# Patient Record
Sex: FEMALE | Race: WHITE | NOT HISPANIC OR LATINO | Employment: OTHER | URBAN - METROPOLITAN AREA
[De-identification: names, ages, dates, MRNs, and addresses within clinical notes are randomized per-mention and may not be internally consistent; named-entity substitution may affect disease eponyms.]

---

## 2017-01-16 ENCOUNTER — HOSPITAL ENCOUNTER (EMERGENCY)
Facility: OTHER | Age: 57
Discharge: HOME | End: 2017-01-16
Attending: EMERGENCY MEDICINE
Payer: MEDICARE

## 2017-01-16 VITALS
DIASTOLIC BLOOD PRESSURE: 75 MMHG | BODY MASS INDEX: 43.43 KG/M2 | OXYGEN SATURATION: 99 % | WEIGHT: 230 LBS | HEART RATE: 74 BPM | HEIGHT: 61 IN | TEMPERATURE: 99 F | RESPIRATION RATE: 18 BRPM | SYSTOLIC BLOOD PRESSURE: 122 MMHG

## 2017-01-16 DIAGNOSIS — R52 PAIN: ICD-10-CM

## 2017-01-16 PROCEDURE — 99283 EMERGENCY DEPT VISIT LOW MDM: CPT

## 2017-01-16 NOTE — ED AVS SNAPSHOT
[facility] EMERGENCY DEPARTMENT  [address] [place] [address]               Robert Smith   2017 10:45 PM   ED    Description:  Female : 1960   Department:  [facility] Emergency Department           Your Care was Coordinated By:     Provider Role From To    Davis Barrett MD Attending Provider 17 [phone] --      Reason for Visit     Fall           Diagnoses this Visit        Comments    Pain           ED Disposition     ED Disposition Condition Comment    Discharge             To Do List           Follow-up Information     Follow up with Tani Chan MD In 3 days.    Specialty:  Family Medicine    Contact information:    [place] [address]  [phone]        [facility] On Call     [facility] On Call Nurse Care Line -  Assistance  Registered nurses in the [facility] provide clinical advisement, health education, appointment booking, and other advisory services.  Call for this free service at [phone].             Medications           Message regarding Medications     Verify the changes and/or additions to your medication regime listed below are the same as discussed with your clinician today.  If any of these changes or additions are incorrect, please notify your healthcare provider.        STOP taking these medications     butalbital-acetaminophen-caffeine -40 mg (FIORICET, ESGIC) -40 mg per tablet     busPIRone (BUSPAR) 7.5 MG tablet     amitriptyline (ELAVIL) 25 MG tablet 25 mg every evening.     aspirin 325 MG tablet Take 1 tablet (325 mg total) by mouth 2 (two) times daily.           Verify that the below list of medications is an accurate representation of the medications you are currently taking.  If none reported, the list may be blank. If incorrect, please contact your healthcare provider. Carry this list with you in case of emergency.           Current Medications     rivaroxaban (XARELTO) 20 mg Tab Take 20 mg by  "mouth daily with dinner or evening meal.    ADVAIR DISKUS 250-50 mcg/dose diskus inhaler     albuterol sulfate 2.5 mg/0.5 mL Nebu     alprazolam (XANAX) 0.5 MG tablet Take 0.5 mg by mouth 3 (three) times daily as needed.      cyclobenzaprine (FLEXERIL) 10 MG tablet 10 mg every evening.     DOC-Q-LACE 100 mg capsule TAKE one capsule by mouth once a day    esomeprazole (NEXIUM) 40 MG capsule Take 40 mg by mouth before breakfast.      fluticasone (FLONASE) 50 mcg/actuation nasal spray as needed.     gabapentin (NEURONTIN) 300 MG capsule 300 mg 3 (three) times daily.     hydrocodone-acetaminophen 10-325mg (NORCO)  mg Tab     hydroxychloroquine (PLAQUENIL) 200 mg tablet Take 1 tablet (200 mg total) by mouth 2 (two) times daily.    levocetirizine (XYZAL) 5 MG tablet 5 mg every evening.     meclizine (ANTIVERT) 25 mg tablet     PAZEO 0.7 % Drop     predniSONE (DELTASONE) 5 MG tablet 5 mg.     promethazine (PHENERGAN) 12.5 MG Tab Take 1 tablet (12.5 mg total) by mouth every 6 (six) hours as needed (for nausea).    SODIUM CHLORIDE FOR INHALATION (SODIUM CHLORIDE 0.9%) 0.9 % nebulizer solution     tizanidine (ZANAFLEX) 4 MG tablet 4 mg as needed.     tofacitinib (XELJANZ XR) 11 mg Tb24 Take 11 mg by mouth once daily.           Clinical Reference Information           Your Vitals Were     BP Pulse Temp Resp Height Weight    122/75 74 99.3 °F (37.4 °C) (Oral) 18 5' 1" (1.549 m) 104.3 kg (230 lb)    SpO2 BMI             99% 43.46 kg/m2         Allergies as of 1/16/2017        Reactions    Sulfa (Sulfonamide Antibiotics) Nausea And Vomiting    Clindamycin Diarrhea    Daypro [Oxaprozin] Nausea Only    Enbrel [Etanercept] Other (See Comments)    Other reaction(s): odd thoughts  Odd thoughts    Orencia  [Abatacept]     Other reaction(s): Muscle pain    Percocet  [Oxycodone-acetaminophen]     Other reaction(s): Vomiting    Simponi  [Golimumab]     Other reaction(s): Unknown    Ciprofloxacin Rash    Sulfamethoxazole-trimethoprim " Rash      Immunizations Administered on Date of Encounter - 1/16/2017     None      ED Micro, Lab, POCT     None      ED Imaging Orders     Start Ordered       Status Ordering Provider    01/16/17 2254 01/16/17 2253  X-Ray Lumbar Spine Ap And Lateral  1 time imaging      In process       Discharge References/Attachments     BACK PAIN (ACUTE OR CHRONIC) (ENGLISH)      Your Scheduled Appointments     Jan 23, 2017 10:45 AM CST   Established Patient Visit with MD Deepak Farias - Orthopedics ([facility] )    [address]  [facility] [address]   [phone]               [facility] Emergency Department complies with applicable Federal civil rights laws and does not discriminate on the basis of race, color, national origin, age, disability, or sex.        Language Assistance Services     ATTENTION: Language assistance services are available, free of charge. Please call [phone].      ATENCIÓN: Si habla español, tiene a holland disposición servicios gratuitos de asistencia lingüística. Llame al [phone].     CHÚ Ý: N?u b?n nói Ti?ng Vi?t, có các d?ch v? h? tr? ngôn ng? mi?n phí dành cho b?n. G?i s? [phone].

## 2017-01-17 NOTE — ED PROVIDER NOTES
Encounter Date: 1/16/2017       History   No chief complaint on file.    Review of patient's allergies indicates:   Allergen Reactions    Sulfa (sulfonamide antibiotics) Nausea And Vomiting    Clindamycin Diarrhea    Daypro [oxaprozin] Nausea Only    Enbrel [etanercept] Other (See Comments)     Other reaction(s): odd thoughts  Odd thoughts    Orencia  [abatacept]      Other reaction(s): Muscle pain    Percocet  [oxycodone-acetaminophen]      Other reaction(s): Vomiting    Simponi  [golimumab]      Other reaction(s): Unknown    Ciprofloxacin Rash    Sulfamethoxazole-trimethoprim Rash     Patient is a 56 y.o. female presenting with the following complaint: back pain. The history is provided by the patient.   Back Pain    This is a new problem. The current episode started just prior to arrival. The problem occurs constantly. The problem has been unchanged. The pain is associated with falling (Tripped up by dog and fell on buttocks). The pain is present in the gluteal region. The quality of the pain is described as stabbing. The pain does not radiate. The pain is at a severity of 4/10. The symptoms are aggravated by bending, twisting and certain positions. The pain is the same all the time. Pertinent negatives include no bowel incontinence, no perianal numbness, no bladder incontinence, no paresthesias, no paresis, no tingling and no weakness. She has tried nothing for the symptoms. The treatment provided no relief. Risk factors include obesity, lack of exercise, poor posture and a sedentary lifestyle.     Past Medical History   Diagnosis Date    Acid reflux     Allergy     Anxiety     Arthritis     Degenerative disc disease     Depression     DVT of leg (deep venous thrombosis) 2009     rt leg    Fibromyalgia     Long-term current use of steroids 11/12/2012    Osteoporosis, postmenopausal      chronic steroid use    RA (rheumatoid arthritis)     Rheumatoid arteritis 2003     Past Medical History  Pertinent Negatives   Diagnosis Date Noted    *Atrial fibrillation 9/24/2012    Alopecia 9/24/2012    Asthma 9/24/2012    Blood transfusion 9/24/2012    Cataract 9/24/2012    CHF (congestive heart failure) 9/24/2012    Chronic kidney disease 9/24/2012    Clotting disorder 9/24/2012    COPD (chronic obstructive pulmonary disease) 9/24/2012    DEMENTIA 9/24/2012    Diabetes mellitus 9/24/2012    Dry eyes 9/24/2012    Dry mouth 9/24/2012    Emphysema of lung 9/24/2012    Glaucoma 9/24/2012    Heart murmur 9/24/2012    HIV infection 9/24/2012    Meningitis 9/24/2012    Myocardial infarction 9/24/2012    Neuromuscular disorder 9/24/2012    Osteoporosis 9/24/2012    Pulmonary embolism 9/24/2012    Scalp tenderness 9/24/2012    Seizures 9/24/2012    Sickle cell anemia 9/24/2012    Stroke 9/24/2012    Substance abuse 9/24/2012    Thyroid disease 9/24/2012    Tuberculosis 9/24/2012    Ulcer 9/24/2012     Past Surgical History   Procedure Laterality Date    Back surgery      Tonsillectomy      Spine surgery      Tubal ligation      Cholecystectomy      Ankle fracture surgery      Hardware removal      Carpal tunnel release      Joint replacement Right      right knee    Knee surgery       Bilateral    Knee surgery Left      revision x 2     Family History   Problem Relation Age of Onset    COPD Mother     Heart attack Father     Emphysema Maternal Grandfather     Breast cancer Neg Hx     Colon cancer Neg Hx     Ovarian cancer Neg Hx      Social History   Substance Use Topics    Smoking status: Never Smoker    Smokeless tobacco: Never Used    Alcohol use No     Review of Systems   Constitutional: Negative.    HENT: Negative.    Eyes: Negative.    Respiratory: Negative.    Cardiovascular: Negative.    Gastrointestinal: Negative.  Negative for bowel incontinence.   Endocrine: Negative.    Genitourinary: Negative.  Negative for bladder incontinence.   Musculoskeletal: Positive for  back pain.   Skin: Negative.    Allergic/Immunologic: Negative.    Neurological: Negative.  Negative for tingling, weakness and paresthesias.   Hematological: Negative.    Psychiatric/Behavioral: Negative.    All other systems reviewed and are negative.      Physical Exam   Initial Vitals   BP Pulse Resp Temp SpO2   -- -- -- -- --            Physical Exam    Nursing note and vitals reviewed.  Constitutional: Vital signs are normal. She appears well-developed. She is active and cooperative.   HENT:   Head: Normocephalic and atraumatic.   Eyes: Conjunctivae, EOM and lids are normal. Pupils are equal, round, and reactive to light.   Neck: Trachea normal and full passive range of motion without pain. Neck supple. No thyroid mass present.   Cardiovascular: Normal rate, regular rhythm, S1 normal, S2 normal, normal heart sounds, intact distal pulses and normal pulses.   Abdominal: Soft. Normal appearance, normal aorta and bowel sounds are normal.   Musculoskeletal:        Lumbar back: She exhibits decreased range of motion, tenderness and spasm. She exhibits no bony tenderness, no swelling and no deformity.   Lymphadenopathy:     She has no axillary adenopathy.   Neurological: She is alert and oriented to person, place, and time.   Skin: Skin is warm, dry and intact.   Psychiatric: She has a normal mood and affect. Her speech is normal and behavior is normal. Judgment and thought content normal. Cognition and memory are normal.         ED Course   Procedures  Labs Reviewed - No data to display          Medical Decision Making:   Clinical Tests:   Radiological Study: Ordered and Reviewed  ED Management:  X-ray of the LS-spine is unremarkable  The patient already has narcotic pain medication muscle relaxers at home.                   ED Course     Clinical Impression:   The encounter diagnosis was Pain.          Davis Barrett MD  01/16/17 0509

## 2017-01-17 NOTE — ED TRIAGE NOTES
Pt presents to ER with c/o lower back pain and rt wrist pain after tripping over a puppy and falling earlier.  She has mild swelling to rt wrist and stiffness to back.  Was ambulatory to exam room.

## 2017-01-23 ENCOUNTER — OFFICE VISIT (OUTPATIENT)
Dept: ORTHOPEDICS | Facility: CLINIC | Age: 57
End: 2017-01-23
Payer: MEDICARE

## 2017-01-23 VITALS
HEIGHT: 61 IN | BODY MASS INDEX: 43.93 KG/M2 | HEART RATE: 76 BPM | RESPIRATION RATE: 18 BRPM | WEIGHT: 232.69 LBS | DIASTOLIC BLOOD PRESSURE: 83 MMHG | SYSTOLIC BLOOD PRESSURE: 129 MMHG

## 2017-01-23 DIAGNOSIS — Z96.652 STATUS POST REVISION OF TOTAL REPLACEMENT OF LEFT KNEE: Primary | ICD-10-CM

## 2017-01-23 DIAGNOSIS — E66.01 OBESITY, MORBID, BMI 40.0-49.9: ICD-10-CM

## 2017-01-23 PROCEDURE — 1159F MED LIST DOCD IN RCRD: CPT | Mod: S$GLB,,, | Performed by: ORTHOPAEDIC SURGERY

## 2017-01-23 PROCEDURE — 99999 PR PBB SHADOW E&M-EST. PATIENT-LVL III: CPT | Mod: PBBFAC,,, | Performed by: ORTHOPAEDIC SURGERY

## 2017-01-23 PROCEDURE — 99213 OFFICE O/P EST LOW 20 MIN: CPT | Mod: S$GLB,,, | Performed by: ORTHOPAEDIC SURGERY

## 2017-01-23 RX ORDER — ZOLPIDEM TARTRATE 10 MG/1
TABLET ORAL
Refills: 0 | COMMUNITY
Start: 2017-01-09 | End: 2017-12-20

## 2017-01-23 NOTE — PROGRESS NOTES
"Subjective:      Patient ID: Robert Smith is a 56 y.o. female.    Chief Complaint: left knee (follow up revision 9-8-16)  SHe is on Xarelto now.  HPI  Robert Smith has left knee pain.  The pain has improved. The pain is located in the medial aspect of the knee.  There is is radiation.  The pain radaites to the thigh.  There is not associated stiffness.   There is not catching and locking. The pain is described as burning. The pain is aggravated by standing and walking .  It is alleviated by rest.  There is associated back pain.  She fell recently and aggravated her back.  Her history, medications and problem list were reviewed.    Review of Systems   Constitution: Negative for chills, fever and night sweats.   HENT: Negative for headaches and hearing loss.    Eyes: Negative for blurred vision and double vision.   Cardiovascular: Negative for chest pain, claudication and leg swelling.   Respiratory: Negative for shortness of breath.    Endocrine: Negative for polydipsia, polyphagia and polyuria.   Hematologic/Lymphatic: Negative for adenopathy and bleeding problem. Does not bruise/bleed easily.   Skin: Negative for poor wound healing.   Musculoskeletal: Positive for joint pain.   Gastrointestinal: Negative for diarrhea and heartburn.   Genitourinary: Negative for bladder incontinence.   Neurological: Negative for focal weakness, numbness, paresthesias and sensory change.   Psychiatric/Behavioral: The patient is not nervous/anxious.    Allergic/Immunologic: Negative for persistent infections.         Objective:      Body mass index is 43.97 kg/(m^2).  Vitals:    01/23/17 1000   BP: 129/83   BP Location: Right arm   Patient Position: Sitting   BP Method: Automatic   Pulse: 76   Resp: 18   Weight: 105.6 kg (232 lb 11.1 oz)   Height: 5' 1" (1.549 m)           General    Constitutional: She is oriented to person, place, and time.   obese   HENT:   Head: Normocephalic and atraumatic.   Eyes: EOM are normal.   Cardiovascular: " Normal rate and regular rhythm.    Pulmonary/Chest: Effort normal.   Neurological: She is alert and oriented to person, place, and time.   Psychiatric: She has a normal mood and affect.     General Musculoskeletal Exam   Gait: normal       Right Knee Exam     Inspection   Erythema: absent  Scars: present  Swelling: absent  Effusion: effusion  Deformity: deformity  Bruising: absent    Tenderness   The patient is experiencing no tenderness.         Range of Motion   Extension: 0   Flexion: 110     Tests   Ligament Examination Lachman: normal (-1 to 2mm)   MCL - Valgus: normal (0 to 2mm)  LCL - Varus: normal  Patella   Passive Patellar Tilt: neutral    Other   Sensation: normal    Left Knee Exam     Inspection   Erythema: absent  Scars: present  Swelling: absent  Effusion: absent  Deformity: deformity  Bruising: absent    Tenderness   The patient tender to palpation of the medial retinaculum.    Range of Motion   Extension: 0   Flexion: 120     Tests   Stability Lachman: normal (-1 to 2mm)   MCL - Valgus: normal (0 to 2mm)  LCL - Varus: normal (0 to 2mm)  Patella   Passive Patellar Tilt: neutral    Other   Sensation: normal    Muscle Strength   Right Lower Extremity   Hip Abduction: 5/5   Quadriceps:  5/5   Hamstrin/5   Left Lower Extremity   Hip Abduction: 5/5   Quadriceps:  4/5   Hamstrin/5     Reflexes     Left Side  Quadriceps:  2+    Right Side   Quadriceps:  2+    Vascular Exam     Right Pulses  Dorsalis Pedis:      2+          Left Pulses  Dorsalis Pedis:      2+          Edema  Right Lower Leg: absent  Left Lower Leg: absent              Assessment:       Encounter Diagnosis   Name Primary?    Status post revision of total replacement of left knee Yes          Plan:       Robert was seen today for left knee.    Diagnoses and all orders for this visit:    Status post revision of total replacement of left knee      She is going to wait until her back pain improves and if her knee pain persists she will  call and  I will send her back to PT for strengthening.    Otherwise, f/u in three months with knee xrays

## 2017-01-24 DIAGNOSIS — M54.50 LUMBAR SPINE PAIN: Primary | ICD-10-CM

## 2017-02-01 ENCOUNTER — TELEPHONE (OUTPATIENT)
Dept: ORTHOPEDICS | Facility: CLINIC | Age: 57
End: 2017-02-01

## 2017-02-01 NOTE — TELEPHONE ENCOUNTER
----- Message from Germán Zamora sent at 2/1/2017  9:30 AM CST -----  Contact: patient  Pt called regarding experiencing pain in her rt knee and leg

## 2017-02-09 ENCOUNTER — HOSPITAL ENCOUNTER (OUTPATIENT)
Dept: RADIOLOGY | Facility: HOSPITAL | Age: 57
Discharge: HOME | End: 2017-02-09
Attending: ORTHOPAEDIC SURGERY
Payer: MEDICARE

## 2017-02-09 ENCOUNTER — INITIAL CONSULT (OUTPATIENT)
Dept: ORTHOPEDICS | Facility: CLINIC | Age: 57
End: 2017-02-09
Payer: MEDICARE

## 2017-02-09 VITALS
HEIGHT: 61 IN | HEART RATE: 74 BPM | SYSTOLIC BLOOD PRESSURE: 148 MMHG | DIASTOLIC BLOOD PRESSURE: 85 MMHG | BODY MASS INDEX: 42.76 KG/M2 | WEIGHT: 226.5 LBS

## 2017-02-09 DIAGNOSIS — M54.50 LUMBAR SPINE PAIN: ICD-10-CM

## 2017-02-09 DIAGNOSIS — Z98.1 HISTORY OF LUMBAR FUSION: Primary | ICD-10-CM

## 2017-02-09 DIAGNOSIS — Z96.652 STATUS POST REVISION OF TOTAL REPLACEMENT OF LEFT KNEE: ICD-10-CM

## 2017-02-09 PROCEDURE — 99214 OFFICE O/P EST MOD 30 MIN: CPT | Mod: S$GLB,,, | Performed by: PHYSICIAN ASSISTANT

## 2017-02-09 PROCEDURE — 99999 PR PBB SHADOW E&M-EST. PATIENT-LVL IV: CPT | Mod: PBBFAC,,, | Performed by: PHYSICIAN ASSISTANT

## 2017-02-09 PROCEDURE — 72100 X-RAY EXAM L-S SPINE 2/3 VWS: CPT | Mod: 26,,, | Performed by: RADIOLOGY

## 2017-02-09 NOTE — PROGRESS NOTES
DATE: 2/9/2017  PATIENT: Robert Smith    Supervising Physician: Rogers Rodríguez M.D.    CHIEF COMPLAINT: back pain    HISTORY:  Robert Smith is a 56 y.o. female s/p L5/S1 anterior spinal fusion more than 15 years ago by Dr. Bentley, s/p left TKA revision 6/2/2016 and 9/8/2016 by Dr. Patterson here for initial evaluation of low back pain (Back - 6, Leg - 0). The pain has been present for about a month.  She tripped over her dog and fell backwards, landing on her back.  The patient describes the pain as aching.  The pain is worse with sitting and standing and improved by rest. There is no associated numbness and tingling. There is subjective weakness in the bilateral lower extremities since her knee surgery. Prior treatments have included norco, gabapentin and zanaflex, but no physical therapy or ESIs.  She also has a PMH of fibromyalgia and rheumatoid arthritis for which she takes plaquenil and xeljanz.     The patient denies myelopathic symptoms such as handwriting changes or difficulty with buttons/coins/keys. Denies perineal paresthesias, bowel/bladder dysfunction.    PAST MEDICAL/SURGICAL HISTORY:  Past Medical History   Diagnosis Date    Acid reflux     Allergy     Anxiety     Arthritis     Degenerative disc disease     Depression     DVT of leg (deep venous thrombosis) 2009     rt leg    Fibromyalgia     Long-term current use of steroids 11/12/2012    Osteoporosis, postmenopausal      chronic steroid use    RA (rheumatoid arthritis)     Rheumatoid arteritis 2003     Past Surgical History   Procedure Laterality Date    Back surgery      Tonsillectomy      Spine surgery      Tubal ligation      Cholecystectomy      Ankle fracture surgery      Hardware removal      Carpal tunnel release      Joint replacement Right      right knee    Knee surgery       Bilateral    Knee surgery Left      revision x 2       Medications:   Current Outpatient Prescriptions on File Prior to Visit   Medication Sig  Dispense Refill    ADVAIR DISKUS 250-50 mcg/dose diskus inhaler       albuterol sulfate 2.5 mg/0.5 mL Nebu       alprazolam (XANAX) 0.5 MG tablet Take 0.5 mg by mouth 3 (three) times daily as needed.        esomeprazole (NEXIUM) 40 MG capsule Take 40 mg by mouth before breakfast.        fluticasone (FLONASE) 50 mcg/actuation nasal spray as needed.       gabapentin (NEURONTIN) 300 MG capsule 300 mg 3 (three) times daily.       hydrocodone-acetaminophen 10-325mg (NORCO)  mg Tab       hydroxychloroquine (PLAQUENIL) 200 mg tablet Take 1 tablet (200 mg total) by mouth 2 (two) times daily. 60 tablet 4    levocetirizine (XYZAL) 5 MG tablet 5 mg every evening.       meclizine (ANTIVERT) 25 mg tablet       PAZEO 0.7 % Drop       predniSONE (DELTASONE) 5 MG tablet 5 mg.       promethazine (PHENERGAN) 12.5 MG Tab Take 1 tablet (12.5 mg total) by mouth every 6 (six) hours as needed (for nausea). 30 tablet 0    rivaroxaban (XARELTO) 20 mg Tab Take 20 mg by mouth daily with dinner or evening meal.      SODIUM CHLORIDE FOR INHALATION (SODIUM CHLORIDE 0.9%) 0.9 % nebulizer solution       tofacitinib (XELJANZ XR) 11 mg Tb24 Take 11 mg by mouth once daily.      zolpidem (AMBIEN) 10 mg Tab TK 1 T PO QHS PRF SLEEP  0    cyclobenzaprine (FLEXERIL) 10 MG tablet 10 mg every evening.       DOC-Q-LACE 100 mg capsule TAKE one capsule by mouth once a day 30 capsule 3    tizanidine (ZANAFLEX) 4 MG tablet 4 mg as needed.       [DISCONTINUED] warfarin (COUMADIN) 1 MG tablet        No current facility-administered medications on file prior to visit.        Social History:   Social History     Social History    Marital status:      Spouse name: N/A    Number of children: N/A    Years of education: N/A     Occupational History    Not on file.     Social History Main Topics    Smoking status: Never Smoker    Smokeless tobacco: Never Used    Alcohol use No    Drug use: No    Sexual activity: No     Other  "Topics Concern    Not on file     Social History Narrative       REVIEW OF SYSTEMS:  Constitution: Negative. Negative for chills, fever and night sweats.   Cardiovascular: Negative for chest pain and syncope.   Respiratory: Negative for cough and shortness of breath.   Gastrointestinal: See HPI. Negative for nausea/vomiting. Negative for abdominal pain.  Genitourinary: See HPI. Negative for discoloration or dysuria.  Skin: Negative for dry skin, itching and rash.   Hematologic/Lymphatic: Negative for bleeding problem. Does not bruise/bleed easily.   Musculoskeletal: Negative for falls and muscle weakness.   Neurological: See HPI. No seizures.   Endocrine: Negative for polydipsia, polyphagia and polyuria.   Allergic/Immunologic: Negative for hives and persistent infections.     EXAM:  Visit Vitals    BP (!) 148/85 (BP Location: Right arm, Patient Position: Sitting, BP Method: Automatic)    Pulse 74    Ht 5' 1" (1.549 m)    Wt 102.7 kg (226 lb 8.4 oz)    BMI 42.8 kg/m2       General: The patient is a very pleasant 56 y.o. female in no apparent distress, the patient is oriented to person, place and time.  Psych: Normal mood and affect  HEENT: Vision grossly intact, hearing intact to the spoken word.  Lungs: Respirations unlabored.  Gait: Normal station and gait, no difficulty with toe or heel walk.   Skin: Dorsal lumbar skin negative for rashes, lesions, hairy patches and surgical scars. There is mild lumbar tenderness to palpation.  Range of motion: Lumbar range of motion is acceptable.  Spinal Balance: Global saggital and coronal spinal balance acceptable, not significant for scoliosis and kyphosis.  Musculoskeletal: No pain with the range of motion of the bilateral hips. No trochanteric tenderness to palpation.  Vascular: Bilateral lower extremities warm and well perfused, dorsalis pedis pulses 2+ bilaterally.  Neurological: Normal strength and tone in all major motor groups in the bilateral lower extremities. " Normal sensation to light touch in the L2-S1 dermatomes bilaterally.  Deep tendon reflexes symmetric 1+ in the bilateral lower extremities.  Negative Babinski bilaterally. Straight leg raise negative bilaterally.    IMAGING:      Today I personally reviewed AP, Lat and Flex/Ex  upright L-spine films that demonstrate L5/S1 anterior spinal fusion with plate and screws in place, no evidence of failure.      ASSESSMENT/PLAN:    Diagnoses and all orders for this visit:    History of lumbar fusion  -     Ambulatory Referral to Physical/Occupational Therapy    Status post revision of total replacement of left knee  -     Ambulatory Referral to Physical/Occupational Therapy      The patient is having back pain since falling about a month ago.  There is no evidence of hardware failure on imaging.  No neurologic deficits on exam.  We will treat conservatively for now with medications and physical therapy.  Order placed for PT at [facility].  Continue pain medications as prescribed by pain management.  Follow up after therapy if symptoms persist.       Return if symptoms worsen or fail to improve.

## 2017-02-09 NOTE — LETTER
February 9, 2017      Mohit Patterson MD  [address]  [place] [address]           [facility]  [address]  [facility] [address]  Phone: [phone]          Patient: Robert Smith   MR Number: [id number]   YOB: 1960   Date of Visit: 2/9/2017       Dear Dr. Mohit Patterson:    Thank you for referring Robert Smith to me for evaluation. Attached you will find relevant portions of my assessment and plan of care.    If you have questions, please do not hesitate to call me. I look forward to following Robert Smith along with you.    Sincerely,    Melany Macias PA-C    Enclosure  CC:  No Recipients    If you would like to receive this communication electronically, please contact [email or web address] or [phone] to request more information on [facility] [employer] access.    For providers and/or their staff who would like to refer a patient to [facility], please contact us through our one-stop-shop provider referral line, [facility], at [phone].    If you feel you have received this communication in error or would no longer like to receive these types of communications, please e-mail [email or web address]

## 2017-03-08 ENCOUNTER — CLINICAL SUPPORT (OUTPATIENT)
Dept: REHABILITATION | Facility: HOSPITAL | Age: 57
End: 2017-03-08
Attending: NURSE PRACTITIONER
Payer: MEDICARE

## 2017-03-08 DIAGNOSIS — M54.50 RIGHT-SIDED LOW BACK PAIN WITHOUT SCIATICA, UNSPECIFIED CHRONICITY: ICD-10-CM

## 2017-03-08 PROCEDURE — G8990 OTHER PT/OT CURRENT STATUS: HCPCS | Mod: CK,PN | Performed by: PHYSICAL THERAPIST

## 2017-03-08 PROCEDURE — 97162 PT EVAL MOD COMPLEX 30 MIN: CPT | Mod: PN | Performed by: PHYSICAL THERAPIST

## 2017-03-08 PROCEDURE — G8991 OTHER PT/OT GOAL STATUS: HCPCS | Mod: CK,PN | Performed by: PHYSICAL THERAPIST

## 2017-03-08 NOTE — PROGRESS NOTES
Physical Therapy Initial Evaluation     Name: Robert Smith  Clinic Number: [id number]    Diagnosis:   Encounter Diagnosis   Name Primary?    Right-sided low back pain without sciatica, unspecified chronicity      Physician: Rogers Rodríguez MD  Treatment Orders: PT Eval and Treat  Past Medical History:   Diagnosis Date    Acid reflux     Allergy     Anxiety     Arthritis     Degenerative disc disease     Depression     DVT of leg (deep venous thrombosis) 2009    rt leg    Fibromyalgia     Long-term current use of steroids 11/12/2012    Osteoporosis, postmenopausal     chronic steroid use    RA (rheumatoid arthritis)     Rheumatoid arteritis 2003     Current Outpatient Prescriptions   Medication Sig    ADVAIR DISKUS 250-50 mcg/dose diskus inhaler     albuterol sulfate 2.5 mg/0.5 mL Nebu     alprazolam (XANAX) 0.5 MG tablet Take 0.5 mg by mouth 3 (three) times daily as needed.      cyclobenzaprine (FLEXERIL) 10 MG tablet 10 mg every evening.     DOC-Q-LACE 100 mg capsule TAKE one capsule by mouth once a day    esomeprazole (NEXIUM) 40 MG capsule Take 40 mg by mouth before breakfast.      fluticasone (FLONASE) 50 mcg/actuation nasal spray as needed.     gabapentin (NEURONTIN) 300 MG capsule 300 mg 3 (three) times daily.     hydrocodone-acetaminophen 10-325mg (NORCO)  mg Tab     hydroxychloroquine (PLAQUENIL) 200 mg tablet Take 1 tablet (200 mg total) by mouth 2 (two) times daily.    levocetirizine (XYZAL) 5 MG tablet 5 mg every evening.     meclizine (ANTIVERT) 25 mg tablet     PAZEO 0.7 % Drop     predniSONE (DELTASONE) 5 MG tablet 5 mg.     promethazine (PHENERGAN) 12.5 MG Tab Take 1 tablet (12.5 mg total) by mouth every 6 (six) hours as needed (for nausea).    rivaroxaban (XARELTO) 20 mg Tab Take 20 mg by mouth daily with dinner or evening meal.    SODIUM CHLORIDE FOR INHALATION (SODIUM CHLORIDE 0.9%) 0.9 % nebulizer solution      tizanidine (ZANAFLEX) 4 MG tablet 4 mg as needed.     tofacitinib (XELJANZ XR) 11 mg Tb24 Take 11 mg by mouth once daily.    zolpidem (AMBIEN) 10 mg Tab TK 1 T PO QHS PRF SLEEP    [DISCONTINUED] warfarin (COUMADIN) 1 MG tablet      No current facility-administered medications for this visit.      Review of patient's allergies indicates:   Allergen Reactions    Sulfa (sulfonamide antibiotics) Nausea And Vomiting    Clindamycin Diarrhea    Daypro [oxaprozin] Nausea Only    Enbrel [etanercept] Other (See Comments)     Other reaction(s): odd thoughts  Odd thoughts    Orencia  [abatacept]      Other reaction(s): Muscle pain    Percocet  [oxycodone-acetaminophen]      Other reaction(s): Vomiting    Simponi  [golimumab]      Other reaction(s): Unknown    Ciprofloxacin Rash    Sulfamethoxazole-trimethoprim Rash           Start Time:  1215  Stop Time:  1315  Total Timed Minutes:  60      OUTPATIENT PHYSICAL THERAPY   PATIENT EVALUATION      Subjective     Patient states:  Low back pain mainly right sided that is intermittent.   Onset: sudden, 1/2017. Tripped over her dog and fell backwards. Immediate pain. Currently improved.   Radicular symptoms:  No   Aggravating factors:  Standing for a prolonged period of time or sitting also for a prolonged period   Easing factors:  Lye down or walk, change of positions.  Pain Scale: Patient rates pain on a scale of 0-10 to be  0 currently   4 at worst ;   0 at best .    Prior Therapy: yes, left knee. Last session 12/2018   Home Environment (Steps/Adaptations): no stairs   Functional Deficits Leading to Referral:   Personal - no limitation   Domestic - mopping, stand to cook only for 10 minutes, bending to get pots in cabinets   Community / social  - no limitation from low back pain   Recreational / Fitness / Sports - has not participated   Prior functional status: patient had been limited due to hospitalization for blood clots in the left LE                   Pts goals:   "To be pain free and be able to do the "good stuff"  Past History: Blood clots x3 12/2016. Treated with meds to resolve the clots. Presently there is resolution of the clots. Bilateral TKR, the first was the left in '09 and the second on the right four months later. The right knee became infected following the surgery. Two years later, in 2011, a second surgery was performed on the right knee to replace the knee prosthesis. While in therapy for the knee she stepped off a piece of exercise equipment awarkwardly falling and fracturing the right ankle. Surgery was performed, ORIF, on the same day. She relates to having back surgery fifteen years ago for a lumbar fusion    Objective     Posture Alignment: forward head, increased upper and mid thoracic kyphosis, reduced lumbar lordosis and right lateral shift.   Sensation: Light Touch: Intact  Palpation: pain over L4-5, right TL PSM and superior gluteal region.      Lumbar/Thoracic AROM: Pain/Dysfunction with Movement:   Flexion                                75 / 40 DP   Extension                           30/20 DP   Right side bending                 30 P    Left side bending                    40 NP   Right rotation DP    Left rotation DN       LOWER EXTREMITY STRENGTH:   Left Right   Quadriceps 5/5 5/5   Hamstrings 4+/5 4+/5     EHL  5/5 5/5   Anterior tibialis 4+/5 4+/5   Peroneals   4+/5 4+/5   Iliopsoas   3+/5 3+/5   Hip Ext 3/5 3+/5       DTR's:   Left Right   Patella Tendon 1+ 1+   Achilles Tendon 1+ 1+           Selective Functional Movement Assessment:  FN: functional, non-painful  FP: functional, painful  DP: dysfunctional, painful  DN: dysfunctional, non-painful  Multi-Segmental Flexion: see above  Multi-Segmental Extension: see above   Multi-Segmental Rotation:   Right:see above   Left:see above     FLEXIBILITY  Hamstrings 90 BLE    Hip flexors ( psoas)   BLE   moderate        IT band normal BLE         SEGMENTAL MOBILITY: hypomobility of the lumbar " segments  Special Tests   Left Right   SLR Neg   Neg        Lasegue Neg  Neg    Nas Neg  Neg        GAIT: ambulates with no assistive device with independently.     GAIT DEVIATIONS: displays no significant deviation associated with her low back.    Pt/family was provided educational information, including: role of PT, goals for PT, scheduling - pt verbalized understanding.     TREATMENT     PT Evaluation Completed? Yes  Discussed Plan of Care with patient: Yes      Assessment     Patient is referred with low back pain. Clinical findings demonstrate no neurological deficits. There is limited trunk mobility likely related decreased segmental mobility and tightness in the back extensors. Also with noted weakness in the core stabilizers and trunk mobilizers.   Pt presents with signs and symptoms consistent with referring diagnosis. Evaluation has determined a decrease in functional status and subjective and objective deficits that can be addressed by physical therapy intervention. Pt demonstrates pain limiting functional activities. Decreased flexibility and strength limiting normal movement patterns. Decreased segmental motion. Decreased postural strength and awareness.  Decreased participation in functional and recreational activities. Subjective and objective measures are addressed by goals in the plan of care. Patient/family are involved in the development of these goals. Patient/family are educated about current injury and treatment.    Pt prognosis is Good.  Pt will benefit from skilled outpatient physical therapy to address the above stated deficits, provide pt/family education and to maximize pt's level of independence. Pt's spiritual, cultural and educational needs considered and pt agreeable to plan of care and goals as stated below and currently has no barriers to learning       Medical necessity is demonstrated by the following IMPAIRMENTS/PROBLEMS:  weakness, impaired functional mobility, pain, decreased  ROM and decreasd segmental mobility of the lumbar segments, decreased flexibility bilateral hip flexors.         History  Co-morbidities and personal factors that may impact the plan of care Examination  Body Structures and Functions, activity limitations and participation restrictions that may impact the plan of care Clinical Presentation   Decision Making/ Complexity Score   Co-morbidities:  depression, osteoarthritis and fibromyalgia, s/p TKA BLE, s/p lumbar fusion, DVT    Personal Factors:   Concern for resumption of left knee rehab Body Regions: back    Body Systems: musculoskeletal and neuromuscular          Activity limitations: prolonged standing and sitting    Participation Restrictions:   mopping, stand to cook only for 10 minutes, bending to get pots in cabinets          Evolving clinical presentation with changing clinical characteristics.  Pain   Complexity:  Mod            Short Term GOALS: 6 weeks. Pt agrees with goals set.  1. Decreased pain 20-40%   2. Demonstrate spinal stability with core activation during transitional movements.   3. Improved sit to stand quality without back pain  4. Patient demonstrates independence with HEP.   5. Patient demonstrates independence with Postural Awareness.   6. Patient demonstrates independence with body mechanics.     Long Term GOALS: 12 weeks. Pt agrees with goals set.  1. Patient demonstrates increased LE flexibility in the hip flexors to improve tolerance to functional activities   2. Patient demonstrates increased strength in the trunk musculature and core strabilizers to improve tolerance to functional activities.   3. Patient demonstrates improved overall function and return demonstration to functional ADL associated with prolonged standing activity and recreational lifestyle  CMS Impairment/Limitation/Restriction for FOTO Lumbar Spine Survey  Status Limitation G-Code CMS Severity Modifier  Intake 47% 53% Current Status CK - At least 40 percent but less  than 60 percent  Predicted 60% 40% Goal Status+ CK - At least 40 percent but less than 60 percent  D/C Status CK **only report if this is a one time visit    PLAN     Outpatient physical therapy 1-2 times weekly to include: pt ed, hep, therapeutic exercises, neuromuscular re-education/ balance exercises, manual therapy,and modalities prn. Cont PT for 12 weeks. Pt may be seen by PTA as part of the rehabilitation team.   Certification Period 3/8/2017 to 6/8/2017        Therapist: Justo Michaels, PT  3/8/2017

## 2017-03-09 NOTE — PLAN OF CARE
Physical Therapy Initial Evaluation     Name: Robert Smith  Clinic Number: [id number]    Diagnosis:   Encounter Diagnosis   Name Primary?    Right-sided low back pain without sciatica, unspecified chronicity      Physician: Rogers Rodríguez MD  Treatment Orders: PT Eval and Treat  Past Medical History:   Diagnosis Date    Acid reflux     Allergy     Anxiety     Arthritis     Degenerative disc disease     Depression     DVT of leg (deep venous thrombosis) 2009    rt leg    Fibromyalgia     Long-term current use of steroids 11/12/2012    Osteoporosis, postmenopausal     chronic steroid use    RA (rheumatoid arthritis)     Rheumatoid arteritis 2003     Current Outpatient Prescriptions   Medication Sig    ADVAIR DISKUS 250-50 mcg/dose diskus inhaler     albuterol sulfate 2.5 mg/0.5 mL Nebu     alprazolam (XANAX) 0.5 MG tablet Take 0.5 mg by mouth 3 (three) times daily as needed.      cyclobenzaprine (FLEXERIL) 10 MG tablet 10 mg every evening.     DOC-Q-LACE 100 mg capsule TAKE one capsule by mouth once a day    esomeprazole (NEXIUM) 40 MG capsule Take 40 mg by mouth before breakfast.      fluticasone (FLONASE) 50 mcg/actuation nasal spray as needed.     gabapentin (NEURONTIN) 300 MG capsule 300 mg 3 (three) times daily.     hydrocodone-acetaminophen 10-325mg (NORCO)  mg Tab     hydroxychloroquine (PLAQUENIL) 200 mg tablet Take 1 tablet (200 mg total) by mouth 2 (two) times daily.    levocetirizine (XYZAL) 5 MG tablet 5 mg every evening.     meclizine (ANTIVERT) 25 mg tablet     PAZEO 0.7 % Drop     predniSONE (DELTASONE) 5 MG tablet 5 mg.     promethazine (PHENERGAN) 12.5 MG Tab Take 1 tablet (12.5 mg total) by mouth every 6 (six) hours as needed (for nausea).    rivaroxaban (XARELTO) 20 mg Tab Take 20 mg by mouth daily with dinner or evening meal.    SODIUM CHLORIDE FOR INHALATION (SODIUM CHLORIDE 0.9%) 0.9 % nebulizer solution      tizanidine (ZANAFLEX) 4 MG tablet 4 mg as needed.     tofacitinib (XELJANZ XR) 11 mg Tb24 Take 11 mg by mouth once daily.    zolpidem (AMBIEN) 10 mg Tab TK 1 T PO QHS PRF SLEEP    [DISCONTINUED] warfarin (COUMADIN) 1 MG tablet      No current facility-administered medications for this visit.      Review of patient's allergies indicates:   Allergen Reactions    Sulfa (sulfonamide antibiotics) Nausea And Vomiting    Clindamycin Diarrhea    Daypro [oxaprozin] Nausea Only    Enbrel [etanercept] Other (See Comments)     Other reaction(s): odd thoughts  Odd thoughts    Orencia  [abatacept]      Other reaction(s): Muscle pain    Percocet  [oxycodone-acetaminophen]      Other reaction(s): Vomiting    Simponi  [golimumab]      Other reaction(s): Unknown    Ciprofloxacin Rash    Sulfamethoxazole-trimethoprim Rash           Start Time:  1215  Stop Time:  1315  Total Timed Minutes:  60      OUTPATIENT PHYSICAL THERAPY   PATIENT EVALUATION      Subjective     Patient states:  Low back pain mainly right sided that is intermittent.   Onset: sudden, 1/2017. Tripped over her dog and fell backwards. Immediate pain. Currently improved.   Radicular symptoms:  No   Aggravating factors:  Standing for a prolonged period of time or sitting also for a prolonged period   Easing factors:  Lye down or walk, change of positions.  Pain Scale: Patient rates pain on a scale of 0-10 to be  0 currently   4 at worst ;   0 at best .    Prior Therapy: yes, left knee. Last session 12/2018   Home Environment (Steps/Adaptations): no stairs   Functional Deficits Leading to Referral:   Personal - no limitation   Domestic - mopping, stand to cook only for 10 minutes, bending to get pots in cabinets   Community / social  - no limitation from low back pain   Recreational / Fitness / Sports - has not participated   Prior functional status: patient had been limited due to hospitalization for blood clots in the left LE                   Pts goals:   "To be pain free and be able to do the "good stuff"  Past History: Blood clots x3 12/2016. Treated with meds to resolve the clots. Presently there is resolution of the clots. Bilateral TKR, the first was the left in '09 and the second on the right four months later. The right knee became infected following the surgery. Two years later, in 2011, a second surgery was performed on the right knee to replace the knee prosthesis. While in therapy for the knee she stepped off a piece of exercise equipment awarkwardly falling and fracturing the right ankle. Surgery was performed, ORIF, on the same day. She relates to having back surgery fifteen years ago for a lumbar fusion    Objective     Posture Alignment: forward head, increased upper and mid thoracic kyphosis, reduced lumbar lordosis and right lateral shift.   Sensation: Light Touch: Intact  Palpation: pain over L4-5, right TL PSM and superior gluteal region.      Lumbar/Thoracic AROM: Pain/Dysfunction with Movement:   Flexion                                75 / 40 DP   Extension                           30/20 DP   Right side bending                 30 P    Left side bending                    40 NP   Right rotation DP    Left rotation DN       LOWER EXTREMITY STRENGTH:   Left Right   Quadriceps 5/5 5/5   Hamstrings 4+/5 4+/5     EHL  5/5 5/5   Anterior tibialis 4+/5 4+/5   Peroneals   4+/5 4+/5   Iliopsoas   3+/5 3+/5   Hip Ext 3/5 3+/5       DTR's:   Left Right   Patella Tendon 1+ 1+   Achilles Tendon 1+ 1+           Selective Functional Movement Assessment:  FN: functional, non-painful  FP: functional, painful  DP: dysfunctional, painful  DN: dysfunctional, non-painful  Multi-Segmental Flexion: see above  Multi-Segmental Extension: see above   Multi-Segmental Rotation:   Right:see above   Left:see above     FLEXIBILITY  Hamstrings 90 BLE    Hip flexors ( psoas)   BLE   moderate        IT band normal BLE         SEGMENTAL MOBILITY: hypomobility of the lumbar " segments  Special Tests   Left Right   SLR Neg   Neg        Lasegue Neg  Neg    Nas Neg  Neg        GAIT: ambulates with no assistive device with independently.     GAIT DEVIATIONS: displays no significant deviation associated with her low back.    Pt/family was provided educational information, including: role of PT, goals for PT, scheduling - pt verbalized understanding.     TREATMENT     PT Evaluation Completed? Yes  Discussed Plan of Care with patient: Yes      Assessment     Patient is referred with low back pain. Clinical findings demonstrate no neurological deficits. There is limited trunk mobility likely related decreased segmental mobility and tightness in the back extensors. Also with noted weakness in the core stabilizers and trunk mobilizers.   Pt presents with signs and symptoms consistent with referring diagnosis. Evaluation has determined a decrease in functional status and subjective and objective deficits that can be addressed by physical therapy intervention. Pt demonstrates pain limiting functional activities. Decreased flexibility and strength limiting normal movement patterns. Decreased segmental motion. Decreased postural strength and awareness.  Decreased participation in functional and recreational activities. Subjective and objective measures are addressed by goals in the plan of care. Patient/family are involved in the development of these goals. Patient/family are educated about current injury and treatment.    Pt prognosis is Good.  Pt will benefit from skilled outpatient physical therapy to address the above stated deficits, provide pt/family education and to maximize pt's level of independence. Pt's spiritual, cultural and educational needs considered and pt agreeable to plan of care and goals as stated below and currently has no barriers to learning       Medical necessity is demonstrated by the following IMPAIRMENTS/PROBLEMS:  weakness, impaired functional mobility, pain, decreased  ROM and decreasd segmental mobility of the lumbar segments, decreased flexibility bilateral hip flexors.         History  Co-morbidities and personal factors that may impact the plan of care Examination  Body Structures and Functions, activity limitations and participation restrictions that may impact the plan of care Clinical Presentation   Decision Making/ Complexity Score   Co-morbidities:  depression, osteoarthritis and fibromyalgia, s/p TKA BLE, s/p lumbar fusion, DVT    Personal Factors:   Concern for resumption of left knee rehab Body Regions: back    Body Systems: musculoskeletal and neuromuscular          Activity limitations: prolonged standing and sitting    Participation Restrictions:   mopping, stand to cook only for 10 minutes, bending to get pots in cabinets          Evolving clinical presentation with changing clinical characteristics.  Pain   Complexity:  Mod            Short Term GOALS: 6 weeks. Pt agrees with goals set.  1. Decreased pain 20-40%   2. Demonstrate spinal stability with core activation during transitional movements.   3. Improved sit to stand quality without back pain  4. Patient demonstrates independence with HEP.   5. Patient demonstrates independence with Postural Awareness.   6. Patient demonstrates independence with body mechanics.     Long Term GOALS: 12 weeks. Pt agrees with goals set.  1. Patient demonstrates increased LE flexibility in the hip flexors to improve tolerance to functional activities   2. Patient demonstrates increased strength in the trunk musculature and core strabilizers to improve tolerance to functional activities.   3. Patient demonstrates improved overall function and return demonstration to functional ADL associated with prolonged standing activity and recreational lifestyle  CMS Impairment/Limitation/Restriction for FOTO Lumbar Spine Survey  Status Limitation G-Code CMS Severity Modifier  Intake 47% 53% Current Status CK - At least 40 percent but less  than 60 percent  Predicted 60% 40% Goal Status+ CK - At least 40 percent but less than 60 percent  D/C Status CK **only report if this is a one time visit    PLAN     Outpatient physical therapy 1-2 times weekly to include: pt ed, hep, therapeutic exercises, neuromuscular re-education/ balance exercises, manual therapy,and modalities prn. Cont PT for 12 weeks. Pt may be seen by PTA as part of the rehabilitation team.   Certification Period 3/8/2017 to 6/8/2017        Therapist: Justo Michaels, PT  3/8/2017

## 2017-03-13 ENCOUNTER — CLINICAL SUPPORT (OUTPATIENT)
Dept: REHABILITATION | Facility: HOSPITAL | Age: 57
End: 2017-03-13
Attending: NURSE PRACTITIONER
Payer: MEDICARE

## 2017-03-13 DIAGNOSIS — M54.50 RIGHT-SIDED LOW BACK PAIN WITHOUT SCIATICA, UNSPECIFIED CHRONICITY: Primary | ICD-10-CM

## 2017-03-13 DIAGNOSIS — Z74.09 IMPAIRED FUNCTIONAL MOBILITY, BALANCE, GAIT, AND ENDURANCE: ICD-10-CM

## 2017-03-13 PROCEDURE — 97110 THERAPEUTIC EXERCISES: CPT | Mod: PN

## 2017-03-13 PROCEDURE — 97140 MANUAL THERAPY 1/> REGIONS: CPT | Mod: PN

## 2017-03-13 NOTE — PROGRESS NOTES
Physical Therapy Daily Note     Name: Robert Smith  Clinic Number: [id number]  Diagnosis:   Encounter Diagnoses   Name Primary?    Right-sided low back pain without sciatica, unspecified chronicity Yes    Impaired functional mobility, balance, gait, and endurance      Physician: Rogers Rodríguez MD    Time In: 0945  Time Out: 1045  Visit #: 2 of 20  POC Exp: 3/8/2017 to 6/8/2017  PTA Visit #: 1      Subjective     Pt reports: that she is having some back pain today.  Patient reports taking her pain medicine prior to the start of therapy.  Patient reports increased dizziness today due to complaints of having fluid build up in her ears.  Pain Scale: Robert rates pain on a scale of 0-10 to be 5 currently.    Objective     Robert received individual therapeutic exercises to develop strength, endurance, ROM, flexibility, posture and core stabilization for 40 minutes including:      SUPINE:  LTR with PB x 2'   DKTC with PB x 2'   TrA recruitment with Kegals  2' x 5'' hold   HL hip adduction ball squeeze 3' x 3''   HL hip abduction with GTB x 3'   SLR with TRA activation 2 x 10 ea LE   Hamstring stretch 3 x 30'' ea LE     PRONE  Sustained extension x 3'   Hip extension x 10 ea LE       SIDELYING  Open Books x 15 ea side       STANDING       Robert received the following manual therapy techniques: Soft tissue Mobilization were applied to the: right sided low back  for 10 minutes      The patient received the following supervised modalities after being cleared for contradictions: moist hot pack x 10' with physioball under knees    Written Home Exercises Provided: LTR  Pt demo good understanding of the education provided. Robert demonstrated good return demonstration of activities.     Education provided re:  Robert verbalized good understanding of education provided.   No spiritual or educational barriers to learning provided    Assessment     Patient tolerated session well.  Patient  with good tolerance to core strengthening exercises without provocation of pain.  Patient with increased tissue restrictions palpated throughout the right sided lumbar paraspinals, piriformis and superior gluteal musculature.  Patient with good response to manual care with decreased pain reported post treatment session.   This is a 56 y.o. female referred to outpatient physical therapy and presents with a medical diagnosis of right sided low back pain and demonstrates limitations as described in the problem list. Pt prognosis is Good. Pt will continue to benefit from skilled outpatient physical therapy to address the deficits listed in the problem list, provide pt/family education and to maximize pt's level of independence in the home and community environment.       Short Term GOALS: 6 weeks. Pt agrees with goals set.  1. Decreased pain 20-40%   2. Demonstrate spinal stability with core activation during transitional movements.   3. Improved sit to stand quality without back pain  4. Patient demonstrates independence with HEP.   5. Patient demonstrates independence with Postural Awareness.   6. Patient demonstrates independence with body mechanics.      Plan     Continue with established Plan of Care towards PT goals.    Therapist: Rosaura Crowell, JEFF  3/13/2017

## 2017-03-16 ENCOUNTER — CLINICAL SUPPORT (OUTPATIENT)
Dept: REHABILITATION | Facility: HOSPITAL | Age: 57
End: 2017-03-16
Attending: NURSE PRACTITIONER
Payer: MEDICARE

## 2017-03-16 DIAGNOSIS — Z96.652 S/P REVISION OF TOTAL KNEE, LEFT: ICD-10-CM

## 2017-03-16 DIAGNOSIS — Z74.09 IMPAIRED FUNCTIONAL MOBILITY, BALANCE, GAIT, AND ENDURANCE: ICD-10-CM

## 2017-03-16 DIAGNOSIS — M54.50 RIGHT-SIDED LOW BACK PAIN WITHOUT SCIATICA, UNSPECIFIED CHRONICITY: Primary | ICD-10-CM

## 2017-03-16 DIAGNOSIS — M25.662 DECREASED ROM OF LEFT KNEE: ICD-10-CM

## 2017-03-16 DIAGNOSIS — M62.81 QUADRICEPS WEAKNESS: ICD-10-CM

## 2017-03-16 PROCEDURE — 97110 THERAPEUTIC EXERCISES: CPT | Mod: PN

## 2017-03-16 NOTE — PROGRESS NOTES
Physical Therapy Daily Note     Name: Robert Smith  Clinic Number: [id number]  Diagnosis:   Encounter Diagnoses   Name Primary?    Right-sided low back pain without sciatica, unspecified chronicity Yes    Impaired functional mobility, balance, gait, and endurance     S/P revision of total knee, left     Quadriceps weakness     Decreased ROM of left knee      Physician: Rogers Rodríguez MD    Time In: 1055  Time Out:  1200  Visit #: 3 of 20  POC Exp: 3/8/2017 to 6/8/2017  PTA Visit #: 2      Subjective     Pt reports: that she is feeling somewhat better today.  Patient states that she is having more pain in her knees today than her back.    Pain Scale: Robert rates pain on a scale of 0-10 to be 4 currently.    Objective     Robert received individual therapeutic exercises to develop strength, endurance, ROM, flexibility, posture and core stabilization for 50 minutes including:      Nustep x 10'       SUPINE:  LTR with PB x 2'   DKTC with PB x 2'   TrA recruitment with Kegals  2' x 5'' hold   HL hip adduction ball squeeze 3' x 3''   HL hip abduction with GTB x 3'   SLR with TRA activation 2 x 10 ea LE   Hamstring stretch 3 x 30'' ea LE   Bridges 2 x 10     PRONE  Sustained extension x 3'   + Prone press ups with OP 1 x 10   Hip extension 1 x 15 ea LE       SIDELYING  Open Books x 15 ea side       STANDING   SEATED    Robert received the following manual therapy techniques: Soft tissue Mobilization were applied to the: right sided low back  for 5 minutes    The patient received the following supervised modalities after being cleared for contradictions: moist hot pack x 10' with LE over wedge      Written Home Exercises Provided: LTR, DKTC, TrA activation, HL hip abd/add, SLR   Pt demo good understanding of the education provided. Robert demonstrated good return demonstration of activities.     Education provided re: compliance with HEP to progress towards   Robert verbalized  good understanding of education provided.   No spiritual or educational barriers to learning provided    Assessment     Patient tolerated session well.  Patient with good tolerance to progression of exercises with appropriate training effect achieved.  Patient with improved tissue extensibility palpated this session.    This is a 56 y.o. female referred to outpatient physical therapy and presents with a medical diagnosis of right sided low back pain and demonstrates limitations as described in the problem list. Pt prognosis is Good. Pt will continue to benefit from skilled outpatient physical therapy to address the deficits listed in the problem list, provide pt/family education and to maximize pt's level of independence in the home and community environment.       Short Term GOALS: 6 weeks. Pt agrees with goals set.  1. Decreased pain 20-40%   2. Demonstrate spinal stability with core activation during transitional movements.   3. Improved sit to stand quality without back pain  4. Patient demonstrates independence with HEP.   5. Patient demonstrates independence with Postural Awareness.   6. Patient demonstrates independence with body mechanics.      Plan     Continue with established Plan of Care towards PT goals.    Therapist: Rosaura Crowell, JEFF  3/16/2017

## 2017-03-21 ENCOUNTER — CLINICAL SUPPORT (OUTPATIENT)
Dept: REHABILITATION | Facility: HOSPITAL | Age: 57
End: 2017-03-21
Attending: NURSE PRACTITIONER
Payer: MEDICARE

## 2017-03-21 DIAGNOSIS — M54.50 RIGHT-SIDED LOW BACK PAIN WITHOUT SCIATICA, UNSPECIFIED CHRONICITY: Primary | ICD-10-CM

## 2017-03-21 DIAGNOSIS — Z74.09 IMPAIRED FUNCTIONAL MOBILITY, BALANCE, GAIT, AND ENDURANCE: ICD-10-CM

## 2017-03-21 DIAGNOSIS — M62.81 QUADRICEPS WEAKNESS: ICD-10-CM

## 2017-03-21 DIAGNOSIS — Z96.652 S/P REVISION OF TOTAL KNEE, LEFT: ICD-10-CM

## 2017-03-21 DIAGNOSIS — M25.662 DECREASED ROM OF LEFT KNEE: ICD-10-CM

## 2017-03-21 PROCEDURE — 97110 THERAPEUTIC EXERCISES: CPT | Mod: PN | Performed by: PHYSICAL THERAPIST

## 2017-03-21 NOTE — PROGRESS NOTES
"    Name: Robert Smith  Clinic Number: [id number]  Date of Treatment: 03/21/2017   Diagnosis:   Encounter Diagnoses   Name Primary?    Right-sided low back pain without sciatica, unspecified chronicity Yes    Impaired functional mobility, balance, gait, and endurance     S/P revision of total knee, left     Quadriceps weakness     Decreased ROM of left knee        Time in: 1200  Time Out: 1300  Total Treatment Time: 60    VISITS / SPENCE: 4/20 - 6/8/17  BOLD=INDICATES ACTIVITIES PERFORMED.      Subjective  Patient states "that she is not feeling well today but her back is feeling a little bit better. She currently has 3/10 pain which increases during standing.      Objective    Treatment: Patient  was instructed in and performed therapeutic exercises to develop strength, ROM, flexibility, balance and core stabilization. Patient performed therapeutic exercises consisting of the following      Leg press   Recumbent bike x 10'  Upright bike   UE ergometer  Treadmill   Elliptical       THERAPEUTIC EXERCISE  SUPINE  LTR with PB x 2'   SKTC with opp arm x 20  DKTC with bilateral arm 3 x 5  Pelvic tilts 20 x 5"  TrA recruitment with Kegals  2' x 5'' hold   HL hip adduction ball squeeze 2' x 3''   HL hip abduction with GTB x 3'   SLR with TRA activation 2 x 10 ea LE   Hamstring stretch 3 x 30'' ea LE   Bridges 2 x 10   Knee flops with GTB x 20 each    PRONE  Sustained extension x 3'   + Prone press ups with OP 2 x 10   Hip extension 1 x 15 ea LE     SIDELYING  Open Books x 15 ea side    STANDING.  -Hamstring stretch 3 x 30"  -// high knees x 20 each  -// hip ext with shoulder ext x 20 each  -// step in/out x 20 each    SITTING         MANUAL THERAPY  STM to the piriformis with cross friction x 5'  MODALITY  DIRECT EDUCATION:      Patient was not issued HEP .  Written Home Exercises Reviewed.   Pt demo good understanding of the education provided. Patient demonstrated good return demonstration of " activities    Assessment  Patient tolerated treatment well today. Pt demonstrated satisfactory core stabilization with supine exercises. Pt was educated in engaging her core more during standing exercises and during ADLs. Patient showed increased strength throughout her hip musculature and can be further progressed with closed chain activities.   Medical Necessity is demonstrated by:  challenged/ difficulty  to participate in daily activities and Continued inability  to participate in vocational pursuits, Pain limits function of effected part for some activities, Requires skilled supervision to complete and progress treatment interventions and HEP.  Pt demo good understanding of the education provided. Patient demonstrated good return demonstration of activities.Patient's tolerance to treatment was good. Patient will continue to benefit from skilled PTintervention.Patient is making progress towards established goals.  New/Revised goals: no  Continue with established Plan of Care towards PT goals.     PLAN     Outpatient physical therapy 1-2 times weekly to include: pt ed, hep, therapeutic exercises, neuromuscular re-education/ balance exercises, manual therapy,and modalities prn. Cont PT for 12 weeks. Pt may be seen by PTA as part of the rehabilitation team.   Certification Period 3/8/2017 to 6/8/2017      Student Therapist: Alicja Pereira, [facility]  Therapist: Justo Michaels, PT  3/21/17    I certify that I was present in the room directing the student in service delivery and guiding them using my skilled judgement. As the co-signing therapist, I have reviewed the students documentation and am responsible for the treatment, assessment, and plan.

## 2017-03-24 ENCOUNTER — CLINICAL SUPPORT (OUTPATIENT)
Dept: REHABILITATION | Facility: HOSPITAL | Age: 57
End: 2017-03-24
Attending: NURSE PRACTITIONER
Payer: MEDICARE

## 2017-03-24 DIAGNOSIS — M54.50 RIGHT-SIDED LOW BACK PAIN WITHOUT SCIATICA, UNSPECIFIED CHRONICITY: Primary | ICD-10-CM

## 2017-03-24 DIAGNOSIS — M25.662 DECREASED ROM OF LEFT KNEE: ICD-10-CM

## 2017-03-24 DIAGNOSIS — M62.81 QUADRICEPS WEAKNESS: ICD-10-CM

## 2017-03-24 DIAGNOSIS — Z74.09 IMPAIRED FUNCTIONAL MOBILITY, BALANCE, GAIT, AND ENDURANCE: ICD-10-CM

## 2017-03-24 DIAGNOSIS — Z96.652 S/P REVISION OF TOTAL KNEE, LEFT: ICD-10-CM

## 2017-03-24 PROCEDURE — 97110 THERAPEUTIC EXERCISES: CPT | Mod: PN | Performed by: PHYSICAL THERAPIST

## 2017-03-24 NOTE — PROGRESS NOTES
"    Name: Robert Smith  Clinic Number: [id number]  Date of Treatment: 03/24/2017   Diagnosis: right sided low back pain    Time in: 1005  Time Out: 1100  Total Treatment Time: 55    VISITS / SPENCE: 5/20 - 6/8/17  BOLD=INDICATES ACTIVITIES PERFORMED.      Subjective  Patient states "that she has 4/10 pain in her back, she did increased housework yesterday that caused her pain to increase.      Objective    Treatment: Patient  was instructed in and performed therapeutic exercises to develop strength, ROM, flexibility, balance and core stabilization. Patient performed therapeutic exercises consisting of the following      Leg press   Recumbent bike x 10'  Upright bike   UE ergometer  Treadmill   Elliptical       THERAPEUTIC EXERCISE  SUPINE  LTR with PB x 2'   SKTC with opp arm x 20  DKTC with bilateral arm 3 x 5  Pelvic tilts 20 x 5"  TrA recruitment with Kegals  2' x 5'' hold   HL hip adduction ball squeeze 2' x 3''   HL hip abduction with GTB x 3'   SLR with TRA activation 2 x 10 ea LE   Hamstring stretch 3 x 30'' ea LE   Bridges 2 x 10   Knee flops with GTB x 20 each    PRONE  Sustained extension x 3'   + Prone press ups with OP 2 x 10   Hip extension 1 x 15 ea LE     SIDELYING  Open Books x 15 ea side    STANDING.  -Hamstring stretch 3 x 30"  -// high knees x 20 each  -// hip ext with shoulder ext x 20 each  -// step in/out x 20 each    SITTING  -low back stretch with stability ball       MANUAL THERAPY  STM to the piriformis with cross friction x 5'  MODALITY  DIRECT EDUCATION:      Patient was not issued HEP .  Written Home Exercises Reviewed.   Pt demo good understanding of the education provided. Patient demonstrated good return demonstration of activities    Assessment  Patient tolerated treatment well today and had no increased pain. Pt demonstrated improved core stabilization during standing activities. Pt required min cueing to avoid compensation when performing sidelying glute strengthening activities. Patient " could be progressed to more closed chain activities to incorporate core stabilization with glute strengthening.   Medical Necessity is demonstrated by:  challenged/ difficulty  to participate in daily activities and Continued inability  to participate in vocational pursuits, Pain limits function of effected part for some activities, Requires skilled supervision to complete and progress treatment interventions and HEP.  Pt demo good understanding of the education provided. Patient demonstrated good return demonstration of activities.Patient's tolerance to treatment was good. Patient will continue to benefit from skilled PTintervention.Patient is making progress towards established goals.  New/Revised goals: no  Continue with established Plan of Care towards PT goals.     PLAN     Outpatient physical therapy 1-2 times weekly to include: pt ed, hep, therapeutic exercises, neuromuscular re-education/ balance exercises, manual therapy,and modalities prn. Cont PT for 12 weeks. Pt may be seen by PTA as part of the rehabilitation team.   Certification Period 3/8/2017 to 6/8/2017      Student Therapist: Alicja Pereira, SPT  Therapist: Justo Michaels, PT  3/21/17    I certify that I was present in the room directing the student in service delivery and guiding them using my skilled judgement. As the co-signing therapist, I have reviewed the students documentation and am responsible for the treatment, assessment, and plan.

## 2017-03-29 ENCOUNTER — CLINICAL SUPPORT (OUTPATIENT)
Dept: REHABILITATION | Facility: HOSPITAL | Age: 57
End: 2017-03-29
Attending: NURSE PRACTITIONER
Payer: MEDICARE

## 2017-03-29 DIAGNOSIS — Z74.09 IMPAIRED FUNCTIONAL MOBILITY, BALANCE, GAIT, AND ENDURANCE: ICD-10-CM

## 2017-03-29 DIAGNOSIS — M25.662 DECREASED ROM OF LEFT KNEE: Primary | ICD-10-CM

## 2017-03-29 DIAGNOSIS — M62.81 QUADRICEPS WEAKNESS: ICD-10-CM

## 2017-03-29 DIAGNOSIS — M54.50 RIGHT-SIDED LOW BACK PAIN WITHOUT SCIATICA, UNSPECIFIED CHRONICITY: ICD-10-CM

## 2017-03-29 DIAGNOSIS — Z96.652 S/P REVISION OF TOTAL KNEE, LEFT: ICD-10-CM

## 2017-03-29 PROCEDURE — G8990 OTHER PT/OT CURRENT STATUS: HCPCS | Mod: CK,PN | Performed by: PHYSICAL THERAPIST

## 2017-03-29 PROCEDURE — 97110 THERAPEUTIC EXERCISES: CPT | Mod: PN | Performed by: PHYSICAL THERAPIST

## 2017-03-29 PROCEDURE — G8991 OTHER PT/OT GOAL STATUS: HCPCS | Mod: CK,PN | Performed by: PHYSICAL THERAPIST

## 2017-03-29 NOTE — PROGRESS NOTES
"    Name: Robert Smith  Clinic Number: [id number]  Date of Treatment: 03/29/2017   Diagnosis: right sided low back pain    Time in: 1030  Time Out: 1130  Total Treatment Time: 60    VISITS / SPENCE: 6/20 - 6/8/17  BOLD=INDICATES ACTIVITIES PERFORMED.      Subjective  Patient states "that she has 4/10 pain in her back. She states she has not been very active the past few days and still has difficulty walking and standing for an extended period of time.       Objective    Treatment: Patient  was instructed in and performed therapeutic exercises to develop strength, ROM, flexibility, balance and core stabilization. Patient performed therapeutic exercises consisting of the following      Leg press   Recumbent bike x 10'  Upright bike   UE ergometer  Treadmill   Elliptical       THERAPEUTIC EXERCISE  SUPINE  LTR with red disc x 3'   SKTC with opp arm x 20  DKTC with bilateral arm 3 x 5  Pelvic tilts 20 x 5"  TrA recruitment with Kegals  2' x 5'' hold   HL hip adduction ball squeeze 2' x 3''   HL hip abduction with GTB x 3'   SLR with TRA activation 2 x 10 ea LE   Hamstring stretch 3 x 30'' ea LE   Bridges 2 x 10  W/ red disc  Knee flops with GTB x 20 each    PRONE  Sustained extension x 3'   + Prone press ups with OP 2 x 10   Hip extension 1 x 15 ea LE   -hip rotation x 20    SIDELYING  Open Books x 15 ea side    STANDING.  -Hamstring stretch 3 x 30"  -// high knees x 20 each  -// hip ext with shoulder ext x 20 each  -// step in/out x 20 each  -mini squats w/ stability ball  -hip ext with RTB x 10    SITTING  -low back stretch with stability ball  -stability ball marches x 20  -stability ball opp arm/leg raises x 20       MANUAL THERAPY  STM to the piriformis with cross friction, myofascial release low back x 5'  MODALITY  DIRECT EDUCATION:      Patient was not issued HEP .  Written Home Exercises Reviewed.   Pt demo good understanding of the education provided. Patient demonstrated good return demonstration of " activities    Assessment  Patient presented with decreased lumbar ROM today that was limited by pain. She showed satisfactory recruitment and coordination of gluteal muscles during standing activities. Pt demonstrated balance deficits that caused a significant amount of ROM restriction and muscle guarding during squats and stability ball exercises. Patient could be progressed to more dynamic activities to challenge balance.  Medical Necessity is demonstrated by:  challenged/ difficulty  to participate in daily activities and Continued inability  to participate in vocational pursuits, Pain limits function of effected part for some activities, Requires skilled supervision to complete and progress treatment interventions and HEP.  Pt demo good understanding of the education provided. Patient demonstrated good return demonstration of activities.Patient's tolerance to treatment was good. Patient will continue to benefit from skilled PTintervention.Patient is making progress towards established goals.  New/Revised goals: no  Continue with established Plan of Care towards PT goals.     CMS Impairment/Limitation/Restriction for FOTO Lumbar Spine Survey  Status Limitation G-Code CMS Severity Modifier  Intake 47% 53%  Predicted 60% 40% Goal Status+ CK - At least 40 percent but less than 60 percent  3/29/2017 55% 45% Current Status CK - At least 40 percent but less than 60 percent  D/C Status CK **only report if this is discharge survey    PLAN     Outpatient physical therapy 1-2 times weekly to include: pt ed, hep, therapeutic exercises, neuromuscular re-education/ balance exercises, manual therapy,and modalities prn. Cont PT for 12 weeks. Pt may be seen by PTA as part of the rehabilitation team.   Certification Period 3/8/2017 to 6/8/2017      Student Therapist: Alicja Pereira, SPT  Therapist: Justo Michaels, PT  3/21/17    I certify that I was present in the room directing the student in service delivery and guiding them  using my skilled judgement. As the co-signing therapist, I have reviewed the students documentation and am responsible for the treatment, assessment, and plan.

## 2017-03-31 ENCOUNTER — CLINICAL SUPPORT (OUTPATIENT)
Dept: REHABILITATION | Facility: HOSPITAL | Age: 57
End: 2017-03-31
Attending: NURSE PRACTITIONER
Payer: MEDICARE

## 2017-03-31 DIAGNOSIS — M54.50 RIGHT-SIDED LOW BACK PAIN WITHOUT SCIATICA, UNSPECIFIED CHRONICITY: ICD-10-CM

## 2017-03-31 DIAGNOSIS — M25.662 DECREASED ROM OF LEFT KNEE: Primary | ICD-10-CM

## 2017-03-31 DIAGNOSIS — M62.81 QUADRICEPS WEAKNESS: ICD-10-CM

## 2017-03-31 DIAGNOSIS — Z74.09 IMPAIRED FUNCTIONAL MOBILITY, BALANCE, GAIT, AND ENDURANCE: ICD-10-CM

## 2017-03-31 PROCEDURE — 97110 THERAPEUTIC EXERCISES: CPT | Mod: PN

## 2017-03-31 NOTE — PROGRESS NOTES
"  Name: Robert Smith  Clinic Number: [id number]  Date of Treatment: 03/31/2017   Diagnosis: right sided low back pain    Time in: 1055  Time Out: 1105  Total Treatment Time: 70'     VISITS / SPENCE: 7/20 - 6/8/17  BOLD=INDICATES ACTIVITIES PERFORMED.      Subjective  Patient states that she is having some slight increased pain in her low back and knee today.  Patient reports that pain to be 5/10 pain.        Objective    Treatment: Patient  was instructed in and performed therapeutic exercises to develop strength, ROM, flexibility, balance and core stabilization. Patient performed therapeutic exercises consisting of the following      Leg press   Recumbent bike x 10'  Upright bike   UE ergometer  Treadmill   Elliptical       THERAPEUTIC EXERCISE  SUPINE  LTR with red disc x 3'   SKTC with opp arm x 20  DKTC with bilateral arm 3 x 5  Pelvic tilts 20 x 5"  TrA recruitment with Kegals  2' x 5'' hold   HL hip adduction ball squeeze 2' x 3''   HL hip abduction with GTB x 3'   SLR with TRA activation 2 x 10 ea LE   Hamstring stretch 3 x 30'' ea LE   Bridges 2 x 10  W/ red disc  Knee flops with GTB x 20 each  Thoracic extension with roll x 3'     PRONE  Sustained extension x 3'   + Prone press ups with OP 2 x 10   Hip extension 1 x 15 ea LE   -hip rotation x 20    SIDELYING  Open Books x 15 ea side    STANDING.  -Hamstring stretch 3 x 30"  -// high knees x 20 each  -// hip ext with shoulder flx x 20 each  -// step in/out x 20 each  -mini squats w/ stability ball  -hip ext with RTB x 10    SITTING  -low back stretch with stability ball  -stability ball marches x 20  -stability ball opp arm/leg raises x 20       MANUAL THERAPY  STM to the piriformis with cross friction, myofascial release low back x 5'  MODALITY  DIRECT EDUCATION:      Patient was not issued HEP .  Written Home Exercises Reviewed.   Pt demo good understanding of the education provided. Patient demonstrated good return demonstration of activities    Assessment  Robert" tolerated treatment session well.  Patient with good response to exercises with decreased pain reported post session.  Patient with continues to be limited by soft tissue restrictions throughout the low lumbar region.  Medical Necessity is demonstrated by:  challenged/ difficulty  to participate in daily activities and Continued inability  to participate in vocational pursuits, Pain limits function of effected part for some activities, Requires skilled supervision to complete and progress treatment interventions and HEP.  Pt demo good understanding of the education provided. Patient demonstrated good return demonstration of activities.Patient's tolerance to treatment was good. Patient will continue to benefit from skilled PTintervention.Patient is making progress towards established goals.  New/Revised goals: no  Continue with established Plan of Care towards PT goals.       PLAN     Outpatient physical therapy 1-2 times weekly to include: pt ed, hep, therapeutic exercises, neuromuscular re-education/ balance exercises, manual therapy,and modalities prn. Cont PT for 12 weeks. Pt may be seen by PTA as part of the rehabilitation team.   Certification Period 3/8/2017 to 6/8/2017      Rosaura Crowell PTA

## 2017-04-09 ENCOUNTER — HOSPITAL ENCOUNTER (EMERGENCY)
Facility: OTHER | Age: 57
Discharge: HOME | End: 2017-04-09
Attending: EMERGENCY MEDICINE
Payer: MEDICARE

## 2017-04-09 VITALS — HEART RATE: 74 BPM | RESPIRATION RATE: 18 BRPM | OXYGEN SATURATION: 100 %

## 2017-04-09 DIAGNOSIS — J40 BRONCHITIS: Primary | ICD-10-CM

## 2017-04-09 PROCEDURE — 94640 AIRWAY INHALATION TREATMENT: CPT

## 2017-04-09 PROCEDURE — 94760 N-INVAS EAR/PLS OXIMETRY 1: CPT

## 2017-04-09 PROCEDURE — 63600175 PHARM REV CODE 636 W HCPCS: Performed by: EMERGENCY MEDICINE

## 2017-04-09 PROCEDURE — 99284 EMERGENCY DEPT VISIT MOD MDM: CPT | Mod: 25

## 2017-04-09 PROCEDURE — 25000242 PHARM REV CODE 250 ALT 637 W/ HCPCS: Performed by: EMERGENCY MEDICINE

## 2017-04-09 RX ORDER — IPRATROPIUM BROMIDE AND ALBUTEROL SULFATE 2.5; .5 MG/3ML; MG/3ML
3 SOLUTION RESPIRATORY (INHALATION)
Status: COMPLETED | OUTPATIENT
Start: 2017-04-09 | End: 2017-04-09

## 2017-04-09 RX ORDER — PREDNISONE 10 MG/1
10 TABLET ORAL DAILY
Qty: 5 TABLET | Refills: 0 | Status: SHIPPED | OUTPATIENT
Start: 2017-04-09 | End: 2017-04-14

## 2017-04-09 RX ORDER — GUAIFENESIN/DEXTROMETHORPHAN 100-10MG/5
5 SYRUP ORAL EVERY 4 HOURS PRN
Qty: 120 ML | Refills: 0
Start: 2017-04-09 | End: 2017-04-19

## 2017-04-09 RX ORDER — PREDNISONE 20 MG/1
60 TABLET ORAL
Status: COMPLETED | OUTPATIENT
Start: 2017-04-09 | End: 2017-04-09

## 2017-04-09 RX ADMIN — PREDNISONE 60 MG: 20 TABLET ORAL at 11:04

## 2017-04-09 RX ADMIN — IPRATROPIUM BROMIDE AND ALBUTEROL SULFATE 3 ML: .5; 3 SOLUTION RESPIRATORY (INHALATION) at 11:04

## 2017-04-09 NOTE — ED AVS SNAPSHOT
[facility] EMERGENCY DEPARTMENT  [address] [place] [address]               Robert Smith   2017 10:26 AM   ED    Description:  Female : 1960   Department:  [facility]           Your Care was Coordinated By:     Provider Role From To    Davis Barrett MD Attending Provider 17 1112 --      Reason for Visit     Cough           Diagnoses this Visit        Comments    Bronchitis    -  Primary       ED Disposition     ED Disposition Condition Comment    Discharge             To Do List           Follow-up Information     Follow up with Tani Chan MD In 1 week(s).    Specialty:  Family Medicine    Contact information:    [address]  [place] [id number] [phone]          Follow up with Tani Chan MD In 1 week.    Specialty:  Family Medicine    Contact information:    [address]  [place] [id number] [phone]         These Medications        Disp Refills Start End    dextromethorphan-guaifenesin  mg/5 ml (ROBITUSSIN-DM)  mg/5 mL liquid 120 mL 0 2017    Take 5 mLs by mouth every 4 (four) hours as needed. - Oral    Pharmacy: [email or web address] Ph #: [phone]       albuterol sulfate 90 mcg/actuation AePB 1 each 1 2017     Inhale 1 puff into the lungs every 4 (four) hours as needed. Rescue - Inhalation    Pharmacy: [facility] [place] [facility] Ph #: [phone]       predniSONE (DELTASONE) 10 MG tablet 5 tablet 0 2017    Take 1 tablet (10 mg total) by mouth once daily. - Oral    Pharmacy: [facility] Ph #: [phone]         [facility]     [facility] Nurse Care Line - 24/7 Assistance  Unless otherwise directed by your provider, please contact [facility], our nurse care line that is available for 24/7 assistance.      Registered nurses in the [facility] provide: appointment scheduling, clinical advisement, health education, and other advisory services.  Call: [phone] (toll free)               Medications           Message regarding Medications     Verify the changes and/or additions to your medication regime listed below are the same as discussed with your clinician today.  If any of these changes or additions are incorrect, please notify your healthcare provider.        START taking these NEW medications        Refills    dextromethorphan-guaifenesin  mg/5 ml (ROBITUSSIN-DM)  mg/5 mL liquid 0    Sig: Take 5 mLs by mouth every 4 (four) hours as needed.    Class: No Print    Route: Oral    albuterol sulfate 90 mcg/actuation AePB 1    Sig: Inhale 1 puff into the lungs every 4 (four) hours as needed. Rescue    Class: Print    Route: Inhalation    predniSONE (DELTASONE) 10 MG tablet 0    Sig: Take 1 tablet (10 mg total) by mouth once daily.    Class: Print    Route: Oral      These medications were administered today        Dose Freq    albuterol-ipratropium 2.5mg-0.5mg/3mL nebulizer solution 3 mL 3 mL ED 1 Time    Sig: Take 3 mLs by nebulization ED 1 Time.    Class: Normal    Route: Nebulization    predniSONE tablet 60 mg 60 mg ED 1 Time    Sig: Take 3 tablets (60 mg total) by mouth ED 1 Time.    Class: Normal    Route: Oral           Verify that the below list of medications is an accurate representation of the medications you are currently taking.  If none reported, the list may be blank. If incorrect, please contact your healthcare provider. Carry this list with you in case of emergency.           Current Medications     ADVAIR DISKUS 250-50 mcg/dose diskus inhaler     albuterol sulfate 2.5 mg/0.5 mL Nebu     albuterol sulfate 90 mcg/actuation AePB Inhale 1 puff into the lungs every 4 (four) hours as needed. Rescue    albuterol-ipratropium 2.5mg-0.5mg/3mL nebulizer solution 3 mL Take 3 mLs by  nebulization ED 1 Time.    alprazolam (XANAX) 0.5 MG tablet Take 0.5 mg by mouth 3 (three) times daily as needed.      cyclobenzaprine (FLEXERIL) 10 MG tablet 10 mg every evening.     dextromethorphan-guaifenesin  mg/5 ml (ROBITUSSIN-DM)  mg/5 mL liquid Take 5 mLs by mouth every 4 (four) hours as needed.    DOC-Q-LACE 100 mg capsule TAKE one capsule by mouth once a day    esomeprazole (NEXIUM) 40 MG capsule Take 40 mg by mouth before breakfast.      fluticasone (FLONASE) 50 mcg/actuation nasal spray as needed.     gabapentin (NEURONTIN) 300 MG capsule 300 mg 3 (three) times daily.     hydrocodone-acetaminophen 10-325mg (NORCO)  mg Tab     hydroxychloroquine (PLAQUENIL) 200 mg tablet Take 1 tablet (200 mg total) by mouth 2 (two) times daily.    levocetirizine (XYZAL) 5 MG tablet 5 mg every evening.     meclizine (ANTIVERT) 25 mg tablet     PAZEO 0.7 % Drop     predniSONE (DELTASONE) 10 MG tablet Take 1 tablet (10 mg total) by mouth once daily.    predniSONE tablet 60 mg Take 3 tablets (60 mg total) by mouth ED 1 Time.    promethazine (PHENERGAN) 12.5 MG Tab Take 1 tablet (12.5 mg total) by mouth every 6 (six) hours as needed (for nausea).    rivaroxaban (XARELTO) 20 mg Tab Take 20 mg by mouth daily with dinner or evening meal.    SODIUM CHLORIDE FOR INHALATION (SODIUM CHLORIDE 0.9%) 0.9 % nebulizer solution     tizanidine (ZANAFLEX) 4 MG tablet 4 mg as needed.     tofacitinib (XELJANZ XR) 11 mg Tb24 Take 11 mg by mouth once daily.    zolpidem (AMBIEN) 10 mg Tab TK 1 T PO QHS [facility] SLEEP           Clinical Reference Information           Allergies as of 4/9/2017        Reactions    Sulfa (Sulfonamide Antibiotics) Nausea And Vomiting    Clindamycin Diarrhea    Daypro [Oxaprozin] Nausea Only    Enbrel [Etanercept] Other (See Comments)    Other reaction(s): odd thoughts  Odd thoughts    Orencia  [Abatacept]     Other reaction(s): Muscle pain    Percocet  [Oxycodone-acetaminophen]     Other reaction(s):  Vomiting    Simponi  [Golimumab]     Other reaction(s): Unknown    Ciprofloxacin Rash    Sulfamethoxazole-trimethoprim Rash      Immunizations Administered on Date of Encounter - 4/9/2017     None      ED Micro, Lab, POCT     None      ED Imaging Orders     None        Discharge Instructions           Acute Bronchitis  Your healthcare provider has told you that you have acute bronchitis. Bronchitis is infection or inflammation of the bronchial tubes (airways in the lungs). Normally, air moves easily in and out of the airways. Bronchitis narrows the airways, making it harder for air to flow in and out of the lungs. This causes symptoms such as shortness of breath, coughing, and wheezing. Bronchitis can be acute or chronic. Acute means the condition comes on quickly and goes away in a short time. Chronic means a condition lasts a long time and often comes back. Read on to learn more about acute bronchitis.    What causes acute bronchitis?  Acute bronchitis almost always starts as a viral respiratory infection, such as a cold or the flu. Certain factors make it more likely for a cold or flu to turn into bronchitis. These include being very young or very old or having a heart or lung problem. Cigarette smoking also makes bronchitis more likely.  When bronchitis develops, the airways become swollen. The airways may also become infected with bacteria. This is known as a secondary infection.  Diagnosing acute bronchitis  Your healthcare provider will examine you and ask about your symptoms and health history. You may also have a sputum culture to test the fluid in your lungs. Chest X-rays may be done to look for infection in the lungs.  Treating acute bronchitis  Bronchitis usually clears up as the cold or flu goes away. You can help feel better faster by doing the following:  · Take medicine as directed. You may be told to take ibuprofen or other over-the-counter medicines. These help relieve inflammation in your  bronchial tubes. Your doctor may prescribe an inhaler to help open up the bronchial tubes. Most of the time, acute bronchitis is caused by a viral infection. Antibiotics are usually not prescribed for viral infections.  · Drink plenty of fluids, such as water, juice, or warm soup. Fluids loosen mucus so that you can cough it up. This helps you breathe more easily. Fluids also prevent dehydration.  · Make sure you get plenty of rest.  · Do not smoke. Do not allow anyone else to smoke in your home.  Recovery and follow-up  Follow up with your doctor as you are told. You will likely feel better in a week or two. But a dry cough can linger beyond that time. Let your doctor know if you still have symptoms (other than a dry cough) after 2 weeks. If youre prone to getting bronchial infections, let your doctor know. And take steps to protect yourself from future infections. These steps include stopping smoking and avoiding tobacco smoke, washing your hands often, and getting a yearly flu shot.  When to call the doctor  Call the doctor if you have any of the following:  · Fever of 100.4°F (38.0°C) higher  · Symptoms that get worse, or new symptoms  · Trouble breathing  · Symptoms that dont start to improve within a week, or within 3 days of taking antibiotics   Date Last Reviewed: 8/4/2014  © 5284-3062 [employer]. [address] [place] [address], [place] [id number]. All rights reserved. This information is not intended as a substitute for professional medical care. Always follow your healthcare professional's instructions.          Bronchitis, Viral (Adult)    You have a viral bronchitis. Bronchitis is inflammation and swelling of the lining of the lungs. This is often caused by an infection. Symptoms include a dry, hacking cough that is worse at night. The cough may bring up yellow-green mucus. You may also feel short of breath or wheeze. Other symptoms may include tiredness, chest discomfort, and chills.  Bronchitis  that is caused by a virus is not treated with antibiotics. Instead, medicines may be given to help relieve symptoms. Symptoms can last up to 2 weeks, although the cough may last much longer.  This illness is contagious during the first few days and is spread through the air by coughing and sneezing, or by direct contact (touching the sick person and then touching your own eyes, nose, or mouth).  Most viral illnesses resolve within 10 to 14 days with rest and simple home remedies, although they may sometimes last for several weeks.  Home care  · If symptoms are severe, rest at home for the first 2 to 3 days. When you go back to your usual activities, don't let yourself get too tired.  · Do not smoke. Also avoid being exposed to secondhand smoke.  · You may use over-the-counter medicine to control fever or pain, unless another pain medicine was prescribed. (Note: If you have chronic liver or kidney disease or have ever had a stomach ulcer or gastrointestinal bleeding, talk with your healthcare provider before using these medicines. Also talk to your provider if you are taking medicine to prevent blood clots.) Aspirin should never be given to anyone younger than 18 years of age who is ill with a viral infection or fever. It may cause severe liver or brain damage.  · Your appetite may be poor, so a light diet is fine. Avoid dehydration by drinking 6 to 8 glasses of fluids per day (such as water, soft drinks, sports drinks, juices, tea, or soup). Extra fluids will help loosen secretions in the nose and lungs.  · Over-the-counter cough, cold, and sore-throat medicines will not shorten the length of the illness, but they may help to reduce symptoms. (Note: Do not use decongestants if you have high blood pressure.)  Follow-up care  Follow up with your healthcare provider, or as advised. If you had an X-ray or ECG (electrocardiogram), a specialist will review it. You will be notified of any new findings that may affect your  care.  Note: If you are age 65 or older, or if you have a chronic lung disease or condition that affects your immune system, or you smoke, talk to your healthcare provider about having pneumococcal vaccinations and a yearly influenza vaccination (flu shot).  When to seek medical advice  Call your healthcare provider right away if any of these occur:  · Fever of 100.4°F (38°C) or higher  · Coughing up increased amounts of colored sputum  · Weakness, drowsiness, headache, facial pain, ear pain, or a stiff neck  Call 911, or get immediate medical care  Contact emergency services right away if any of these occur:  · Coughing up blood  · Worsening weakness, drowsiness, headache, or stiff neck  · Trouble breathing, wheezing, or pain with breathing  Date Last Reviewed: 9/13/2015 © 2000-2016 [employer]. [address] [place] [address]. All rights reserved. This information is not intended as a substitute for professional medical care. Always follow your healthcare professional's instructions.          Your Scheduled Appointments     May 17, 2017  9:45 AM CDT   Established Patient Visit with Mohit Patterson MD   [facility] - Orthoped[facility] ([facility] )    [address]  [place] [address]   [phone]               [facility] Emergency Department complies with applicable Federal civil rights laws and does not discriminate on the basis of race, color, national origin, age, disability, or sex.        Language Assistance Services     ATTENTION: Language assistance services are available, free of charge. Please call [phone].      ATENCIÓN: Si habla español, tiene a holland disposición servicios gratuitos de asistencia lingüística. Llame al [phone].     CHÚ Ý: N?u b?n nói Ti?ng Vi?t, có các d?ch v? h? tr? ngôn ng? mi?n phí dành cho b?n. G?i s? [phone].

## 2017-04-09 NOTE — ED PROVIDER NOTES
Encounter Date: 4/9/2017       History     Chief Complaint   Patient presents with    Cough     Review of patient's allergies indicates:   Allergen Reactions    Sulfa (sulfonamide antibiotics) Nausea And Vomiting    Clindamycin Diarrhea    Daypro [oxaprozin] Nausea Only    Enbrel [etanercept] Other (See Comments)     Other reaction(s): odd thoughts  Odd thoughts    Orencia  [abatacept]      Other reaction(s): Muscle pain    Percocet  [oxycodone-acetaminophen]      Other reaction(s): Vomiting    Simponi  [golimumab]      Other reaction(s): Unknown    Ciprofloxacin Rash    Sulfamethoxazole-trimethoprim Rash     Patient is a 56 y.o. female presenting with the following complaint: cough. The history is provided by the patient.   Cough   This is a new problem. The current episode started several days ago. The problem occurs constantly. The problem has been unchanged. The cough is non-productive. There has been no fever. Associated symptoms include chills and wheezing. Treatments tried: Inhaled steroids. The treatment provided no relief. She is not a smoker. Her past medical history is significant for bronchitis.     Past Medical History:   Diagnosis Date    Acid reflux     Allergy     Anxiety     Arthritis     Degenerative disc disease     Depression     DVT of leg (deep venous thrombosis) 2009    rt leg    Fibromyalgia     Long-term current use of steroids 11/12/2012    Osteoporosis, postmenopausal     chronic steroid use    RA (rheumatoid arthritis)     Rheumatoid arteritis 2003     Past Surgical History:   Procedure Laterality Date    ANKLE FRACTURE SURGERY      BACK SURGERY      CARPAL TUNNEL RELEASE      CHOLECYSTECTOMY      HARDWARE REMOVAL      JOINT REPLACEMENT Right     right knee    KNEE SURGERY      Bilateral    KNEE SURGERY Left     revision x 2    SPINE SURGERY      TONSILLECTOMY      TUBAL LIGATION       Family History   Problem Relation Age of Onset    COPD Mother      Heart attack Father     Emphysema Maternal Grandfather     Breast cancer Neg Hx     Colon cancer Neg Hx     Ovarian cancer Neg Hx      Social History   Substance Use Topics    Smoking status: Never Smoker    Smokeless tobacco: Never Used    Alcohol use No     Review of Systems   Constitutional: Positive for chills.   HENT: Negative.    Eyes: Negative.    Respiratory: Positive for cough and wheezing.    Cardiovascular: Negative.    Gastrointestinal: Negative.    Endocrine: Negative.    Genitourinary: Negative.    Musculoskeletal: Negative.    Skin: Negative.    Allergic/Immunologic: Negative.    Neurological: Negative.    Hematological: Negative.    Psychiatric/Behavioral: Negative.    All other systems reviewed and are negative.      Physical Exam   Initial Vitals   BP Pulse Resp Temp SpO2   -- -- -- -- --            Physical Exam    Nursing note and vitals reviewed.  Constitutional: Vital signs are normal. She appears well-developed. She is active and cooperative.   HENT:   Head: Normocephalic and atraumatic.   Eyes: Conjunctivae, EOM and lids are normal. Pupils are equal, round, and reactive to light.   Neck: Trachea normal and full passive range of motion without pain. Neck supple. No thyroid mass present.   Cardiovascular: Normal rate, regular rhythm, S1 normal, S2 normal, normal heart sounds, intact distal pulses and normal pulses.   Pulmonary/Chest: She has wheezes.   Abdominal: Soft. Normal appearance, normal aorta and bowel sounds are normal.   Musculoskeletal: Normal range of motion.   Lymphadenopathy:     She has no axillary adenopathy.   Neurological: She is alert and oriented to person, place, and time.   Skin: Skin is warm, dry and intact.   Psychiatric: She has a normal mood and affect. Her speech is normal and behavior is normal. Judgment and thought content normal. Cognition and memory are normal.         ED Course   Procedures  Labs Reviewed - No data to display                            ED  Course     Clinical Impression:   The encounter diagnosis was Bronchitis.          Davis Barrett MD  04/09/17 1125

## 2017-05-10 DIAGNOSIS — M25.562 LEFT KNEE PAIN, UNSPECIFIED CHRONICITY: Primary | ICD-10-CM

## 2017-05-31 ENCOUNTER — HOSPITAL ENCOUNTER (OUTPATIENT)
Dept: RADIOLOGY | Facility: HOSPITAL | Age: 57
Discharge: HOME | End: 2017-05-31
Attending: ORTHOPAEDIC SURGERY
Payer: MEDICARE

## 2017-05-31 ENCOUNTER — OFFICE VISIT (OUTPATIENT)
Dept: ORTHOPEDICS | Facility: CLINIC | Age: 57
End: 2017-05-31
Payer: MEDICARE

## 2017-05-31 VITALS — BODY MASS INDEX: 42.87 KG/M2 | HEIGHT: 61 IN | WEIGHT: 227.06 LBS

## 2017-05-31 DIAGNOSIS — M25.562 LEFT KNEE PAIN, UNSPECIFIED CHRONICITY: ICD-10-CM

## 2017-05-31 DIAGNOSIS — G89.29 CHRONIC PAIN OF LEFT KNEE: Primary | ICD-10-CM

## 2017-05-31 DIAGNOSIS — M25.562 CHRONIC PAIN OF LEFT KNEE: Primary | ICD-10-CM

## 2017-05-31 DIAGNOSIS — E66.01 OBESITY, MORBID, BMI 40.0-49.9: ICD-10-CM

## 2017-05-31 DIAGNOSIS — Z96.652 STATUS POST REVISION OF TOTAL REPLACEMENT OF LEFT KNEE: ICD-10-CM

## 2017-05-31 PROCEDURE — 73560 X-RAY EXAM OF KNEE 1 OR 2: CPT | Mod: 26,59,RT, | Performed by: RADIOLOGY

## 2017-05-31 PROCEDURE — 99213 OFFICE O/P EST LOW 20 MIN: CPT | Mod: S$GLB,,, | Performed by: ORTHOPAEDIC SURGERY

## 2017-05-31 PROCEDURE — 73562 X-RAY EXAM OF KNEE 3: CPT | Mod: 26,LT,, | Performed by: RADIOLOGY

## 2017-05-31 PROCEDURE — 99999 PR PBB SHADOW E&M-EST. PATIENT-LVL III: CPT | Mod: PBBFAC,,, | Performed by: ORTHOPAEDIC SURGERY

## 2017-05-31 PROCEDURE — 73560 X-RAY EXAM OF KNEE 1 OR 2: CPT | Mod: TC,RT

## 2017-05-31 RX ORDER — ONDANSETRON HYDROCHLORIDE 8 MG/1
TABLET, FILM COATED ORAL
COMMUNITY
Start: 2017-05-23 | End: 2018-03-21 | Stop reason: SDUPTHER

## 2017-05-31 RX ORDER — CETIRIZINE HYDROCHLORIDE 10 MG/1
10 TABLET ORAL DAILY
COMMUNITY
End: 2018-03-21

## 2017-05-31 RX ORDER — PREDNISONE 5 MG/1
TABLET ORAL
COMMUNITY
Start: 2017-05-16 | End: 2017-09-18 | Stop reason: SDUPTHER

## 2017-05-31 RX ORDER — LIDOCAINE AND PRILOCAINE 25; 25 MG/G; MG/G
CREAM TOPICAL
Refills: 0 | COMMUNITY
Start: 2017-04-06 | End: 2019-03-15

## 2017-05-31 RX ORDER — ALBUTEROL SULFATE 90 UG/1
AEROSOL, METERED RESPIRATORY (INHALATION)
Refills: 1 | COMMUNITY
Start: 2017-04-09 | End: 2020-02-10

## 2017-05-31 NOTE — PROGRESS NOTES
"Subjective:      Patient ID: Robert Smith is a 56 y.o. female.    Chief Complaint: Pain of the Left Knee and Pain of the Right Knee    HPI  Robert Smith has left knee pain.  The pain has worsened. She had to stop her lates RA med because of side effects.  She has more generalized pain. The pain is located in the lateral leg and lateral retinacular area.  There is radiation.  The pain radaites to the lateral leg.  There is not associated stiffness.   There is not catching and locking. The pain is described as achy. The pain is aggravated by activity.  It is alleviated by rest.  There is associated back pain.  Her history, medications and problem list were reviewed.    Review of Systems   Constitution: Negative for chills, fever and night sweats.   HENT: Negative for headaches and hearing loss.    Eyes: Negative for blurred vision and double vision.   Cardiovascular: Negative for chest pain, claudication and leg swelling.   Respiratory: Negative for shortness of breath.    Endocrine: Negative for polydipsia, polyphagia and polyuria.   Hematologic/Lymphatic: Negative for adenopathy and bleeding problem. Does not bruise/bleed easily.   Skin: Negative for poor wound healing.   Musculoskeletal: Positive for back pain and joint pain.   Gastrointestinal: Negative for diarrhea and heartburn.   Genitourinary: Negative for bladder incontinence.   Neurological: Negative for focal weakness, numbness, paresthesias and sensory change.   Psychiatric/Behavioral: The patient is not nervous/anxious.    Allergic/Immunologic: Negative for persistent infections.         Objective:      Body mass index is 42.91 kg/m².  Vitals:    05/31/17 0810   Weight: 103 kg (227 lb 1.2 oz)   Height: 5' 1" (1.549 m)           General    Constitutional: She is oriented to person, place, and time.   obese   HENT:   Head: Normocephalic and atraumatic.   Eyes: EOM are normal.   Cardiovascular: Normal rate and regular rhythm.    Pulmonary/Chest: Effort normal. "   Neurological: She is alert and oriented to person, place, and time.   Psychiatric: She has a normal mood and affect.     General Musculoskeletal Exam   Gait: normal       Right Knee Exam     Inspection   Erythema: absent  Scars: present  Swelling: absent  Effusion: effusion  Deformity: deformity  Bruising: absent    Tenderness   The patient is tender to palpation of the lateral retinaculum (hyper sensitive lateral leg).    Range of Motion   Extension: 0   Flexion: 120     Tests   Ligament Examination Lachman: normal (-1 to 2mm)   MCL - Valgus: normal (0 to 2mm)  LCL - Varus: normal  Patella   Passive Patellar Tilt: neutral    Other   Sensation: normal    Left Knee Exam     Inspection   Erythema: absent  Scars: present  Swelling: absent  Effusion: absent  Deformity: deformity  Bruising: absent    Tenderness   The patient tender to palpation of the no tenderness and lateral retinaculum (hyper sensitive lateral leg).    Range of Motion   Extension: 0   Flexion: 120     Tests   Stability Lachman: normal (-1 to 2mm)   MCL - Valgus: normal (0 to 2mm)  LCL - Varus: normal (0 to 2mm)  Patella   Passive Patellar Tilt: neutral    Other   Sensation: normal    Muscle Strength   Right Lower Extremity   Hip Abduction: 5/5   Quadriceps:  4/5   Hamstrin/5   Left Lower Extremity   Hip Abduction: 5/5   Quadriceps:  4/5   Hamstrin/5     Reflexes     Left Side  Quadriceps:  2+    Right Side   Quadriceps:  2+    Vascular Exam     Right Pulses  Dorsalis Pedis:      2+          Left Pulses  Dorsalis Pedis:      2+          Edema  Right Lower Leg: absent  Left Lower Leg: absent    Radiographs taken today were reviewed by me.  There is a prosthetic replacement of the bilateral knee(s).  The prosthesis is well positioned.  There is not evidence of bone loss, osteolysis, or loosening. Patella fragmentation on the right.                Assessment:       Encounter Diagnoses   Name Primary?    Chronic pain of left knee Yes     Status post revision of total replacement of left knee     Obesity, morbid, BMI 40.0-49.9           Plan:       Robert was seen today for pain and pain.    Diagnoses and all orders for this visit:    Chronic pain of left knee  -     C-reactive protein; Future  -     Sedimentation rate, manual; Future  -     Ambulatory Referral to Physical/Occupational Therapy    Status post revision of total replacement of left knee  -     C-reactive protein; Future  -     Sedimentation rate, manual; Future  -     Ambulatory Referral to Physical/Occupational Therapy    Obesity, morbid, BMI 40.0-49.9  -     C-reactive protein; Future  -     Sedimentation rate, manual; Future  -     Ambulatory Referral to Physical/Occupational Therapy      ESR/CRP  Options were discussed.  We will pursue a course of physical therapy.  F/U in 6 weeks.

## 2017-06-01 ENCOUNTER — TELEPHONE (OUTPATIENT)
Dept: ORTHOPEDICS | Facility: CLINIC | Age: 57
End: 2017-06-01

## 2017-06-01 NOTE — TELEPHONE ENCOUNTER
----- Message from Mohit Patterson MD sent at 5/31/2017  4:36 PM CDT -----  Please call pt. Labs are OK.  Sightly elevated, but most likely due to RA.  Carlos Manuel PT and see me back as scheduled. Sooner if things get worse.

## 2017-06-01 NOTE — TELEPHONE ENCOUNTER
Spoke to pt and she was notified of her results and she understood and was pleased. Pt states she will keep her follow up appointment.

## 2017-06-13 ENCOUNTER — TELEPHONE (OUTPATIENT)
Dept: ORTHOPEDICS | Facility: CLINIC | Age: 57
End: 2017-06-13

## 2017-06-13 NOTE — TELEPHONE ENCOUNTER
----- Message from Nick Mijares sent at 6/13/2017 11:14 AM CDT -----  Contact: self/home  Pt would like to speak with you regarding her not being able to complete PT due to her not having her pain medication.

## 2017-07-25 ENCOUNTER — TELEPHONE (OUTPATIENT)
Dept: ORTHOPEDICS | Facility: CLINIC | Age: 57
End: 2017-07-25

## 2017-07-25 NOTE — TELEPHONE ENCOUNTER
----- Message from Nick Mijares sent at 7/25/2017  2:04 PM CDT -----  Contact: self/home  Pt would like to speak with you regarding her 7/26 appt.

## 2017-07-25 NOTE — TELEPHONE ENCOUNTER
----- Message from Mohit Patterson MD sent at 7/25/2017  3:55 PM CDT -----  Contact: self/home  No.  ----- Message -----  From: Jerilyn Mcgill MA  Sent: 7/25/2017   2:42 PM  To: Mohit Patterson MD    I spoke to pt and she would like to know if she should keep her appointment for tomorrow? Pt stated she never had PT done.   ----- Message -----  From: Nick Mijares  Sent: 7/25/2017   2:04 PM  To: Yesenia THORNE Staff    Pt would like to speak with you regarding her 7/26 appt.

## 2017-07-25 NOTE — TELEPHONE ENCOUNTER
Spoke to pt and she was notified she doesn't need to keep the appointment. Pt was please and will call back to get a new order for PT and to reschedule the appointment with .

## 2017-07-25 NOTE — TELEPHONE ENCOUNTER
Spoke to pt and she wants to know if she should keep the appointment for tomorrow. I explained to pt I would call her back once I received an answer. Pt understood.

## 2017-07-28 ENCOUNTER — OFFICE VISIT (OUTPATIENT)
Dept: RHEUMATOLOGY | Facility: CLINIC | Age: 57
End: 2017-07-28
Payer: MEDICARE

## 2017-07-28 ENCOUNTER — LAB VISIT (OUTPATIENT)
Dept: LAB | Facility: HOSPITAL | Age: 57
End: 2017-07-28
Attending: INTERNAL MEDICINE
Payer: MEDICARE

## 2017-07-28 ENCOUNTER — TELEPHONE (OUTPATIENT)
Dept: INTERNAL MEDICINE | Facility: CLINIC | Age: 57
End: 2017-07-28

## 2017-07-28 VITALS
HEIGHT: 61 IN | SYSTOLIC BLOOD PRESSURE: 117 MMHG | WEIGHT: 216.19 LBS | BODY MASS INDEX: 40.82 KG/M2 | DIASTOLIC BLOOD PRESSURE: 73 MMHG | HEART RATE: 85 BPM

## 2017-07-28 DIAGNOSIS — M05.79 RHEUMATOID ARTHRITIS INVOLVING MULTIPLE SITES WITH POSITIVE RHEUMATOID FACTOR: Primary | ICD-10-CM

## 2017-07-28 DIAGNOSIS — R11.0 NAUSEA: ICD-10-CM

## 2017-07-28 DIAGNOSIS — M05.79 RHEUMATOID ARTHRITIS INVOLVING MULTIPLE SITES WITH POSITIVE RHEUMATOID FACTOR: ICD-10-CM

## 2017-07-28 DIAGNOSIS — B17.9 HEPATITIS, ACUTE: ICD-10-CM

## 2017-07-28 DIAGNOSIS — R30.0 DYSURIA: ICD-10-CM

## 2017-07-28 DIAGNOSIS — R63.4 WEIGHT LOSS: ICD-10-CM

## 2017-07-28 DIAGNOSIS — B37.0 THRUSH, ORAL: ICD-10-CM

## 2017-07-28 DIAGNOSIS — B17.9 HEPATITIS, ACUTE: Primary | ICD-10-CM

## 2017-07-28 LAB
BACTERIA #/AREA URNS AUTO: ABNORMAL /HPF
BILIRUB UR QL STRIP: ABNORMAL
CLARITY UR REFRACT.AUTO: ABNORMAL
COLOR UR AUTO: ABNORMAL
GLUCOSE UR QL STRIP: NEGATIVE
HGB UR QL STRIP: NEGATIVE
HYALINE CASTS UR QL AUTO: 1 /LPF
KETONES UR QL STRIP: NEGATIVE
LEUKOCYTE ESTERASE UR QL STRIP: ABNORMAL
MICROSCOPIC COMMENT: ABNORMAL
NITRITE UR QL STRIP: NEGATIVE
PH UR STRIP: 5 [PH] (ref 5–8)
PROT UR QL STRIP: ABNORMAL
RBC #/AREA URNS AUTO: 1 /HPF (ref 0–4)
SP GR UR STRIP: 1.02 (ref 1–1.03)
SQUAMOUS #/AREA URNS AUTO: 3 /HPF
URN SPEC COLLECT METH UR: ABNORMAL
UROBILINOGEN UR STRIP-ACNC: 2 EU/DL
WBC #/AREA URNS AUTO: 20 /HPF (ref 0–5)

## 2017-07-28 PROCEDURE — 81001 URINALYSIS AUTO W/SCOPE: CPT

## 2017-07-28 PROCEDURE — 99205 OFFICE O/P NEW HI 60 MIN: CPT | Mod: S$GLB,,, | Performed by: INTERNAL MEDICINE

## 2017-07-28 PROCEDURE — 87077 CULTURE AEROBIC IDENTIFY: CPT

## 2017-07-28 PROCEDURE — 87086 URINE CULTURE/COLONY COUNT: CPT

## 2017-07-28 PROCEDURE — 99999 PR PBB SHADOW E&M-EST. PATIENT-LVL IV: CPT | Mod: PBBFAC,,, | Performed by: INTERNAL MEDICINE

## 2017-07-28 PROCEDURE — 87088 URINE BACTERIA CULTURE: CPT

## 2017-07-28 PROCEDURE — 87186 SC STD MICRODIL/AGAR DIL: CPT

## 2017-07-28 RX ORDER — ONDANSETRON 4 MG/1
TABLET, FILM COATED ORAL
COMMUNITY
Start: 2017-07-27 | End: 2017-07-28

## 2017-07-28 RX ORDER — PROMETHAZINE HYDROCHLORIDE 25 MG/1
TABLET ORAL
Refills: 0 | COMMUNITY
Start: 2017-07-08 | End: 2018-02-16

## 2017-07-28 RX ORDER — SULFASALAZINE 500 MG/1
TABLET ORAL
COMMUNITY
Start: 2017-07-05 | End: 2017-07-28 | Stop reason: SINTOL

## 2017-07-28 RX ORDER — FLUCONAZOLE 200 MG/1
200 TABLET ORAL DAILY
Qty: 3 TABLET | Refills: 0 | Status: SHIPPED | OUTPATIENT
Start: 2017-07-28 | End: 2018-02-16 | Stop reason: ALTCHOICE

## 2017-07-28 ASSESSMENT — ROUTINE ASSESSMENT OF PATIENT INDEX DATA (RAPID3)
PSYCHOLOGICAL DISTRESS SCORE: 5.5
FATIGUE SCORE: 7
TOTAL RAPID3 SCORE: 6
MDHAQ FUNCTION SCORE: .9
WHEN YOU AWAKENED IN THE MORNING OVER THE LAST WEEK, PLEASE INDICATE THE AMOUNT OF TIME IT TAKES UNTIL YOU ARE AS LIMBER AS YOU WILL BE FOR THE DAY: 3HR
PAIN SCORE: 5
AM STIFFNESS SCORE: 1, YES
PATIENT GLOBAL ASSESSMENT SCORE: 10

## 2017-07-28 NOTE — TELEPHONE ENCOUNTER
I called patient; discussed elevated liver enzymes; advised stay off SSZ and tylenol; plan u/s liver and hepatology next week and will repeat liver enzymes next week to see if resolving

## 2017-07-28 NOTE — PROGRESS NOTES
"Subjective:       Patient ID: Robert Smith is a 56 y.o. female.  Here with   Lives on [facility]    Chief Complaint: Pain    Hx RA dx 2003; dx at [facility] ; started with hands and all over body  On prednisone 5 mg/d since 2003    Rx by Dr. Rosenbaum here 2013  Hx Plaquenil, MTX, Arava  Hx Humira did not work  Hx Simponi caused body to shake  Orencia caused shaking and jaw pain    Dr Paris 2013-now; they want to change because they cannot communicate with him  Had Rituxin a year or so ago  Earlier this year took Xeljanz for a few months; had a lot of nausea and she felt arms and hands getting numb  Earlier this month Dr Paris Rx SSZ but she had a lot of nausea and she stopped it 2 days ago  Recalls MTX pills and shots with similar side effects    Infusion therapy complicated by knee prosthetic infections  Hx 2 knee replacements; 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 and had to be redone  Knees a little better; cannot walk far or stand for long  In too much pain to do therapy  Currently hands and feet hurt really bad  Pain in neck and shoulders      Also hx FMS; take gabapentin 300 mg 1 bid and 2 hs; thinks gabapentin causes leg cramps at night     notes that Xeljanz helped RA more than others     Review of Systems   Constitutional: Negative for fatigue, fever and unexpected weight change.   HENT: Negative for mouth sores and trouble swallowing.         Dry mouth   Eyes: Negative for redness.        Dry eyes   Respiratory: Negative for cough and shortness of breath.    Cardiovascular: Negative for chest pain.   Gastrointestinal: Positive for diarrhea and nausea. Negative for abdominal pain and constipation.   Skin: Negative for rash.   Neurological: Negative for headaches.   Hematological: Negative for adenopathy. Does not bruise/bleed easily.   Psychiatric/Behavioral: Negative for sleep disturbance.         Objective:   /73 (BP Location: Left arm, Patient Position: Sitting, BP Method: Automatic)   Pulse 85   Ht 5' 1" " (1.549 m)   Wt 98.1 kg (216 lb 3.2 oz)   BMI 40.85 kg/m²      Physical Exam   Constitutional: She is well-developed, well-nourished, and in no distress.   WF; Appears ill    HENT:   Mouth/Throat: Oropharynx is clear and moist.   Eyes: Conjunctivae are normal.   Cardiovascular: Normal rate and regular rhythm.    Pulmonary/Chest: She has no wheezes. She has no rales.   Lymphadenopathy:     She has no cervical adenopathy.   Neurological: She is alert.   Skin: No rash noted.     Musculoskeletal:   Swollen PIP and MCP joints with minimal tenderness; sw tender wrists with limited ROM  L elbow flexion contracture  Good passive ROM shoulders  Knees warm; not tender  Ankles, midfoot, MTPs bilaterally swollen and tender           Assessment:       1. Rheumatoid arthritis involving multiple sites with positive rheumatoid factor    2. Thrush, oral    3. Nausea    4. Weight loss    5. Dysuria    6. Hepatitis, acute        RA is active but it is not clear to me that nausea is necessarily a side effect of meds; she needs adequate nausea workup before starting a new DMARD or possibly re-starting Xeljanz    Plan:     1. Lab today to investigate nausea and RA  2. No new DMARD yet until nausea addressed  3. Reduce gabapentin to 300 tid then further if necessary   4. Expedite f/u with PCP and GI re: nausea  5. Discuss by telephone next week; cont pred 5 mg/d for now  6. Diflucan for 3 d for thrush       Later:  Lab shows AST/ALT 1480/1632; CK normal; albumin 3.2; alk phos 530  This may be SSZ adverse effect though much higher than usual drug induced transaminase elevation    I will add hepatitis panel and u/s liver and hepatology consult and notify PCP

## 2017-07-31 ENCOUNTER — DOCUMENTATION ONLY (OUTPATIENT)
Dept: TRANSPLANT | Facility: CLINIC | Age: 57
End: 2017-07-31

## 2017-07-31 LAB — BACTERIA UR CULT: NORMAL

## 2017-07-31 NOTE — NURSING
Pt records reviewed.   Pt will be referred to Hepatology.  Hepatitis, acute  Initial referral received  from the workque.   Referring Provider/diagnosis  Ten Rodriguez MD  Referral letter sent to provider and patient.

## 2017-07-31 NOTE — LETTER
July 31, 2017    Robert Smith  [address] [place] [id number]      Dear Robert Smith:    Your doctor has referred you to the [facility]. You will be contacted by our office and an initial appointment will then be scheduled for you.    We look forward to seeing you soon. If you have any further questions, please contact us at [phone].       Sincerely,        [facility]   [address] [place] [id number]  [phone]

## 2017-07-31 NOTE — LETTER
July 31, 2017    Ten Rodriguez MD  [address]  [place] [address]      Dear Dr. Rodriguez    Patient: Robert Smith   MR Number: [id number]   YOB: 1960     Thank you for the referral of Robert Smith to the [facility]. An initial appointment will be scheduled for your patient with one of our Hepatologists.      Thank you again for your trust in our program.  If there is anything we can do for you or your staff, please feel free to contact us.        Sincerely,        [facility]  [facility] [place] [address]  [phone]

## 2017-08-02 ENCOUNTER — LAB VISIT (OUTPATIENT)
Dept: LAB | Facility: HOSPITAL | Age: 57
End: 2017-08-02
Attending: INTERNAL MEDICINE
Payer: MEDICARE

## 2017-08-02 DIAGNOSIS — B17.9 HEPATITIS, ACUTE: ICD-10-CM

## 2017-08-02 LAB
ALBUMIN SERPL BCP-MCNC: 2.8 G/DL
ALP SERPL-CCNC: 419 U/L
ALT SERPL W/O P-5'-P-CCNC: 871 U/L
AST SERPL-CCNC: 734 U/L
BILIRUB DIRECT SERPL-MCNC: 1 MG/DL
BILIRUB SERPL-MCNC: 1.7 MG/DL
HAV IGM SERPL QL IA: NEGATIVE
HBV CORE IGM SERPL QL IA: NEGATIVE
HBV SURFACE AG SERPL QL IA: NEGATIVE
HCV AB SERPL QL IA: NEGATIVE
PROT SERPL-MCNC: 6.7 G/DL

## 2017-08-02 PROCEDURE — 80076 HEPATIC FUNCTION PANEL: CPT

## 2017-08-02 PROCEDURE — 86644 CMV ANTIBODY: CPT

## 2017-08-02 PROCEDURE — 36415 COLL VENOUS BLD VENIPUNCTURE: CPT

## 2017-08-02 PROCEDURE — 80074 ACUTE HEPATITIS PANEL: CPT

## 2017-08-02 PROCEDURE — 86645 CMV ANTIBODY IGM: CPT

## 2017-08-03 LAB — CMV IGM TITR SERPL: <8 U/ML

## 2017-08-04 LAB — CMV IGG SERPL QL IA: NORMAL

## 2017-08-08 ENCOUNTER — TELEPHONE (OUTPATIENT)
Dept: RHEUMATOLOGY | Facility: CLINIC | Age: 57
End: 2017-08-08

## 2017-08-08 DIAGNOSIS — K75.9 HEPATITIS: Primary | ICD-10-CM

## 2017-08-08 NOTE — TELEPHONE ENCOUNTER
----- Message from Nick Mijares sent at 8/8/2017 12:02 PM CDT -----  Contact: self/home  Pt would like her lab results.

## 2017-08-09 RX ORDER — HYDROCODONE BITARTRATE AND ACETAMINOPHEN 10; 325 MG/1; MG/1
1 TABLET ORAL EVERY 8 HOURS PRN
Qty: 60 TABLET | Refills: 0 | Status: SHIPPED | OUTPATIENT
Start: 2017-08-09 | End: 2018-09-07

## 2017-08-09 NOTE — TELEPHONE ENCOUNTER
----- Message from Guicho Mendieta sent at 8/9/2017  9:36 AM CDT -----  Contact: self@home, [phone]  Pt called in stating that she left a message yesterday, but has not gotten a response. She stated that she is in a lot of pain and she is needing to know what the plan is for her care. Please return call as soon as possible    Thank you

## 2017-08-09 NOTE — TELEPHONE ENCOUNTER
Nausea better but still taking ondansetron  Pain is bad.   Liver enzymes improved but has not seen hepatology yet.    I will Rx refill on hydrocodone  to minimize acetaminophen    Schedule hepatic function panel tomorrow

## 2017-08-10 ENCOUNTER — HOSPITAL ENCOUNTER (OUTPATIENT)
Dept: RADIOLOGY | Facility: HOSPITAL | Age: 57
Discharge: HOME | End: 2017-08-10
Attending: INTERNAL MEDICINE
Payer: MEDICARE

## 2017-08-10 DIAGNOSIS — B17.9 HEPATITIS, ACUTE: ICD-10-CM

## 2017-08-10 PROCEDURE — 76705 ECHO EXAM OF ABDOMEN: CPT | Mod: 26,,, | Performed by: RADIOLOGY

## 2017-08-10 PROCEDURE — 76705 ECHO EXAM OF ABDOMEN: CPT | Mod: TC

## 2017-08-11 ENCOUNTER — TELEPHONE (OUTPATIENT)
Dept: RHEUMATOLOGY | Facility: CLINIC | Age: 57
End: 2017-08-11

## 2017-08-11 NOTE — TELEPHONE ENCOUNTER
----- Message from Verito Hoff sent at 8/11/2017 10:12 AM CDT -----  Contact: self@home  Patient called regarding test results .

## 2017-08-14 ENCOUNTER — TELEPHONE (OUTPATIENT)
Dept: RHEUMATOLOGY | Facility: CLINIC | Age: 57
End: 2017-08-14

## 2017-08-14 NOTE — TELEPHONE ENCOUNTER
----- Message from Rosalba Coronel sent at 8/14/2017  4:28 PM CDT -----  Contact: self  Patient ask for a call in regards to results patient states in pain now called this morning with no response Patient can be reach on home phone

## 2017-08-16 ENCOUNTER — HOSPITAL ENCOUNTER (OUTPATIENT)
Dept: RADIOLOGY | Facility: HOSPITAL | Age: 57
Discharge: HOME | End: 2017-08-16
Attending: INTERNAL MEDICINE
Payer: MEDICARE

## 2017-08-16 ENCOUNTER — OFFICE VISIT (OUTPATIENT)
Dept: RHEUMATOLOGY | Facility: CLINIC | Age: 57
End: 2017-08-16
Payer: MEDICARE

## 2017-08-16 ENCOUNTER — OFFICE VISIT (OUTPATIENT)
Dept: HEPATOLOGY | Facility: CLINIC | Age: 57
End: 2017-08-16
Payer: MEDICARE

## 2017-08-16 VITALS
HEART RATE: 89 BPM | RESPIRATION RATE: 18 BRPM | SYSTOLIC BLOOD PRESSURE: 157 MMHG | BODY MASS INDEX: 41.34 KG/M2 | DIASTOLIC BLOOD PRESSURE: 72 MMHG | HEIGHT: 61 IN | WEIGHT: 218.94 LBS | TEMPERATURE: 98 F | OXYGEN SATURATION: 96 %

## 2017-08-16 VITALS
BODY MASS INDEX: 41.5 KG/M2 | SYSTOLIC BLOOD PRESSURE: 111 MMHG | HEART RATE: 89 BPM | WEIGHT: 219.81 LBS | DIASTOLIC BLOOD PRESSURE: 77 MMHG | HEIGHT: 61 IN

## 2017-08-16 DIAGNOSIS — M05.79 RHEUMATOID ARTHRITIS INVOLVING MULTIPLE SITES WITH POSITIVE RHEUMATOID FACTOR: Primary | ICD-10-CM

## 2017-08-16 DIAGNOSIS — B17.9 HEPATITIS, ACUTE: Primary | ICD-10-CM

## 2017-08-16 DIAGNOSIS — M05.79 RHEUMATOID ARTHRITIS INVOLVING MULTIPLE SITES WITH POSITIVE RHEUMATOID FACTOR: ICD-10-CM

## 2017-08-16 DIAGNOSIS — M89.9 DISORDER OF BONE: ICD-10-CM

## 2017-08-16 PROCEDURE — 99214 OFFICE O/P EST MOD 30 MIN: CPT | Mod: S$GLB,,, | Performed by: PHYSICIAN ASSISTANT

## 2017-08-16 PROCEDURE — 77077 JOINT SURVEY SINGLE VIEW: CPT | Mod: TC

## 2017-08-16 PROCEDURE — 77077 JOINT SURVEY SINGLE VIEW: CPT | Mod: 26,,, | Performed by: RADIOLOGY

## 2017-08-16 PROCEDURE — 99999 PR PBB SHADOW E&M-EST. PATIENT-LVL V: CPT | Mod: PBBFAC,,, | Performed by: PHYSICIAN ASSISTANT

## 2017-08-16 PROCEDURE — 99213 OFFICE O/P EST LOW 20 MIN: CPT | Mod: S$GLB,,, | Performed by: INTERNAL MEDICINE

## 2017-08-16 PROCEDURE — 3008F BODY MASS INDEX DOCD: CPT | Mod: S$GLB,,, | Performed by: PHYSICIAN ASSISTANT

## 2017-08-16 PROCEDURE — 3008F BODY MASS INDEX DOCD: CPT | Mod: S$GLB,,, | Performed by: INTERNAL MEDICINE

## 2017-08-16 PROCEDURE — 99999 PR PBB SHADOW E&M-EST. PATIENT-LVL V: CPT | Mod: PBBFAC,,, | Performed by: INTERNAL MEDICINE

## 2017-08-16 ASSESSMENT — ROUTINE ASSESSMENT OF PATIENT INDEX DATA (RAPID3)
PATIENT GLOBAL ASSESSMENT SCORE: 5
PSYCHOLOGICAL DISTRESS SCORE: 6.6
AM STIFFNESS SCORE: 1, YES
PAIN SCORE: 8.5
FATIGUE SCORE: 4.5
MDHAQ FUNCTION SCORE: 1.5
TOTAL RAPID3 SCORE: 6.16
WHEN YOU AWAKENED IN THE MORNING OVER THE LAST WEEK, PLEASE INDICATE THE AMOUNT OF TIME IT TAKES UNTIL YOU ARE AS LIMBER AS YOU WILL BE FOR THE DAY: 2 HRS

## 2017-08-16 NOTE — PROGRESS NOTES
HEPATOLOGY CLINIC VISIT NOTE     REFERRING PROVIDER: Dr. Ten Rodriguez     REASON FOR VISIT: acute hepatitis     HISTORY: This is a 56 y.o. White female here for evaluation of acute transaminase elevation and abnormal LFTs, which now appears to be improving.  Pt reports that about 3-4 weeks ago, she was started on Sulfasalazine by her rheumatologist. She reports that she felt very poorly while taking it, noting nausea and abdominal pain. She presented to a new rheumatologist who did routine blood work noting acute transaminitis and abnormal LFTs. Pt had stopped the Sulfasalazine two days prior to the blood draw. Pt denies jaundice, fever or rash during occurrence. She denies any other new medications. She denies herbal supplements. She does not endorse drug or alcohol use. Her acute hepatitis panel was negative as well as CMV, LÁZARO, IgG, and IgM. Her U/S revealed no concerning findings.  She does take norco for chronic pain, but reports 2-3 pills at most daily. Prior to recently, pt had normal transaminases and liver function. She has had mild elevated alkaline phosphatase intermittently, I suspect this is of bone origin due to RA but will monitor should elevation persist. Pt reports that she feels well other than generalized RA pain. Denies jaundice, dark urine, hematemesis, melena, slowed mentation, abdominal distention.     Her PMH is listed below. At present reports an RA flare.     Liver staging:  No formal staging  AST previously 1480, now 28  ALT previously 1,632 now 67  Alk phos 530, now 216.   Tbili 3.5, now 1.1      Past Medical History:   Diagnosis Date    Acid reflux     Allergy     Anxiety     Arthritis     Degenerative disc disease     Depression     DVT of leg (deep venous thrombosis) 2009    rt leg    Fibromyalgia     Long-term current use of steroids 11/12/2012    Osteoporosis, postmenopausal     chronic steroid use    RA (rheumatoid arthritis)     Rheumatoid arteritis 2003     Past  Surgical History:   Procedure Laterality Date    ANKLE FRACTURE SURGERY      BACK SURGERY      CARPAL TUNNEL RELEASE      CHOLECYSTECTOMY      HARDWARE REMOVAL      JOINT REPLACEMENT Right     right knee    KNEE SURGERY      Bilateral    KNEE SURGERY Left     revision x 2    SPINE SURGERY      TONSILLECTOMY      TUBAL LIGATION       FAMILY HISTORY: Negative for liver disease    SOCIAL HISTORY:   Disabled, worked for  office on [employer]  History   Smoking Status    Never Smoker   Smokeless Tobacco    Never Used       History   Alcohol Use No   denies, none recently       History   Drug Use No   denies     ROS:   No fever, chills, weight loss, (+)fatigue  No chest pain, dyspnea, cough  No abdominal pain, (+)change in bowel pattern- constipation, no nausea, vomiting  No skin rashes   No headaches, visual changes  (+) lower extremity edema- ankles  No depression or anxiety      PHYSICAL EXAM:  Friendly White female, in no acute distress; alert and oriented to person, place and time  VITALS: reviewed  HEENT: Sclerae anicteric.   NECK: Supple  CVS: Regular rate and rhythm. No murmurs  LUNGS: Normal respiratory effort. Clear bilaterally  ABDOMEN: Flat, soft, nontender. No organomegaly or masses. No ascites or hernias.    SKIN: Warm and dry. No jaundice, No obvious rashes.   EXTREMITIES: No lower extremity edema  NEURO/PSYCH: Normal gate. Memory intact. Thought and speech pattern appropriate. Behavior normal. No depression or anxiety noted.    RECENT LABS:  Lab Results   Component Value Date    WBC 5.57 07/28/2017    HGB 13.2 07/28/2017     07/28/2017     Lab Results   Component Value Date    INR 0.9 12/08/2016     Lab Results   Component Value Date    AST 28 08/10/2017    ALT 67 (H) 08/10/2017    BILITOT 1.1 (H) 08/10/2017    ALBUMIN 3.0 (L) 08/10/2017    ALKPHOS 216 (H) 08/10/2017    CREATININE 0.8 07/28/2017    BUN 9 07/28/2017     07/28/2017    K 4.2 07/28/2017     RECENT IMAGING:  U/S  abdomen 08/2017  Narrative     Sonographic evaluation of the right upper quadrant of the abdomen was performed. The head and body of the pancreas appear grossly unremarkable. Visualized portions of the abdominal aorta are normal in caliber. The liver is normal in overall size and is homogeneous in echotexture with no focal abnormalities of the liver identified. The gallbladder is surgically absent. The common bile duct is normal in caliber measuring 6 mm in diameter. There is no evidence for intrahepatic biliary dilatation. The right kidney is normal in overall size measuring 11.1 cm in longitudinal length. There is an echogenic focus within the cortex of the lower pole of the right kidney measuring 1.0 x 0.7 x 0.7 cm. This may be related to cortical calcification or possibly a small angiomyolipoma.   Impression       Status post cholecystectomy.  1.0 cm echogenic focus within the cortex of the lower pole the right kidney. This may represent a cortical calcification or possibly a small angiomyolipoma.     ASSESSMENT  56 y.o. White female with:  1. ACUTE TRANSAMINITIS AND ELEVATED LFTs  -- mixed pattern, likely drug induced  -- given improvement, do not feel full work up is warranted  -- continue to remain of SSZ  -- will trend labs  -- should alk phos remain elevated, will fractionate    PLAN:  1. Repeat labs today  2. Will determine interval for monitoring based on results  3. Remain of SSZ    Thank you for allowing me to participate in the care of Robetr Florian PA-C

## 2017-08-16 NOTE — LETTER
August 16, 2017      Ten Rodriguez MD  [address]  [facility] [address]           [facility]  [address]  [facility] [address]  Phone: [phone]  Fax: [phone]          Patient: Robert Smith   MR Number: [id number]   YOB: 1960   Date of Visit: 8/16/2017       Dear Dr. Ten Rodriguez:    Thank you for referring Robert Smith to me for evaluation. Attached you will find relevant portions of my assessment and plan of care.    If you have questions, please do not hesitate to call me. I look forward to following Robert Smith along with you.    Sincerely,    Germán Florian PA-C    Enclosure  CC:  No Recipients    If you would like to receive this communication electronically, please contact [email or web address] or [phone] to request more information on [facility] [facility] access.    For providers and/or their staff who would like to refer a patient to [facility], please contact us through our one-stop-shop provider referral line, [facility] , at [phone].    If you feel you have received this communication in error or would no longer like to receive these types of communications, please e-mail [email or web address]

## 2017-08-16 NOTE — PROGRESS NOTES
I have reviewed and concur with the SHERI's history, physical, assessment, and plan.  I have personally interviewed and examined the patient at bedside.  See below addendum for my evaluation and additional findings.    57yo female with RA and recent start of sulfasalazine that presents for evaluation of elevated liver tests.  Patient had rise in transaminases over 1000s along with mild elevation of bilirubin and AP, most consistent with hepatocellular damage.  Negative viral hepatitis panel and CMV.  Leading etiology is DILI.  Patient is having rapid improvement of liver tests over the last 3 weeks.  Suspect this was infectious or DILI and resolving spontaneously.  US was performed and negative for obstruction.  No further work-up required at this time.  Noted to have elevation of AP in 12/2016.  Recommend allowing patient to recover from this acute injury.  If elevated AP persists, recommend work-up with AMA, AP fractionation, GGT and possible MRI/MRCP to exclude cholestatic causes for chronic liver disease.  Patient also at risk for fatty liver disease and elevated AP from bone source given RA.      Patient will return to clinic with JESSICA Hunter

## 2017-08-16 NOTE — PROGRESS NOTES
"Subjective:       Patient ID: Robert Smith is a 56 y.o. female.    Chief Complaint: Disease Management    HPI   Hx RA dx 2003; dx at [facility] ; started with hands and all over body  On prednisone 5 mg/d since 2003     Rx by Dr. Rosenbaum here 2013  Hx Plaquenil, MTX, Arava  Hx Humira did not work  Hx Simponi caused body to shake  Orencia caused shaking and jaw pain     Dr Paris 2013-now; they want to change because they cannot communicate with him  Had Rituxin a year or so ago  Earlier this year took Xeljanz for a few months; had a lot of nausea and she felt arms and hands getting numb  Earlier this month Dr Paris Rx SSZ but she had a lot of nausea and she stopped it 2 days ago  Recalls MTX pills and shots with similar side effects     Infusion therapy complicated by knee prosthetic infections  Hx 2 knee replacements; 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 and had to be redone    2 h morning stiffness  Pain mid thoracic spine; previous xrays show lordosis and she has been on low dose prednisone for 10+ years    Review of Systems   Constitutional: Negative for fatigue, fever and unexpected weight change.   HENT: Negative for mouth sores and trouble swallowing.         Dry mouth   Eyes: Negative for redness.        Dry eyes   Respiratory: Negative for cough and shortness of breath.    Cardiovascular: Negative for chest pain.   Gastrointestinal: Positive for constipation. Negative for abdominal pain and diarrhea.   Skin: Negative for rash.   Neurological: Negative for headaches.   Hematological: Negative for adenopathy. Does not bruise/bleed easily.   Psychiatric/Behavioral: Negative for sleep disturbance.         Objective:   /77   Pulse 89   Ht 5' 1" (1.549 m)   Wt 99.7 kg (219 lb 12.8 oz)   BMI 41.53 kg/m²      Physical Exam       Right Side Rheumatological Exam     The patient is tender to palpation of the wrist, 1st PIP, 1st MCP, 2nd PIP, 2nd MCP, 3rd PIP, 3rd MCP, 4th PIP, 4th MCP, 5th PIP and 5th MCP    She has swelling of the " wrist, 1st PIP, 1st MCP, 2nd PIP, 2nd MCP, 3rd PIP, 3rd MCP, 4th PIP, 4th MCP, 5th PIP and 5th MCP    Left Side Rheumatological Exam     The patient is tender to palpation of the elbow, wrist, 1st PIP, 1st MCP, 2nd MCP and 3rd MCP.    She has swelling of the elbow, wrist, 1st PIP, 1st MCP, 2nd MCP and 3rd MCP          swollen tender pip and MCP and wrist joints; L elbow, R shoulder    Lab shows resolution of elevated liver enzymes except for sl elevated alk phos and albumen 2.7    7/28 ,   Assessment:       1. Rheumatoid arthritis involving multiple sites with positive rheumatoid factor    2. Disorder of bone         RA with intolerance to many drugs, currently uncontrolled on hydroxychloroquine plus prednisone 5 mg/d  Sulfasalazine induced hepatitis now resolved  Chronic pain on hydrocodone  Opioid induced constipation  Plan:     1. RELEASE OF INFORMATION  To Dr Paris  2. Schedule xrays and DXA scan  3. Start Enbrel 50/ week  4. RTC 1 mo

## 2017-08-16 NOTE — PATIENT INSTRUCTIONS
Take release of information to Dr Paris clinic    Miralax, dulcolax and milk of magnesium for constipation  Continue prednisone 5 mg/d   Start Enbrel one shot every week

## 2017-08-17 ENCOUNTER — TELEPHONE (OUTPATIENT)
Dept: PHARMACY | Facility: CLINIC | Age: 57
End: 2017-08-17

## 2017-08-17 ENCOUNTER — PATIENT MESSAGE (OUTPATIENT)
Dept: HEPATOLOGY | Facility: CLINIC | Age: 57
End: 2017-08-17

## 2017-08-17 DIAGNOSIS — B17.9 HEPATITIS, ACUTE: Primary | ICD-10-CM

## 2017-08-17 DIAGNOSIS — D84.9 IMMUNOSUPPRESSION: Primary | ICD-10-CM

## 2017-08-18 NOTE — TELEPHONE ENCOUNTER
Documentation Only:    Faxed Prior Authorization form for Enbrel to insurance 08/18/2017 @ 1:24 P.M.

## 2017-08-18 NOTE — TELEPHONE ENCOUNTER
I ordered new Quantiferon test for TB; please call and tell her that I want her to do it so we can start her on Enbrel.

## 2017-08-18 NOTE — TELEPHONE ENCOUNTER
Good morning,    We are currently working on Ms. Smith's Enbrel Rx; however, there is no up to date TB test on file. Is there anyway she can be scheduled for this test in the near future?     Thanks,   Shantelle Bueno, PharmD  [facility]

## 2017-08-21 NOTE — TELEPHONE ENCOUNTER
FOR DOCUMENTATION ONLY:  Enbrel prior authorization approved x 2 years.  8/19/17 through 8/19/19  $0 co pay

## 2017-08-22 NOTE — TELEPHONE ENCOUNTER
The Enbrel has been approved, but still no TB completed. Would you like us to continue to hold her prescription until it is completed?    Thanks ,  Shantelle

## 2017-08-23 ENCOUNTER — HOSPITAL ENCOUNTER (OUTPATIENT)
Dept: RADIOLOGY | Facility: HOSPITAL | Age: 57
Discharge: HOME | End: 2017-08-23
Attending: INTERNAL MEDICINE
Payer: MEDICARE

## 2017-08-23 DIAGNOSIS — M89.9 DISORDER OF BONE: ICD-10-CM

## 2017-08-23 DIAGNOSIS — M05.79 RHEUMATOID ARTHRITIS INVOLVING MULTIPLE SITES WITH POSITIVE RHEUMATOID FACTOR: ICD-10-CM

## 2017-08-23 PROCEDURE — 77080 DXA BONE DENSITY AXIAL: CPT | Mod: TC

## 2017-08-23 PROCEDURE — 77080 DXA BONE DENSITY AXIAL: CPT | Mod: 26,,, | Performed by: RADIOLOGY

## 2017-08-25 NOTE — TELEPHONE ENCOUNTER
Initial Enbrel Sureclick 50mg/ml Injection consult completed and picked up on 17.  $ 0 copay. Patient will start Enbrel Sureclick 50mg/ml Injection on 17. Address confirmed, CC on file. Confirmed 2 patient identifiers - name and . Therapy Appropriate.    Counseled patient on administration directions:  -  Inject Enbrel 50mg into the skin every 7 days.  .- Take out of the refrigerator 30-60 minutes prior to injection.  - Wash hands before and after injection.  - Monthly RX will come with gauze, bandaids, and alcohol swabs.  - Patient may inject in either the tops of the thighs, abdomen- but at least 2 inches away from her belly button, or the outer part of her upper arm.  Patient was instructed to rotate injections sites.  - Patient is to wipe down the injection site with the alcohol pad, wait to dry.  Gently squeeze the area of the cleaned skin and hold it firmly.   Place the pen flat against the raised area of skin that is being squeezed, then push down on the button and hold for 10-15 seconds, until the window has gone from clear to yellow.  - Patient should rotate injection sites.   - Patient will use sharps container; once full, per [place] law, she/ he may lock the sharps container and place in her trash. She/ he can then contact the Pharmacy and we will replace the sharps at no additional charge.    Patient was counseled on possible side effects:  - Injection site reaction: redness, soreness, itching, bruising, which should resolve within 3-5 days.  - Increased risk for infections.  If patient becomes sick, patient is to hold Enbrel use until she/ he is better.     DDIs:  Medication list reviewed and potential DDIs addressed.    Patient verbalized understanding. Compliance stressed. Patient advised to keep a calendar marking dates of injections to ensure better compliance. Patient advised to call myself or provider should any questions arise. Patient plans to start Enbrel on 17. Consultation  included: indication; goals of treatment; administration; storage and handling; side effects; how to handle side effects; the importance of compliance; how to handle missed doses; the importance of laboratory monitoring; the importance of keeping all follow up appointments.  Patient understands to report any medication changes to [facility] and provider. All questions answered and addressed to patients satisfaction. I will f/u with her in 1 week from start, [facility] to contact patient in 3 weeks for refills.       Discussed previous trial on Humira and Xeljanz. D/C Humira due to lack of effectiveness. D/C Xeljanz due to side effects experienced. She was very hesitant to start a new medication.     Injected first dose at pharmacy in left thigh. Went well and felt fine after. Will be setting alarm in phone to remind her weekly.

## 2017-08-28 NOTE — TELEPHONE ENCOUNTER
Mrs. Smith called over the pharmacy on Sunday and left a voicemail regarding an injection site reaction she was noticing after injecting on Friday. It appeared as a raised area that was very itchy. No rash that spread just located at that central spot. After putting a cool rag at the location it went subsided. She was advised to have her daughter home with her on Friday when she injects next should she have any type of allergic reaction to the ingredients. She acknowledged. She also stated it may have just been due to her anxiety that she has been dealing with when she was thinking about the potential side effects. Stated that even after one dose she is noticing a great difference in her arthritis. This morning she was able to just get up and walk around with no morning stiffness to note. Advised to call us back should she notice and bulging or itchiness again.

## 2017-09-11 ENCOUNTER — TELEPHONE (OUTPATIENT)
Dept: PHARMACY | Facility: CLINIC | Age: 57
End: 2017-09-11

## 2017-09-13 ENCOUNTER — LAB VISIT (OUTPATIENT)
Dept: LAB | Facility: HOSPITAL | Age: 57
End: 2017-09-13
Attending: PHYSICIAN ASSISTANT
Payer: MEDICARE

## 2017-09-13 DIAGNOSIS — B17.9 HEPATITIS, ACUTE: ICD-10-CM

## 2017-09-13 LAB
ALBUMIN SERPL BCP-MCNC: 2.9 G/DL
ALP SERPL-CCNC: 88 U/L
ALT SERPL W/O P-5'-P-CCNC: 8 U/L
ANION GAP SERPL CALC-SCNC: 7 MMOL/L
AST SERPL-CCNC: 17 U/L
BILIRUB SERPL-MCNC: 0.7 MG/DL
BUN SERPL-MCNC: 13 MG/DL
CALCIUM SERPL-MCNC: 10.4 MG/DL
CHLORIDE SERPL-SCNC: 103 MMOL/L
CO2 SERPL-SCNC: 31 MMOL/L
CREAT SERPL-MCNC: 0.8 MG/DL
EST. GFR  (AFRICAN AMERICAN): >60 ML/MIN/1.73 M^2
EST. GFR  (NON AFRICAN AMERICAN): >60 ML/MIN/1.73 M^2
GLUCOSE SERPL-MCNC: 92 MG/DL
POTASSIUM SERPL-SCNC: 4.7 MMOL/L
PROT SERPL-MCNC: 7 G/DL
SODIUM SERPL-SCNC: 141 MMOL/L

## 2017-09-13 PROCEDURE — 36415 COLL VENOUS BLD VENIPUNCTURE: CPT

## 2017-09-13 PROCEDURE — 84075 ASSAY ALKALINE PHOSPHATASE: CPT

## 2017-09-13 PROCEDURE — 80053 COMPREHEN METABOLIC PANEL: CPT

## 2017-09-14 ENCOUNTER — TELEPHONE (OUTPATIENT)
Dept: RHEUMATOLOGY | Facility: CLINIC | Age: 57
End: 2017-09-14

## 2017-09-14 NOTE — TELEPHONE ENCOUNTER
----- Message from Carmel Gaitan MA sent at 9/14/2017  1:47 PM CDT -----  Contact: self@home, [phone]      ----- Message -----  From: Guicho Mendieta  Sent: 9/14/2017  11:55 AM  To: Michael RODRIGUEZ Staff    Pt called in stating that she had her lab work done yesterday and is asking if the results are available. She is asking to please call her and advise    Thank you

## 2017-09-15 ENCOUNTER — TELEPHONE (OUTPATIENT)
Dept: RHEUMATOLOGY | Facility: CLINIC | Age: 57
End: 2017-09-15

## 2017-09-15 ENCOUNTER — TELEPHONE (OUTPATIENT)
Dept: PHARMACY | Facility: CLINIC | Age: 57
End: 2017-09-15

## 2017-09-15 NOTE — TELEPHONE ENCOUNTER
Mrs. Smith called to discuss issues with injection she was administering today. When her daughter hit the trigger button the pen got jammed. No medication came out of the pen. She was given the  # [phone] to get replacement pen. Advised if they give her any trouble we will work on getting an override with insurance. Acknowledged and will be calling us back should she have any trouble.

## 2017-09-16 LAB
ALP BONE CFR SERPL: 41 U/L (ref 5–58)
ALP BONE SERPL-CCNC: 46 % (ref 16–56)
ALP INTEST CFR SERPL: 0 U/L
ALP INTEST SERPL-CCNC: 0 %
ALP LIVER CFR SERPL: 48 U/L (ref 5–93)
ALP LIVER SERPL-CCNC: 54 % (ref 44–84)
ALP SERPL-CCNC: 89 U/L (ref 33–130)

## 2017-09-18 ENCOUNTER — OFFICE VISIT (OUTPATIENT)
Dept: RHEUMATOLOGY | Facility: CLINIC | Age: 57
End: 2017-09-18
Payer: MEDICARE

## 2017-09-18 ENCOUNTER — TELEPHONE (OUTPATIENT)
Dept: RHEUMATOLOGY | Facility: CLINIC | Age: 57
End: 2017-09-18

## 2017-09-18 ENCOUNTER — TELEPHONE (OUTPATIENT)
Dept: HEPATOLOGY | Facility: CLINIC | Age: 57
End: 2017-09-18

## 2017-09-18 VITALS
WEIGHT: 213.69 LBS | SYSTOLIC BLOOD PRESSURE: 116 MMHG | HEIGHT: 61 IN | DIASTOLIC BLOOD PRESSURE: 74 MMHG | HEART RATE: 75 BPM | BODY MASS INDEX: 40.35 KG/M2

## 2017-09-18 DIAGNOSIS — M05.79 RHEUMATOID ARTHRITIS INVOLVING MULTIPLE SITES WITH POSITIVE RHEUMATOID FACTOR: Primary | ICD-10-CM

## 2017-09-18 DIAGNOSIS — R76.11 POSITIVE PPD: ICD-10-CM

## 2017-09-18 DIAGNOSIS — M79.7 FIBROMYALGIA: ICD-10-CM

## 2017-09-18 PROBLEM — B17.9 HEPATITIS, ACUTE: Status: RESOLVED | Noted: 2017-07-28 | Resolved: 2017-09-18

## 2017-09-18 PROCEDURE — 3008F BODY MASS INDEX DOCD: CPT | Mod: S$GLB,,, | Performed by: INTERNAL MEDICINE

## 2017-09-18 PROCEDURE — 99214 OFFICE O/P EST MOD 30 MIN: CPT | Mod: S$GLB,,, | Performed by: INTERNAL MEDICINE

## 2017-09-18 PROCEDURE — 99999 PR PBB SHADOW E&M-EST. PATIENT-LVL V: CPT | Mod: PBBFAC,,, | Performed by: INTERNAL MEDICINE

## 2017-09-18 RX ORDER — PREDNISONE 1 MG/1
5 TABLET ORAL DAILY
Qty: 150 TABLET | Refills: 2 | Status: SHIPPED | OUTPATIENT
Start: 2017-09-18 | End: 2017-12-20 | Stop reason: SDUPTHER

## 2017-09-18 RX ORDER — DULOXETIN HYDROCHLORIDE 30 MG/1
30 CAPSULE, DELAYED RELEASE ORAL DAILY
Qty: 30 CAPSULE | Refills: 5 | Status: SHIPPED | OUTPATIENT
Start: 2017-09-18 | End: 2017-12-20 | Stop reason: SDUPTHER

## 2017-09-18 ASSESSMENT — ROUTINE ASSESSMENT OF PATIENT INDEX DATA (RAPID3)
TOTAL RAPID3 SCORE: 7.78
FATIGUE SCORE: 4.5
PAIN SCORE: 10
MDHAQ FUNCTION SCORE: 1
PSYCHOLOGICAL DISTRESS SCORE: 8.8
PATIENT GLOBAL ASSESSMENT SCORE: 10

## 2017-09-18 ASSESSMENT — DISEASE ACTIVITY SCORE (DAS28)
SWOLLEN_JOINTS_COUNT: 6
TENDER_JOINTS_COUNT: 10
GLOBAL_HEALTH_SCORE: 100
CRP_MG_PER_LITER: 24
ESR_MM_PER_HR: 30
TOTAL_SCORE_ESR: 6.24
TOTAL_SCORE_CRP: 5.98

## 2017-09-18 NOTE — TELEPHONE ENCOUNTER
MA called patient inform patient of her results below. Patient understood and verbalized understanding.MARIA ISABEL

## 2017-09-18 NOTE — TELEPHONE ENCOUNTER
----- Message from Germán Florian PA-C sent at 9/18/2017  4:04 PM CDT -----  Please let her know that her liver enzymes have normalized, therefore no further work up for follow up will be needed in the liver clinic. She can return PRN

## 2017-09-18 NOTE — PROGRESS NOTES
"Subjective:       Patient ID: Robert Smith is a 56 y.o. female.    Chief Complaint: Disease Management    Hx RA dx 2003; dx at [facility] ; started with hands and all over body  On prednisone 5 mg/d since 2003     Rx by Dr. Rosenbaum here 2013  Hx Plaquenil, MTX, Arava  Hx Humira did not work  Hx Simponi caused body to shake  Orencia caused shaking and jaw pain     Dr Paris 2013-now; they want to change because they cannot communicate with him  Had Rituxin a year or so ago  Earlier 2017 took Xeljanz for a few months; had a lot of nausea and she felt arms and hands getting numb  Earlier this month Dr Paris Rx SSZ but she had a lot of nausea and she stopped it 2 days ago  Recalls MTX pills and shots with similar side effects     Infusion therapy complicated by knee prosthetic infections  Hx 2 knee replacements; 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 and had to be redone     Took 3 Enbrel shots and noted less pain; could go longer without pain med  Friday shot malfunctioned and now more pain all over ; ankles giving out    DXA scan shows low bone mass      Review of Systems   Constitutional: Positive for appetite change and unexpected weight change. Negative for fatigue and fever.        Poor appetite and lost 6 lb   HENT: Negative for mouth sores and trouble swallowing.         Dry mouth   Eyes: Negative for redness.        Dry eyes   Respiratory: Negative for cough and shortness of breath.    Cardiovascular: Negative for chest pain.   Gastrointestinal: Positive for constipation. Negative for abdominal pain and diarrhea.   Skin: Negative for rash.   Neurological: Positive for headaches.        Was more forgetful until reduced gabapentin to 300 tid   Hematological: Negative for adenopathy. Does not bruise/bleed easily.   Psychiatric/Behavioral: Negative for sleep disturbance.         Objective:   /74   Pulse 75   Ht 5' 1" (1.549 m)   Wt 96.9 kg (213 lb 11.2 oz)   BMI 40.38 kg/m²      Physical Exam   Constitutional: She is " well-developed, well-nourished, and in no distress.   HENT:   Mouth/Throat: Oropharynx is clear and moist.   Eyes: Conjunctivae are normal.   Cardiovascular: Normal rate and regular rhythm.    Pulmonary/Chest: She has no wheezes. She has no rales.       Right Side Rheumatological Exam     The patient is tender to palpation of the shoulder, elbow, wrist, knee and 3rd MCP    She has swelling of the elbow, wrist and 3rd MCP    Left Side Rheumatological Exam     The patient is tender to palpation of the shoulder, elbow, wrist, knee and 3rd MCP.    She has swelling of the elbow, wrist and 3rd MCP      Lymphadenopathy:     She has no cervical adenopathy.   Neurological: She is alert.   Skin: No rash noted.     Musculoskeletal: She exhibits tenderness.   Swollen , tender 3rd MP bilateral, wrists, elbows  Shoulders, AC, SC, TM all tender  Knees, ankles, MTP's tender  Knees swollen vs obese           Lab Results   Component Value Date    SEDRATE 30 (H) 07/28/2017      Lab Results   Component Value Date    CRP 24.0 (H) 07/28/2017      Assessment:       1. Rheumatoid arthritis involving multiple sites with positive rheumatoid factor    2. Fibromyalgia        RA with initial partial response to Enbrel with decreased pain  Fibromyalgia that complicates assessment of therapeutic response and disease activity by increasing pain       Plan:     1. Cont Enbrel; sample given to replace dose she missed last week  2. Add duloxetine 30 mg/d with likely increase to 60 in future  3. F/u with pain management for injections and consider weaning gabapentin if she cannot tolerate a therapeutic dose  4. RTC me in 3 mo with lab

## 2017-10-09 ENCOUNTER — TELEPHONE (OUTPATIENT)
Dept: PHARMACY | Facility: CLINIC | Age: 57
End: 2017-10-09

## 2017-11-01 ENCOUNTER — TELEPHONE (OUTPATIENT)
Dept: RHEUMATOLOGY | Facility: CLINIC | Age: 57
End: 2017-11-01

## 2017-11-01 RX ORDER — LINACLOTIDE 290 UG/1
CAPSULE, GELATIN COATED ORAL
COMMUNITY
Start: 2017-09-12 | End: 2017-12-20

## 2017-11-01 NOTE — TELEPHONE ENCOUNTER
Has been in pain  Elbows, shoulders, neck hurt  Feet, ankles hurt  Knees hurt  Not much morning stiffness but pain worsens as day goes by  Some swelling ankles  Hard to rise from sitting    Taking Enbrel weekly and HCQ and pred 5 mg/d  Not taking duloxetine  Taking gabapentin 300 tid    Upper back injections did help her    Last lab was Sept  Schedule lab for tomorrow; crp and sed rate; WB

## 2017-11-01 NOTE — TELEPHONE ENCOUNTER
----- Message from Bita Reyes MA sent at 10/31/2017  9:39 AM CDT -----  Contact: self      ----- Message -----  From: Rosalba Coronel  Sent: 10/31/2017   9:13 AM  To: Michael RODRIGUEZ Staff    Patient states she is taking her etanercept (ENBREL SURECLICK) 50 mg/mL (0.98 mL) PnIj however not doing any good. Still in a lot of pain, thinks may need to do labs. Patient ask for a call on home phone.

## 2017-11-02 ENCOUNTER — LAB VISIT (OUTPATIENT)
Dept: LAB | Facility: HOSPITAL | Age: 57
End: 2017-11-02
Attending: INTERNAL MEDICINE
Payer: MEDICARE

## 2017-11-02 DIAGNOSIS — M05.79 RHEUMATOID ARTHRITIS INVOLVING MULTIPLE SITES WITH POSITIVE RHEUMATOID FACTOR: ICD-10-CM

## 2017-11-02 LAB
CRP SERPL-MCNC: 39 MG/L
ERYTHROCYTE [SEDIMENTATION RATE] IN BLOOD BY WESTERGREN METHOD: 35 MM/HR

## 2017-11-02 PROCEDURE — 85651 RBC SED RATE NONAUTOMATED: CPT

## 2017-11-02 PROCEDURE — 86140 C-REACTIVE PROTEIN: CPT

## 2017-11-02 PROCEDURE — 36415 COLL VENOUS BLD VENIPUNCTURE: CPT | Mod: PO

## 2017-11-06 ENCOUNTER — TELEPHONE (OUTPATIENT)
Dept: PHARMACY | Facility: CLINIC | Age: 57
End: 2017-11-06

## 2017-11-06 ENCOUNTER — TELEPHONE (OUTPATIENT)
Dept: RHEUMATOLOGY | Facility: CLINIC | Age: 57
End: 2017-11-06

## 2017-11-06 NOTE — TELEPHONE ENCOUNTER
----- Message from Ten Rodriguez MD sent at 11/3/2017  5:51 PM CDT -----  Tell her that inflammation levels are high and when I get back from [facility] we will have to consider change in medicine.

## 2017-11-08 ENCOUNTER — OFFICE VISIT (OUTPATIENT)
Dept: PODIATRY | Facility: CLINIC | Age: 57
End: 2017-11-08
Payer: MEDICARE

## 2017-11-08 VITALS — WEIGHT: 213 LBS | HEIGHT: 61 IN | BODY MASS INDEX: 40.22 KG/M2

## 2017-11-08 DIAGNOSIS — G60.9 IDIOPATHIC PERIPHERAL NEUROPATHY: ICD-10-CM

## 2017-11-08 DIAGNOSIS — M79.7 FIBROMYALGIA: Primary | ICD-10-CM

## 2017-11-08 DIAGNOSIS — M79.672 FOOT PAIN, BILATERAL: ICD-10-CM

## 2017-11-08 DIAGNOSIS — M79.671 FOOT PAIN, BILATERAL: ICD-10-CM

## 2017-11-08 DIAGNOSIS — L90.9 FAT PAD ATROPHY OF FOOT: ICD-10-CM

## 2017-11-08 DIAGNOSIS — M25.572 CHRONIC ANKLE PAIN, BILATERAL: ICD-10-CM

## 2017-11-08 DIAGNOSIS — G89.29 CHRONIC ANKLE PAIN, BILATERAL: ICD-10-CM

## 2017-11-08 DIAGNOSIS — M25.571 CHRONIC ANKLE PAIN, BILATERAL: ICD-10-CM

## 2017-11-08 PROCEDURE — 99999 PR PBB SHADOW E&M-EST. PATIENT-LVL II: CPT | Mod: PBBFAC,,, | Performed by: PODIATRIST

## 2017-11-08 PROCEDURE — 99203 OFFICE O/P NEW LOW 30 MIN: CPT | Mod: S$GLB,,, | Performed by: PODIATRIST

## 2017-11-08 RX ORDER — CYCLOSPORINE 0.5 MG/ML
EMULSION OPHTHALMIC
COMMUNITY
Start: 2017-10-19 | End: 2018-09-07

## 2017-11-08 RX ORDER — CEPHALEXIN 500 MG/1
CAPSULE ORAL
COMMUNITY
Start: 2017-10-19 | End: 2017-12-20 | Stop reason: ALTCHOICE

## 2017-11-08 RX ORDER — TOBRAMYCIN AND DEXAMETHASONE 3; 1 MG/ML; MG/ML
SUSPENSION/ DROPS OPHTHALMIC
COMMUNITY
Start: 2017-10-19 | End: 2017-12-20 | Stop reason: ALTCHOICE

## 2017-11-08 RX ORDER — LEVOCETIRIZINE DIHYDROCHLORIDE 5 MG/1
TABLET, FILM COATED ORAL
COMMUNITY
Start: 2017-10-23 | End: 2017-12-20

## 2017-11-08 NOTE — PROGRESS NOTES
Subjective:      Patient ID: Robert Smith is a 57 y.o. female.    Chief Complaint: Foot Pain (bilateral foot pain left worse than right )    Robert is a 57 y.o. female who presents to the podiatry clinic  with complaint of  bilateral foot pain. Onset of the symptoms was several weeks ago. Precipitating event: none known. Current symptoms include: ability to bear weight, but with some pain and worsening symptoms after a period of activity. Aggravating factors: any weight bearing. Symptoms have gradually worsened. Patient has had prior foot problems. Evaluation to date: none. Treatment to date: rest. Patients rates pain 6/10 on pain scale.    Past Medical History:   Diagnosis Date    Acid reflux     Allergy     Anxiety     Degenerative disc disease     Depression     DVT of leg (deep venous thrombosis) 2009    rt leg    Fibromyalgia     Long-term current use of steroids 11/12/2012    Osteoporosis, postmenopausal     chronic steroid use    RA (rheumatoid arthritis)        Past Surgical History:   Procedure Laterality Date    ANKLE FRACTURE SURGERY      BACK SURGERY      CARPAL TUNNEL RELEASE      CHOLECYSTECTOMY      HARDWARE REMOVAL      JOINT REPLACEMENT Right     right knee    KNEE SURGERY      Bilateral    KNEE SURGERY Left     revision x 2    SPINE SURGERY      TONSILLECTOMY      TUBAL LIGATION         Family History   Problem Relation Age of Onset    COPD Mother     Heart attack Father     Emphysema Maternal Grandfather     Breast cancer Neg Hx     Colon cancer Neg Hx     Ovarian cancer Neg Hx        Social History     Social History    Marital status:      Spouse name: N/A    Number of children: N/A    Years of education: N/A     Social History Main Topics    Smoking status: Never Smoker    Smokeless tobacco: Never Used    Alcohol use No    Drug use: No    Sexual activity: No     Other Topics Concern    None     Social History Narrative    None       Current Outpatient  Prescriptions   Medication Sig Dispense Refill    ADVAIR DISKUS 250-50 mcg/dose diskus inhaler       albuterol sulfate 2.5 mg/0.5 mL Nebu       albuterol sulfate 90 mcg/actuation AePB Inhale 1 puff into the lungs every 4 (four) hours as needed. Rescue 1 each 1    alprazolam (XANAX) 0.5 MG tablet Take 0.5 mg by mouth 3 (three) times daily as needed.        cephALEXin (KEFLEX) 500 MG capsule       cetirizine (ZYRTEC) 10 MG tablet Take 10 mg by mouth once daily.      cyclobenzaprine (FLEXERIL) 10 MG tablet 10 mg every evening.       DOC-Q-LACE 100 mg capsule TAKE one capsule by mouth once a day 30 capsule 3    duloxetine (CYMBALTA) 30 MG capsule Take 1 capsule (30 mg total) by mouth once daily. 30 capsule 5    esomeprazole (NEXIUM) 40 MG capsule Take 40 mg by mouth before breakfast.        etanercept (ENBREL SURECLICK) 50 mg/mL (0.98 mL) PnIj Inject 50 mg into the skin once a week. 4 Syringe 11    fluticasone (FLONASE) 50 mcg/actuation nasal spray as needed.       gabapentin (NEURONTIN) 300 MG capsule 300 mg 3 (three) times daily.       hydrocodone-acetaminophen 10-325mg (NORCO)  mg Tab Take 1 tablet by mouth every 8 (eight) hours as needed for Pain. 60 tablet 0    HYDROCORT/MIN OIL/PETROLAT,WHT (HYDROCORTISONE-MIN OIL-WHT PET) 1 % Oint       hydroxychloroquine (PLAQUENIL) 200 mg tablet Take 1 tablet (200 mg total) by mouth 2 (two) times daily. 60 tablet 4    levocetirizine (XYZAL) 5 MG tablet       lidocaine-prilocaine (EMLA) cream APPLY A SMALL AMOUNT (2-3 GRAMS) TOPICALLY TO AFFECTED AREAS 3 TIMES A DAY AS DIRECTED.  0    LINZESS 290 mcg Cap       meclizine (ANTIVERT) 25 mg tablet       ondansetron (ZOFRAN) 8 MG tablet       PAZEO 0.7 % Drop       predniSONE (DELTASONE) 1 MG tablet Take 5 tablets (5 mg total) by mouth once daily. 150 tablet 2    promethazine (PHENERGAN) 25 MG tablet TK 1 T PO  Q 4 H  0    RESTASIS 0.05 % ophthalmic emulsion       SODIUM CHLORIDE FOR INHALATION (SODIUM  CHLORIDE 0.9%) 0.9 % nebulizer solution       tobramycin-dexamethasone 0.3-0.1% (TOBRADEX) 0.3-0.1 % DrpS       VENTOLIN HFA 90 mcg/actuation inhaler INHALE ONE PUFF INTO THE LUNGS Q 4 H PRN . RESCUE  1    zolpidem (AMBIEN) 10 mg Tab TK 1 T PO QHS PRF SLEEP  0    fluconazole (DIFLUCAN) 200 MG Tab Take 1 tablet (200 mg total) by mouth once daily. 3 tablet 0     No current facility-administered medications for this visit.        Review of patient's allergies indicates:   Allergen Reactions    Sulfasalazine      HEPATITIS    Sulfa (sulfonamide antibiotics) Nausea And Vomiting    Clindamycin Diarrhea    Daypro [oxaprozin] Nausea Only    Enbrel [etanercept] Other (See Comments)     Other reaction(s): odd thoughts  Odd thoughts    Orencia  [abatacept]      Other reaction(s): Muscle pain    Percocet  [oxycodone-acetaminophen]      Other reaction(s): Vomiting    Simponi  [golimumab]      Other reaction(s): Unknown    Ciprofloxacin Rash    Sulfamethoxazole-trimethoprim Rash         Review of Systems   Constitution: Negative for chills and fever.   Cardiovascular: Negative for chest pain, claudication and leg swelling.   Respiratory: Negative for cough and shortness of breath.    Skin: Negative.    Musculoskeletal: Positive for arthritis, joint pain, myalgias and stiffness.   Gastrointestinal: Negative for nausea and vomiting.   Neurological: Positive for numbness and paresthesias.   Psychiatric/Behavioral: Negative for altered mental status.           Objective:      Physical Exam   Constitutional: She is oriented to person, place, and time. She appears well-developed and well-nourished.   HENT:   Head: Normocephalic.   Cardiovascular: Intact distal pulses.    Pulses:       Dorsalis pedis pulses are 2+ on the right side, and 2+ on the left side.        Posterior tibial pulses are 1+ on the right side, and 1+ on the left side.   CRT < 3 sec to tips of toes. No edema noted to b/l LE. No vericosities noted to b/l  LEs.      Pulmonary/Chest: No respiratory distress.   Musculoskeletal:   Gastrocnemius equinus noted to b/l ankles with decreased DF noted on exam. MMT 5/5 in DF/PF/Inv/Ev resistance with mild reproduction of pain in all directions. Passive range of motion of ankle and pedal joints is painless. Adequate pedal joint ROM.     Generalized pain with palpation of b/l metatarsal heads 2-4. + pain with palpation of plantar medial longitudinal arch b/l. Mild pain with palpation of plantar medial calcaneal tuber b/l. No pain with lateral calc squeeze. No pain with STJ ROM or palpation of Achilles tendon/insertion, Abductor hallucis muscle belly, TA tendon/insertion b/l. Generalized pain with palpation of metatarsals 2-4 bilateral-mild.       Neurological: She is alert and oriented to person, place, and time. She has normal strength. A sensory deficit is present.   Light touch, proprioception, and sharp/dull sensation are all intact bilaterally. Protective threshold with the Williston-Wienstein monofilament is intact bilaterally. Vibratory sensation diminished b/l distal foot. Subjective paresthesias with no clearly identifiable source or trigger.      Skin: Skin is warm, dry and intact. No abrasion, no ecchymosis and no lesion noted. No erythema.   No open lesions, lacerations or wounds noted. Nails are normotrophic and well trimmed to R 1-5 and L 1-5. Interdigital spaces clean, dry and intact b/l. No erythema noted to b/l foot. Skin texture normal. Pedal hair adequate. Skin temperature normal b/l foot.      Psychiatric: She has a normal mood and affect. Her behavior is normal. Judgment and thought content normal.   Vitals reviewed.            Assessment:       Encounter Diagnoses   Name Primary?    Fibromyalgia Yes    Foot pain, bilateral     Chronic ankle pain, bilateral     Fat pad atrophy of foot     Idiopathic peripheral neuropathy          Plan:       Robert was seen today for foot pain.    Diagnoses and all orders for  this visit:    Fibromyalgia    Foot pain, bilateral    Chronic ankle pain, bilateral    Fat pad atrophy of foot    Idiopathic peripheral neuropathy      I counseled the patient on her conditions, their implications and medical management.    Pain likely combination of wear and tear, loss of fat pad, poor choice of shoes and exacerbated by fibromyalgia and RA.     Rx compound pain cream to be applied to areas of paresthesias up to 4-5 x daily as needed for pain. Prescription faxed to [facility].     Recommended gel insoles for additional cushion to plantar surface of both feet given fat pad atrophy along balls of feet.     Long discussion with patient regarding appropriate, supportive and comfortable shoes. Recommended SAS/Easy Spirit style shoe brands or sneaker styles shoes (new balance, asics etc) with adequate arch supports to alleviate abnormal pressure and improve stability of foot while walking. Avoid flat shoes and barefoot walking as these will exacerbate or worsen symptoms.     Advised to stretch calf muscles multiple times daily for increased flexibility of ankles and to help decrease forefoot pressure over time. Illustrative stretching exercise handout provided.     Discussed regular and routine moisturizer to skin of both feet to help improve dry skin. Advised to apply twice daily until resolution of symptoms. Avoid between toes.     Will consider Rheumatology referral if no improvement in pain with above treatments.     RTC 2 months for reassessment, sooner PRN

## 2017-11-15 ENCOUNTER — TELEPHONE (OUTPATIENT)
Dept: PHARMACY | Facility: CLINIC | Age: 57
End: 2017-11-15

## 2017-11-15 NOTE — TELEPHONE ENCOUNTER
Pt was called for Enbrel refill and informed us that she was told to stop medication.  Please advise, so that we may appropriately handle the prescription.    Thanks!  Kenya Elise, PharmD  [facility]  [phone]

## 2017-11-17 NOTE — TELEPHONE ENCOUNTER
Ms. Smith stated today that she was called and told that based on her lab work, the Enbrel did not seem to be working.  Based on this information, she stopped the medication.  She has 2 doses remaining.  She would like a call back, as she feels that she needs to be seen sooner than her scheduled appointment.  Let me know if I can be of any more assistance.    Thanks,  Kenya Elise, PharmD  [facility]  [phone]

## 2017-11-20 ENCOUNTER — TELEPHONE (OUTPATIENT)
Dept: RHEUMATOLOGY | Facility: CLINIC | Age: 57
End: 2017-11-20

## 2017-11-20 NOTE — TELEPHONE ENCOUNTER
----- Message from Guicho Mendieta sent at 11/20/2017 10:33 AM CST -----  Contact: [email or web address] number  Pt has requested a call back several times last week regarding her medication not doing any good, but has not heard anything back yet. She asked to please call her as soon as possible    Thank you

## 2017-11-20 NOTE — TELEPHONE ENCOUNTER
I spoke to the patient and explained that she should stay on the med until she returns     Please make sure she is on the cancellation list.    WD

## 2017-11-24 ENCOUNTER — TELEPHONE (OUTPATIENT)
Dept: RHEUMATOLOGY | Facility: CLINIC | Age: 57
End: 2017-11-24

## 2017-11-28 ENCOUNTER — TELEPHONE (OUTPATIENT)
Dept: PHARMACY | Facility: CLINIC | Age: 57
End: 2017-11-28

## 2017-11-28 NOTE — TELEPHONE ENCOUNTER
Confirmed readiness for Enbrel refill. Copay $0 (004). shipping out 11/29/17. Address confirmed.   Patient was getting ready to go to Dr chayito and asked that we catch her to have completed at next refill (TTT & F/U). Everything is going well at this time, but still experiencing an injection site reaction. Lasts for 2 days. She takes PO Benadryl the day of injection and applies hydrocortisone cream, which helps. Injects every Monday

## 2017-12-18 ENCOUNTER — LAB VISIT (OUTPATIENT)
Dept: LAB | Facility: HOSPITAL | Age: 57
End: 2017-12-18
Attending: INTERNAL MEDICINE
Payer: MEDICARE

## 2017-12-18 DIAGNOSIS — M05.79 RHEUMATOID ARTHRITIS INVOLVING MULTIPLE SITES WITH POSITIVE RHEUMATOID FACTOR: ICD-10-CM

## 2017-12-18 LAB
ALBUMIN SERPL BCP-MCNC: 3.1 G/DL
ALP SERPL-CCNC: 90 U/L
ALT SERPL W/O P-5'-P-CCNC: 11 U/L
ANION GAP SERPL CALC-SCNC: 6 MMOL/L
AST SERPL-CCNC: 18 U/L
BASOPHILS # BLD AUTO: 0.02 K/UL
BASOPHILS NFR BLD: 0.3 %
BILIRUB SERPL-MCNC: 0.7 MG/DL
BUN SERPL-MCNC: 13 MG/DL
CALCIUM SERPL-MCNC: 10.2 MG/DL
CHLORIDE SERPL-SCNC: 102 MMOL/L
CO2 SERPL-SCNC: 32 MMOL/L
CREAT SERPL-MCNC: 0.8 MG/DL
CRP SERPL-MCNC: 24.2 MG/L
DIFFERENTIAL METHOD: ABNORMAL
EOSINOPHIL # BLD AUTO: 0.1 K/UL
EOSINOPHIL NFR BLD: 2 %
ERYTHROCYTE [DISTWIDTH] IN BLOOD BY AUTOMATED COUNT: 13.8 %
EST. GFR  (AFRICAN AMERICAN): >60 ML/MIN/1.73 M^2
EST. GFR  (NON AFRICAN AMERICAN): >60 ML/MIN/1.73 M^2
GLUCOSE SERPL-MCNC: 93 MG/DL
HCT VFR BLD AUTO: 41.9 %
HGB BLD-MCNC: 12.5 G/DL
IMM GRANULOCYTES # BLD AUTO: 0.01 K/UL
IMM GRANULOCYTES NFR BLD AUTO: 0.2 %
LYMPHOCYTES # BLD AUTO: 1.7 K/UL
LYMPHOCYTES NFR BLD: 25.4 %
MCH RBC QN AUTO: 26.7 PG
MCHC RBC AUTO-ENTMCNC: 29.8 G/DL
MCV RBC AUTO: 90 FL
MONOCYTES # BLD AUTO: 0.4 K/UL
MONOCYTES NFR BLD: 6.2 %
NEUTROPHILS # BLD AUTO: 4.4 K/UL
NEUTROPHILS NFR BLD: 65.9 %
NRBC BLD-RTO: 0 /100 WBC
PLATELET # BLD AUTO: 154 K/UL
PMV BLD AUTO: 10.9 FL
POTASSIUM SERPL-SCNC: 4.3 MMOL/L
PROT SERPL-MCNC: 7 G/DL
RBC # BLD AUTO: 4.68 M/UL
SODIUM SERPL-SCNC: 140 MMOL/L
WBC # BLD AUTO: 6.65 K/UL

## 2017-12-18 PROCEDURE — 80053 COMPREHEN METABOLIC PANEL: CPT

## 2017-12-18 PROCEDURE — 85651 RBC SED RATE NONAUTOMATED: CPT

## 2017-12-18 PROCEDURE — 86140 C-REACTIVE PROTEIN: CPT

## 2017-12-18 PROCEDURE — 85025 COMPLETE CBC W/AUTO DIFF WBC: CPT

## 2017-12-19 LAB — ERYTHROCYTE [SEDIMENTATION RATE] IN BLOOD BY WESTERGREN METHOD: 14 MM/HR

## 2017-12-20 ENCOUNTER — TELEPHONE (OUTPATIENT)
Dept: PHARMACY | Facility: HOSPITAL | Age: 57
End: 2017-12-20

## 2017-12-20 ENCOUNTER — OFFICE VISIT (OUTPATIENT)
Dept: RHEUMATOLOGY | Facility: CLINIC | Age: 57
End: 2017-12-20
Payer: MEDICARE

## 2017-12-20 VITALS
DIASTOLIC BLOOD PRESSURE: 86 MMHG | HEIGHT: 61 IN | BODY MASS INDEX: 40.57 KG/M2 | SYSTOLIC BLOOD PRESSURE: 125 MMHG | HEART RATE: 77 BPM | WEIGHT: 214.88 LBS

## 2017-12-20 DIAGNOSIS — B17.9 HEPATITIS, ACUTE: ICD-10-CM

## 2017-12-20 DIAGNOSIS — M79.7 FIBROMYALGIA: ICD-10-CM

## 2017-12-20 DIAGNOSIS — F32.89 OTHER DEPRESSION: ICD-10-CM

## 2017-12-20 DIAGNOSIS — M05.79 RHEUMATOID ARTHRITIS INVOLVING MULTIPLE SITES WITH POSITIVE RHEUMATOID FACTOR: Primary | ICD-10-CM

## 2017-12-20 PROCEDURE — 96372 THER/PROPH/DIAG INJ SC/IM: CPT | Mod: S$GLB,,, | Performed by: INTERNAL MEDICINE

## 2017-12-20 PROCEDURE — 99999 PR PBB SHADOW E&M-EST. PATIENT-LVL IV: CPT | Mod: PBBFAC,,, | Performed by: INTERNAL MEDICINE

## 2017-12-20 PROCEDURE — 99214 OFFICE O/P EST MOD 30 MIN: CPT | Mod: 25,S$GLB,, | Performed by: INTERNAL MEDICINE

## 2017-12-20 RX ORDER — HYDROXYCHLOROQUINE SULFATE 200 MG/1
200 TABLET, FILM COATED ORAL 2 TIMES DAILY
Qty: 60 TABLET | Refills: 5 | Status: SHIPPED | OUTPATIENT
Start: 2017-12-20 | End: 2018-08-17 | Stop reason: SDUPTHER

## 2017-12-20 RX ORDER — PREDNISONE 1 MG/1
5 TABLET ORAL DAILY
Qty: 150 TABLET | Refills: 2 | Status: SHIPPED | OUTPATIENT
Start: 2017-12-20 | End: 2018-01-04

## 2017-12-20 RX ORDER — DULOXETIN HYDROCHLORIDE 60 MG/1
60 CAPSULE, DELAYED RELEASE ORAL DAILY
Qty: 30 CAPSULE | Refills: 5 | Status: SHIPPED | OUTPATIENT
Start: 2017-12-20 | End: 2018-02-16

## 2017-12-20 RX ORDER — DULOXETIN HYDROCHLORIDE 30 MG/1
30 CAPSULE, DELAYED RELEASE ORAL DAILY
Qty: 30 CAPSULE | Refills: 5 | Status: SHIPPED | OUTPATIENT
Start: 2017-12-20 | End: 2018-09-07 | Stop reason: SDUPTHER

## 2017-12-20 RX ORDER — TRIAMCINOLONE ACETONIDE 40 MG/ML
80 INJECTION, SUSPENSION INTRA-ARTICULAR; INTRAMUSCULAR
Status: COMPLETED | OUTPATIENT
Start: 2017-12-20 | End: 2017-12-20

## 2017-12-20 RX ADMIN — TRIAMCINOLONE ACETONIDE 80 MG: 40 INJECTION, SUSPENSION INTRA-ARTICULAR; INTRAMUSCULAR at 11:12

## 2017-12-20 ASSESSMENT — ROUTINE ASSESSMENT OF PATIENT INDEX DATA (RAPID3)
FATIGUE SCORE: 6
TOTAL RAPID3 SCORE: 5.78
WHEN YOU AWAKENED IN THE MORNING OVER THE LAST WEEK, PLEASE INDICATE THE AMOUNT OF TIME IT TAKES UNTIL YOU ARE AS LIMBER AS YOU WILL BE FOR THE DAY: 2 HOURS
AM STIFFNESS SCORE: 1, YES
PATIENT GLOBAL ASSESSMENT SCORE: 6
MDHAQ FUNCTION SCORE: 1
PAIN SCORE: 8
PSYCHOLOGICAL DISTRESS SCORE: 7.7

## 2017-12-20 NOTE — PROGRESS NOTES
Subjective:       Patient ID: Robert Smith is a 57 y.o. female.    Chief Complaint: Disease Management    Hx RA dx 2003; dx at [facility] ; started with hands and all over body  On prednisone 5 mg/d since 2003     Rx by Dr. Rosenbaum here 2013  Hx Plaquenil, MTX, Arava  Hx Humira did not work  Hx Simponi caused body to shake  Orencia caused shaking and jaw pain     Dr Paris [id number];  changed because they cannot communicate with him  Had Rituxin 2016  Earlier 2017 took Xeljanz for a few months; had a lot of nausea and she felt arms and hands getting numb  SSZ July 2017 caused nausea and hepatitis  Recalls MTX pills and shots with similar side effects     Restarted ENBREL Aug 2017    Infusion therapy complicated by knee prosthetic infections  Hx 2 knee replacements; 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 and had to be redone     DXA scan shows low bone mass              Associated symptoms include fatigue. Pertinent negatives include no fever, trouble swallowing or headaches.       after initial improvement on Enbrel she had recurrent pain and joint swelling hands and feet  Increasing cymbalta from 60 to 90 mg made a big difference to her    Had some relief of pain with Cymbalta but more hand and feet pain now and swelling hands and feet  Worse in morning    She c/o memory problem and wonders if it is gabapentin    Review of Systems   Constitutional: Positive for fatigue. Negative for fever and unexpected weight change.   HENT: Negative for mouth sores and trouble swallowing.         Dry mouth   Eyes: Negative for redness.        Dry eyes   Respiratory: Negative for cough and shortness of breath.    Cardiovascular: Negative for chest pain.   Gastrointestinal: Negative for abdominal pain, constipation and diarrhea.   Skin: Negative for rash.   Neurological: Negative for headaches.   Hematological: Negative for adenopathy. Does not bruise/bleed easily.   Psychiatric/Behavioral: Negative for sleep disturbance.         Objective:   /86   Pulse  "77   Ht 5' 1" (1.549 m)   Wt 97.5 kg (214 lb 14.4 oz)   BMI 40.60 kg/m²      Physical Exam   Musculoskeletal:   Flexion contracture R elbow  [place]anger neck deformity L 4th digit         Physical Exam     Tenderness:   RUE: glenohumeral  LUE: glenohumeral  Left hand: 1st MCP, 2nd MCP and 3rd MCP  RLE: tibiofemoral  LLE: tibiofemoral    Swelling:   RUE: wrist  Right hand: 1st MCP, 2nd MCP, 3rd MCP, 4th MCP, 5th MCP and 1st PIP  Left hand: 1st MCP, 2nd MCP, 3rd MCP, 4th MCP, 5th MCP, 1st PIP and 3rd PIP    HERNANDEZ-28 tender joint count: 7  HERNANDEZ-28 swollen joint count: 14  ESR (mm/hr): 14  Patient global assessment: 60  HERNANDEZ-28 score: 5.22 (High Disease Activity)    Lab Results   Component Value Date    SEDRATE 14 12/18/2017      Lab Results   Component Value Date    CRP 24.2 (H) 12/18/2017      Assessment:       1. Rheumatoid arthritis involving multiple sites with positive rheumatoid factor    2. Fibromyalgia    3. Other depression        RA with initial partial response to Enbrel but now with ongoing uncontrolled synovitis/ high disease activity    Intolerance of many other drugs as noted in hx    Fibromyalgia with notable improvement in pain management on high dose of duloxetine    C/o memory difficulty possibly due to gabapentin      Plan:     1. Stop Enbrel due to poor response  2. Start Cimzia , 400 mg weeks 0, 2, and 4, then 200 mg every 2 weeks  3. Cont duloxetine , 90 mg/ d  4.  Reduce gabapentin to hs or stop as pain better with duloxetine   5. RTC me in 3 mo with lab          "

## 2017-12-20 NOTE — TELEPHONE ENCOUNTER
Informed patient [facility] received a prescription for Cimzia and we will contact their insurance company to find out if the medication is covered. We will update patient of status as more information is received. feel free to give us a call with  any questions at [phone].

## 2017-12-28 NOTE — TELEPHONE ENCOUNTER
Faxed prior authorization for Cimzia to insurance company for review on Thurs 12/28/2017 @8:22am TASNEEM

## 2017-12-28 NOTE — TELEPHONE ENCOUNTER
DOCUMENTATION ONLY:  Prior authorization for Cimzia approved from 12/28/2017 to 12/31/2018.   Case ID# [id number]     Co-pay: $0    Patient Assistance IS NOT required. Forward to clinical pharmacist for consult & shipment.

## 2018-01-04 ENCOUNTER — TELEPHONE (OUTPATIENT)
Dept: PHARMACY | Facility: CLINIC | Age: 58
End: 2018-01-04

## 2018-01-04 NOTE — TELEPHONE ENCOUNTER
Intial Cimzia starter kit will be shipped to pt on 1/4.  Pt will start on 1/9, since last dose of Enbrel was 1/2.  Pt familiar with injection technique.  Storage, dosage and administration reviewed with the pt. [facility] will follow up with her 1 week from start and every 3 weeks for refills. Pt will need maintenance rx for refills.    Thank You,  Kenya Elise, PharmD  [facility]  [phone]

## 2018-01-13 ENCOUNTER — HOSPITAL ENCOUNTER (EMERGENCY)
Facility: HOSPITAL | Age: 58
Discharge: HOME | End: 2018-01-13
Attending: EMERGENCY MEDICINE
Payer: MEDICARE

## 2018-01-13 ENCOUNTER — TELEPHONE (OUTPATIENT)
Dept: RHEUMATOLOGY | Facility: HOSPITAL | Age: 58
End: 2018-01-13

## 2018-01-13 VITALS
RESPIRATION RATE: 18 BRPM | TEMPERATURE: 99 F | HEIGHT: 61 IN | DIASTOLIC BLOOD PRESSURE: 67 MMHG | OXYGEN SATURATION: 95 % | HEART RATE: 68 BPM | WEIGHT: 205 LBS | BODY MASS INDEX: 38.71 KG/M2 | SYSTOLIC BLOOD PRESSURE: 117 MMHG

## 2018-01-13 DIAGNOSIS — R07.89 CHEST PAIN, NON-CARDIAC: Primary | ICD-10-CM

## 2018-01-13 LAB
ALBUMIN SERPL BCP-MCNC: 3.8 G/DL
ALP SERPL-CCNC: 104 U/L
ALT SERPL W/O P-5'-P-CCNC: 25 U/L
ANION GAP SERPL CALC-SCNC: 9 MMOL/L
AST SERPL-CCNC: 24 U/L
BASOPHILS # BLD AUTO: 0.02 K/UL
BASOPHILS NFR BLD: 0.2 %
BILIRUB SERPL-MCNC: 1 MG/DL
BNP SERPL-MCNC: 155 PG/ML
BUN SERPL-MCNC: 14 MG/DL
CALCIUM SERPL-MCNC: 10.8 MG/DL
CHLORIDE SERPL-SCNC: 104 MMOL/L
CO2 SERPL-SCNC: 30 MMOL/L
CREAT SERPL-MCNC: 0.8 MG/DL
DIFFERENTIAL METHOD: ABNORMAL
EOSINOPHIL # BLD AUTO: 0.2 K/UL
EOSINOPHIL NFR BLD: 2.1 %
ERYTHROCYTE [DISTWIDTH] IN BLOOD BY AUTOMATED COUNT: 14.9 %
EST. GFR  (AFRICAN AMERICAN): >60 ML/MIN/1.73 M^2
EST. GFR  (NON AFRICAN AMERICAN): >60 ML/MIN/1.73 M^2
GLUCOSE SERPL-MCNC: 81 MG/DL
HCT VFR BLD AUTO: 46.5 %
HGB BLD-MCNC: 14.4 G/DL
LYMPHOCYTES # BLD AUTO: 2.2 K/UL
LYMPHOCYTES NFR BLD: 26.8 %
MAGNESIUM SERPL-MCNC: 2.3 MG/DL
MCH RBC QN AUTO: 27.5 PG
MCHC RBC AUTO-ENTMCNC: 31 G/DL
MCV RBC AUTO: 89 FL
MONOCYTES # BLD AUTO: 0.5 K/UL
MONOCYTES NFR BLD: 5.8 %
NEUTROPHILS # BLD AUTO: 5.3 K/UL
NEUTROPHILS NFR BLD: 64.9 %
PLATELET # BLD AUTO: 139 K/UL
PMV BLD AUTO: 11.1 FL
POTASSIUM SERPL-SCNC: 4.9 MMOL/L
PROT SERPL-MCNC: 7 G/DL
RBC # BLD AUTO: 5.24 M/UL
SODIUM SERPL-SCNC: 143 MMOL/L
TROPONIN I SERPL DL<=0.01 NG/ML-MCNC: 0.01 NG/ML
TROPONIN I SERPL DL<=0.01 NG/ML-MCNC: <0.006 NG/ML
WBC # BLD AUTO: 8.12 K/UL

## 2018-01-13 PROCEDURE — 84484 ASSAY OF TROPONIN QUANT: CPT | Mod: 91

## 2018-01-13 PROCEDURE — 93010 ELECTROCARDIOGRAM REPORT: CPT | Mod: ,,, | Performed by: INTERNAL MEDICINE

## 2018-01-13 PROCEDURE — 83880 ASSAY OF NATRIURETIC PEPTIDE: CPT

## 2018-01-13 PROCEDURE — 83735 ASSAY OF MAGNESIUM: CPT

## 2018-01-13 PROCEDURE — 85025 COMPLETE CBC W/AUTO DIFF WBC: CPT

## 2018-01-13 PROCEDURE — 99284 EMERGENCY DEPT VISIT MOD MDM: CPT | Mod: 25

## 2018-01-13 PROCEDURE — 80053 COMPREHEN METABOLIC PANEL: CPT

## 2018-01-13 PROCEDURE — 96374 THER/PROPH/DIAG INJ IV PUSH: CPT

## 2018-01-13 PROCEDURE — 63600175 PHARM REV CODE 636 W HCPCS: Performed by: EMERGENCY MEDICINE

## 2018-01-13 RX ORDER — PANTOPRAZOLE SODIUM 40 MG/1
40 TABLET, DELAYED RELEASE ORAL 2 TIMES DAILY
Qty: 30 TABLET | Refills: 0 | Status: SHIPPED | OUTPATIENT
Start: 2018-01-13 | End: 2018-02-16

## 2018-01-13 RX ORDER — LORAZEPAM 2 MG/ML
1 INJECTION INTRAMUSCULAR
Status: COMPLETED | OUTPATIENT
Start: 2018-01-13 | End: 2018-01-13

## 2018-01-13 RX ADMIN — LORAZEPAM 1 MG: 2 INJECTION INTRAMUSCULAR at 03:01

## 2018-01-13 NOTE — ED PROVIDER NOTES
"Encounter Date: 1/13/2018    SCRIBE #1 NOTE: I, Gudelia Craig, am scribing for, and in the presence of,  Zachariah Valentine MD. I have scribed the following portions of the note - Other sections scribed: HPI and ROS.       History     Chief Complaint   Patient presents with    Chest Pain     " I have RA and I started a new injection Wednesday. I started having chest tightness (midsternal, constant, pressure) and SOB yesterday.     CC: Chest Pain and Arm Pain    HPI: This 57 y.o. Female with PMHx of allergy, anxiety, degenerative disc disease, acid reflux, depression, DVT of leg, postmenopausal osteoporosis, long-term current use of steroids, RA, and fibromyalgia presents to the ED c/o 3 day hx of acute onset, constant, and severe midsternal chest "pressure" and right arm pain. Pt states symptoms began a day after she received 200 mg/mL Cimzia (certolizumab pegol) shot for treatment of RA. Pt took her anxiety medicine with no improvement. Pt also had a rash to bilateral arm, but it was resolved after taking benadryl. Pt denies fever, chills, ear pain, sore throat, eye pain, cough, SOB, abdominal pain, N/V/D, dysuria, headaches, and any other associated symptoms.       The history is provided by the patient. No  was used.     Review of patient's allergies indicates:   Allergen Reactions    Sulfasalazine      HEPATITIS    Sulfa (sulfonamide antibiotics) Nausea And Vomiting    Clindamycin Diarrhea    Daypro [oxaprozin] Nausea Only    Orencia  [abatacept]      Other reaction(s): Muscle pain    Percocet  [oxycodone-acetaminophen]      Other reaction(s): Vomiting    Simponi  [golimumab]      Other reaction(s): Unknown    Ciprofloxacin Rash    Sulfamethoxazole-trimethoprim Rash     Past Medical History:   Diagnosis Date    Acid reflux     Allergy     Anxiety     Degenerative disc disease     Depression     DVT of leg (deep venous thrombosis) 2009    rt leg    Fibromyalgia     Long-term " "current use of steroids 11/12/2012    Osteoporosis, postmenopausal     chronic steroid use    RA (rheumatoid arthritis)      Past Surgical History:   Procedure Laterality Date    ANKLE FRACTURE SURGERY      BACK SURGERY      CARPAL TUNNEL RELEASE      CHOLECYSTECTOMY      HARDWARE REMOVAL      JOINT REPLACEMENT Right     right knee    KNEE SURGERY      Bilateral    KNEE SURGERY Left     revision x 2    SPINE SURGERY      TONSILLECTOMY      TUBAL LIGATION       Family History   Problem Relation Age of Onset    COPD Mother     Heart attack Father     Emphysema Maternal Grandfather     Breast cancer Neg Hx     Colon cancer Neg Hx     Ovarian cancer Neg Hx      Social History   Substance Use Topics    Smoking status: Never Smoker    Smokeless tobacco: Never Used    Alcohol use No     Review of Systems   Constitutional: Negative for chills, diaphoresis and fever.   HENT: Negative for ear pain and sore throat.    Eyes: Negative for pain.   Respiratory: Negative for cough and shortness of breath.    Cardiovascular: Positive for chest pain (constant, and severe midsternal chest "pressure" ).   Gastrointestinal: Negative for abdominal pain, diarrhea, nausea and vomiting.   Genitourinary: Negative for dysuria.   Musculoskeletal: Positive for arthralgias (right arm). Negative for back pain.   Skin: Negative for rash.   Neurological: Negative for headaches.       Physical Exam     Initial Vitals [01/13/18 1448]   BP Pulse Resp Temp SpO2   (!) 168/86 74 16 97.8 °F (36.6 °C) 95 %      MAP       113.33         Physical Exam  The patient was examined specifically for the following:   General:No significant distress, Good color, Warm and dry. Head and neck:Scalp atraumatic, Neck supple. Neurological:Appropriate conversation, Gross motor deficits. Eyes:Conjugate gaze, Clear corneas. ENT: No epistaxis. Cardiac: Regular rate and rhythm, Grossly normal heart tones. Pulmonary: Wheezing, Rales. Gastrointestinal: " Abdominal tenderness, Abdominal distention. Musculoskeletal: Extremity deformity, Apparent pain with range of motion of the joints. Skin: Rash.   The findings on examination were normal.  The lungs are clear.  The heart tones are normal.  The patient has some borderline high blood pressure.  The abdomen is soft.  Extremities are nontender.  There is no pale range of motion of any joints.  The patient has no rash.  ED Course   Procedures  Labs Reviewed   COMPREHENSIVE METABOLIC PANEL - Abnormal; Notable for the following:        Result Value    CO2 30 (*)     Calcium 10.8 (*)     All other components within normal limits   CBC W/ AUTO DIFFERENTIAL - Abnormal; Notable for the following:     MCHC 31.0 (*)     RDW 14.9 (*)     Platelets 139 (*)     All other components within normal limits   B-TYPE NATRIURETIC PEPTIDE - Abnormal; Notable for the following:      (*)     All other components within normal limits   TROPONIN I   MAGNESIUM     EKG Readings: (Independently Interpreted)   This patient is in a normal sinus rhythm.  There is a right bundle branch block.  The [place] and QT intervals are normal.  The QRS interval and QT intervals are normal..  There are nonspecific ST segment and T-wave changes.  There is no definite evidence of acute myocardial infarction or malignant arrhythmia.       X-Rays:   Independently Interpreted Readings:   Other Readings:  Chest x-ray fails to reveal pneumothorax pneumonia pleural effusion.        Medical decision making: Given the above, this patient presents to the emergency room complaining of chest pain since last night constant dull bilateral.  Patient has mild exertional dyspnea.  She has no shortness of breath at this time.  Her EKG reveals a right bundle branch block.  There are nonspecific ST and T-wave changes.  Her first troponin is negative.  I consulted cardiology, , who suggests that the patient can be safely discharged if she has a negative troponin, 4 hours  after the first one.  I will order that troponin and signed the patient out to the ER physician on the next shift.  The symptoms are typical.  It may be related to her new medicine.  I will execute the recommendations of cardiology.          Scribe Attestation:   Scribe #1: I performed the above scribed service and the documentation accurately describes the services I performed. I attest to the accuracy of the note.    Attending Attestation:           Physician Attestation for Scribe:  Physician Attestation Statement for Scribe #1: I, Zachariah Valentine MD, reviewed documentation, as scribed by Gudelia Craig in my presence, and it is both accurate and complete.                 ED Course      Clinical Impression:   The encounter diagnosis was Chest pain, non-cardiac.                           Zachariah Valentine MD  01/13/18 4406

## 2018-01-13 NOTE — DISCHARGE INSTRUCTIONS
Please discontinue your new medicine until you speak to your rheumatologist.  Please follow-up with the cardiologist above for chest pain.  Please return immediately if you get worse or if new problems develop.  Rest.

## 2018-01-13 NOTE — TELEPHONE ENCOUNTER
Patient called and concerned that she had a  Itchy rash on her arm after the cimzia injection which went away about 30 minutes later after she took some benadryl at home, but also has noticed chest heaviness in the middle of her chest since yesterday which has been constant, she also noticed more sob when she walked to get her mail this morning than usual and the chest heaviness/tightness was unrelieved with her anti-anxiety medications which usual help when it is anxiety, pt with history of DVT in past not currently on therapy anymore, however chest pain concerning and pt needs to be evaluated in ED to rule out MI or PE or other serious etiology of chest pain.  Pt expressed understanding and plans to go to the [facility]. Pt stated she will follow up with Dr. Rodriguez once she is out of the hospital. Dr. Rodriguez notified.

## 2018-01-15 ENCOUNTER — TELEPHONE (OUTPATIENT)
Dept: RHEUMATOLOGY | Facility: CLINIC | Age: 58
End: 2018-01-15

## 2018-01-15 NOTE — TELEPHONE ENCOUNTER
----- Message from Guicho Mendieta sent at 1/15/2018  3:48 PM CST -----  Contact: self@home number  Pt called in advising that she went to the ED Saturday night, and she is asking to speak with Dr. Rodriguez about it to find out if it could have been due to the injection that she had. She is asking to please return her call as soon as possible    Thank you

## 2018-01-16 NOTE — TELEPHONE ENCOUNTER
Took injection  Next day chest pain; troponin negative and ECG showed RBBB  Next day rash on other arm, then rash on both arms  Now has itch but no rash  Next Cimzia shot due on 2 weeks    Was advised to see a cardiologist but I told her to call her PCP first    Meantime she will watch for more rash before next dose of Cimzia in 2 weeks

## 2018-01-24 ENCOUNTER — TELEPHONE (OUTPATIENT)
Dept: RHEUMATOLOGY | Facility: CLINIC | Age: 58
End: 2018-01-24

## 2018-01-24 NOTE — TELEPHONE ENCOUNTER
No rash  Itchy feeling that migrates  Still gets chest pain; PCP ordered stress test and ultrasound    I am not concerned about itch in absence of rash but I am concerned about chest pain workup and advised her to hold Cimzia one more week to complete cardiac testing; if it is ok then resume

## 2018-01-24 NOTE — TELEPHONE ENCOUNTER
----- Message from Lali Hawley sent at 1/24/2018 10:30 AM CST -----  Contact: self   Pt called stating she has been issues with her injections and today she is due to take the next injection. She would like to speak with Dr. Rodriguez office today before she receives her injection today. [phone]

## 2018-02-05 ENCOUNTER — TELEPHONE (OUTPATIENT)
Dept: RHEUMATOLOGY | Facility: CLINIC | Age: 58
End: 2018-02-05

## 2018-02-05 DIAGNOSIS — M05.79 RHEUMATOID ARTHRITIS INVOLVING MULTIPLE SITES WITH POSITIVE RHEUMATOID FACTOR: Primary | ICD-10-CM

## 2018-02-05 NOTE — TELEPHONE ENCOUNTER
----- Message from Elidia Marin PharmD sent at 2/5/2018  8:51 AM CST -----  Regarding: RE: Cimzia Maintenance Prescription  Thank you Dr. Rodriguez. Can we have a prescription for the maintenance dose?     ----- Message -----  From: Ten Rodriguez MD  Sent: 2/2/2018   4:38 PM  To: Elidia Marin PharmD  Subject: RE: Cimzia Maintenance Prescription              Maintenance is 200 mg every 2 weeks after the initial load.  WD  ----- Message -----  From: Elidia Marin PharmD  Sent: 2/2/2018  11:52 AM  To: Ten Rodriguez MD, #  Subject: Cimzia Maintenance Prescription                  Good morning Dr. Rodriguez and staff,        Can you please advise on the maintenance prescription of Cimzia for Ms. Smith. Will the patient continue with therapy or will it be changed because of the response after her loading dose?     Thank you,  Elidia Marin PharmD  [facility]  [phone]

## 2018-02-16 ENCOUNTER — HOSPITAL ENCOUNTER (OUTPATIENT)
Dept: RADIOLOGY | Facility: HOSPITAL | Age: 58
Discharge: HOME | End: 2018-02-16
Attending: PODIATRIST
Payer: MEDICARE

## 2018-02-16 ENCOUNTER — OFFICE VISIT (OUTPATIENT)
Dept: PODIATRY | Facility: CLINIC | Age: 58
End: 2018-02-16
Payer: MEDICARE

## 2018-02-16 VITALS
HEIGHT: 61 IN | HEART RATE: 71 BPM | SYSTOLIC BLOOD PRESSURE: 125 MMHG | BODY MASS INDEX: 38.71 KG/M2 | DIASTOLIC BLOOD PRESSURE: 84 MMHG | WEIGHT: 205 LBS

## 2018-02-16 DIAGNOSIS — M79.671 FOOT PAIN, RIGHT: ICD-10-CM

## 2018-02-16 DIAGNOSIS — M79.671 FOOT PAIN, RIGHT: Primary | ICD-10-CM

## 2018-02-16 DIAGNOSIS — M79.7 FIBROMYALGIA: ICD-10-CM

## 2018-02-16 DIAGNOSIS — M25.474 SWELLING OF FOOT JOINT, RIGHT: ICD-10-CM

## 2018-02-16 DIAGNOSIS — M21.621 TAILOR'S BUNION OF RIGHT FOOT: ICD-10-CM

## 2018-02-16 PROCEDURE — 99999 PR PBB SHADOW E&M-EST. PATIENT-LVL III: CPT | Mod: PBBFAC,,, | Performed by: PODIATRIST

## 2018-02-16 PROCEDURE — 73630 X-RAY EXAM OF FOOT: CPT | Mod: 26,RT,, | Performed by: RADIOLOGY

## 2018-02-16 PROCEDURE — 73630 X-RAY EXAM OF FOOT: CPT | Mod: TC,FY,PO,RT

## 2018-02-16 PROCEDURE — 99213 OFFICE O/P EST LOW 20 MIN: CPT | Mod: S$GLB,,, | Performed by: PODIATRIST

## 2018-02-16 PROCEDURE — 3008F BODY MASS INDEX DOCD: CPT | Mod: S$GLB,,, | Performed by: PODIATRIST

## 2018-02-16 RX ORDER — LEVOCETIRIZINE DIHYDROCHLORIDE 5 MG/1
TABLET, FILM COATED ORAL
COMMUNITY
Start: 2018-02-12 | End: 2018-09-07

## 2018-02-16 RX ORDER — TIZANIDINE 4 MG/1
TABLET ORAL
COMMUNITY
Start: 2018-02-15 | End: 2018-03-21 | Stop reason: CLARIF

## 2018-02-16 RX ORDER — DIAZEPAM 5 MG/1
TABLET ORAL
COMMUNITY
Start: 2018-01-16 | End: 2022-09-12 | Stop reason: CLARIF

## 2018-02-16 NOTE — PROGRESS NOTES
Subjective:      Patient ID: Robert Smith is a 57 y.o. female.    Chief Complaint: Foot Pain (right lateral foot pain x 2-3 weeks / pcp  Tani Chan 1-12-18)    Robert is a 57 y.o. female who presents to the podiatry clinic  with complaint of  right outside foot pain. Onset of the symptoms was several days ago. Precipitating event: none known. Current symptoms include: pain with every step along the little toe joint. Aggravating factors: any weight bearing and shoes. Symptoms have gradually worsened. Patient has had prior foot problems. Evaluation to date: none. Treatment to date: rest. Patients rates pain 10/10 on pain scale. Previous ankle pain has resolved. Has hx of fibromyalgia and RA.     Past Medical History:   Diagnosis Date    Acid reflux     Allergy     Anxiety     Degenerative disc disease     Depression     DVT of leg (deep venous thrombosis) 2009    rt leg    Fibromyalgia     Long-term current use of steroids 11/12/2012    Osteoporosis, postmenopausal     chronic steroid use    RA (rheumatoid arthritis)        Past Surgical History:   Procedure Laterality Date    ANKLE FRACTURE SURGERY      BACK SURGERY      CARPAL TUNNEL RELEASE      CHOLECYSTECTOMY      HARDWARE REMOVAL      JOINT REPLACEMENT Right     right knee    KNEE SURGERY      Bilateral    KNEE SURGERY Left     revision x 2    SPINE SURGERY      TONSILLECTOMY      TUBAL LIGATION         Family History   Problem Relation Age of Onset    COPD Mother     Heart attack Father     Emphysema Maternal Grandfather     Breast cancer Neg Hx     Colon cancer Neg Hx     Ovarian cancer Neg Hx        Social History     Social History    Marital status:      Spouse name: N/A    Number of children: N/A    Years of education: N/A     Social History Main Topics    Smoking status: Never Smoker    Smokeless tobacco: Never Used    Alcohol use No    Drug use: No    Sexual activity: No     Other Topics Concern    None      Social History Narrative    None       Current Outpatient Prescriptions   Medication Sig Dispense Refill    ADVAIR DISKUS 250-50 mcg/dose diskus inhaler Inhale 1 puff into the lungs daily as needed.       albuterol sulfate 2.5 mg/0.5 mL Nebu Take 2.5 mg by nebulization every 6 (six) hours as needed.       albuterol sulfate 90 mcg/actuation AePB Inhale 1 puff into the lungs every 4 (four) hours as needed. Rescue 1 each 1    diazePAM (VALIUM) 5 MG tablet       DULoxetine (CYMBALTA) 30 MG capsule Take 1 capsule (30 mg total) by mouth once daily. Combine with 60 mg to total 90 mg/d (Patient taking differently: Take 30 mg by mouth 2 (two) times daily. Combine with 60 mg to total 90 mg/d) 30 capsule 5    fluticasone (FLONASE) 50 mcg/actuation nasal spray as needed.       hydrocodone-acetaminophen 10-325mg (NORCO)  mg Tab Take 1 tablet by mouth every 8 (eight) hours as needed for Pain. 60 tablet 0    HYDROCORT/MIN OIL/PETROLAT,WHT (HYDROCORTISONE-MIN OIL-WHT PET) 1 % Oint       hydroxychloroquine (PLAQUENIL) 200 mg tablet Take 1 tablet (200 mg total) by mouth 2 (two) times daily. 60 tablet 5    levocetirizine (XYZAL) 5 MG tablet       lidocaine-prilocaine (EMLA) cream APPLY A SMALL AMOUNT (2-3 GRAMS) TOPICALLY TO AFFECTED AREAS 3 TIMES A DAY AS DIRECTED.  0    meclizine (ANTIVERT) 25 mg tablet       ondansetron (ZOFRAN) 8 MG tablet       predniSONE (DELTASONE) 5 MG tablet       RESTASIS 0.05 % ophthalmic emulsion       SODIUM CHLORIDE FOR INHALATION (SODIUM CHLORIDE 0.9%) 0.9 % nebulizer solution       tiZANidine (ZANAFLEX) 4 MG tablet       VENTOLIN HFA 90 mcg/actuation inhaler INHALE ONE PUFF INTO THE LUNGS Q 4 H PRN . RESCUE  1    certolizumab pegol 400 mg/2 mL (200 mg/mL x 2) SyKt kit Inject 200 mg (1 mL total) into the skin every 14 (fourteen) days. 1 kit 11    cetirizine (ZYRTEC) 10 MG tablet Take 10 mg by mouth once daily.       No current facility-administered medications for this  visit.        Review of patient's allergies indicates:   Allergen Reactions    Sulfasalazine      HEPATITIS    Sulfa (sulfonamide antibiotics) Nausea And Vomiting    Clindamycin Diarrhea    Daypro [oxaprozin] Nausea Only    Enbrel [etanercept] Other (See Comments)     Other reaction(s): odd thoughts  Odd thoughts    Orencia  [abatacept]      Other reaction(s): Muscle pain    Percocet  [oxycodone-acetaminophen]      Other reaction(s): Vomiting    Simponi  [golimumab]      Other reaction(s): Unknown    Ciprofloxacin Rash    Sulfamethoxazole-trimethoprim Rash         Review of Systems   Constitution: Negative for chills and fever.   Cardiovascular: Negative for chest pain, claudication and leg swelling.   Respiratory: Negative for cough and shortness of breath.    Skin: Negative.    Musculoskeletal: Positive for arthritis, myalgias and stiffness.        R foot pain severe   Gastrointestinal: Negative for nausea and vomiting.   Neurological: Positive for numbness and paresthesias.   Psychiatric/Behavioral: Negative for altered mental status.           Objective:      Physical Exam   Constitutional: She is oriented to person, place, and time. She appears well-developed and well-nourished.   HENT:   Head: Normocephalic.   Cardiovascular: Intact distal pulses.    Pulses:       Dorsalis pedis pulses are 2+ on the right side, and 2+ on the left side.        Posterior tibial pulses are 1+ on the right side, and 1+ on the left side.   CRT < 3 sec to tips of toes. Mild edema noted to R foot 5th MTPJ. No vericosities noted to b/l LEs.      Pulmonary/Chest: No respiratory distress.   Musculoskeletal:        Left foot: There is tenderness, bony tenderness and swelling.        Feet:    Gastrocnemius equinus noted to b/l ankles with decreased DF noted on exam. MMT 5/5 in DF/PF/Inv/Ev resistance with mild reproduction of pain in all directions. Passive range of motion of ankle and pedal joints is painless. Adequate pedal  joint ROM.     Focal pain with palpation of left 5th metatarsal head. Prominent lateral 5th met head with adductovarus rotation of the 5th digit left. Pain with ROM of left 5th MTPJ. No associated ecchymosis noted.    Neurological: She is alert and oriented to person, place, and time. She has normal strength. A sensory deficit is present.   Light touch, proprioception, and sharp/dull sensation are all intact bilaterally. Protective threshold with the [place]-Wienstein monofilament is intact bilaterally. Subjective paresthesias with no clearly identifiable source or trigger.      Skin: Skin is warm, dry and intact. No abrasion, no ecchymosis and no lesion noted. No erythema.   No open lesions, lacerations or wounds noted. Nails are normotrophic and well trimmed to R 1-5 and L 1-5. Interdigital spaces clean, dry and intact b/l. No erythema noted to b/l foot. Skin texture normal. Pedal hair adequate. Skin temperature normal b/l foot. Mild pressure induced redness note to R foot 5th met head     Psychiatric: She has a normal mood and affect. Her behavior is normal. Judgment and thought content normal.   Vitals reviewed.            Assessment:       Encounter Diagnoses   Name Primary?    Foot pain, right Yes    Tailor's bunion of right foot     Swelling of foot joint, right     Fibromyalgia          Plan:       Robert was seen today for foot pain.    Diagnoses and all orders for this visit:    Foot pain, right  -     X-Ray Foot Complete Right; Future    Tailor's bunion of right foot  -     X-Ray Foot Complete Right; Future    Swelling of foot joint, right  -     X-Ray Foot Complete Right; Future    Fibromyalgia  -     X-Ray Foot Complete Right; Future      I counseled the patient on her conditions, their implications and medical management.    Pain likely combination of wear and tear, loss of fat pad, poor choice of shoes and exacerbated by fibromyalgia and RA.     CAM boot dispensed and applied to affected foot.  Advised to use at all times while weightbearing and ambulating even for few minutes. Advised to use sneaker style shoe in opposite foot to maintain balance. Ok to remove  at night but reapply if patient is to get up in the middle of the night. Verbalized understanding.     Xray ordered left foot to assess for any obvious bony deformity, injury. Xray reviewed prior to patient discharge. No fracture identified. Severe Tailor's bunion with possible irritation of joint vs tendinitis vs bone bruise.    Recommended to wear CAM boot for 3-4 weeks or until resolution of pain.     OTC and topical NSAIDs/pain relievers.     RICE. The patient is advised to apply ice or cold packs intermittently as needed to relieve pain. 20 minute increments. Protect skin with towel.     Avoid high stress activities and limit WB to only 5 minutes per hour for next 1-2 weeks followed by gradual increase to tolerance in CAM boot over next 2 weeks.     F/u 4 weeks, sooner PRN

## 2018-02-22 ENCOUNTER — LAB VISIT (OUTPATIENT)
Dept: LAB | Facility: HOSPITAL | Age: 58
End: 2018-02-22
Attending: INTERNAL MEDICINE
Payer: MEDICARE

## 2018-02-22 DIAGNOSIS — R76.11 POSITIVE PPD: ICD-10-CM

## 2018-02-22 PROCEDURE — 86480 TB TEST CELL IMMUN MEASURE: CPT

## 2018-02-22 PROCEDURE — 36415 COLL VENOUS BLD VENIPUNCTURE: CPT | Mod: PO

## 2018-02-23 ENCOUNTER — TELEPHONE (OUTPATIENT)
Dept: PHARMACY | Facility: CLINIC | Age: 58
End: 2018-02-23

## 2018-02-23 NOTE — TELEPHONE ENCOUNTER
Cimzia Follow-up:  She had a terrible reaction after the first dose (400 mg) was injected on 1/10/18. Stated it started as a rash, which spread across both arms and she eventually developed chest pain and shortness of breath. That weekend she went to the Er. Will no longer be taking the medication. Deactivated Rx to confirm she does not accidentally receive an order. She will be seeing her Cardiologist in the near future and then plans on seeing Dr. Rodriguez to discuss a new treatment.     At this time the rash has gotten much better and no difficulty with breathing or chest pain. Is experiencing some RA pain being she is without medication.     Advised should she have any questions or concerns before we reach out again, should we receive a new Rx, to always feel free to call us. She acknowledged.

## 2018-02-27 ENCOUNTER — TELEPHONE (OUTPATIENT)
Dept: RHEUMATOLOGY | Facility: CLINIC | Age: 58
End: 2018-02-27

## 2018-02-27 NOTE — TELEPHONE ENCOUNTER
Left message per Dr. Rodriguez. That her labs look good, and to call office back so we could see how she was doing.

## 2018-02-28 LAB
MITOGEN NIL: >10 IU/ML
NIL: 0.02 IU/ML
TB ANTIGEN NIL: 0.02 IU/ML
TB ANTIGEN: 0.04 IU/ML
TB GOLD: NEGATIVE

## 2018-03-05 ENCOUNTER — TELEPHONE (OUTPATIENT)
Dept: RHEUMATOLOGY | Facility: CLINIC | Age: 58
End: 2018-03-05

## 2018-03-05 NOTE — TELEPHONE ENCOUNTER
----- Message from Nick Mijares sent at 3/5/2018  1:21 PM CST -----  Contact: self/home   Pt would like to speak with you regarding her pain medication not easing her pain.

## 2018-03-12 ENCOUNTER — TELEPHONE (OUTPATIENT)
Dept: RHEUMATOLOGY | Facility: CLINIC | Age: 58
End: 2018-03-12

## 2018-03-12 DIAGNOSIS — M79.7 FIBROMYALGIA: ICD-10-CM

## 2018-03-12 DIAGNOSIS — M05.79 RHEUMATOID ARTHRITIS INVOLVING MULTIPLE SITES WITH POSITIVE RHEUMATOID FACTOR: Primary | ICD-10-CM

## 2018-03-12 NOTE — TELEPHONE ENCOUNTER
----- Message from Jose Granado sent at 3/12/2018  2:15 PM CDT -----  Contact: Pt  Pt would like to be called back asap regarding pain in multiple joints. Pt would like to discuss further.    Pt can be reached at [phone].    Thanks

## 2018-03-12 NOTE — TELEPHONE ENCOUNTER
Please call patient--having a lot of  Joint pain, and pain medicine only helps a little  Please call patient concerning what medication she can take

## 2018-03-13 RX ORDER — HEPARIN 100 UNIT/ML
500 SYRINGE INTRAVENOUS
Status: CANCELLED | OUTPATIENT
Start: 2018-03-21

## 2018-03-13 RX ORDER — DIPHENHYDRAMINE HYDROCHLORIDE 50 MG/ML
50 INJECTION INTRAMUSCULAR; INTRAVENOUS
Status: CANCELLED | OUTPATIENT
Start: 2018-03-21

## 2018-03-13 RX ORDER — SODIUM CHLORIDE 0.9 % (FLUSH) 0.9 %
10 SYRINGE (ML) INJECTION
Status: CANCELLED | OUTPATIENT
Start: 2018-03-21

## 2018-03-13 NOTE — TELEPHONE ENCOUNTER
In a lot of pain  Has failed mtx, lfa, enbrel, remicade, humira, simponi, cimzia, orencia, rituximab  Currently on pred 5 and     Cymbalta 60 mg/d  Gabapentin did not help  Lyrica caused side effects; ended up in ER    She has appt March 21 (next week)   I advised that I will order Actemra infusions and PT referral now to start when I see her next week

## 2018-03-21 ENCOUNTER — OFFICE VISIT (OUTPATIENT)
Dept: RHEUMATOLOGY | Facility: CLINIC | Age: 58
End: 2018-03-21
Payer: MEDICARE

## 2018-03-21 VITALS
HEART RATE: 86 BPM | SYSTOLIC BLOOD PRESSURE: 130 MMHG | BODY MASS INDEX: 38.59 KG/M2 | HEIGHT: 61 IN | DIASTOLIC BLOOD PRESSURE: 88 MMHG | WEIGHT: 204.38 LBS

## 2018-03-21 DIAGNOSIS — R11.2 CHEMOTHERAPY INDUCED NAUSEA AND VOMITING: ICD-10-CM

## 2018-03-21 DIAGNOSIS — T45.1X5A CHEMOTHERAPY INDUCED NAUSEA AND VOMITING: ICD-10-CM

## 2018-03-21 DIAGNOSIS — Z79.899 HIGH RISK MEDICATION USE: ICD-10-CM

## 2018-03-21 DIAGNOSIS — M05.79 RHEUMATOID ARTHRITIS INVOLVING MULTIPLE SITES WITH POSITIVE RHEUMATOID FACTOR: Primary | ICD-10-CM

## 2018-03-21 DIAGNOSIS — B37.0 ORAL THRUSH: ICD-10-CM

## 2018-03-21 PROCEDURE — 99214 OFFICE O/P EST MOD 30 MIN: CPT | Mod: S$GLB,,, | Performed by: INTERNAL MEDICINE

## 2018-03-21 PROCEDURE — 99999 PR PBB SHADOW E&M-EST. PATIENT-LVL IV: CPT | Mod: PBBFAC,,, | Performed by: INTERNAL MEDICINE

## 2018-03-21 RX ORDER — DIPHENHYDRAMINE HYDROCHLORIDE 50 MG/ML
12.5 INJECTION INTRAMUSCULAR; INTRAVENOUS ONCE
Status: CANCELLED | OUTPATIENT
Start: 2018-03-21 | End: 2018-03-21

## 2018-03-21 RX ORDER — METHOCARBAMOL 750 MG/1
TABLET, FILM COATED ORAL
COMMUNITY
Start: 2018-03-14 | End: 2018-09-07

## 2018-03-21 RX ORDER — FOLIC ACID 1 MG/1
1 TABLET ORAL DAILY
Qty: 90 TABLET | Refills: 2 | Status: SHIPPED | OUTPATIENT
Start: 2018-03-21 | End: 2018-12-12

## 2018-03-21 RX ORDER — ONDANSETRON HYDROCHLORIDE 8 MG/1
8 TABLET, FILM COATED ORAL ONCE
Qty: 6 TABLET | Refills: 5 | Status: SHIPPED | OUTPATIENT
Start: 2018-03-21 | End: 2018-03-21

## 2018-03-21 ASSESSMENT — ROUTINE ASSESSMENT OF PATIENT INDEX DATA (RAPID3)
PATIENT GLOBAL ASSESSMENT SCORE: 10
TOTAL RAPID3 SCORE: 7.89
PSYCHOLOGICAL DISTRESS SCORE: 7.7
FATIGUE SCORE: 8
AM STIFFNESS SCORE: 0, NO
PAIN SCORE: 10
MDHAQ FUNCTION SCORE: 1.1

## 2018-03-21 NOTE — PATIENT INSTRUCTIONS
Start Methotrexate by injection (OTREXUP) One day every week; take Zofran tablet 1 hour before injection    Start Actemra by infusion, every 4 weeks; take Diazepam and Zofran before you leave the house to go get infusion    Start folic acid 1 mg every day to prevent methotrexate side effects

## 2018-03-21 NOTE — PROGRESS NOTES
Subjective:       Patient ID: Robert Smith is a 57 y.o. female.    Chief Complaint: Disease Management    Hx RA dx 2003; dx at [facility] ; started with hands and all over body  On prednisone 5 mg/d since 2003  Rx here 2007 Dr Max and Violettekem Rx MTX and Humira  MTX nausea with po and SQ  LFA tried 2007 by Dr Terry and she did not tolerate with nausea and hospitalization  2008 Dr Mann retried MTX; did not tolerate  2010-12 Dr Rosenbaum treated with Plaquenil(HCQ)  Humira quit working 2010 2010 Simponi tried - caused weak and shaky reaction  2011 Orencia tried - caused muscle aches, shakes and jaw pain  2011 Enbrel tried - did not work  Dr Paris [phone];  Had Rituxin 2016  Earlier 2017 took Xeljanz for a few months; had a lot of nausea and she felt arms and hands getting numb  SSZ July 2017 caused nausea and hepatitis        ENBREL retried  Aug 2017 - stopped Dec due to lack of effectiveness  Cimzia Jan 2018 caused urticarial rash    Hx 2 knee replacements; 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 and had to be redone     DXA scan shows low bone mass              Associated symptoms include fatigue. Pertinent negatives include no fever, trouble swallowing or headaches.         Stopped Cimzia due to rash and now worse  Now severe pain in all joints  Taking prednisone 5 mg/d   Hydrocodone 10 mg provides minimal relief  Cymbalta 60 mg every morning and 30 mg every evening; it had helped before RA worsened    Worse in morning with pain   Swelling hands, elbows,   Cannot raise arms  R jaw pain  Both knees bad  B ankles and top of feet    She c/o memory problem and wonders if it is gabapentin    Review of Systems   Constitutional: Positive for fatigue and unexpected weight change. Negative for fever.   HENT: Negative for mouth sores and trouble swallowing.         Dry mouth   Eyes: Negative for redness.        Dry eyes   Respiratory: Negative for cough and shortness of breath.    Cardiovascular: Negative for chest pain.   Gastrointestinal: Positive  "for constipation. Negative for abdominal pain and diarrhea.   Skin: Negative for rash.   Neurological: Negative for headaches.   Hematological: Negative for adenopathy. Does not bruise/bleed easily.   Psychiatric/Behavioral: Negative for sleep disturbance.         Objective:   /88   Pulse 86   Ht 5' 1" (1.549 m)   Wt 92.7 kg (204 lb 6.4 oz)   BMI 38.62 kg/m²      Physical Exam   Constitutional: She is well-developed, well-nourished, and in no distress.   HENT:   Mouth/Throat: Oropharynx is clear and moist.   Tongue white c/w thrush   Eyes: Conjunctivae are normal.   Cardiovascular: Normal rate and regular rhythm.    Pulmonary/Chest: She has no wheezes. She has no rales.   Lymphadenopathy:     She has no cervical adenopathy.   Neurological: She is alert.   Skin: No rash noted.     Musculoskeletal:   Flexion contracture R elbow  [place] neck deformity L 4th digit         Physical Exam     Tenderness:   Right temporomandibular  RUE: glenohumeral, ulnohumeral and radiohumeral and wrist  LUE: glenohumeral, ulnohumeral and radiohumeral and wrist  Right hand: 1st MCP, 2nd MCP, 3rd MCP, 4th MCP, 5th MCP, 1st PIP, 2nd PIP, 3rd PIP, 4th PIP and 5th PIP  Left hand: 1st MCP, 2nd MCP, 3rd MCP, 4th MCP, 5th MCP, 1st PIP, 2nd PIP, 3rd PIP, 4th PIP and 5th PIP  RLE: tibiofemoral and tibiotalar  LLE: tibiofemoral and tibiotalar  Right foot: 3rd MTP  Left foot: 3rd MTP    Swelling:   RUE: glenohumeral, ulnohumeral and radiohumeral and wrist  LUE: ulnohumeral and radiohumeral and wrist  Right hand: 1st MCP, 2nd MCP, 3rd MCP, 3rd PIP and 4th PIP  Left hand: 2nd MCP, 3rd MCP, 4th MCP, 2nd PIP and 3rd PIP    HERNANDEZ-28 tender joint count: 28  HERNANDEZ-28 swollen joint count: 15  ESR (mm/hr): 45  Patient global assessment: 100  HERNANDEZ-28 score: 8.11 (High Disease Activity)    Lab Results   Component Value Date    SEDRATE 45 (H) 03/27/2018      Lab Results   Component Value Date    CRP 84.2 (H) 03/27/2018      Assessment:       1. Rheumatoid " arthritis involving multiple sites with positive rheumatoid factor    2. Chemotherapy induced nausea and vomiting    3. High risk medication use    4. Oral thrush        RA with initial partial response to Enbrel and intolerance to Cimzia (rash) and now with ongoing uncontrolled synovitis/ high disease activity    Intolerance of many other drugs as noted in hx    Fibromyalgia on high dose of duloxetine    Plan:       1. Stop Cimzia due to skin rash (already done)  2. Restart mtx by injection, 10 mg/week ; premedicate with Zofran  3. Start ACTEMRA IV; initially 4 mg/kg q4w with premeds since she has hx drug intolerance  4. Folic acid 1 mg/d   5. Cont duloxetine , 90 mg/ d  6. RTC me in one month for early reassessment considering difficulties with meds

## 2018-03-22 ENCOUNTER — TELEPHONE (OUTPATIENT)
Dept: PHARMACY | Facility: HOSPITAL | Age: 58
End: 2018-03-22

## 2018-03-22 NOTE — TELEPHONE ENCOUNTER
Informed patient [facility] received a prescription for Otrexup and we will contact their insurance company to find out if the medication is covered. We will update patient of status as more information is received. feel free to give us a call with  any questions at [phone].

## 2018-03-27 ENCOUNTER — INFUSION (OUTPATIENT)
Dept: INFECTIOUS DISEASES | Facility: HOSPITAL | Age: 58
End: 2018-03-27
Attending: INTERNAL MEDICINE
Payer: MEDICARE

## 2018-03-27 VITALS
DIASTOLIC BLOOD PRESSURE: 82 MMHG | BODY MASS INDEX: 38.45 KG/M2 | SYSTOLIC BLOOD PRESSURE: 131 MMHG | WEIGHT: 203.5 LBS | TEMPERATURE: 99 F | HEART RATE: 90 BPM | RESPIRATION RATE: 18 BRPM | OXYGEN SATURATION: 96 %

## 2018-03-27 DIAGNOSIS — M05.79 RHEUMATOID ARTHRITIS INVOLVING MULTIPLE SITES WITH POSITIVE RHEUMATOID FACTOR: Primary | ICD-10-CM

## 2018-03-27 PROCEDURE — 63600175 PHARM REV CODE 636 W HCPCS: Performed by: INTERNAL MEDICINE

## 2018-03-27 PROCEDURE — 96375 TX/PRO/DX INJ NEW DRUG ADDON: CPT

## 2018-03-27 PROCEDURE — 96365 THER/PROPH/DIAG IV INF INIT: CPT

## 2018-03-27 PROCEDURE — 25000003 PHARM REV CODE 250: Performed by: INTERNAL MEDICINE

## 2018-03-27 RX ORDER — METHYLPREDNISOLONE SOD SUCC 125 MG
80 VIAL (EA) INJECTION
Status: DISCONTINUED | OUTPATIENT
Start: 2018-03-27 | End: 2018-03-27 | Stop reason: HOSPADM

## 2018-03-27 RX ORDER — SODIUM CHLORIDE 0.9 % (FLUSH) 0.9 %
10 SYRINGE (ML) INJECTION
Status: CANCELLED | OUTPATIENT
Start: 2018-03-27

## 2018-03-27 RX ORDER — DIPHENHYDRAMINE HYDROCHLORIDE 50 MG/ML
12.5 INJECTION INTRAMUSCULAR; INTRAVENOUS ONCE
Status: COMPLETED | OUTPATIENT
Start: 2018-03-27 | End: 2018-03-27

## 2018-03-27 RX ORDER — HEPARIN 100 UNIT/ML
500 SYRINGE INTRAVENOUS
Status: CANCELLED | OUTPATIENT
Start: 2018-03-27

## 2018-03-27 RX ORDER — DIPHENHYDRAMINE HYDROCHLORIDE 50 MG/ML
12.5 INJECTION INTRAMUSCULAR; INTRAVENOUS ONCE
Status: CANCELLED | OUTPATIENT
Start: 2018-03-27 | End: 2018-03-27

## 2018-03-27 RX ADMIN — METHYLPREDNISOLONE SODIUM SUCCINATE 80 MG: 125 INJECTION, POWDER, FOR SOLUTION INTRAMUSCULAR; INTRAVENOUS at 09:03

## 2018-03-27 RX ADMIN — TOCILIZUMAB 370 MG: 20 INJECTION, SOLUTION, CONCENTRATE INTRAVENOUS at 09:03

## 2018-03-27 RX ADMIN — DIPHENHYDRAMINE HYDROCHLORIDE 12.5 MG: 50 INJECTION INTRAMUSCULAR; INTRAVENOUS at 09:03

## 2018-03-27 NOTE — PROGRESS NOTES
Pt received 1st dose actemra, premeds given benadryl 12.5 mg IVP and solumedrol 80 mg IVP, tolerated well, left unit in NAD, family at side.

## 2018-04-05 NOTE — TELEPHONE ENCOUNTER
DOCUMENTATION ONLY:  Prior authorization for Otrexup approved from 4/5/18 to 12/31/18    Co-pay: $0    Patient Assistance is not required

## 2018-04-06 DIAGNOSIS — M05.79 RHEUMATOID ARTHRITIS INVOLVING MULTIPLE SITES WITH POSITIVE RHEUMATOID FACTOR: ICD-10-CM

## 2018-04-09 ENCOUNTER — TELEPHONE (OUTPATIENT)
Dept: PHARMACY | Facility: CLINIC | Age: 58
End: 2018-04-09

## 2018-04-09 NOTE — TELEPHONE ENCOUNTER
Initial Otrexup 10mg/0.4ml Injection consult completed  On 18.  $0 copay. Patient will start Otrexup  Injection on 4/10/18. Address confirmed, CC on file. Confirmed 2 patient identifiers - name and . Therapy Appropriate. Pt experienced to medication. Reviewed dosage and administration. All questions answered to pt's satisfaction.  I will follow up with her in 1 week from start, [facility] to call every 3 weeks for refills.      Kenya Elise, PharmD  [facility]  [phone]

## 2018-05-04 ENCOUNTER — TELEPHONE (OUTPATIENT)
Dept: RHEUMATOLOGY | Facility: CLINIC | Age: 58
End: 2018-05-04

## 2018-05-04 NOTE — TELEPHONE ENCOUNTER
4/23 went to PCP with cough and URI sx; lost voice; took 2 weeks of antibiotics ; finished last week    Never did start methotrexate or Actemra  Advised to start MTX now     Please call infusion center to see if they can start Actemra next week

## 2018-05-04 NOTE — TELEPHONE ENCOUNTER
----- Message from Elidia Marin PharmD sent at 5/2/2018  4:10 PM CDT -----  Good afternoon Dr. Rodriguez and staff,      I called Ms. Smith to follow-up with her and ask if she was ready to schedule her next refill of her Otrexup. She states that she never started the medication because she was feeling ill. She states she went to an office visit with Dr. Tani Lara and was diagnosed with infectious mononucleosis. She states she will have his office fax over the chart notes. She cancelled her appointment for her Actemra infusion and she has not started the Otrexup. She says that she is severe pain. Please advise.     Thank you,   Elidia Marin, PharmD  [facility]  [phone] or [phone]

## 2018-05-11 ENCOUNTER — INFUSION (OUTPATIENT)
Dept: INFECTIOUS DISEASES | Facility: HOSPITAL | Age: 58
End: 2018-05-11
Attending: INTERNAL MEDICINE
Payer: MEDICARE

## 2018-05-11 VITALS
DIASTOLIC BLOOD PRESSURE: 79 MMHG | WEIGHT: 207.44 LBS | RESPIRATION RATE: 18 BRPM | TEMPERATURE: 98 F | HEART RATE: 79 BPM | BODY MASS INDEX: 39.16 KG/M2 | SYSTOLIC BLOOD PRESSURE: 131 MMHG | OXYGEN SATURATION: 97 % | HEIGHT: 61 IN

## 2018-05-11 DIAGNOSIS — M05.79 RHEUMATOID ARTHRITIS INVOLVING MULTIPLE SITES WITH POSITIVE RHEUMATOID FACTOR: Primary | ICD-10-CM

## 2018-05-11 PROCEDURE — 63600175 PHARM REV CODE 636 W HCPCS: Performed by: INTERNAL MEDICINE

## 2018-05-11 PROCEDURE — 96375 TX/PRO/DX INJ NEW DRUG ADDON: CPT

## 2018-05-11 PROCEDURE — 96365 THER/PROPH/DIAG IV INF INIT: CPT

## 2018-05-11 PROCEDURE — 25000003 PHARM REV CODE 250: Performed by: INTERNAL MEDICINE

## 2018-05-11 RX ORDER — SODIUM CHLORIDE 0.9 % (FLUSH) 0.9 %
10 SYRINGE (ML) INJECTION
Status: CANCELLED | OUTPATIENT
Start: 2018-05-11

## 2018-05-11 RX ORDER — METHYLPREDNISOLONE SOD SUCC 125 MG
80 VIAL (EA) INJECTION
Status: DISCONTINUED | OUTPATIENT
Start: 2018-05-11 | End: 2018-05-11 | Stop reason: HOSPADM

## 2018-05-11 RX ORDER — HEPARIN 100 UNIT/ML
500 SYRINGE INTRAVENOUS
Status: CANCELLED | OUTPATIENT
Start: 2018-05-11

## 2018-05-11 RX ORDER — DIPHENHYDRAMINE HYDROCHLORIDE 50 MG/ML
12.5 INJECTION INTRAMUSCULAR; INTRAVENOUS ONCE
Status: CANCELLED | OUTPATIENT
Start: 2018-05-11 | End: 2018-05-11

## 2018-05-11 RX ORDER — DIPHENHYDRAMINE HYDROCHLORIDE 50 MG/ML
12.5 INJECTION INTRAMUSCULAR; INTRAVENOUS ONCE
Status: COMPLETED | OUTPATIENT
Start: 2018-05-11 | End: 2018-05-11

## 2018-05-11 RX ADMIN — TOCILIZUMAB 376 MG: 20 INJECTION, SOLUTION, CONCENTRATE INTRAVENOUS at 11:05

## 2018-05-11 RX ADMIN — DIPHENHYDRAMINE HYDROCHLORIDE 12.5 MG: 50 INJECTION INTRAMUSCULAR; INTRAVENOUS at 10:05

## 2018-05-11 RX ADMIN — METHYLPREDNISOLONE SODIUM SUCCINATE 80 MG: 125 INJECTION, POWDER, FOR SOLUTION INTRAMUSCULAR; INTRAVENOUS at 10:05

## 2018-05-21 DIAGNOSIS — R11.0 CHEMOTHERAPY-INDUCED NAUSEA: Primary | ICD-10-CM

## 2018-05-21 DIAGNOSIS — T45.1X5A CHEMOTHERAPY-INDUCED NAUSEA: Primary | ICD-10-CM

## 2018-05-21 RX ORDER — OXYCODONE 13.5 MG/1
CAPSULE, EXTENDED RELEASE ORAL
COMMUNITY
Start: 2018-04-12 | End: 2018-09-07 | Stop reason: DRUGHIGH

## 2018-05-21 RX ORDER — ONDANSETRON HYDROCHLORIDE 8 MG/1
8 TABLET, FILM COATED ORAL 2 TIMES DAILY
Qty: 30 TABLET | Refills: 2 | Status: SHIPPED | OUTPATIENT
Start: 2018-05-21 | End: 2019-03-15

## 2018-05-21 RX ORDER — HYDROCORTISONE 10 MG/1
TABLET ORAL
COMMUNITY
Start: 2018-05-01 | End: 2018-09-07

## 2018-05-21 NOTE — TELEPHONE ENCOUNTER
Good Afternoon,  Ms. Smith was called today for Otrexup follow up.  Pt started injections on Saturday, and is feeling very nauseated.  She does not report any vomiting or fever.  She requested that a nausea medication sent to her local pharmacy, if possible.      Thanks,  Kenya Elise, PharmD  [facility]  [phone]

## 2018-06-02 ENCOUNTER — HOSPITAL ENCOUNTER (EMERGENCY)
Facility: HOSPITAL | Age: 58
Discharge: HOME | End: 2018-06-02
Attending: EMERGENCY MEDICINE
Payer: MEDICARE

## 2018-06-02 VITALS
OXYGEN SATURATION: 97 % | HEART RATE: 79 BPM | DIASTOLIC BLOOD PRESSURE: 74 MMHG | HEIGHT: 61 IN | SYSTOLIC BLOOD PRESSURE: 127 MMHG | BODY MASS INDEX: 39.65 KG/M2 | TEMPERATURE: 98 F | WEIGHT: 210 LBS | RESPIRATION RATE: 18 BRPM

## 2018-06-02 DIAGNOSIS — M25.50 POLYARTHRALGIA: Primary | ICD-10-CM

## 2018-06-02 DIAGNOSIS — M25.569 KNEE PAIN: ICD-10-CM

## 2018-06-02 PROCEDURE — 99283 EMERGENCY DEPT VISIT LOW MDM: CPT

## 2018-06-02 PROCEDURE — 99282 EMERGENCY DEPT VISIT SF MDM: CPT | Mod: ,,, | Performed by: EMERGENCY MEDICINE

## 2018-06-02 NOTE — ED TRIAGE NOTES
Presents to ER with complaint of exacerbation of RA pain.  Patient's name and date of birth checked and is correct.  LOC: The patient is awake, alert and aware of environment with an appropriate affect, the patient is oriented x 3 and speaking appropriately.  APPEARANCE: Patient resting comfortably and in no acute distress, patient is clean and well groomed, patient's clothing is properly fastened.  CARDIOVASCULAR:  Heart rate regular and even with no peripheral edema noted.  SKIN: The skin is warm and dry, patient has normal skin turgor and moist mucus membranes, skin intact, no breakdown or brusing noted.   MUSKULOSKELETAL: Patient moving all extremities well, no obvious swelling or deformities noted.  RESPIRATORY: Airway is open and patent, respirations are spontaneous, patient has a normal effort and rate.

## 2018-06-02 NOTE — ED PROVIDER NOTES
"SCRIBE #1 NOTE: I, Keerthi Card, am scribing for, and in the presence of,  Dr. Jay. I have scribed the entire note.       CC: Rheumatoid Arthritis      HPI: Robert Smith is a 57 y.o. year old female who presents to the ED complaining of bilateral knee, ankle, and foot pain.   Pt states that she has rheumatoid arthritis and fibromyalgia. She has had constant bilateral knee, ankle, and foot pain x1 week. She has difficulty standing without pain. Her usual rheumatoid arthritis pain usually "moves around". She denies fever. She is taking Norco, prednisone, methotrexate, Tocilizumab, Plaquenil, a topical medication, lidocaine patches, and numerous vitamins. She was told not to take ibuprofen and does not take tylenol because she states that she breaks out in a rash. Her rheumatologist is Dr. Rodriguez. She is also followed by pain management.    History was provided by the patient.      Past Medical History:   Diagnosis Date    Acid reflux     Allergy     Anxiety     Degenerative disc disease     Depression     DVT of leg (deep venous thrombosis) 2009    rt leg    Fibromyalgia     Long-term current use of steroids 11/12/2012    Osteoporosis, postmenopausal     chronic steroid use    RA (rheumatoid arthritis)      Past Surgical History:   Procedure Laterality Date    ANKLE FRACTURE SURGERY      BACK SURGERY      CARPAL TUNNEL RELEASE      CHOLECYSTECTOMY      HARDWARE REMOVAL      JOINT REPLACEMENT Right     right knee    KNEE SURGERY      Bilateral    KNEE SURGERY Left     revision x 2    SPINE SURGERY      TONSILLECTOMY      TUBAL LIGATION       Family History   Problem Relation Age of Onset    COPD Mother     Heart attack Father     Emphysema Maternal Grandfather     Breast cancer Neg Hx     Colon cancer Neg Hx     Ovarian cancer Neg Hx      No current facility-administered medications on file prior to encounter.      Current Outpatient Prescriptions on File Prior to Encounter   Medication " Sig Dispense Refill    ADVAIR DISKUS 250-50 mcg/dose diskus inhaler Inhale 1 puff into the lungs daily as needed.       diazePAM (VALIUM) 5 MG tablet       DULoxetine (CYMBALTA) 30 MG capsule Take 1 capsule (30 mg total) by mouth once daily. Combine with 60 mg to total 90 mg/d (Patient taking differently: Take 30 mg by mouth 2 (two) times daily. Combine with 60 mg to total 90 mg/d) 30 capsule 5    folic acid (FOLVITE) 1 MG tablet Take 1 tablet (1 mg total) by mouth once daily. 90 tablet 2    hydrocodone-acetaminophen 10-325mg (NORCO)  mg Tab Take 1 tablet by mouth every 8 (eight) hours as needed for Pain. 60 tablet 0    hydroxychloroquine (PLAQUENIL) 200 mg tablet Take 1 tablet (200 mg total) by mouth 2 (two) times daily. 60 tablet 5    levocetirizine (XYZAL) 5 MG tablet       methocarbamol (ROBAXIN) 750 MG Tab       methotrexate, PF, (OTREXUP, PF,) 10 mg/0.4 mL AtIn Inject 10 mg into the skin every 7 days. 4 Syringe 2    predniSONE (DELTASONE) 5 MG tablet       RESTASIS 0.05 % ophthalmic emulsion       XTAMPZA ER 13.5 mg CSpT       albuterol sulfate 2.5 mg/0.5 mL Nebu Take 2.5 mg by nebulization every 6 (six) hours as needed.       albuterol sulfate 90 mcg/actuation AePB Inhale 1 puff into the lungs every 4 (four) hours as needed. Rescue 1 each 1    fluticasone (FLONASE) 50 mcg/actuation nasal spray as needed.       HYDROCORT/MIN OIL/PETROLAT,WHT (HYDROCORTISONE-MIN OIL-WHT PET) 1 % Oint       hydrocortisone (CORTEF) 10 MG Tab       lidocaine-prilocaine (EMLA) cream APPLY A SMALL AMOUNT (2-3 GRAMS) TOPICALLY TO AFFECTED AREAS 3 TIMES A DAY AS DIRECTED.  0    meclizine (ANTIVERT) 25 mg tablet       ondansetron (ZOFRAN) 8 MG tablet Take 1 tablet (8 mg total) by mouth 2 (two) times daily. 30 tablet 2    SODIUM CHLORIDE FOR INHALATION (SODIUM CHLORIDE 0.9%) 0.9 % nebulizer solution       VENTOLIN HFA 90 mcg/actuation inhaler INHALE ONE PUFF INTO THE LUNGS Q 4 H PRN . RESCUE  1     [DISCONTINUED] warfarin (COUMADIN) 1 MG tablet        Sulfasalazine; Sulfa (sulfonamide antibiotics); Clindamycin; Daypro [oxaprozin]; Orencia  [abatacept]; Percocet  [oxycodone-acetaminophen]; Simponi  [golimumab]; Cimzia [certolizumab pegol]; Ciprofloxacin; and Sulfamethoxazole-trimethoprim  Social History     Social History    Marital status:      Spouse name: N/A    Number of children: N/A    Years of education: N/A     Social History Main Topics    Smoking status: Never Smoker    Smokeless tobacco: Never Used    Alcohol use No    Drug use: No    Sexual activity: No     Other Topics Concern    None     Social History Narrative    None       ROS:     Constitutional : neg  HEENT neg  Resp neg  Cardiac  neg  GI neg   neg  Neuro neg  Heme/Immune: neg  Endo neg  Skin neg  Musculoskeletal: positive for bilateral pain to knees, ankles, and feet.    PHYSICAL EXAM:  Vitals:    06/02/18 2026   BP: 127/74   Pulse: 79   Resp:    Temp:          PHYSICAL EXAM:   general: comfortable, in no acute distress  VS: triage VS reviewed  HEENT: NC/AT, PERRL, EOMI  Neck: trachea midline  CV: RRR, no m/r/g, no LE pitting edema  Resp: CTAB  ABD:  ND, + normal BS, NT  Renal: No CVAT  Neuro: AAO x 3, 5/5 muscle strength in upper and lower extremities, sensation grossly intact, face symmetric, speech normal  Ext: no deformity, +2 symmetrical peripheral pulses, bilateral lower extremities no redness, no warmth to palpation. Bilateral anterior knee surgical scars well healed. Tenderness to palpation diffusely over anterior surface of both knees and the anterior surface of the tibia.          DATA & INTERVENTIONS:    LABS reviewed:  Labs Reviewed - No data to display    RADIOLOGY reviewed:  Imaging Results          X-Ray Knee 3 View Bilateral (Final result)  Result time 06/02/18 19:55:12   Procedure changed from X-Ray Knee 3 View Left     Final result by Frank Pitts MD (06/02/18 19:55:12)                 Impression:       Stable postoperative changes of bilateral total knee arthroplasty.  No evidence of fracture or malalignment.      Electronically signed by: Frank Pitts MD  Date:    06/02/2018  Time:    19:55             Narrative:    EXAMINATION:  XR KNEE 3 VIEW BILATERAL    CLINICAL HISTORY:  pain;    TECHNIQUE:  AP, lateral, and sunrise views of both knees were performed.    COMPARISON:  05/31/2017.    FINDINGS:  Right knee:    There are stable postoperative changes of total right knee arthroplasty.  The femoral and tibial components appear well seated.  There is no evidence of a periprosthetic fracture.  There are stable arthroplasty changes also involving the right patella.  The joint spaces are stable in appearance.  There is no evidence of a fracture or dislocation of the right knee.    Left knee:    There are stable postoperative changes of total left knee arthroplasty.  The femoral and tibial components are unchanged in appearance.  There is no evidence of subsidence.  No periprosthetic fracture is identified.  There are stable arthroplasty changes of the left patella.  There is no evidence of a fracture or dislocation of the left knee.                                MEDICATIONS/FLUIDS:  Medications - No data to display      MDM:   57 y.o. female with fibromyalgia and rheumatoid arthritis on multiple pain medications followed by rheumatology and pain management. Failed multiple medications for rheumatoid arthritis. Currently on Norco, Methotrexate, Tocilizumab, topical medication, and lidocaine patches, with no good control of the pain. Patient is mainly worried that she might have an infection in her knees, however, she reports pain in both knees and both ankles with no associated fever, redness, or warmth to suggest infection. Will obtain bilateral knee x-ray, advise follow up with her rheumatologist and her pain management physician.    X-ray negative for fracture. Unlikely infection. Advise to follow up with PCP and  rheumatologist.     IMPRESSION:  1.) Polyarthralgia     Dispo: Discharge    Critical Care Time: N/A       Xochitl Jay MD  06/02/18 4325

## 2018-06-02 NOTE — ED NOTES
"Pt stated "I have RA and been hurting in knees and feet, started 2wks ago and hes trying a new infusion on me that is once a month and methotrexate weekly on mondays and hydrocodone and xtampza for pain but not getting any relief".   LOC: The patient is awake, alert and aware of environment with an appropriate affect, the patient is oriented x 3 and speaking appropriately.  APPEARANCE: Patient resting comfortably and in no acute distress, patient is clean and well groomed, patient's clothing is properly fastened.  SKIN: The skin is warm and dry, intact, patient has normal skin turgor and moist mucus membranes.   RESPIRATORY: Airway is open and patent, respirations are spontaneous, patient has a normal effort and rate.  CARDIAC: Patient has a normal rate and rhythm, no periphreal edema noted, capillary refill < 3 seconds. Bilateral radial pulses +2  ABDOMEN: Soft and non tender to palpation, no distention noted. Bowel sounds present  NEUROLOGIC: PERRL, facial expression is symmetrical, patient moving all extremities spontaneously, normal sensation in all extremities when touched with a finger. Follows all commands appropriately  MUSCULOSKELETAL: No obvious deformities. Full ROM in all 4 extremities.   "

## 2018-06-08 ENCOUNTER — TELEPHONE (OUTPATIENT)
Dept: PHARMACY | Facility: CLINIC | Age: 58
End: 2018-06-08

## 2018-06-12 NOTE — TELEPHONE ENCOUNTER
Patient called to refill Otrexup.  Patient confirmed she has no doses left.  Due for next injection 6/16/18.    Ship 6/14/18 for 6/15/18 delivery.  Copay $0 in 004.  Patient confirmed no new meds, allergies, or health conditions.  Verified address.  Declined [facility] .

## 2018-06-14 ENCOUNTER — INFUSION (OUTPATIENT)
Dept: INFECTIOUS DISEASES | Facility: HOSPITAL | Age: 58
End: 2018-06-14
Attending: INTERNAL MEDICINE
Payer: MEDICARE

## 2018-06-14 VITALS
SYSTOLIC BLOOD PRESSURE: 129 MMHG | RESPIRATION RATE: 16 BRPM | HEIGHT: 61 IN | BODY MASS INDEX: 39.25 KG/M2 | DIASTOLIC BLOOD PRESSURE: 65 MMHG | WEIGHT: 207.88 LBS | HEART RATE: 85 BPM | TEMPERATURE: 99 F | OXYGEN SATURATION: 97 %

## 2018-06-14 DIAGNOSIS — M05.79 RHEUMATOID ARTHRITIS INVOLVING MULTIPLE SITES WITH POSITIVE RHEUMATOID FACTOR: Primary | ICD-10-CM

## 2018-06-14 PROCEDURE — 96375 TX/PRO/DX INJ NEW DRUG ADDON: CPT

## 2018-06-14 PROCEDURE — 63600175 PHARM REV CODE 636 W HCPCS: Performed by: INTERNAL MEDICINE

## 2018-06-14 PROCEDURE — 25000003 PHARM REV CODE 250: Performed by: INTERNAL MEDICINE

## 2018-06-14 PROCEDURE — 96365 THER/PROPH/DIAG IV INF INIT: CPT

## 2018-06-14 RX ORDER — DIPHENHYDRAMINE HYDROCHLORIDE 50 MG/ML
12.5 INJECTION INTRAMUSCULAR; INTRAVENOUS ONCE
Status: COMPLETED | OUTPATIENT
Start: 2018-06-14 | End: 2018-06-14

## 2018-06-14 RX ORDER — HEPARIN 100 UNIT/ML
500 SYRINGE INTRAVENOUS
Status: CANCELLED | OUTPATIENT
Start: 2018-06-14

## 2018-06-14 RX ORDER — METHYLPREDNISOLONE SOD SUCC 125 MG
80 VIAL (EA) INJECTION
Status: DISCONTINUED | OUTPATIENT
Start: 2018-06-14 | End: 2018-06-14 | Stop reason: HOSPADM

## 2018-06-14 RX ORDER — DIPHENHYDRAMINE HYDROCHLORIDE 50 MG/ML
12.5 INJECTION INTRAMUSCULAR; INTRAVENOUS ONCE
Status: CANCELLED | OUTPATIENT
Start: 2018-06-14 | End: 2018-06-14

## 2018-06-14 RX ORDER — SODIUM CHLORIDE 0.9 % (FLUSH) 0.9 %
10 SYRINGE (ML) INJECTION
Status: CANCELLED | OUTPATIENT
Start: 2018-06-14

## 2018-06-14 RX ADMIN — TOCILIZUMAB 378 MG: 20 INJECTION, SOLUTION, CONCENTRATE INTRAVENOUS at 10:06

## 2018-06-14 RX ADMIN — DIPHENHYDRAMINE HYDROCHLORIDE 12.5 MG: 50 INJECTION INTRAMUSCULAR; INTRAVENOUS at 09:06

## 2018-06-14 RX ADMIN — METHYLPREDNISOLONE SODIUM SUCCINATE 80 MG: 125 INJECTION, POWDER, FOR SOLUTION INTRAMUSCULAR; INTRAVENOUS at 09:06

## 2018-06-14 NOTE — PROGRESS NOTES
Pt received dose actemra, premeds given benadryl 12.5 mg IVP and solumedrol 80 mg IVP, tolerated well, left unit in NAD, family at side.

## 2018-07-11 ENCOUNTER — NURSE TRIAGE (OUTPATIENT)
Dept: ADMINISTRATIVE | Facility: CLINIC | Age: 58
End: 2018-07-11

## 2018-07-11 ENCOUNTER — TELEPHONE (OUTPATIENT)
Dept: RHEUMATOLOGY | Facility: CLINIC | Age: 58
End: 2018-07-11

## 2018-07-11 NOTE — TELEPHONE ENCOUNTER
----- Message from Rosaura Cleveland MA sent at 7/11/2018  9:06 AM CDT -----  Contact: self @ [phone]      ----- Message -----  From: Ammon Fuentes  Sent: 7/11/2018   8:49 AM  To: [facility] Staff    Pt would like a call back @ [phone] in regards of tomorrow's infusion pt stated she may have strep throat

## 2018-07-11 NOTE — TELEPHONE ENCOUNTER
Sore throat last 2 days; no fever; no other URI sx  Scheduled for infusion tomorrow  Advised to postpone infusion for one week

## 2018-07-11 NOTE — TELEPHONE ENCOUNTER
----- Message from Reji Carpenter sent at 7/11/2018  2:01 PM CDT -----  Contact: patient  Please call pt at [phone]. Patient has a medication question.   2nd request    Thank you

## 2018-07-11 NOTE — TELEPHONE ENCOUNTER
Reason for Disposition   Caller has medication question, adult has minor symptoms, caller declines triage, and triager answers question    Protocols used: ST MEDICATION QUESTION CALL-A-AH  pt has a sore throat- wanted to know if she can still get her infusion tomorrow. Advised to contact MD

## 2018-07-23 ENCOUNTER — TELEPHONE (OUTPATIENT)
Dept: RHEUMATOLOGY | Facility: CLINIC | Age: 58
End: 2018-07-23

## 2018-07-23 NOTE — TELEPHONE ENCOUNTER
----- Message from Mary Patino sent at 7/23/2018  1:46 PM CDT -----  Contact: Self  Patient had an issue with throat and several ear infections, was instructed to stop injections and infusions while ill. She has finished her round of antiobiotics as of Friday and wants to know when she can start up her Methotrexate and infusion therapy again.    Patient also needs to schedule an appt. Nothing showing on .    [phone]

## 2018-07-23 NOTE — TELEPHONE ENCOUNTER
She says had a double ear infection and is better though still some     She has not received bills for Actemra that insurance did not cover    I advised her to resume mtx and call infusion center to resume Actemra

## 2018-07-27 ENCOUNTER — TELEPHONE (OUTPATIENT)
Dept: RHEUMATOLOGY | Facility: CLINIC | Age: 58
End: 2018-07-27

## 2018-07-30 ENCOUNTER — INFUSION (OUTPATIENT)
Dept: INFECTIOUS DISEASES | Facility: HOSPITAL | Age: 58
End: 2018-07-30
Attending: INTERNAL MEDICINE
Payer: MEDICARE

## 2018-07-30 VITALS
SYSTOLIC BLOOD PRESSURE: 123 MMHG | OXYGEN SATURATION: 92 % | RESPIRATION RATE: 15 BRPM | BODY MASS INDEX: 37.48 KG/M2 | TEMPERATURE: 99 F | HEART RATE: 82 BPM | DIASTOLIC BLOOD PRESSURE: 63 MMHG | HEIGHT: 61 IN | WEIGHT: 198.5 LBS

## 2018-07-30 DIAGNOSIS — M05.79 RHEUMATOID ARTHRITIS INVOLVING MULTIPLE SITES WITH POSITIVE RHEUMATOID FACTOR: Primary | ICD-10-CM

## 2018-07-30 PROCEDURE — 63600175 PHARM REV CODE 636 W HCPCS: Mod: JG | Performed by: INTERNAL MEDICINE

## 2018-07-30 PROCEDURE — 96365 THER/PROPH/DIAG IV INF INIT: CPT

## 2018-07-30 PROCEDURE — 25000200 PHARM REV CODE 250 W HCPCS: Performed by: INTERNAL MEDICINE

## 2018-07-30 PROCEDURE — 25000003 PHARM REV CODE 250: Performed by: INTERNAL MEDICINE

## 2018-07-30 PROCEDURE — 96375 TX/PRO/DX INJ NEW DRUG ADDON: CPT

## 2018-07-30 RX ORDER — METHYLPREDNISOLONE SOD SUCC 125 MG
80 VIAL (EA) INJECTION
Status: DISCONTINUED | OUTPATIENT
Start: 2018-07-30 | End: 2018-07-30 | Stop reason: HOSPADM

## 2018-07-30 RX ORDER — SODIUM CHLORIDE 0.9 % (FLUSH) 0.9 %
10 SYRINGE (ML) INJECTION
Status: CANCELLED | OUTPATIENT
Start: 2018-07-30

## 2018-07-30 RX ORDER — DIPHENHYDRAMINE HYDROCHLORIDE 50 MG/ML
12.5 INJECTION INTRAMUSCULAR; INTRAVENOUS ONCE
Status: COMPLETED | OUTPATIENT
Start: 2018-07-30 | End: 2018-07-30

## 2018-07-30 RX ORDER — DIPHENHYDRAMINE HYDROCHLORIDE 50 MG/ML
12.5 INJECTION INTRAMUSCULAR; INTRAVENOUS ONCE
Status: CANCELLED | OUTPATIENT
Start: 2018-07-30 | End: 2018-07-30

## 2018-07-30 RX ORDER — HEPARIN 100 UNIT/ML
500 SYRINGE INTRAVENOUS
Status: CANCELLED | OUTPATIENT
Start: 2018-07-30

## 2018-07-30 RX ADMIN — DIPHENHYDRAMINE HYDROCHLORIDE 12.5 MG: 50 INJECTION INTRAMUSCULAR; INTRAVENOUS at 02:07

## 2018-07-30 RX ADMIN — METHYLPREDNISOLONE SODIUM SUCCINATE 80 MG: 125 INJECTION, POWDER, FOR SOLUTION INTRAMUSCULAR; INTRAVENOUS at 02:07

## 2018-07-30 RX ADMIN — TOCILIZUMAB 360 MG: 20 INJECTION, SOLUTION, CONCENTRATE INTRAVENOUS at 02:07

## 2018-08-07 ENCOUNTER — TELEPHONE (OUTPATIENT)
Dept: PHARMACY | Facility: CLINIC | Age: 58
End: 2018-08-07

## 2018-08-17 DIAGNOSIS — M05.79 RHEUMATOID ARTHRITIS INVOLVING MULTIPLE SITES WITH POSITIVE RHEUMATOID FACTOR: ICD-10-CM

## 2018-08-17 RX ORDER — NEOMYCIN SULFATE, POLYMYXIN B SULFATE AND HYDROCORTISONE 10; 3.5; 1 MG/ML; MG/ML; [USP'U]/ML
SUSPENSION/ DROPS AURICULAR (OTIC)
COMMUNITY
Start: 2018-07-12 | End: 2019-03-25 | Stop reason: HOSPADM

## 2018-08-17 RX ORDER — HYDROXYCHLOROQUINE SULFATE 200 MG/1
TABLET, FILM COATED ORAL
Qty: 60 TABLET | Refills: 0 | Status: SHIPPED | OUTPATIENT
Start: 2018-08-17 | End: 2018-09-07

## 2018-08-17 RX ORDER — ESOMEPRAZOLE MAGNESIUM 40 MG/1
CAPSULE, DELAYED RELEASE ORAL
COMMUNITY
Start: 2018-07-19 | End: 2019-10-23

## 2018-08-17 NOTE — TELEPHONE ENCOUNTER
----- Message from Ammon Fuentes sent at 8/17/2018 10:11 AM CDT -----  Contact: Self @ [phone]   Also pt would like to schedule a f/u appointment   ----- Message -----  From: Ammon Fuentes  Sent: 8/17/2018  10:08 AM  To: [facility] Staff    Pt would like a call back @ [phone] in regards to her concerns about rx DULoxetine (CYMBALTA) 30 MG capsule

## 2018-08-17 NOTE — TELEPHONE ENCOUNTER
She has not been back to me and has questions but not     She needs a follow up appt.     Please schedule lab now and EP asap and put her cancellation for earlier appt    I told her she could d/c pm cymbalta if desired

## 2018-08-22 ENCOUNTER — TELEPHONE (OUTPATIENT)
Dept: PHARMACY | Facility: CLINIC | Age: 58
End: 2018-08-22

## 2018-08-31 ENCOUNTER — LAB VISIT (OUTPATIENT)
Dept: LAB | Facility: HOSPITAL | Age: 58
End: 2018-08-31
Attending: INTERNAL MEDICINE
Payer: MEDICARE

## 2018-08-31 ENCOUNTER — INFUSION (OUTPATIENT)
Dept: INFECTIOUS DISEASES | Facility: HOSPITAL | Age: 58
End: 2018-08-31
Attending: INTERNAL MEDICINE
Payer: MEDICARE

## 2018-08-31 VITALS
TEMPERATURE: 99 F | OXYGEN SATURATION: 95 % | DIASTOLIC BLOOD PRESSURE: 67 MMHG | WEIGHT: 190.25 LBS | SYSTOLIC BLOOD PRESSURE: 143 MMHG | RESPIRATION RATE: 16 BRPM | HEART RATE: 80 BPM | BODY MASS INDEX: 35.95 KG/M2

## 2018-08-31 DIAGNOSIS — M05.79 RHEUMATOID ARTHRITIS INVOLVING MULTIPLE SITES WITH POSITIVE RHEUMATOID FACTOR: ICD-10-CM

## 2018-08-31 DIAGNOSIS — Z79.899 HIGH RISK MEDICATION USE: ICD-10-CM

## 2018-08-31 DIAGNOSIS — M05.79 RHEUMATOID ARTHRITIS INVOLVING MULTIPLE SITES WITH POSITIVE RHEUMATOID FACTOR: Primary | ICD-10-CM

## 2018-08-31 LAB
ALBUMIN SERPL BCP-MCNC: 2.8 G/DL
ALP SERPL-CCNC: 91 U/L
ALT SERPL W/O P-5'-P-CCNC: 15 U/L
ANION GAP SERPL CALC-SCNC: 5 MMOL/L
AST SERPL-CCNC: 18 U/L
BASOPHILS # BLD AUTO: 0.03 K/UL
BASOPHILS NFR BLD: 0.3 %
BILIRUB SERPL-MCNC: 0.7 MG/DL
BUN SERPL-MCNC: 12 MG/DL
CALCIUM SERPL-MCNC: 10.6 MG/DL
CHLORIDE SERPL-SCNC: 101 MMOL/L
CO2 SERPL-SCNC: 34 MMOL/L
CREAT SERPL-MCNC: 0.6 MG/DL
CRP SERPL-MCNC: 31.7 MG/L
DIFFERENTIAL METHOD: ABNORMAL
EOSINOPHIL # BLD AUTO: 0.8 K/UL
EOSINOPHIL NFR BLD: 8 %
ERYTHROCYTE [DISTWIDTH] IN BLOOD BY AUTOMATED COUNT: 16.1 %
ERYTHROCYTE [SEDIMENTATION RATE] IN BLOOD BY WESTERGREN METHOD: 40 MM/HR
EST. GFR  (AFRICAN AMERICAN): >60 ML/MIN/1.73 M^2
EST. GFR  (NON AFRICAN AMERICAN): >60 ML/MIN/1.73 M^2
GLUCOSE SERPL-MCNC: 76 MG/DL
HCT VFR BLD AUTO: 43 %
HGB BLD-MCNC: 12.9 G/DL
IMM GRANULOCYTES # BLD AUTO: 0.03 K/UL
IMM GRANULOCYTES NFR BLD AUTO: 0.3 %
LYMPHOCYTES # BLD AUTO: 1.4 K/UL
LYMPHOCYTES NFR BLD: 13.8 %
MCH RBC QN AUTO: 29 PG
MCHC RBC AUTO-ENTMCNC: 30 G/DL
MCV RBC AUTO: 97 FL
MONOCYTES # BLD AUTO: 0.4 K/UL
MONOCYTES NFR BLD: 3.7 %
NEUTROPHILS # BLD AUTO: 7.3 K/UL
NEUTROPHILS NFR BLD: 73.9 %
NRBC BLD-RTO: 0 /100 WBC
PLATELET # BLD AUTO: 244 K/UL
PMV BLD AUTO: 10 FL
POTASSIUM SERPL-SCNC: 4.5 MMOL/L
PROT SERPL-MCNC: 6.3 G/DL
RBC # BLD AUTO: 4.45 M/UL
SODIUM SERPL-SCNC: 140 MMOL/L
WBC # BLD AUTO: 9.94 K/UL

## 2018-08-31 PROCEDURE — 63600175 PHARM REV CODE 636 W HCPCS: Performed by: INTERNAL MEDICINE

## 2018-08-31 PROCEDURE — 96375 TX/PRO/DX INJ NEW DRUG ADDON: CPT

## 2018-08-31 PROCEDURE — 80053 COMPREHEN METABOLIC PANEL: CPT

## 2018-08-31 PROCEDURE — 96374 THER/PROPH/DIAG INJ IV PUSH: CPT

## 2018-08-31 PROCEDURE — 86140 C-REACTIVE PROTEIN: CPT

## 2018-08-31 PROCEDURE — 85652 RBC SED RATE AUTOMATED: CPT

## 2018-08-31 PROCEDURE — 85025 COMPLETE CBC W/AUTO DIFF WBC: CPT

## 2018-08-31 PROCEDURE — 96365 THER/PROPH/DIAG IV INF INIT: CPT

## 2018-08-31 PROCEDURE — 25000003 PHARM REV CODE 250: Performed by: INTERNAL MEDICINE

## 2018-08-31 RX ORDER — DIPHENHYDRAMINE HYDROCHLORIDE 50 MG/ML
12.5 INJECTION INTRAMUSCULAR; INTRAVENOUS ONCE
Status: CANCELLED | OUTPATIENT
Start: 2018-08-31 | End: 2018-08-31

## 2018-08-31 RX ORDER — SODIUM CHLORIDE 0.9 % (FLUSH) 0.9 %
10 SYRINGE (ML) INJECTION
Status: CANCELLED | OUTPATIENT
Start: 2018-08-31

## 2018-08-31 RX ORDER — METHYLPREDNISOLONE SOD SUCC 125 MG
80 VIAL (EA) INJECTION
Status: DISCONTINUED | OUTPATIENT
Start: 2018-08-31 | End: 2018-08-31 | Stop reason: HOSPADM

## 2018-08-31 RX ORDER — HEPARIN 100 UNIT/ML
500 SYRINGE INTRAVENOUS
Status: CANCELLED | OUTPATIENT
Start: 2018-08-31

## 2018-08-31 RX ORDER — DIPHENHYDRAMINE HYDROCHLORIDE 50 MG/ML
12.5 INJECTION INTRAMUSCULAR; INTRAVENOUS ONCE
Status: COMPLETED | OUTPATIENT
Start: 2018-08-31 | End: 2018-08-31

## 2018-08-31 RX ADMIN — DIPHENHYDRAMINE HYDROCHLORIDE 12.5 MG: 50 INJECTION INTRAMUSCULAR; INTRAVENOUS at 10:08

## 2018-08-31 RX ADMIN — METHYLPREDNISOLONE SODIUM SUCCINATE 80 MG: 125 INJECTION, POWDER, FOR SOLUTION INTRAMUSCULAR; INTRAVENOUS at 10:08

## 2018-08-31 RX ADMIN — TOCILIZUMAB 346 MG: 20 INJECTION, SOLUTION, CONCENTRATE INTRAVENOUS at 11:08

## 2018-08-31 NOTE — PLAN OF CARE
Problem: Patient Care Overview (Adult)  Goal: Plan of Care Review  Outcome: Outcome(s) achieved Date Met: 08/31/18  Pt educated on hand hygiene and infection control.

## 2018-09-07 ENCOUNTER — TELEPHONE (OUTPATIENT)
Dept: RHEUMATOLOGY | Facility: CLINIC | Age: 58
End: 2018-09-07

## 2018-09-07 ENCOUNTER — HOSPITAL ENCOUNTER (OUTPATIENT)
Dept: RADIOLOGY | Facility: HOSPITAL | Age: 58
Discharge: HOME | End: 2018-09-07
Attending: INTERNAL MEDICINE
Payer: MEDICARE

## 2018-09-07 ENCOUNTER — OFFICE VISIT (OUTPATIENT)
Dept: RHEUMATOLOGY | Facility: CLINIC | Age: 58
End: 2018-09-07
Payer: MEDICARE

## 2018-09-07 VITALS
DIASTOLIC BLOOD PRESSURE: 80 MMHG | HEIGHT: 61 IN | SYSTOLIC BLOOD PRESSURE: 137 MMHG | HEART RATE: 66 BPM | BODY MASS INDEX: 36.87 KG/M2 | WEIGHT: 195.31 LBS

## 2018-09-07 DIAGNOSIS — M05.79 RHEUMATOID ARTHRITIS INVOLVING MULTIPLE SITES WITH POSITIVE RHEUMATOID FACTOR: Primary | ICD-10-CM

## 2018-09-07 DIAGNOSIS — F32.89 OTHER DEPRESSION: ICD-10-CM

## 2018-09-07 DIAGNOSIS — Z79.899 HIGH RISK MEDICATION USE: ICD-10-CM

## 2018-09-07 DIAGNOSIS — M79.7 FIBROMYALGIA: ICD-10-CM

## 2018-09-07 DIAGNOSIS — M05.79 RHEUMATOID ARTHRITIS INVOLVING MULTIPLE SITES WITH POSITIVE RHEUMATOID FACTOR: ICD-10-CM

## 2018-09-07 DIAGNOSIS — M41.24 OTHER IDIOPATHIC SCOLIOSIS, THORACIC REGION: ICD-10-CM

## 2018-09-07 DIAGNOSIS — E24.2 IATROGENIC CUSHINGOID FEATURES: ICD-10-CM

## 2018-09-07 PROCEDURE — 72070 X-RAY EXAM THORAC SPINE 2VWS: CPT | Mod: TC

## 2018-09-07 PROCEDURE — 72040 X-RAY EXAM NECK SPINE 2-3 VW: CPT | Mod: TC

## 2018-09-07 PROCEDURE — 72070 X-RAY EXAM THORAC SPINE 2VWS: CPT | Mod: 26,,, | Performed by: RADIOLOGY

## 2018-09-07 PROCEDURE — 99999 PR PBB SHADOW E&M-EST. PATIENT-LVL IV: CPT | Mod: PBBFAC,,, | Performed by: INTERNAL MEDICINE

## 2018-09-07 PROCEDURE — 99214 OFFICE O/P EST MOD 30 MIN: CPT | Mod: S$PBB,,, | Performed by: INTERNAL MEDICINE

## 2018-09-07 PROCEDURE — 72040 X-RAY EXAM NECK SPINE 2-3 VW: CPT | Mod: 26,,, | Performed by: RADIOLOGY

## 2018-09-07 PROCEDURE — 99214 OFFICE O/P EST MOD 30 MIN: CPT | Mod: PBBFAC,25 | Performed by: INTERNAL MEDICINE

## 2018-09-07 PROCEDURE — 3008F BODY MASS INDEX DOCD: CPT | Mod: CPTII,,, | Performed by: INTERNAL MEDICINE

## 2018-09-07 RX ORDER — HYDROXYCHLOROQUINE SULFATE 200 MG/1
200 TABLET, FILM COATED ORAL 2 TIMES DAILY
Qty: 60 TABLET | Refills: 2 | Status: SHIPPED | OUTPATIENT
Start: 2018-09-07 | End: 2018-12-11 | Stop reason: SDUPTHER

## 2018-09-07 RX ORDER — MONTELUKAST SODIUM 10 MG/1
TABLET ORAL
COMMUNITY
Start: 2018-08-20 | End: 2019-03-25 | Stop reason: HOSPADM

## 2018-09-07 RX ORDER — DULOXETIN HYDROCHLORIDE 60 MG/1
60 CAPSULE, DELAYED RELEASE ORAL DAILY
Qty: 90 CAPSULE | Refills: 2 | Status: SHIPPED | OUTPATIENT
Start: 2018-09-07 | End: 2018-11-17

## 2018-09-07 RX ORDER — HYDROCODONE BITARTRATE AND ACETAMINOPHEN 5; 325 MG/1; MG/1
1 TABLET ORAL EVERY 6 HOURS PRN
COMMUNITY
End: 2020-05-04 | Stop reason: CLARIF

## 2018-09-07 ASSESSMENT — ROUTINE ASSESSMENT OF PATIENT INDEX DATA (RAPID3)
TOTAL RAPID3 SCORE: 4.22
PAIN SCORE: 5
MDHAQ FUNCTION SCORE: .5
PATIENT GLOBAL ASSESSMENT SCORE: 6
AM STIFFNESS SCORE: 0, NO
PSYCHOLOGICAL DISTRESS SCORE: 6.6
FATIGUE SCORE: 7.5

## 2018-09-07 NOTE — ASSESSMENT & PLAN NOTE
Improved disease control on Actemra but still with moderate activity  Will increase dose of Actemra ; continue mtx in spite of hair thinning  Cont pred 5 for now but hope to taper after more actemra

## 2018-09-07 NOTE — ASSESSMENT & PLAN NOTE
Increased pain neck and trapezius but also kyphotic  Does not sleep but is on chronic narcotics     Plan get xrays   Cont Cymbalta 60 mg /d

## 2018-09-07 NOTE — PROGRESS NOTES
Subjective:       Patient ID: Robert Smith is a 57 y.o. female.    Chief Complaint: Disease Management    Hx RA dx 2003; dx at [facility] ; started with hands and all over body  On prednisone 5 mg/d since 2003  Rx here 2007 Dr Max and Zakem Rx MTX and Humira  MTX nausea with po and SQ  LFA tried 2007 by Dr Terry and she did not tolerate with nausea and hospitalization  2008 Dr Mann retried MTX; did not tolerate  2010-12 Dr Rosenbaum treated with Plaquenil(HCQ)  Humira quit working 2010 2010 Simponi tried - caused weak and shaky reaction  2011 Orencia tried - caused muscle aches, shakes and jaw pain  2011 Enbrel tried - did not work  Dr Paris [id number];  Had Rituxin 2016  Earlier 2017 took Xeljanz for a few months; had a lot of nausea and she felt arms and hands getting numb  SSZ July 2017 caused nausea and hepatitis       ENBREL retried  Aug 2017 - stopped Dec due to lack of effectiveness  Cimzia Jan 2018 caused urticarial rash  Now Actemra monthly IV since Mar 2018     Hx 2 knee replacements; 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 and had to be redone     DXA scan shows low bone mass       Actemra since March; helps kamaljit for a few weeks but not for the full 4 weeks between infusions  mtx every 4 weeks; getting thin hair in front  Folic acid ; says she takes daily plus MVT supplements    Had ear infection and throat infection  No fever but throat still sore sometimes; hurts to swallow    Had esophagus stretched including a couple months ago at [facility] , Dr Mendoza    Reduced duloxetine to 60 mg qam with some decrease in leg cramps    Now on 5 mg hydrocodone (from 10 ) and on ER oxycodone     Review of Systems   Constitutional: Negative for fatigue, fever and unexpected weight change.   HENT: Negative for mouth sores and trouble swallowing.         Dry mouth   Eyes: Negative for redness.        Dry eyes   Respiratory: Positive for wheezing. Negative for cough and shortness of breath.         Inhaler relieves wheeze   Cardiovascular:  "Negative for chest pain.   Gastrointestinal: Negative for abdominal pain, constipation and diarrhea.        Had heartburn  Until stopped coffee   Skin: Negative for rash.        Hair loss   Neurological: Negative for headaches.   Hematological: Negative for adenopathy. Does not bruise/bleed easily.   Psychiatric/Behavioral: Positive for sleep disturbance.        Sleeps poorly         Objective:   /80   Pulse 66   Ht 5' 1" (1.549 m)   Wt 88.6 kg (195 lb 4.8 oz)   BMI 36.90 kg/m²      Physical Exam   Constitutional: She is well-developed, well-nourished, and in no distress.   HENT:   Mouth/Throat: Oropharynx is clear and moist.   Eyes: Conjunctivae are normal.   Cardiovascular: Normal rate and regular rhythm.    Pulmonary/Chest: She has no wheezes. She has no rales.   Tender kyphotic thoracic spine   Lymphadenopathy:     She has no cervical adenopathy.   Skin: No rash noted.          Physical Exam     Tenderness:   Right hand: 2nd MCP and 3rd MCP    Swelling:   Right hand: 1st MCP, 2nd MCP, 3rd MCP, 4th MCP and 5th MCP  Left hand: 2nd MCP and 3rd MCP    HERNANDEZ-28 tender joint count: 2  HERNANDEZ-28 swollen joint count: 7  ESR (mm/hr): 40               Lab Results   Component Value Date    SEDRATE 40 (H) 08/31/2018     Lab Results   Component Value Date    CRP 31.7 (H) 08/31/2018      Lab Results   Component Value Date    WBC 9.94 08/31/2018    HGB 12.9 08/31/2018    HCT 43.0 08/31/2018    MCV 97 08/31/2018     08/31/2018      Lab Results   Component Value Date    ALT 15 08/31/2018       Assessment:       1. Rheumatoid arthritis involving multiple sites with positive rheumatoid factor    2. High risk medication use    3. Fibromyalgia    4. Other depression    5. Iatrogenic cushingoid features            Plan:       Problem List Items Addressed This Visit        Active Problems    Fibromyalgia     Increased pain neck and trapezius but also kyphotic  Does not sleep but is on chronic narcotics     Plan get xrays "   Cont Cymbalta 60 mg /d         Relevant Medications    DULoxetine (CYMBALTA) 60 MG capsule    Other Relevant Orders    X-Ray Thoracic Spine AP Lateral (Completed)    High risk medication use     LAB OK ON MTX  Has eye appt for HCQ         Iatrogenic cushingoid features     [place] hump on prednisone 5 mg/d  Plan pred taper after established response to Actemra         Rheumatoid arthritis - Primary     Improved disease control on Actemra but still with moderate activity  Will increase dose of Actemra ; continue mtx in spite of hair thinning  Cont pred 5 for now but hope to taper after more actemra         Relevant Medications    hydroxychloroquine (PLAQUENIL) 200 mg tablet    Other Relevant Orders    X-Ray Cervical Spine AP And Lateral (Completed)    Ambulatory Referral to Physical/Occupational Therapy      Other Visit Diagnoses     Other depression        Relevant Medications    DULoxetine (CYMBALTA) 60 MG capsule

## 2018-09-07 NOTE — TELEPHONE ENCOUNTER
Please tell her that xrays show no fracture but do show degeneration and a curve in her spine and I think she needs therapy to learn exercises to help that.  I'll order PT     WD

## 2018-09-11 ENCOUNTER — TELEPHONE (OUTPATIENT)
Dept: RHEUMATOLOGY | Facility: CLINIC | Age: 58
End: 2018-09-11

## 2018-09-11 NOTE — TELEPHONE ENCOUNTER
Patient called and informed of xray results and told PT would be ordered.  Verbalizes understanding.

## 2018-09-13 DIAGNOSIS — M05.79 RHEUMATOID ARTHRITIS INVOLVING MULTIPLE SITES WITH POSITIVE RHEUMATOID FACTOR: ICD-10-CM

## 2018-09-14 ENCOUNTER — TELEPHONE (OUTPATIENT)
Dept: PHARMACY | Facility: CLINIC | Age: 58
End: 2018-09-14

## 2018-09-14 NOTE — TELEPHONE ENCOUNTER
Patient returned phone call back regard her specialty medication refill for Otrexup 10mg/0.4mL $0/004- Patient scheduled to have medication ship out on Tues 9/18 to arrive on Wed 9/19 address confirmed. Patient would like alcohol swabs along with a sharp container to be mail along with her medication. Patient informed no new medications, conditions or allergies since last talked to [facility]. Patient have 1 injection remaining for next week on Tues 9/18. Patient declined questions for the clinical pharmacist. Patient voiced understanding.

## 2018-09-27 ENCOUNTER — DOCUMENTATION ONLY (OUTPATIENT)
Dept: REHABILITATION | Facility: HOSPITAL | Age: 58
End: 2018-09-27

## 2018-09-27 NOTE — PROGRESS NOTES
Pt cancelled her occupational therapy evaluation at [facility] due to a high level of pain today. Pt stated she would contact therapy to r/s.

## 2018-09-28 ENCOUNTER — INFUSION (OUTPATIENT)
Dept: INFECTIOUS DISEASES | Facility: HOSPITAL | Age: 58
End: 2018-09-28
Attending: INTERNAL MEDICINE
Payer: MEDICARE

## 2018-09-28 VITALS
HEART RATE: 75 BPM | HEIGHT: 61 IN | BODY MASS INDEX: 37.66 KG/M2 | WEIGHT: 199.5 LBS | DIASTOLIC BLOOD PRESSURE: 67 MMHG | OXYGEN SATURATION: 98 % | TEMPERATURE: 98 F | SYSTOLIC BLOOD PRESSURE: 118 MMHG | RESPIRATION RATE: 18 BRPM

## 2018-09-28 DIAGNOSIS — M05.79 RHEUMATOID ARTHRITIS INVOLVING MULTIPLE SITES WITH POSITIVE RHEUMATOID FACTOR: Primary | ICD-10-CM

## 2018-09-28 PROCEDURE — 25000003 PHARM REV CODE 250: Performed by: INTERNAL MEDICINE

## 2018-09-28 PROCEDURE — 63600175 PHARM REV CODE 636 W HCPCS: Performed by: INTERNAL MEDICINE

## 2018-09-28 PROCEDURE — 96365 THER/PROPH/DIAG IV INF INIT: CPT

## 2018-09-28 PROCEDURE — 96375 TX/PRO/DX INJ NEW DRUG ADDON: CPT

## 2018-09-28 PROCEDURE — 96374 THER/PROPH/DIAG INJ IV PUSH: CPT

## 2018-09-28 RX ORDER — SODIUM CHLORIDE 0.9 % (FLUSH) 0.9 %
10 SYRINGE (ML) INJECTION
Status: CANCELLED | OUTPATIENT
Start: 2018-09-28

## 2018-09-28 RX ORDER — DIPHENHYDRAMINE HYDROCHLORIDE 50 MG/ML
12.5 INJECTION INTRAMUSCULAR; INTRAVENOUS ONCE
Status: CANCELLED | OUTPATIENT
Start: 2018-09-28 | End: 2018-09-28

## 2018-09-28 RX ORDER — DIPHENHYDRAMINE HYDROCHLORIDE 50 MG/ML
12.5 INJECTION INTRAMUSCULAR; INTRAVENOUS ONCE
Status: COMPLETED | OUTPATIENT
Start: 2018-09-28 | End: 2018-09-28

## 2018-09-28 RX ORDER — HEPARIN 100 UNIT/ML
500 SYRINGE INTRAVENOUS
Status: CANCELLED | OUTPATIENT
Start: 2018-09-28

## 2018-09-28 RX ORDER — METHYLPREDNISOLONE SOD SUCC 125 MG
80 VIAL (EA) INJECTION
Status: DISCONTINUED | OUTPATIENT
Start: 2018-09-28 | End: 2018-09-28 | Stop reason: HOSPADM

## 2018-09-28 RX ADMIN — TOCILIZUMAB 724 MG: 20 INJECTION, SOLUTION, CONCENTRATE INTRAVENOUS at 10:09

## 2018-09-28 RX ADMIN — DIPHENHYDRAMINE HYDROCHLORIDE 12.5 MG: 50 INJECTION INTRAMUSCULAR; INTRAVENOUS at 09:09

## 2018-09-28 RX ADMIN — METHYLPREDNISOLONE SODIUM SUCCINATE 80 MG: 125 INJECTION, POWDER, FOR SOLUTION INTRAMUSCULAR; INTRAVENOUS at 09:09

## 2018-09-28 NOTE — PLAN OF CARE
Problem: Health Knowledge, Opportunity to Enhance (Adult,NICU,[place],Obstetrics,Pediatric)  Goal: Identify Related Risk Factors and Signs and Symptoms  Related risk factors and signs and symptoms are identified upon initiation of [employer] Clinical Practice Guideline (CPG)  Outcome: Outcome(s) achieved Date Met: 18  Pt instructed on infection control such as hand hygiene. Pt verbalized understanding.

## 2018-09-28 NOTE — PLAN OF CARE
Problem: Patient Care Overview (Adult)  Goal: Plan of Care Review  Outcome: Outcome(s) achieved Date Met: 09/28/18  Pt tolerated infusion well and in NAD. Pt next appt scheduled and pt discharged home to self care.

## 2018-10-08 ENCOUNTER — CLINICAL SUPPORT (OUTPATIENT)
Dept: REHABILITATION | Facility: HOSPITAL | Age: 58
End: 2018-10-08
Payer: MEDICARE

## 2018-10-08 DIAGNOSIS — R29.898 WEAKNESS OF BOTH HANDS: ICD-10-CM

## 2018-10-08 DIAGNOSIS — M20.029 BOUTONNIERE DEFORMITY, UNSPECIFIED LATERALITY: Primary | ICD-10-CM

## 2018-10-08 DIAGNOSIS — M06.9 RHEUMATOID ARTHRITIS INVOLVING BOTH HANDS, UNSPECIFIED RHEUMATOID FACTOR PRESENCE: ICD-10-CM

## 2018-10-08 PROCEDURE — G8984 CARRY CURRENT STATUS: HCPCS | Mod: CK,PN

## 2018-10-08 PROCEDURE — 97161 PT EVAL LOW COMPLEX 20 MIN: CPT | Mod: PN

## 2018-10-08 PROCEDURE — 97110 THERAPEUTIC EXERCISES: CPT | Mod: PN

## 2018-10-08 PROCEDURE — 97166 OT EVAL MOD COMPLEX 45 MIN: CPT | Mod: PN

## 2018-10-08 PROCEDURE — G8985 CARRY GOAL STATUS: HCPCS | Mod: CJ,PN

## 2018-10-08 NOTE — PLAN OF CARE
Physical Therapy Evaluation    Name: Robert Smith  Clinic Number: [id number]      Diagnosis: No diagnosis found.  Physician: Ten Rodriguez, *  Treatment Orders: PT Eval and Treat    Past Medical History:   Diagnosis Date    Acid reflux     Allergy     Anxiety     Degenerative disc disease     Depression     DVT of leg (deep venous thrombosis) 2009    rt leg    Fibromyalgia     Long-term current use of steroids 11/12/2012    Osteoporosis, postmenopausal     chronic steroid use    RA (rheumatoid arthritis)      Current Outpatient Medications   Medication Sig    ADVAIR DISKUS 250-50 mcg/dose diskus inhaler Inhale 1 puff into the lungs daily as needed.     albuterol sulfate 2.5 mg/0.5 mL Nebu Take 2.5 mg by nebulization every 6 (six) hours as needed.     albuterol sulfate 90 mcg/actuation AePB Inhale 1 puff into the lungs every 4 (four) hours as needed. Rescue    diazePAM (VALIUM) 5 MG tablet     DULoxetine (CYMBALTA) 60 MG capsule Take 1 capsule (60 mg total) by mouth once daily. Combine with 60 mg to total 90 mg/d    esomeprazole (NEXIUM) 40 MG capsule     fluticasone (FLONASE) 50 mcg/actuation nasal spray as needed.     folic acid (FOLVITE) 1 MG tablet Take 1 tablet (1 mg total) by mouth once daily.    HYDROcodone-acetaminophen (NORCO) 5-325 mg per tablet Take 1 tablet by mouth every 6 (six) hours as needed for Pain.    HYDROCORT/MIN OIL/PETROLAT,WHT (HYDROCORTISONE-MIN OIL-WHT PET) 1 % Oint     hydroxychloroquine (PLAQUENIL) 200 mg tablet Take 1 tablet (200 mg total) by mouth 2 (two) times daily.    lidocaine-prilocaine (EMLA) cream APPLY A SMALL AMOUNT (2-3 GRAMS) TOPICALLY TO AFFECTED AREAS 3 TIMES A DAY AS DIRECTED.    meclizine (ANTIVERT) 25 mg tablet     methotrexate, PF, (OTREXUP, PF,) 10 mg/0.4 mL AtIn Inject 10 mg into the skin every 7 days.    montelukast (SINGULAIR) 10 mg tablet     neomycin-polymyxin-hydrocortisone (CORTISPORIN) 3.5-10,000-1 mg/mL-unit/mL-% otic suspension      ondansetron (ZOFRAN) 8 MG tablet Take 1 tablet (8 mg total) by mouth 2 (two) times daily.    oxyCODONE myristate (XTAMPZA ER) 18 mg CSpT Take 18 mg by mouth every 12 (twelve) hours.    predniSONE (DELTASONE) 5 MG tablet     SODIUM CHLORIDE FOR INHALATION (SODIUM CHLORIDE 0.9%) 0.9 % nebulizer solution     VENTOLIN HFA 90 mcg/actuation inhaler INHALE ONE PUFF INTO THE LUNGS Q 4 H PRN . RESCUE     No current facility-administered medications for this visit.      Review of patient's allergies indicates:   Allergen Reactions    Sulfasalazine      HEPATITIS    Sulfa (sulfonamide antibiotics) Nausea And Vomiting    Clindamycin Diarrhea    Daypro [oxaprozin] Nausea Only    Orencia  [abatacept]      Other reaction(s): Muscle pain    Percocet  [oxycodone-acetaminophen]      Other reaction(s): Vomiting    Simponi  [golimumab]      Other reaction(s): Unknown    Cimzia [certolizumab pegol] Rash    Ciprofloxacin Rash    Sulfamethoxazole-trimethoprim Rash     Precautions: Standard     Evaluation Date: 10/8/2018  Visit # authorized: 1  Authorization period: 12/31/18      Isidro Jones is a 57 y.o. female that presents to [facility] secondary to low back pain. Patient reports that the pain in her back has been bothering her off and on for over 1 year however, recently a flare up has led to more constant and consistent pain after the last 6 months. Patient reports increased pain with prolonged standing and prolonged walking, either greater than 15 minutes.   Patient is disable secondary to RA since 2006.    Patient lives alone. Patients daughter does help her with heavy cleaning and tasks around her home.   Patient c/o: continuous  Radicular symptoms: no  Onset: gradual   Pain Scale: Robert rates pain on a scale of 0-10 to be 10 at worst; 3 currently; 3 at best .  Aggravating factors: increased pain with standing and with prolonged walking   Pain with coughing/sneezing, B&B, sleep disturbance:  difficulty going to sleep, cramps in legs and feet   Relieving factors: heat, meds   Previous treatment: therapy before  Bowel/Bladder Changes: nocturia   Imaging: MRI  Past surgical history: none to note   Functional deficits: difficulty with prolonged WB activities   Prior level of function: disabled, pain with prolonged activity   Occupation: disabled since 2003   Environment: one story home with one steps to enter, has a RW AD, lives alone   No cultural or spiritual barriers identified to treatment or learning.  Activity Level/Partication: unable to work   Patient's goals: less pain with daily function       Objective     Posture: slumped core, rounded shoulders   Posture Alignment :increased kyphosis    Lumbar Range of Motion:    GUARDING WITH TESTING    %   Flexion 25*     Extension 25*     Left Side Bending 25*   Right Side Bending 25*   Left rotation   25*   Right Rotation   25*    *= pain    Lower Extremity Strength    Right LE  Left LE    Hip flexion: 4/5 Hip flexion: 4-/5   Knee extension: 4-/5 Knee extension: 4-/5   Knee flexion: 4-/5 Knee flexion: 4-/5   Hip IR: 4/5 Hip IR: 4-/5   Hip ER: 4-/5 Hip ER: 4/5   Hip extension:  3+/5 Hip extension: 3+/5   Hip abduction: 3+/5 Hip abduction: 3/5   Hip adduction: 3/5 Hip adduction 3/5   Ankle dorsiflexion: 5/5 Ankle dorsiflexion: 5/5   Ankle plantarflexion: 5/5 Ankle plantarflexion: 5/5       Special Tests:     Right (combined) Left   Trendelenburg Test neg neg   Quadrant testing neg neg   KIMBERLEE + +   Scour neg neg   Repeated Flexion Pain  Pain    Repeated Ext pos pos   Piriformis Test pos pos   Prone Instability Test N/T N/T   Bridge Test unable unable   Sustained extension  pos Pos        Neuro Dynamic Testing:  Sciatic nerve:      SLR: pos    Tibial bias: neg    Common Peroneal bias: neg   Femoral Nerve:    Femoral nerve test: neg   Neural Tension:     Slump test: pos    Palpation: TTP with inc tissue tension in lumbar paraspinals, TTP in B piriformis, TTP  "along proximal HS     Sensation: intact    Functional Limitations Reports - G Codes  Category: Mobility   Tool: FOTO   Score:   Current:   Goal:     PT Evaluation Completed? Yes  Discussed Plan of Care with patient: Yes    TREATMENT:  Robert received therapeutic exercises to develop strength and endurance, flexibility for 15 minutes including:  - LTR, 10x for 10" B   - PPT, x10 reps with 5" hold   - piriformis stretch, 20" x 3 reps B     Robert  received the following manual therapy techniques x  min: Joint mobilizations and soft tissue mobilization were applied to the  to improve/decrease      Pt received hot pack x 10' following treatment.    HEP provided: yes   Instructed pt. Regarding: Proper technique with all exercises. Pt demo good understanding of the education provided. Robert demonstrated good return demonstration of activities.      Assessment     This is a 57 y.o. female referred to outpatient physical therapy and presents with a medical diagnosis of low back pain, chronic in nature and demonstrates limitations as described in the problem list. Patient will benefit from physcial therapy services in order to maximize pain free functional independence. The following goals were discussed with the patient and patient is in agreement with them as to be addressed in the treatment plan. Patient was given a HEP consisting of lower extremity stretching . Patient verbally understood the instructions as they were given and demonstrated proper form and technique during therapy. Patient was advised to perform these exercises free of pain, and to stop performing them if pain occurs. Patient would benefit from skilled PT to address above stated problems, as well as, achieve pt goals within a timely manner. Patient has set realistic goals and has verbalized good understanding and agreement with reported diagnosis, prognosis and treatment. Patient demonstrates no additional cultural, spiritual or educational need and currently " has no barriers to learning.    History  Co-morbidities and personal factors that may impact the plan of care Examination  Body Structures and Functions, activity limitations and participation restrictions that may impact the plan of care Clinical Presentation   Decision Making/ Complexity Score   Co-morbidities:   Fibromyalgia   RA        Personal Factors:   Age 57  Occupation: disabled   Lifestyle: sedentary    Attitudes: pessimistic    Body Regions: back     Body Systems: Musculoskeletal (symmetry, ROM, strength, flexibility, joint mobility), Neuromuscular (coordination, posture, balance, gait, transfers, motor control/learning), Cardiovascular (endurance)    Activity limitations: prolonged standing, prolonged walking     Participation Restrictions: unable to work, clean    limited clinical presentation with changing clinical characteristics    Pain: 3/10   Complexity:  low    Functional Outcome measure  FAQ: %, LEFS: % function, FOTO limitation: % disability         Prognosis: fair    Anticipated barriers to physical therapy: chronicity, co morbidities     Medical necessity is demonstrated by the following IMPAIRMENTS/PROBLEM LIST:   1) Increase in pain level limiting function   2) co morbidities limiting function    3) decreased, painful lumbar ROM    4) decreased LE strength    5) Lack of HEP    GOALS: Short Term Goals:  4 weeks  1. Patient will be able to report decreased lumbar resting pain  <   / =  3 /10  to increase tolerance for prolonged standing tasks in home.   2. Patient will be able to report a functional  improvement in her ability to stand for greater than 20 min without increase in pain   3. Patient will demonstrate an increased MMT in B LE by 1/2 grade MMT  to increase tolerance for climbing stairs   4. Patient will be able to demonstrate an increase in lumbar ROM by 25% with less guarding to improve mobilty.   5. Patient will be able to tolerate HEP to improve ROM and independence with  ADL's    Long Term Goals: 8 weeks  1.Report decreased in resting L hip pain  <   / =  1  /10  to increase tolerance for prolonged walking .   2.Patient will be able to demonstrate an increase in 25 degrees of hamstring flexibility on L LE to improve mobility.   3. Patient will demonstrate an increased MMT by 1/2 grade  to increase tolerance for ADL and work activities.  4. Patient will report at CJ level (20-40% impaired) on LEFS  to demonstrate increase in LE function with every day tasks.   5. Patient to be Independent with HEP to improve ROM and independence with ADL's      Plan     Patient will be treated by physical therapy 1 times a week for 8 weeks for Electrical Stimulation PRN, Iontophoresis (with dexamethasone PRN), Manual Therapy, Moist Heat/ Ice, Neuromuscular Re-ed, Patient Education, Therapeutic Activites, Therapeutic Exercise and Other therapeutic taping, dry needling, aquatic therapy to achieve established goals. Robert   may at times be seen by a PTA as part of the Rehab Team.        I certify the need for these services furnished under this plan of treatment and while under my care.______________________________ Physician/Referring Practitioner  Date of Signature      Rashida Bernardo, PT  10/8/2018

## 2018-10-08 NOTE — PROGRESS NOTES
Physical Therapy Evaluation    Name: Robert Smith  Clinic Number: [id number]      Diagnosis: No diagnosis found.  Physician: Ten Rodriguez, *  Treatment Orders: PT Eval and Treat    Past Medical History:   Diagnosis Date    Acid reflux     Allergy     Anxiety     Degenerative disc disease     Depression     DVT of leg (deep venous thrombosis) 2009    rt leg    Fibromyalgia     Long-term current use of steroids 11/12/2012    Osteoporosis, postmenopausal     chronic steroid use    RA (rheumatoid arthritis)      Current Outpatient Medications   Medication Sig    ADVAIR DISKUS 250-50 mcg/dose diskus inhaler Inhale 1 puff into the lungs daily as needed.     albuterol sulfate 2.5 mg/0.5 mL Nebu Take 2.5 mg by nebulization every 6 (six) hours as needed.     albuterol sulfate 90 mcg/actuation AePB Inhale 1 puff into the lungs every 4 (four) hours as needed. Rescue    diazePAM (VALIUM) 5 MG tablet     DULoxetine (CYMBALTA) 60 MG capsule Take 1 capsule (60 mg total) by mouth once daily. Combine with 60 mg to total 90 mg/d    esomeprazole (NEXIUM) 40 MG capsule     fluticasone (FLONASE) 50 mcg/actuation nasal spray as needed.     folic acid (FOLVITE) 1 MG tablet Take 1 tablet (1 mg total) by mouth once daily.    HYDROcodone-acetaminophen (NORCO) 5-325 mg per tablet Take 1 tablet by mouth every 6 (six) hours as needed for Pain.    HYDROCORT/MIN OIL/PETROLAT,WHT (HYDROCORTISONE-MIN OIL-WHT PET) 1 % Oint     hydroxychloroquine (PLAQUENIL) 200 mg tablet Take 1 tablet (200 mg total) by mouth 2 (two) times daily.    lidocaine-prilocaine (EMLA) cream APPLY A SMALL AMOUNT (2-3 GRAMS) TOPICALLY TO AFFECTED AREAS 3 TIMES A DAY AS DIRECTED.    meclizine (ANTIVERT) 25 mg tablet     methotrexate, PF, (OTREXUP, PF,) 10 mg/0.4 mL AtIn Inject 10 mg into the skin every 7 days.    montelukast (SINGULAIR) 10 mg tablet     neomycin-polymyxin-hydrocortisone (CORTISPORIN) 3.5-10,000-1 mg/mL-unit/mL-% otic suspension      ondansetron (ZOFRAN) 8 MG tablet Take 1 tablet (8 mg total) by mouth 2 (two) times daily.    oxyCODONE myristate (XTAMPZA ER) 18 mg CSpT Take 18 mg by mouth every 12 (twelve) hours.    predniSONE (DELTASONE) 5 MG tablet     SODIUM CHLORIDE FOR INHALATION (SODIUM CHLORIDE 0.9%) 0.9 % nebulizer solution     VENTOLIN HFA 90 mcg/actuation inhaler INHALE ONE PUFF INTO THE LUNGS Q 4 H PRN . RESCUE     No current facility-administered medications for this visit.      Review of patient's allergies indicates:   Allergen Reactions    Sulfasalazine      HEPATITIS    Sulfa (sulfonamide antibiotics) Nausea And Vomiting    Clindamycin Diarrhea    Daypro [oxaprozin] Nausea Only    Orencia  [abatacept]      Other reaction(s): Muscle pain    Percocet  [oxycodone-acetaminophen]      Other reaction(s): Vomiting    Simponi  [golimumab]      Other reaction(s): Unknown    Cimzia [certolizumab pegol] Rash    Ciprofloxacin Rash    Sulfamethoxazole-trimethoprim Rash     Precautions: Standard     Evaluation Date: 10/8/2018  Visit # authorized: 1  Authorization period: 12/31/18      Isidro Jones is a 57 y.o. female that presents to [facility] outpatient clinic secondary to low back pain. Patient reports that the pain in her back has been bothering her off and on for over 1 year however, recently a flare up has led to more constant and consistent pain after the last 6 months. Patient reports increased pain with prolonged standing and prolonged walking, either greater than 15 minutes.   Patient is disable secondary to RA since 2006.    Patient lives alone. Patients daughter does help her with heavy cleaning and tasks around her home.   Patient c/o: continuous  Radicular symptoms: no  Onset: gradual   Pain Scale: Robert rates pain on a scale of 0-10 to be 10 at worst; 3 currently; 3 at best .  Aggravating factors: increased pain with standing and with prolonged walking   Pain with coughing/sneezing, B&B, sleep disturbance:  difficulty going to sleep, cramps in legs and feet   Relieving factors: heat, meds   Previous treatment: therapy before  Bowel/Bladder Changes: nocturia   Imaging: MRI  Past surgical history: none to note   Functional deficits: difficulty with prolonged WB activities   Prior level of function: disabled, pain with prolonged activity   Occupation: disabled since 2003   Environment: one story home with one steps to enter, has a RW AD, lives alone   No cultural or spiritual barriers identified to treatment or learning.  Activity Level/Partication: unable to work   Patient's goals: less pain with daily function       Objective     Posture: slumped core, rounded shoulders   Posture Alignment :increased kyphosis    Lumbar Range of Motion:    GUARDING WITH TESTING    %   Flexion 25*     Extension 25*     Left Side Bending 25*   Right Side Bending 25*   Left rotation   25*   Right Rotation   25*    *= pain    Lower Extremity Strength    Right LE  Left LE    Hip flexion: 4/5 Hip flexion: 4-/5   Knee extension: 4-/5 Knee extension: 4-/5   Knee flexion: 4-/5 Knee flexion: 4-/5   Hip IR: 4/5 Hip IR: 4-/5   Hip ER: 4-/5 Hip ER: 4/5   Hip extension:  3+/5 Hip extension: 3+/5   Hip abduction: 3+/5 Hip abduction: 3/5   Hip adduction: 3/5 Hip adduction 3/5   Ankle dorsiflexion: 5/5 Ankle dorsiflexion: 5/5   Ankle plantarflexion: 5/5 Ankle plantarflexion: 5/5       Special Tests:     Right (combined) Left   Trendelenburg Test neg neg   Quadrant testing neg neg   KIMBERLEE + +   Scour neg neg   Repeated Flexion Pain  Pain    Repeated Ext pos pos   Piriformis Test pos pos   Prone Instability Test N/T N/T   Bridge Test unable unable   Sustained extension  pos Pos        Neuro Dynamic Testing:  Sciatic nerve:      SLR: pos    Tibial bias: neg    Common Peroneal bias: neg   Femoral Nerve:    Femoral nerve test: neg   Neural Tension:     Slump test: pos    Palpation: TTP with inc tissue tension in lumbar paraspinals, TTP in B piriformis, TTP  "along proximal HS     Sensation: intact    Functional Limitations Reports - G Codes  Category: Mobility   Tool: FOTO   Score:   Current:   Goal:     PT Evaluation Completed? Yes  Discussed Plan of Care with patient: Yes    TREATMENT:  Robert received therapeutic exercises to develop strength and endurance, flexibility for 15 minutes including:  - LTR, 10x for 10" B   - PPT, x10 reps with 5" hold   - piriformis stretch, 20" x 3 reps B     Robert  received the following manual therapy techniques x  min: Joint mobilizations and soft tissue mobilization were applied to the  to improve/decrease      Pt received hot pack x 10' following treatment.    HEP provided: yes   Instructed pt. Regarding: Proper technique with all exercises. Pt demo good understanding of the education provided. Robert demonstrated good return demonstration of activities.      Assessment     This is a 57 y.o. female referred to outpatient physical therapy and presents with a medical diagnosis of low back pain, chronic in nature and demonstrates limitations as described in the problem list. Patient will benefit from physcial therapy services in order to maximize pain free functional independence. The following goals were discussed with the patient and patient is in agreement with them as to be addressed in the treatment plan. Patient was given a HEP consisting of lower extremity stretching . Patient verbally understood the instructions as they were given and demonstrated proper form and technique during therapy. Patient was advised to perform these exercises free of pain, and to stop performing them if pain occurs. Patient would benefit from skilled PT to address above stated problems, as well as, achieve pt goals within a timely manner. Patient has set realistic goals and has verbalized good understanding and agreement with reported diagnosis, prognosis and treatment. Patient demonstrates no additional cultural, spiritual or educational need and currently " has no barriers to learning.    History  Co-morbidities and personal factors that may impact the plan of care Examination  Body Structures and Functions, activity limitations and participation restrictions that may impact the plan of care Clinical Presentation   Decision Making/ Complexity Score   Co-morbidities:   Fibromyalgia   RA        Personal Factors:   Age 57  Occupation: disabled   Lifestyle: sedentary    Attitudes: pessimistic    Body Regions: back     Body Systems: Musculoskeletal (symmetry, ROM, strength, flexibility, joint mobility), Neuromuscular (coordination, posture, balance, gait, transfers, motor control/learning), Cardiovascular (endurance)    Activity limitations: prolonged standing, prolonged walking     Participation Restrictions: unable to work, clean    limited clinical presentation with changing clinical characteristics    Pain: 3/10   Complexity:  low    Functional Outcome measure  FAQ: %, LEFS: % function, FOTO limitation: % disability         Prognosis: fair    Anticipated barriers to physical therapy: chronicity, co morbidities     Medical necessity is demonstrated by the following IMPAIRMENTS/PROBLEM LIST:   1) Increase in pain level limiting function   2) co morbidities limiting function    3) decreased, painful lumbar ROM    4) decreased LE strength    5) Lack of HEP    GOALS: Short Term Goals:  4 weeks  1. Patient will be able to report decreased lumbar resting pain  <   / =  3 /10  to increase tolerance for prolonged standing tasks in home.   2. Patient will be able to report a functional  improvement in her ability to stand for greater than 20 min without increase in pain   3. Patient will demonstrate an increased MMT in B LE by 1/2 grade MMT  to increase tolerance for climbing stairs   4. Patient will be able to demonstrate an increase in lumbar ROM by 25% with less guarding to improve mobilty.   5. Patient will be able to tolerate HEP to improve ROM and independence with  ADL's    Long Term Goals: 8 weeks  1.Report decreased in resting L hip pain  <   / =  1  /10  to increase tolerance for prolonged walking .   2.Patient will be able to demonstrate an increase in 25 degrees of hamstring flexibility on L LE to improve mobility.   3. Patient will demonstrate an increased MMT by 1/2 grade  to increase tolerance for ADL and work activities.  4. Patient will report at CJ level (20-40% impaired) on LEFS  to demonstrate increase in LE function with every day tasks.   5. Patient to be Independent with HEP to improve ROM and independence with ADL's      Plan     Patient will be treated by physical therapy 1 times a week for 8 weeks for Electrical Stimulation PRN, Iontophoresis (with dexamethasone PRN), Manual Therapy, Moist Heat/ Ice, Neuromuscular Re-ed, Patient Education, Therapeutic Activites, Therapeutic Exercise and Other therapeutic taping, dry needling, aquatic therapy to achieve established goals. Robert   may at times be seen by a PTA as part of the Rehab Team.        I certify the need for these services furnished under this plan of treatment and while under my care.______________________________ Physician/Referring Practitioner  Date of Signature      Rashida Bernardo, PT  10/8/2018

## 2018-10-08 NOTE — PLAN OF CARE
Occupational Therapy Evaluation    Patient: Robert Smith  Date of Evaluation: 10/08/2018  Referring Physician: Dr. Ten Rodriguez  Diagnosis:   Encounter Diagnoses   Name Primary?    Rheumatoid arthritis involving both hands, unspecified rheumatoid factor presence Yes    Weakness of both hands     Boutonniere deformity, unspecified laterality            Referral Orders: Eval and treat, orthotic fabricaion  Start Time: 2:10 pm  End Time: 3:05 pm  Total Time: 55 min  Group Time: -    Age: 57 y.o.  Sex: female  Hand dominance: right    Occupation: retired kitchen work a [employer]  Working presently: No  Last time worked: 2003  Workmen's Compensation: No    Date of Injury: n/a  Date of Surgery: n/a  Involved areas: right and left hand    Mechanism of Injury: Dr. Rodriguez noticed changes in her hands  Past Medical History:   Diagnosis Date    Acid reflux     Allergy     Anxiety     Degenerative disc disease     Depression     DVT of leg (deep venous thrombosis) 2009    rt leg    Fibromyalgia     Long-term current use of steroids 11/12/2012    Osteoporosis, postmenopausal     chronic steroid use    RA (rheumatoid arthritis)      Current Outpatient Medications   Medication Sig    ADVAIR DISKUS 250-50 mcg/dose diskus inhaler Inhale 1 puff into the lungs daily as needed.     albuterol sulfate 2.5 mg/0.5 mL Nebu Take 2.5 mg by nebulization every 6 (six) hours as needed.     albuterol sulfate 90 mcg/actuation AePB Inhale 1 puff into the lungs every 4 (four) hours as needed. Rescue    diazePAM (VALIUM) 5 MG tablet     DULoxetine (CYMBALTA) 60 MG capsule Take 1 capsule (60 mg total) by mouth once daily. Combine with 60 mg to total 90 mg/d    esomeprazole (NEXIUM) 40 MG capsule     fluticasone (FLONASE) 50 mcg/actuation nasal spray as needed.     folic acid (FOLVITE) 1 MG tablet Take 1 tablet (1 mg total) by mouth once daily.    HYDROcodone-acetaminophen (NORCO) 5-325 mg per tablet Take 1 tablet by mouth  "every 6 (six) hours as needed for Pain.    HYDROCORT/MIN OIL/PETROLAT,WHT (HYDROCORTISONE-MIN OIL-WHT PET) 1 % Oint     hydroxychloroquine (PLAQUENIL) 200 mg tablet Take 1 tablet (200 mg total) by mouth 2 (two) times daily.    lidocaine-prilocaine (EMLA) cream APPLY A SMALL AMOUNT (2-3 GRAMS) TOPICALLY TO AFFECTED AREAS 3 TIMES A DAY AS DIRECTED.    meclizine (ANTIVERT) 25 mg tablet     methotrexate, PF, (OTREXUP, PF,) 10 mg/0.4 mL AtIn Inject 10 mg into the skin every 7 days.    montelukast (SINGULAIR) 10 mg tablet     neomycin-polymyxin-hydrocortisone (CORTISPORIN) 3.5-10,000-1 mg/mL-unit/mL-% otic suspension     ondansetron (ZOFRAN) 8 MG tablet Take 1 tablet (8 mg total) by mouth 2 (two) times daily.    oxyCODONE myristate (XTAMPZA ER) 18 mg CSpT Take 18 mg by mouth every 12 (twelve) hours.    predniSONE (DELTASONE) 5 MG tablet     SODIUM CHLORIDE FOR INHALATION (SODIUM CHLORIDE 0.9%) 0.9 % nebulizer solution     VENTOLIN HFA 90 mcg/actuation inhaler INHALE ONE PUFF INTO THE LUNGS Q 4 H PRN . RESCUE     No current facility-administered medications for this visit.      Review of patient's allergies indicates:   Allergen Reactions    Sulfasalazine      HEPATITIS    Sulfa (sulfonamide antibiotics) Nausea And Vomiting    Clindamycin Diarrhea    Daypro [oxaprozin] Nausea Only    Orencia  [abatacept]      Other reaction(s): Muscle pain    Percocet  [oxycodone-acetaminophen]      Other reaction(s): Vomiting    Simponi  [golimumab]      Other reaction(s): Unknown    Cimzia [certolizumab pegol] Rash    Ciprofloxacin Rash    Sulfamethoxazole-trimethoprim Rash     X-Ray Results: none noted  MRI Results: n/a  EMG Results: n/a    Subjective  Robert reports "she is having some trouble with bending her fingers at times."  Pt states since she has started the infusions she has not really had any pain in her hands.    Functional Pain Scale Rating 0-10: 0/10  Location: n/a    Robert's goals for therapy are: To be " able to carry groceries and  small things like coins.    Objective    Observation: Skin intact, swan neck deformities in right and left hand all fingers, more involved left then right    Sensation: Ulnar, medial and Radial Nerve grossly Intact  [place] Monofilament Test: No  Stereognosis: Intact    Special Tests: n/t    Wound Assessment: n/a    Edema: Circumferential measurements: N/A    Range of Motion: right and left Active WFL all fingers    Thumb Opposition: to all tips  Palmar Abduction: WFL  Radial Abduction: WFL    Wrist Ext/Flex: WFl/WFL  Wrist RD/UD: WFL/WFL  Supination/Pronation: WFL/WFL     Strength: (ALVA Dynamometer in lbs.) Average 3 trials, Position II  Right: 40, 35, 35  Avg36.6#  Left: 35,40,35 avg 36.6#    Pinch Strength: (Pinch Gauge in psi's), Average 3 trials  Victoria Pinch R) 10,13, 12 avg 12 psi's   L)11, 12, 12 avg 11.6 psi's  3pt Pinch   R) 11, 10, 13 avg 11.3 psi's  L) 10, 11, 11 avg 10.6 psi's      Fine Ute Coordination Tests:   9 hole Peg Test R) 13 seconds no drops   L) 18 seconds no drops    Cultural/Spiritual/Education Needs: none noted or reported  Barriers to Learning: none noted or reported    Functional Limitations: Patient presents with the following functional Limitations:   Self Care / ADL: able to dress, shower and personal hygiene, difficulty with curling and drying hair    Work/Activities: tough with pulling clothes from washer to dryer, carrying hands feel weak, sweeping and mopping bothers her back, someone else cleans the bathroom, difficulty with chopping, stirring and lifting pots while cooking    Leisure: play games on phone    Treatment included: OT evaluation and instruction in written HEP including (Hand Therapy/Finger Exercises) regular and flat fist, wrist ext/flex/RD/UD.    Repetitions 10 each   Frequency 3* per day    Pt demonstrated exercises properly in clinic.  Educational materials re: joint protection and energy conservation principles  provided.    Assessment  Treatment Diagnosis/ Problem List BUE Decreased  strength, Decreased pinch strength and impaired manipulation skills    Goals to be met in 4 weeks:   Patient to be IND with HEP.  Pt will be able to practice joint protection skills in daily tasks  Pt will increase her  strength x 5 # in BUE to improve lifting and carrying ability  Therapist to fabricate ring splints for all appropriate fingers.    FOTO  handling  Current Score  = 54 or 46% impaired   Goal at Discharge Score 64 or 36% impaired     Score interpretation is as follows:     TEST SCORE  Modifier  Impairment Limitation Restriction    0%  CH  0 % impaired, limited or restricted    1-19%  CI  @ least 1% but less than 20% impaired, limited or restricted    20-39%  CJ  @ least 20%<40% impaired, limited or restricted    40-59%  CK  @ least 40%<60% impaired, limited or restricted    60-79%  CL  @ least 60% <80% impaired, limited or restricted    80-99%  CM  @ least 80%<100% impaired limited or restricted    100%  CN  100% impaired, limited or restricted     Profile and History Assessment of Occupational Performance Level of Clinical Decision Making Complexity Score   Occupational Profile:   Robert Smith is a 57 y.o. female who lives alone and is currently disabled. Robert Smith has difficulty with  cutting, opening jars, carrying  shopping and housework/household chores  affecting his/her daily functional abilities. His/her main goal for therapy is To be able to carry groceries and  small things like coins..     Comorbidities:   depression and anxiety, fibromyalgia    Medical and Therapy History Review:   Expanded               Performance Deficits    Physical:  Joint Stability  Muscle Power/Strength   Strength  Pinch Strength  Fine Motor Coordination    Cognitive:  Memory    Psychosocial:    minimally social and no leisure activities     Clinical Decision Making:  moderate    Assessment Process:  Problem-Focused  Assessments    Modification/Need for Assistance:  Not Necessary    Intervention Selection:  Several Treatment Options       moderate  Based on PMHX, co morbidities , data from assessments and functional level of assistance required with task and clinical presentation directly impacting function.         Plan  Robert Smith to be treated by Occupational Therapy 1 times per week for 4 weeks during the certification period from 10/8/18 to 11/5/18 to achieve the established goals.     Treatment to include: Paraffin, Therapeutic exercises/activities., Strengthening and Orthotic Fabrication/Fit/Training, as well as any other treatments deemed necessary based on the patient's needs or progress.     I certify the need for these services furnished under this plan of treatment and while under my care.     ____________________________________                         __________________  Physician/Referring Practitioner                                               Date of Signature

## 2018-10-08 NOTE — PATIENT INSTRUCTIONS
Flexor Tendon Gliding (Active Full Fist)        Straighten all fingers, then make a fist, bending all joints.  Repeat __10__ times. Do _3___ sessions per day.    Copyright © [facility]. All rights reserved.   Flexor Tendon Gliding (Active Straight Fist)        Start with fingers straight. Bend knuckles and middle joints. Keep fingertip joints straight to touch base of palm.  Repeat __10__ times. Do _3___ sessions per day.    Copyright © [employer]. All rights reserved.     AROM: Wrist Extension        With right palm down, bend wrist up.  Repeat 10 times per set. Do 1 sets per session. Do 3 sessions per day.      AROM: Wrist Flexion        With right palm up, bend wrist up.  Repeat 10 times per set. Do 1 sets per session. Do 3 sessions per day.              AROM: Wrist Radial / Ulnar Deviation Against Gravity        With thumb toward face, gently bend left wrist toward body, then away. Keep elbow bent and supported.  Repeat 10 times per set. Do 1 sets per session. Do 3 sessions per day.    ** ALL movements should be gentle, steady and slow.**

## 2018-10-09 ENCOUNTER — TELEPHONE (OUTPATIENT)
Dept: PHARMACY | Facility: CLINIC | Age: 58
End: 2018-10-09

## 2018-10-17 ENCOUNTER — DOCUMENTATION ONLY (OUTPATIENT)
Dept: REHABILITATION | Facility: HOSPITAL | Age: 58
End: 2018-10-17

## 2018-10-26 ENCOUNTER — INFUSION (OUTPATIENT)
Dept: INFECTIOUS DISEASES | Facility: HOSPITAL | Age: 58
End: 2018-10-26
Attending: INTERNAL MEDICINE
Payer: MEDICARE

## 2018-10-26 VITALS
HEART RATE: 76 BPM | SYSTOLIC BLOOD PRESSURE: 133 MMHG | DIASTOLIC BLOOD PRESSURE: 80 MMHG | WEIGHT: 201.63 LBS | OXYGEN SATURATION: 97 % | RESPIRATION RATE: 16 BRPM | TEMPERATURE: 98 F | BODY MASS INDEX: 38.09 KG/M2

## 2018-10-26 DIAGNOSIS — M05.79 RHEUMATOID ARTHRITIS INVOLVING MULTIPLE SITES WITH POSITIVE RHEUMATOID FACTOR: Primary | ICD-10-CM

## 2018-10-26 PROCEDURE — 96365 THER/PROPH/DIAG IV INF INIT: CPT

## 2018-10-26 PROCEDURE — 96375 TX/PRO/DX INJ NEW DRUG ADDON: CPT

## 2018-10-26 PROCEDURE — 25000003 PHARM REV CODE 250: Performed by: INTERNAL MEDICINE

## 2018-10-26 PROCEDURE — 96374 THER/PROPH/DIAG INJ IV PUSH: CPT

## 2018-10-26 PROCEDURE — 63600175 PHARM REV CODE 636 W HCPCS: Performed by: INTERNAL MEDICINE

## 2018-10-26 RX ORDER — DIPHENHYDRAMINE HYDROCHLORIDE 50 MG/ML
12.5 INJECTION INTRAMUSCULAR; INTRAVENOUS ONCE
Status: CANCELLED | OUTPATIENT
Start: 2018-10-26 | End: 2018-10-26

## 2018-10-26 RX ORDER — SODIUM CHLORIDE 0.9 % (FLUSH) 0.9 %
10 SYRINGE (ML) INJECTION
Status: CANCELLED | OUTPATIENT
Start: 2018-10-26

## 2018-10-26 RX ORDER — METHYLPREDNISOLONE SOD SUCC 125 MG
80 VIAL (EA) INJECTION
Status: DISCONTINUED | OUTPATIENT
Start: 2018-10-26 | End: 2018-10-26 | Stop reason: HOSPADM

## 2018-10-26 RX ORDER — DIPHENHYDRAMINE HYDROCHLORIDE 50 MG/ML
12.5 INJECTION INTRAMUSCULAR; INTRAVENOUS ONCE
Status: COMPLETED | OUTPATIENT
Start: 2018-10-26 | End: 2018-10-26

## 2018-10-26 RX ORDER — HEPARIN 100 UNIT/ML
500 SYRINGE INTRAVENOUS
Status: CANCELLED | OUTPATIENT
Start: 2018-10-26

## 2018-10-26 RX ADMIN — TOCILIZUMAB 732 MG: 20 INJECTION, SOLUTION, CONCENTRATE INTRAVENOUS at 09:10

## 2018-10-26 RX ADMIN — DIPHENHYDRAMINE HYDROCHLORIDE 12.5 MG: 50 INJECTION INTRAMUSCULAR; INTRAVENOUS at 09:10

## 2018-10-26 RX ADMIN — METHYLPREDNISOLONE SODIUM SUCCINATE 80 MG: 125 INJECTION, POWDER, FOR SOLUTION INTRAMUSCULAR; INTRAVENOUS at 09:10

## 2018-10-26 NOTE — PROGRESS NOTES
Pt received dose actemra, premeds given benadryl 12.5 mg IVP and solumedrol 80 mg IVP, tolerated well, left unit in NAD.

## 2018-11-05 ENCOUNTER — TELEPHONE (OUTPATIENT)
Dept: PHARMACY | Facility: CLINIC | Age: 58
End: 2018-11-05

## 2018-11-07 ENCOUNTER — TELEPHONE (OUTPATIENT)
Dept: PHARMACY | Facility: CLINIC | Age: 58
End: 2018-11-07

## 2018-11-17 ENCOUNTER — OFFICE VISIT (OUTPATIENT)
Dept: URGENT CARE | Facility: CLINIC | Age: 58
End: 2018-11-17
Payer: MEDICARE

## 2018-11-17 VITALS
HEART RATE: 68 BPM | BODY MASS INDEX: 38.92 KG/M2 | TEMPERATURE: 96 F | DIASTOLIC BLOOD PRESSURE: 90 MMHG | WEIGHT: 206 LBS | SYSTOLIC BLOOD PRESSURE: 139 MMHG | OXYGEN SATURATION: 97 %

## 2018-11-17 DIAGNOSIS — M26.621 TMJ TENDERNESS, RIGHT: Primary | ICD-10-CM

## 2018-11-17 PROCEDURE — 3008F BODY MASS INDEX DOCD: CPT | Mod: CPTII,S$GLB,, | Performed by: NURSE PRACTITIONER

## 2018-11-17 PROCEDURE — 99214 OFFICE O/P EST MOD 30 MIN: CPT | Mod: 25,S$GLB,, | Performed by: NURSE PRACTITIONER

## 2018-11-17 PROCEDURE — 96372 THER/PROPH/DIAG INJ SC/IM: CPT | Mod: S$GLB,,, | Performed by: NURSE PRACTITIONER

## 2018-11-17 RX ORDER — DULOXETIN HYDROCHLORIDE 30 MG/1
CAPSULE, DELAYED RELEASE ORAL
COMMUNITY
Start: 2018-10-16 | End: 2019-06-12

## 2018-11-17 RX ORDER — DIAZEPAM 5 MG/1
5 TABLET ORAL EVERY 8 HOURS PRN
Qty: 15 TABLET | Refills: 0 | Status: SHIPPED | OUTPATIENT
Start: 2018-11-17 | End: 2018-11-22

## 2018-11-17 RX ORDER — TIZANIDINE 4 MG/1
TABLET ORAL
COMMUNITY
Start: 2018-10-31 | End: 2019-03-25

## 2018-11-17 RX ORDER — KETOROLAC TROMETHAMINE 30 MG/ML
30 INJECTION, SOLUTION INTRAMUSCULAR; INTRAVENOUS
Status: COMPLETED | OUTPATIENT
Start: 2018-11-17 | End: 2018-11-17

## 2018-11-17 RX ADMIN — KETOROLAC TROMETHAMINE 30 MG: 30 INJECTION, SOLUTION INTRAMUSCULAR; INTRAVENOUS at 11:11

## 2018-11-17 NOTE — PATIENT INSTRUCTIONS
TMJ Syndrome  The temporomandibular joint (TMJ) is the joint that connects your lower jaw to your head. You can feel it in front of your ears when you open and close your mouth. TMJ disorders involve chronic or recurrent pain in the joint. When treated, symptoms of TMJ disorders usually go away within a few months.  Causes  There is no widely agreed-on cause of TMJ disorders. They have been linked to injury, arthritis, chronic fatigue syndrome, and fibromyalgia. A definite connection has not been shown, though.  Symptoms  · Pain in the face, jaw, or neck  · Pain with jaw movement or chewing  · Locking or catching sensation of the jaw  · Clicking, popping, or grinding sounds with movement of the TMJ  · Headache  · Ear pain  Home care  Modest, nonsurgical treatments are a good first step toward relieving symptoms. Try the approaches described below.  · Rest the jaw by avoiding crunchy or hard-to-chew foods. Do not eat hard or sticky candies. Soft foods and liquids are easier on the jaw.  · Protect your jaw while yawning. If you need to yawn, put your fist under your chin to prevent your mouth from opening up too wide.  · To help relieve pain, try applying hot or cold packs to the painful area. Try both hot and cold to find out which works best for you. If you use hot packs (small towels soaked in hot water), be careful not to burn yourself.  · You may take acetaminophen or ibuprofen for pain, unless you were given a different pain medicine. (Note: If you have chronic liver or kidney disease or have ever had a stomach ulcer or gastrointestinal bleeding, talk with your healthcare provider before using these medicines. Also talk to your provider if you are taking medicine to prevent blood clots.) Aspirin should never be given to anyone younger than 18 years of age who is ill with a viral infection or fever. It may cause severe liver or brain damage.  Reducing stress  If stress seems to be contributing to your symptoms,  try to identify the sources of stress in your life. These arent always obvious. Common stressors include:  · Everyday hassles (which can add up), such as traffic jams, missed appointments, or car trouble  · Major life changes, both good (such as a new baby or job promotion) and bad (loss of job or loss of a loved one)  · Overload: The feeling that you have too many responsibilities and can't take care of everything at once  · Helplessness: Feeling like your problems are more than you can solve  When possible, do something about your sources of stress. See if you can avoid hassles, limit the amount of change in your life at one time, and take breaks when you feel overloaded.  Unfortunately, many stressful situations cannot be avoided. So learning how to manage stress better is very important. Getting regular exercise, eating nutritious, balanced meals, and getting adequate rest all help to make everyday stress more manageable. Certain techniques are also helpful: relaxation and breathing exercises, visualization, biofeedback, meditation, or simply taking some time out to clear your mind. For more information, talk with your healthcare provider.  Follow-up care  Follow up with your healthcare provider, or as advised. Further testing and additional treatment may be required. If changes to your lifestyle do not improve your symptoms, talk with your healthcare provider about other available therapies. These include bite guards for help with teeth grinding, stress management techniques, and more. If stress is an important factor and does not respond to the above simple measures, talk to your doctor about a referral for stress management.  · If X-rays were done, they will be reviewed by a specialist. You will be notified of the results, especially if they affect treatment.  Call 911  Call emergency services right away if any of these occur:  · Trouble breathing or swallowing, wheezing  · Confusion  · Extreme drowsiness or  trouble awakening  · Fainting or loss of consciousness  · Rapid heart rate  When to seek medical advice  Call your healthcare provider right away if any of these occur:  · Your face becomes swollen or red.  · Your pain worsens.  · You have increasing neck, mouth, tooth, or throat pain.  · You develop a fever of 100.4F (38°C) or higher  Date Last Reviewed: 7/30/2015  © 8412-0743 [employer]. [address] [place] [address], [place] [address]. All rights reserved. This information is not intended as a substitute for professional medical care. Always follow your healthcare professional's instructions.

## 2018-11-17 NOTE — PROGRESS NOTES
Subjective:       Patient ID: Robert Smith is a 58 y.o. female.    Vitals:  weight is 93.4 kg (206 lb). Her temperature is 96.1 °F (35.6 °C). Her blood pressure is 139/90 (abnormal) and her pulse is 68. Her oxygen saturation is 97%.     Chief Complaint: Otalgia    Pt has right sided jaw pain that radiates into the ear. Pt states that talking even hurts      Otalgia    There is pain in the right ear. This is a new problem. The current episode started yesterday. The problem occurs constantly. There has been no fever. The pain is at a severity of 10/10. Associated symptoms include headaches. Pertinent negatives include no coughing, diarrhea, rash, sore throat or vomiting. Treatments tried: pain medication. The treatment provided no relief.       Constitution: Negative for chills, fatigue and fever.   HENT: Positive for ear pain. Negative for congestion and sore throat.    Neck: Negative for painful lymph nodes.   Cardiovascular: Negative for chest pain and leg swelling.   Eyes: Negative for double vision and blurred vision.   Respiratory: Negative for cough and shortness of breath.    Gastrointestinal: Negative for nausea, vomiting and diarrhea.   Genitourinary: Negative for dysuria, frequency, urgency and history of kidney stones.   Musculoskeletal: Negative for joint pain, joint swelling, muscle cramps and muscle ache.   Skin: Negative for color change, pale, rash and bruising.   Allergic/Immunologic: Negative for seasonal allergies.   Neurological: Positive for headaches. Negative for dizziness, history of vertigo, light-headedness and passing out.   Hematologic/Lymphatic: Negative for swollen lymph nodes.   Psychiatric/Behavioral: Negative for nervous/anxious, sleep disturbance and depression. The patient is not nervous/anxious.        Objective:      Physical Exam   Constitutional: She is oriented to person, place, and time. She appears well-developed and well-nourished. She is cooperative.  Non-toxic appearance.  She does not appear ill. No distress.   HENT:   Head: Normocephalic and atraumatic.       Right Ear: Hearing, tympanic membrane, external ear and ear canal normal.   Left Ear: Hearing, tympanic membrane and ear canal normal.   Nose: No mucosal edema, rhinorrhea or nasal deformity. No epistaxis. Right sinus exhibits no maxillary sinus tenderness and no frontal sinus tenderness. Left sinus exhibits no maxillary sinus tenderness and no frontal sinus tenderness.   Mouth/Throat: Uvula is midline and mucous membranes are normal. There is trismus in the jaw. Normal dentition. No uvula swelling. No posterior oropharyngeal erythema.   Eyes: Conjunctivae and lids are normal. Right eye exhibits no discharge. Left eye exhibits no discharge. No scleral icterus.   Sclera clear bilat   Neck: Trachea normal, normal range of motion, full passive range of motion without pain and phonation normal. Neck supple.   Cardiovascular: Normal rate, regular rhythm, normal heart sounds, intact distal pulses and normal pulses.   Pulmonary/Chest: Effort normal and breath sounds normal. No respiratory distress.   Abdominal: Soft. Normal appearance and bowel sounds are normal. She exhibits no distension, no pulsatile midline mass and no mass. There is no tenderness.   Musculoskeletal: Normal range of motion. She exhibits no edema or deformity.   Neurological: She is alert and oriented to person, place, and time. She exhibits normal muscle tone. Coordination normal.   Skin: Skin is warm, dry and intact. She is not diaphoretic. No pallor.   Psychiatric: She has a normal mood and affect. Her speech is normal and behavior is normal. Judgment and thought content normal. Cognition and memory are normal.   Nursing note and vitals reviewed.      Assessment:       1. TMJ tenderness, right        Plan:       Patient Instructions     TMJ Syndrome  The temporomandibular joint (TMJ) is the joint that connects your lower jaw to your head. You can feel it in  front of your ears when you open and close your mouth. TMJ disorders involve chronic or recurrent pain in the joint. When treated, symptoms of TMJ disorders usually go away within a few months.  Causes  There is no widely agreed-on cause of TMJ disorders. They have been linked to injury, arthritis, chronic fatigue syndrome, and fibromyalgia. A definite connection has not been shown, though.  Symptoms  · Pain in the face, jaw, or neck  · Pain with jaw movement or chewing  · Locking or catching sensation of the jaw  · Clicking, popping, or grinding sounds with movement of the TMJ  · Headache  · Ear pain  Home care  Modest, nonsurgical treatments are a good first step toward relieving symptoms. Try the approaches described below.  · Rest the jaw by avoiding crunchy or hard-to-chew foods. Do not eat hard or sticky candies. Soft foods and liquids are easier on the jaw.  · Protect your jaw while yawning. If you need to yawn, put your fist under your chin to prevent your mouth from opening up too wide.  · To help relieve pain, try applying hot or cold packs to the painful area. Try both hot and cold to find out which works best for you. If you use hot packs (small towels soaked in hot water), be careful not to burn yourself.  · You may take acetaminophen or ibuprofen for pain, unless you were given a different pain medicine. (Note: If you have chronic liver or kidney disease or have ever had a stomach ulcer or gastrointestinal bleeding, talk with your healthcare provider before using these medicines. Also talk to your provider if you are taking medicine to prevent blood clots.) Aspirin should never be given to anyone younger than 18 years of age who is ill with a viral infection or fever. It may cause severe liver or brain damage.  Reducing stress  If stress seems to be contributing to your symptoms, try to identify the sources of stress in your life. These arent always obvious. Common stressors include:  · Everyday  hassles (which can add up), such as traffic jams, missed appointments, or car trouble  · Major life changes, both good (such as a new baby or job promotion) and bad (loss of job or loss of a loved one)  · Overload: The feeling that you have too many responsibilities and can't take care of everything at once  · Helplessness: Feeling like your problems are more than you can solve  When possible, do something about your sources of stress. See if you can avoid hassles, limit the amount of change in your life at one time, and take breaks when you feel overloaded.  Unfortunately, many stressful situations cannot be avoided. So learning how to manage stress better is very important. Getting regular exercise, eating nutritious, balanced meals, and getting adequate rest all help to make everyday stress more manageable. Certain techniques are also helpful: relaxation and breathing exercises, visualization, biofeedback, meditation, or simply taking some time out to clear your mind. For more information, talk with your healthcare provider.  Follow-up care  Follow up with your healthcare provider, or as advised. Further testing and additional treatment may be required. If changes to your lifestyle do not improve your symptoms, talk with your healthcare provider about other available therapies. These include bite guards for help with teeth grinding, stress management techniques, and more. If stress is an important factor and does not respond to the above simple measures, talk to your doctor about a referral for stress management.  · If X-rays were done, they will be reviewed by a specialist. You will be notified of the results, especially if they affect treatment.  Call 911  Call emergency services right away if any of these occur:  · Trouble breathing or swallowing, wheezing  · Confusion  · Extreme drowsiness or trouble awakening  · Fainting or loss of consciousness  · Rapid heart rate  When to seek medical advice  Call your  healthcare provider right away if any of these occur:  · Your face becomes swollen or red.  · Your pain worsens.  · You have increasing neck, mouth, tooth, or throat pain.  · You develop a fever of 100.4F (38°C) or higher  Date Last Reviewed: 7/30/2015  © 4128-2604 [employer]. [address] [place] [address], [place] [address]. All rights reserved. This information is not intended as a substitute for professional medical care. Always follow your healthcare professional's instructions.              TMJ tenderness, right    Other orders  -     ketorolac injection 30 mg  -     diazePAM (VALIUM) 5 MG tablet; Take 1 tablet (5 mg total) by mouth every 8 (eight) hours as needed for Anxiety or Insomnia.  Dispense: 15 tablet; Refill: 0

## 2018-11-19 ENCOUNTER — TELEPHONE (OUTPATIENT)
Dept: RHEUMATOLOGY | Facility: CLINIC | Age: 58
End: 2018-11-19

## 2018-11-19 DIAGNOSIS — L29.9 ITCH: Primary | ICD-10-CM

## 2018-11-19 NOTE — TELEPHONE ENCOUNTER
----- Message from Carroll Palma sent at 11/19/2018  1:35 PM CST -----  Contact: self   Pt is requesting a call back regarding extreme pain, pt can be reached at [phone].

## 2018-11-20 RX ORDER — HYDROXYZINE HYDROCHLORIDE 25 MG/1
25 TABLET, FILM COATED ORAL 3 TIMES DAILY PRN
Qty: 60 TABLET | Refills: 2 | Status: SHIPPED | OUTPATIENT
Start: 2018-11-20 | End: 2019-04-04 | Stop reason: SDUPTHER

## 2018-11-20 NOTE — TELEPHONE ENCOUNTER
Itching really bad; no rash  Bruising everywhere  Took benedryl  Takes hydrocodone ; usually one one twice a day  Had episode of severe jaw and ear pain and difficulty opening mouth  Prednisone 5 mg/d  Oct 26 had Actemra; has another Nov 23  Going through difficult time with mother just dx with cancer

## 2018-11-23 ENCOUNTER — INFUSION (OUTPATIENT)
Dept: INFECTIOUS DISEASES | Facility: HOSPITAL | Age: 58
End: 2018-11-23
Attending: INTERNAL MEDICINE
Payer: MEDICARE

## 2018-11-23 VITALS
SYSTOLIC BLOOD PRESSURE: 126 MMHG | HEART RATE: 80 BPM | DIASTOLIC BLOOD PRESSURE: 65 MMHG | WEIGHT: 209.13 LBS | TEMPERATURE: 98 F | OXYGEN SATURATION: 95 % | RESPIRATION RATE: 16 BRPM | BODY MASS INDEX: 39.48 KG/M2 | HEIGHT: 61 IN

## 2018-11-23 DIAGNOSIS — M05.79 RHEUMATOID ARTHRITIS INVOLVING MULTIPLE SITES WITH POSITIVE RHEUMATOID FACTOR: Primary | ICD-10-CM

## 2018-11-23 PROCEDURE — 63600175 PHARM REV CODE 636 W HCPCS: Performed by: INTERNAL MEDICINE

## 2018-11-23 PROCEDURE — 25000003 PHARM REV CODE 250: Performed by: INTERNAL MEDICINE

## 2018-11-23 PROCEDURE — 96374 THER/PROPH/DIAG INJ IV PUSH: CPT

## 2018-11-23 PROCEDURE — 96375 TX/PRO/DX INJ NEW DRUG ADDON: CPT

## 2018-11-23 PROCEDURE — 96365 THER/PROPH/DIAG IV INF INIT: CPT

## 2018-11-23 RX ORDER — HEPARIN 100 UNIT/ML
500 SYRINGE INTRAVENOUS
Status: CANCELLED | OUTPATIENT
Start: 2018-11-23

## 2018-11-23 RX ORDER — METHYLPREDNISOLONE SOD SUCC 125 MG
80 VIAL (EA) INJECTION
Status: DISCONTINUED | OUTPATIENT
Start: 2018-11-23 | End: 2018-11-23 | Stop reason: HOSPADM

## 2018-11-23 RX ORDER — SODIUM CHLORIDE 0.9 % (FLUSH) 0.9 %
10 SYRINGE (ML) INJECTION
Status: CANCELLED | OUTPATIENT
Start: 2018-11-23

## 2018-11-23 RX ORDER — DIPHENHYDRAMINE HYDROCHLORIDE 50 MG/ML
12.5 INJECTION INTRAMUSCULAR; INTRAVENOUS ONCE
Status: COMPLETED | OUTPATIENT
Start: 2018-11-23 | End: 2018-11-23

## 2018-11-23 RX ORDER — DIPHENHYDRAMINE HYDROCHLORIDE 50 MG/ML
12.5 INJECTION INTRAMUSCULAR; INTRAVENOUS ONCE
Status: CANCELLED | OUTPATIENT
Start: 2018-11-23 | End: 2018-11-23

## 2018-11-23 RX ADMIN — TOCILIZUMAB 760 MG: 20 INJECTION, SOLUTION, CONCENTRATE INTRAVENOUS at 10:11

## 2018-11-23 RX ADMIN — DIPHENHYDRAMINE HYDROCHLORIDE 12.5 MG: 50 INJECTION INTRAMUSCULAR; INTRAVENOUS at 09:11

## 2018-11-23 RX ADMIN — METHYLPREDNISOLONE SODIUM SUCCINATE 80 MG: 125 INJECTION, POWDER, FOR SOLUTION INTRAMUSCULAR; INTRAVENOUS at 09:11

## 2018-11-23 NOTE — PLAN OF CARE
Problem: Patient Care Overview (Adult)  Goal: Individualization & Mutuality  Outcome: Ongoing (interventions implemented as appropriate)  Pt educated on hand hygiene and infection control.

## 2018-12-04 ENCOUNTER — LAB VISIT (OUTPATIENT)
Dept: LAB | Facility: HOSPITAL | Age: 58
End: 2018-12-04
Attending: INTERNAL MEDICINE
Payer: MEDICARE

## 2018-12-04 ENCOUNTER — DOCUMENTATION ONLY (OUTPATIENT)
Dept: REHABILITATION | Facility: HOSPITAL | Age: 58
End: 2018-12-04

## 2018-12-04 DIAGNOSIS — Z79.899 HIGH RISK MEDICATION USE: ICD-10-CM

## 2018-12-04 DIAGNOSIS — M05.79 RHEUMATOID ARTHRITIS INVOLVING MULTIPLE SITES WITH POSITIVE RHEUMATOID FACTOR: ICD-10-CM

## 2018-12-04 LAB
ALBUMIN SERPL BCP-MCNC: 3.1 G/DL
ALP SERPL-CCNC: 73 U/L
ALT SERPL W/O P-5'-P-CCNC: 28 U/L
ANION GAP SERPL CALC-SCNC: 5 MMOL/L
AST SERPL-CCNC: 28 U/L
BASOPHILS # BLD AUTO: 0.01 K/UL
BASOPHILS NFR BLD: 0.2 %
BILIRUB SERPL-MCNC: 0.4 MG/DL
BUN SERPL-MCNC: 21 MG/DL
CALCIUM SERPL-MCNC: 9.9 MG/DL
CHLORIDE SERPL-SCNC: 108 MMOL/L
CO2 SERPL-SCNC: 28 MMOL/L
CREAT SERPL-MCNC: 0.8 MG/DL
CRP SERPL-MCNC: 4.3 MG/L
DIFFERENTIAL METHOD: ABNORMAL
EOSINOPHIL # BLD AUTO: 0.4 K/UL
EOSINOPHIL NFR BLD: 8.9 %
ERYTHROCYTE [DISTWIDTH] IN BLOOD BY AUTOMATED COUNT: 15.2 %
ERYTHROCYTE [SEDIMENTATION RATE] IN BLOOD BY WESTERGREN METHOD: 11 MM/HR
EST. GFR  (AFRICAN AMERICAN): >60 ML/MIN/1.73 M^2
EST. GFR  (NON AFRICAN AMERICAN): >60 ML/MIN/1.73 M^2
GLUCOSE SERPL-MCNC: 81 MG/DL
HCT VFR BLD AUTO: 43 %
HGB BLD-MCNC: 13.3 G/DL
IMM GRANULOCYTES # BLD AUTO: 0.01 K/UL
IMM GRANULOCYTES NFR BLD AUTO: 0.2 %
LYMPHOCYTES # BLD AUTO: 1.4 K/UL
LYMPHOCYTES NFR BLD: 31.4 %
MCH RBC QN AUTO: 31.1 PG
MCHC RBC AUTO-ENTMCNC: 30.9 G/DL
MCV RBC AUTO: 101 FL
MONOCYTES # BLD AUTO: 0.1 K/UL
MONOCYTES NFR BLD: 2.7 %
NEUTROPHILS # BLD AUTO: 2.5 K/UL
NEUTROPHILS NFR BLD: 56.6 %
NRBC BLD-RTO: 0 /100 WBC
PLATELET # BLD AUTO: 135 K/UL
PMV BLD AUTO: 10.7 FL
POTASSIUM SERPL-SCNC: 4.5 MMOL/L
PROT SERPL-MCNC: 5.5 G/DL
RBC # BLD AUTO: 4.27 M/UL
SODIUM SERPL-SCNC: 141 MMOL/L
WBC # BLD AUTO: 4.39 K/UL

## 2018-12-04 PROCEDURE — 80053 COMPREHEN METABOLIC PANEL: CPT

## 2018-12-04 PROCEDURE — 86140 C-REACTIVE PROTEIN: CPT

## 2018-12-04 PROCEDURE — 85025 COMPLETE CBC W/AUTO DIFF WBC: CPT

## 2018-12-04 PROCEDURE — 85652 RBC SED RATE AUTOMATED: CPT

## 2018-12-04 NOTE — PROGRESS NOTES
Pt will be d/c from occupational therapy secondary to her need to assist family due to illness.  Pt's plan of care has .

## 2018-12-07 ENCOUNTER — TELEPHONE (OUTPATIENT)
Dept: PHARMACY | Facility: CLINIC | Age: 58
End: 2018-12-07

## 2018-12-11 DIAGNOSIS — M05.79 RHEUMATOID ARTHRITIS INVOLVING MULTIPLE SITES WITH POSITIVE RHEUMATOID FACTOR: ICD-10-CM

## 2018-12-11 RX ORDER — HYDROXYCHLOROQUINE SULFATE 200 MG/1
TABLET, FILM COATED ORAL
Qty: 60 TABLET | Refills: 2 | Status: SHIPPED | OUTPATIENT
Start: 2018-12-11 | End: 2019-03-14 | Stop reason: SDUPTHER

## 2018-12-12 ENCOUNTER — OFFICE VISIT (OUTPATIENT)
Dept: RHEUMATOLOGY | Facility: CLINIC | Age: 58
End: 2018-12-12
Payer: MEDICARE

## 2018-12-12 VITALS
HEART RATE: 75 BPM | DIASTOLIC BLOOD PRESSURE: 76 MMHG | WEIGHT: 209.69 LBS | HEIGHT: 61 IN | BODY MASS INDEX: 39.59 KG/M2 | SYSTOLIC BLOOD PRESSURE: 131 MMHG

## 2018-12-12 DIAGNOSIS — Z79.899 HIGH RISK MEDICATION USE: ICD-10-CM

## 2018-12-12 DIAGNOSIS — M05.79 RHEUMATOID ARTHRITIS INVOLVING MULTIPLE SITES WITH POSITIVE RHEUMATOID FACTOR: Primary | ICD-10-CM

## 2018-12-12 DIAGNOSIS — R76.11 POSITIVE PPD: ICD-10-CM

## 2018-12-12 DIAGNOSIS — M79.7 FIBROMYALGIA: ICD-10-CM

## 2018-12-12 PROCEDURE — 99214 OFFICE O/P EST MOD 30 MIN: CPT | Mod: S$GLB,,, | Performed by: INTERNAL MEDICINE

## 2018-12-12 PROCEDURE — 99999 PR PBB SHADOW E&M-EST. PATIENT-LVL III: CPT | Mod: PBBFAC,,, | Performed by: INTERNAL MEDICINE

## 2018-12-12 PROCEDURE — 3008F BODY MASS INDEX DOCD: CPT | Mod: CPTII,S$GLB,, | Performed by: INTERNAL MEDICINE

## 2018-12-12 RX ORDER — FOLIC ACID 1 MG/1
2 TABLET ORAL DAILY
Qty: 180 TABLET | Refills: 3 | Status: SHIPPED | OUTPATIENT
Start: 2018-12-12 | End: 2019-10-23

## 2018-12-12 ASSESSMENT — ROUTINE ASSESSMENT OF PATIENT INDEX DATA (RAPID3)
MDHAQ FUNCTION SCORE: .9
PATIENT GLOBAL ASSESSMENT SCORE: 5.5
TOTAL RAPID3 SCORE: 4.83
PSYCHOLOGICAL DISTRESS SCORE: 7.7
PAIN SCORE: 6
WHEN YOU AWAKENED IN THE MORNING OVER THE LAST WEEK, PLEASE INDICATE THE AMOUNT OF TIME IT TAKES UNTIL YOU ARE AS LIMBER AS YOU WILL BE FOR THE DAY: 1
FATIGUE SCORE: 4.5
AM STIFFNESS SCORE: 1, YES

## 2018-12-12 NOTE — ASSESSMENT & PLAN NOTE
Low platelets and hair thinning on mtx  No serious infections    Try increasing folic acid to 2 mg/d  Cont lab q3m; next in March

## 2018-12-12 NOTE — ASSESSMENT & PLAN NOTE
Has high disease activity in spite of anti-IL6, mtx, hcq, and prednisone  Has failed all other available agents so far  I will request authorization for Olumiant/baricitinib to see if she can afford it; if so will switch from Actemra to Olumiant  Otherwise cannot increase MTX due to thrombocytopenia, so will hold mtx, hcq, and pred at current dose    RTC 3 m with lab before

## 2018-12-12 NOTE — PROGRESS NOTES
Subjective:       Patient ID: Robert Smith is a 58 y.o. female.    Chief Complaint: Disease Management    Hx RA dx 2003; dx at [facility] ; started with hands and all over body  On prednisone 5 mg/d since 2003  Rx here 2007 Dr Max and Violettekem Rx MTX and Humira  MTX nausea with po and SQ  LFA tried 2007 by Dr Terry and she did not tolerate with nausea and hospitalization  2008 Dr Mann retried MTX; did not tolerate  2010-12 Dr Rosenbaum treated with Plaquenil(HCQ)  Humira quit working 2010 2010 Simponi tried - caused weak and shaky reaction  2011 Orencia tried - caused muscle aches, shakes and jaw pain  2011 Enbrel tried - did not work  Dr Paris [id number];  Had Rituxin 2016  Earlier 2017 took Xeljanz for a few months; had a lot of nausea and she felt arms and hands getting numb  SSZ July 2017 caused nausea and hepatitis       ENBREL retried  Aug 2017 - stopped Dec due to lack of effectiveness  Cimzia Jan 2018 caused urticarial rash    Now Actemra 8 mg/kg monthly IV since Mar 2018; last Nov 23   MTX 10 mg (Otrexup)  HC Q 400 mg  Prednisone 5 mg    Hx 2 knee replacements; 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   HENT: Positive for rhinorrhea. Negative for mouth sores and trouble swallowing.         Dry mouth   Eyes: Negative for redness.        Dry eyes   Respiratory: Positive for cough. Negative for shortness of breath.         Dry cough   Cardiovascular: Negative for chest pain.   Gastrointestinal: Negative for abdominal pain, constipation and diarrhea.   Skin: Negative for rash.   Neurological: Negative for headaches.   Hematological: Negative for adenopathy. Does not bruise/bleed easily.   Psychiatric/Behavioral: Negative for sleep  "disturbance.         Objective:   /76 (BP Location: Left arm, Patient Position: Sitting, BP Method: Large (Automatic))   Pulse 75   Ht 5' 1" (1.549 m)   Wt 95.1 kg (209 lb 10.5 oz)   BMI 39.61 kg/m²      Physical Exam   Constitutional: She is well-developed, well-nourished, and in no distress.   HENT:   Mouth/Throat: Oropharynx is clear and moist.   Eyes: Conjunctivae are normal.   Cardiovascular: Normal rate and regular rhythm.    Pulmonary/Chest: She has no wheezes. She has no rales.   Some diffuse crackles   Lymphadenopathy:     She has no cervical adenopathy.   Skin: No rash noted.          Physical Exam     Tenderness:   RUE: ulnohumeral and radiohumeral and wrist  Right hand: 1st MCP, 2nd MCP, 3rd MCP, 4th MCP, 5th MCP, 1st PIP, 2nd PIP, 3rd PIP, 4th PIP and 5th PIP  Left hand: 1st MCP  RLE: tibiofemoral and tibiotalar  LLE: tibiofemoral and tibiotalar  Right foot: 1st MTP, 2nd MTP, 3rd MTP, 4th MTP and 5th MTP  Left foot: 1st MTP, 2nd MTP, 3rd MTP, 4th MTP and 5th MTP    Swelling:   RUE: wrist  LUE: wrist  Right hand: 1st MCP, 2nd MCP, 3rd MCP, 4th MCP and 5th MCP  Left hand: 1st MCP, 2nd MCP, 3rd MCP and 5th MCP  RLE: tibiotalar  LLE: tibiotalar  Right foot: 1st MTP, 2nd MTP and 3rd MTP  Left foot: 1st MTP and 2nd MTP    HERNANDEZ-28 tender joint count: 15  HERNANDEZ-28 swollen joint count: 11  ESR (mm/hr): 11  Patient global assessment: 55  HERNANDEZ-28 score: 5.55 (High Disease Activity)    Lab Results   Component Value Date    SEDRATE 11 12/04/2018          Assessment:       1. Rheumatoid arthritis involving multiple sites with positive rheumatoid factor    2. Fibromyalgia    3. High risk medication use    4. Positive PPD            Plan:       Problem List Items Addressed This Visit        Active Problems    Fibromyalgia     Remarkably stable in spite of family illnesses  Cont Cymbalta         High risk medication use     Low platelets and hair thinning on mtx  No serious infections    Try increasing folic acid " to 2 mg/d  Cont lab q3m; next in March         Relevant Medications    folic acid (FOLVITE) 1 MG tablet    Other Relevant Orders    Lipid panel    Rheumatoid arthritis with positive rheumatoid factor - Primary     Has high disease activity in spite of anti-IL6, mtx, hcq, and prednisone  Has failed all other available agents so far  I will request authorization for Olumiant/baricitinib to see if she can afford it; if so will switch from Actemra to Olumiant  Otherwise cannot increase MTX due to thrombocytopenia, so will hold mtx, hcq, and pred at current dose    RTC 3 m with lab before             Resolved Problems    RESOLVED: Positive PPD

## 2018-12-21 ENCOUNTER — INFUSION (OUTPATIENT)
Dept: INFECTIOUS DISEASES | Facility: HOSPITAL | Age: 58
End: 2018-12-21
Attending: INTERNAL MEDICINE
Payer: MEDICARE

## 2018-12-21 VITALS
OXYGEN SATURATION: 95 % | WEIGHT: 205.13 LBS | SYSTOLIC BLOOD PRESSURE: 168 MMHG | DIASTOLIC BLOOD PRESSURE: 71 MMHG | HEART RATE: 70 BPM | TEMPERATURE: 98 F | BODY MASS INDEX: 38.73 KG/M2 | HEIGHT: 61 IN

## 2018-12-21 DIAGNOSIS — M05.79 RHEUMATOID ARTHRITIS INVOLVING MULTIPLE SITES WITH POSITIVE RHEUMATOID FACTOR: ICD-10-CM

## 2018-12-21 DIAGNOSIS — M05.79 RHEUMATOID ARTHRITIS INVOLVING MULTIPLE SITES WITH POSITIVE RHEUMATOID FACTOR: Primary | ICD-10-CM

## 2018-12-21 PROCEDURE — 96375 TX/PRO/DX INJ NEW DRUG ADDON: CPT

## 2018-12-21 PROCEDURE — 25000003 PHARM REV CODE 250: Performed by: INTERNAL MEDICINE

## 2018-12-21 PROCEDURE — 96413 CHEMO IV INFUSION 1 HR: CPT

## 2018-12-21 PROCEDURE — 63600175 PHARM REV CODE 636 W HCPCS: Performed by: INTERNAL MEDICINE

## 2018-12-21 RX ORDER — SODIUM CHLORIDE 0.9 % (FLUSH) 0.9 %
10 SYRINGE (ML) INJECTION
Status: DISCONTINUED | OUTPATIENT
Start: 2018-12-21 | End: 2018-12-21 | Stop reason: HOSPADM

## 2018-12-21 RX ORDER — DIPHENHYDRAMINE HYDROCHLORIDE 50 MG/ML
12.5 INJECTION INTRAMUSCULAR; INTRAVENOUS ONCE
Status: COMPLETED | OUTPATIENT
Start: 2018-12-21 | End: 2018-12-21

## 2018-12-21 RX ORDER — SODIUM CHLORIDE 0.9 % (FLUSH) 0.9 %
10 SYRINGE (ML) INJECTION
Status: CANCELLED | OUTPATIENT
Start: 2018-12-21

## 2018-12-21 RX ORDER — DIPHENHYDRAMINE HYDROCHLORIDE 50 MG/ML
12.5 INJECTION INTRAMUSCULAR; INTRAVENOUS ONCE
Status: CANCELLED | OUTPATIENT
Start: 2018-12-21 | End: 2018-12-21

## 2018-12-21 RX ORDER — HEPARIN 100 UNIT/ML
500 SYRINGE INTRAVENOUS
Status: CANCELLED | OUTPATIENT
Start: 2018-12-21

## 2018-12-21 RX ORDER — HEPARIN 100 UNIT/ML
500 SYRINGE INTRAVENOUS
Status: DISCONTINUED | OUTPATIENT
Start: 2018-12-21 | End: 2018-12-21 | Stop reason: HOSPADM

## 2018-12-21 RX ORDER — METHYLPREDNISOLONE SOD SUCC 125 MG
80 VIAL (EA) INJECTION
Status: DISCONTINUED | OUTPATIENT
Start: 2018-12-21 | End: 2018-12-21 | Stop reason: HOSPADM

## 2018-12-21 RX ADMIN — TOCILIZUMAB 744 MG: 20 INJECTION, SOLUTION, CONCENTRATE INTRAVENOUS at 10:12

## 2018-12-21 RX ADMIN — METHYLPREDNISOLONE SODIUM SUCCINATE 80 MG: 125 INJECTION, POWDER, FOR SOLUTION INTRAMUSCULAR; INTRAVENOUS at 09:12

## 2018-12-21 RX ADMIN — DIPHENHYDRAMINE HYDROCHLORIDE 12.5 MG: 50 INJECTION, SOLUTION INTRAMUSCULAR; INTRAVENOUS at 09:12

## 2018-12-21 NOTE — PLAN OF CARE
Problem: Adult Inpatient Plan of Care  Goal: Plan of Care Review  Outcome: Ongoing (interventions implemented as appropriate)  Pt educated on hand washing and infection control.  Pt verbalized understanding.

## 2018-12-24 ENCOUNTER — TELEPHONE (OUTPATIENT)
Dept: PHARMACY | Facility: CLINIC | Age: 58
End: 2018-12-24

## 2019-01-21 ENCOUNTER — INFUSION (OUTPATIENT)
Dept: INFECTIOUS DISEASES | Facility: HOSPITAL | Age: 59
End: 2019-01-21
Attending: INTERNAL MEDICINE
Payer: MEDICARE

## 2019-01-21 VITALS
RESPIRATION RATE: 18 BRPM | TEMPERATURE: 98 F | SYSTOLIC BLOOD PRESSURE: 143 MMHG | DIASTOLIC BLOOD PRESSURE: 70 MMHG | WEIGHT: 203.06 LBS | OXYGEN SATURATION: 95 % | BODY MASS INDEX: 38.36 KG/M2 | HEART RATE: 75 BPM

## 2019-01-21 DIAGNOSIS — M05.79 RHEUMATOID ARTHRITIS INVOLVING MULTIPLE SITES WITH POSITIVE RHEUMATOID FACTOR: Primary | ICD-10-CM

## 2019-01-21 PROCEDURE — 96365 THER/PROPH/DIAG IV INF INIT: CPT

## 2019-01-21 PROCEDURE — 96374 THER/PROPH/DIAG INJ IV PUSH: CPT

## 2019-01-21 PROCEDURE — 63600175 PHARM REV CODE 636 W HCPCS: Mod: JG | Performed by: INTERNAL MEDICINE

## 2019-01-21 PROCEDURE — 96375 TX/PRO/DX INJ NEW DRUG ADDON: CPT

## 2019-01-21 PROCEDURE — 25000003 PHARM REV CODE 250: Performed by: INTERNAL MEDICINE

## 2019-01-21 RX ORDER — HEPARIN 100 UNIT/ML
500 SYRINGE INTRAVENOUS
Status: CANCELLED | OUTPATIENT
Start: 2019-01-21

## 2019-01-21 RX ORDER — DIPHENHYDRAMINE HYDROCHLORIDE 50 MG/ML
12.5 INJECTION INTRAMUSCULAR; INTRAVENOUS ONCE
Status: COMPLETED | OUTPATIENT
Start: 2019-01-21 | End: 2019-01-21

## 2019-01-21 RX ORDER — METHYLPREDNISOLONE SOD SUCC 125 MG
80 VIAL (EA) INJECTION
Status: DISCONTINUED | OUTPATIENT
Start: 2019-01-21 | End: 2019-01-21 | Stop reason: HOSPADM

## 2019-01-21 RX ORDER — SODIUM CHLORIDE 0.9 % (FLUSH) 0.9 %
10 SYRINGE (ML) INJECTION
Status: CANCELLED | OUTPATIENT
Start: 2019-01-21

## 2019-01-21 RX ORDER — DIPHENHYDRAMINE HYDROCHLORIDE 50 MG/ML
12.5 INJECTION INTRAMUSCULAR; INTRAVENOUS ONCE
Status: CANCELLED | OUTPATIENT
Start: 2019-01-21 | End: 2019-01-21

## 2019-01-21 RX ADMIN — TOCILIZUMAB 736 MG: 20 INJECTION, SOLUTION, CONCENTRATE INTRAVENOUS at 10:01

## 2019-01-21 RX ADMIN — METHYLPREDNISOLONE SODIUM SUCCINATE 80 MG: 125 INJECTION, POWDER, FOR SOLUTION INTRAMUSCULAR; INTRAVENOUS at 10:01

## 2019-01-21 RX ADMIN — DIPHENHYDRAMINE HYDROCHLORIDE 12.5 MG: 50 INJECTION INTRAMUSCULAR; INTRAVENOUS at 10:01

## 2019-01-24 ENCOUNTER — TELEPHONE (OUTPATIENT)
Dept: PHARMACY | Facility: CLINIC | Age: 59
End: 2019-01-24

## 2019-02-18 ENCOUNTER — INFUSION (OUTPATIENT)
Dept: INFECTIOUS DISEASES | Facility: HOSPITAL | Age: 59
End: 2019-02-18
Attending: INTERNAL MEDICINE
Payer: MEDICARE

## 2019-02-18 VITALS
RESPIRATION RATE: 17 BRPM | TEMPERATURE: 98 F | HEIGHT: 61 IN | HEART RATE: 70 BPM | BODY MASS INDEX: 38.04 KG/M2 | DIASTOLIC BLOOD PRESSURE: 60 MMHG | SYSTOLIC BLOOD PRESSURE: 119 MMHG | WEIGHT: 201.5 LBS | OXYGEN SATURATION: 98 %

## 2019-02-18 DIAGNOSIS — M05.79 RHEUMATOID ARTHRITIS INVOLVING MULTIPLE SITES WITH POSITIVE RHEUMATOID FACTOR: Primary | ICD-10-CM

## 2019-02-18 PROCEDURE — 96375 TX/PRO/DX INJ NEW DRUG ADDON: CPT

## 2019-02-18 PROCEDURE — 96374 THER/PROPH/DIAG INJ IV PUSH: CPT

## 2019-02-18 PROCEDURE — 25000200 PHARM REV CODE 250 W HCPCS: Performed by: INTERNAL MEDICINE

## 2019-02-18 PROCEDURE — 96365 THER/PROPH/DIAG IV INF INIT: CPT

## 2019-02-18 PROCEDURE — 63600175 PHARM REV CODE 636 W HCPCS: Mod: JG | Performed by: INTERNAL MEDICINE

## 2019-02-18 PROCEDURE — 25000003 PHARM REV CODE 250: Performed by: INTERNAL MEDICINE

## 2019-02-18 RX ORDER — DIPHENHYDRAMINE HYDROCHLORIDE 50 MG/ML
12.5 INJECTION INTRAMUSCULAR; INTRAVENOUS ONCE
Status: CANCELLED | OUTPATIENT
Start: 2019-02-18 | End: 2019-02-18

## 2019-02-18 RX ORDER — HEPARIN 100 UNIT/ML
500 SYRINGE INTRAVENOUS
Status: CANCELLED | OUTPATIENT
Start: 2019-02-18

## 2019-02-18 RX ORDER — DIPHENHYDRAMINE HYDROCHLORIDE 50 MG/ML
12.5 INJECTION INTRAMUSCULAR; INTRAVENOUS ONCE
Status: COMPLETED | OUTPATIENT
Start: 2019-02-18 | End: 2019-02-18

## 2019-02-18 RX ORDER — SODIUM CHLORIDE 0.9 % (FLUSH) 0.9 %
10 SYRINGE (ML) INJECTION
Status: CANCELLED | OUTPATIENT
Start: 2019-02-18

## 2019-02-18 RX ORDER — METHYLPREDNISOLONE SODIUM SUCCINATE 125 MG/2ML
80 INJECTION INTRAMUSCULAR; INTRAVENOUS
Status: DISCONTINUED | OUTPATIENT
Start: 2019-02-18 | End: 2019-02-18 | Stop reason: HOSPADM

## 2019-02-18 RX ADMIN — TOCILIZUMAB 732 MG: 20 INJECTION, SOLUTION, CONCENTRATE INTRAVENOUS at 10:02

## 2019-02-18 RX ADMIN — METHYLPREDNISOLONE SODIUM SUCCINATE 80 MG: 125 INJECTION, POWDER, FOR SOLUTION INTRAMUSCULAR; INTRAVENOUS at 10:02

## 2019-02-18 RX ADMIN — DIPHENHYDRAMINE HYDROCHLORIDE 12.5 MG: 50 INJECTION INTRAMUSCULAR; INTRAVENOUS at 10:02

## 2019-03-12 ENCOUNTER — TELEPHONE (OUTPATIENT)
Dept: PHARMACY | Facility: CLINIC | Age: 59
End: 2019-03-12

## 2019-03-13 ENCOUNTER — LAB VISIT (OUTPATIENT)
Dept: LAB | Facility: HOSPITAL | Age: 59
End: 2019-03-13
Attending: INTERNAL MEDICINE
Payer: MEDICARE

## 2019-03-13 DIAGNOSIS — M05.79 RHEUMATOID ARTHRITIS INVOLVING MULTIPLE SITES WITH POSITIVE RHEUMATOID FACTOR: ICD-10-CM

## 2019-03-13 DIAGNOSIS — Z79.899 HIGH RISK MEDICATION USE: ICD-10-CM

## 2019-03-13 LAB
ALBUMIN SERPL BCP-MCNC: 3.1 G/DL
ALP SERPL-CCNC: 79 U/L
ALT SERPL W/O P-5'-P-CCNC: 24 U/L
ANION GAP SERPL CALC-SCNC: 4 MMOL/L
AST SERPL-CCNC: 20 U/L
BASOPHILS # BLD AUTO: 0.01 K/UL
BASOPHILS NFR BLD: 0.1 %
BILIRUB SERPL-MCNC: 0.8 MG/DL
BUN SERPL-MCNC: 18 MG/DL
CALCIUM SERPL-MCNC: 10.2 MG/DL
CHLORIDE SERPL-SCNC: 105 MMOL/L
CHOLEST SERPL-MCNC: 221 MG/DL
CHOLEST/HDLC SERPL: 4.3 {RATIO}
CO2 SERPL-SCNC: 32 MMOL/L
CREAT SERPL-MCNC: 0.8 MG/DL
CRP SERPL-MCNC: 2.1 MG/L
DIFFERENTIAL METHOD: ABNORMAL
EOSINOPHIL # BLD AUTO: 0.5 K/UL
EOSINOPHIL NFR BLD: 6 %
ERYTHROCYTE [DISTWIDTH] IN BLOOD BY AUTOMATED COUNT: 13 %
ERYTHROCYTE [SEDIMENTATION RATE] IN BLOOD BY WESTERGREN METHOD: 3 MM/HR
EST. GFR  (AFRICAN AMERICAN): >60 ML/MIN/1.73 M^2
EST. GFR  (NON AFRICAN AMERICAN): >60 ML/MIN/1.73 M^2
GLUCOSE SERPL-MCNC: 80 MG/DL
HCT VFR BLD AUTO: 44.5 %
HDLC SERPL-MCNC: 52 MG/DL
HDLC SERPL: 23.5 %
HGB BLD-MCNC: 13.5 G/DL
IMM GRANULOCYTES # BLD AUTO: 0.03 K/UL
IMM GRANULOCYTES NFR BLD AUTO: 0.3 %
LDLC SERPL CALC-MCNC: 137.6 MG/DL
LYMPHOCYTES # BLD AUTO: 1.2 K/UL
LYMPHOCYTES NFR BLD: 13.8 %
MCH RBC QN AUTO: 31.4 PG
MCHC RBC AUTO-ENTMCNC: 30.3 G/DL
MCV RBC AUTO: 104 FL
MONOCYTES # BLD AUTO: 0.2 K/UL
MONOCYTES NFR BLD: 2.2 %
NEUTROPHILS # BLD AUTO: 6.7 K/UL
NEUTROPHILS NFR BLD: 77.6 %
NONHDLC SERPL-MCNC: 169 MG/DL
NRBC BLD-RTO: 0 /100 WBC
PLATELET # BLD AUTO: 129 K/UL
PMV BLD AUTO: 11.6 FL
POTASSIUM SERPL-SCNC: 4.8 MMOL/L
PROT SERPL-MCNC: 5.7 G/DL
RBC # BLD AUTO: 4.3 M/UL
SODIUM SERPL-SCNC: 141 MMOL/L
TRIGL SERPL-MCNC: 157 MG/DL
WBC # BLD AUTO: 8.6 K/UL

## 2019-03-13 PROCEDURE — 80053 COMPREHEN METABOLIC PANEL: CPT

## 2019-03-13 PROCEDURE — 85025 COMPLETE CBC W/AUTO DIFF WBC: CPT

## 2019-03-13 PROCEDURE — 85652 RBC SED RATE AUTOMATED: CPT

## 2019-03-13 PROCEDURE — 86140 C-REACTIVE PROTEIN: CPT

## 2019-03-13 PROCEDURE — 36415 COLL VENOUS BLD VENIPUNCTURE: CPT | Mod: PO

## 2019-03-13 PROCEDURE — 80061 LIPID PANEL: CPT

## 2019-03-14 DIAGNOSIS — M05.79 RHEUMATOID ARTHRITIS INVOLVING MULTIPLE SITES WITH POSITIVE RHEUMATOID FACTOR: ICD-10-CM

## 2019-03-15 RX ORDER — HYDROXYCHLOROQUINE SULFATE 200 MG/1
TABLET, FILM COATED ORAL
Qty: 60 TABLET | Refills: 2 | Status: SHIPPED | OUTPATIENT
Start: 2019-03-15 | End: 2019-03-25 | Stop reason: ALTCHOICE

## 2019-03-20 ENCOUNTER — INFUSION (OUTPATIENT)
Dept: INFECTIOUS DISEASES | Facility: HOSPITAL | Age: 59
End: 2019-03-20
Attending: INTERNAL MEDICINE
Payer: MEDICARE

## 2019-03-20 VITALS
TEMPERATURE: 98 F | HEART RATE: 66 BPM | WEIGHT: 207.56 LBS | BODY MASS INDEX: 39.19 KG/M2 | HEIGHT: 61 IN | OXYGEN SATURATION: 98 %

## 2019-03-20 DIAGNOSIS — M05.79 RHEUMATOID ARTHRITIS INVOLVING MULTIPLE SITES WITH POSITIVE RHEUMATOID FACTOR: Primary | ICD-10-CM

## 2019-03-20 PROCEDURE — 25000003 PHARM REV CODE 250: Performed by: INTERNAL MEDICINE

## 2019-03-20 PROCEDURE — 25000200 PHARM REV CODE 250 W HCPCS: Performed by: INTERNAL MEDICINE

## 2019-03-20 PROCEDURE — 63600175 PHARM REV CODE 636 W HCPCS: Mod: JG | Performed by: INTERNAL MEDICINE

## 2019-03-20 PROCEDURE — 96375 TX/PRO/DX INJ NEW DRUG ADDON: CPT

## 2019-03-20 PROCEDURE — 96365 THER/PROPH/DIAG IV INF INIT: CPT

## 2019-03-20 RX ORDER — DIPHENHYDRAMINE HYDROCHLORIDE 50 MG/ML
12.5 INJECTION INTRAMUSCULAR; INTRAVENOUS ONCE
Status: CANCELLED | OUTPATIENT
Start: 2019-03-20 | End: 2019-03-20

## 2019-03-20 RX ORDER — DIPHENHYDRAMINE HYDROCHLORIDE 50 MG/ML
12.5 INJECTION INTRAMUSCULAR; INTRAVENOUS ONCE
Status: COMPLETED | OUTPATIENT
Start: 2019-03-20 | End: 2019-03-20

## 2019-03-20 RX ORDER — SODIUM CHLORIDE 0.9 % (FLUSH) 0.9 %
10 SYRINGE (ML) INJECTION
Status: CANCELLED | OUTPATIENT
Start: 2019-03-20

## 2019-03-20 RX ORDER — METHYLPREDNISOLONE SODIUM SUCCINATE 125 MG/2ML
80 INJECTION INTRAMUSCULAR; INTRAVENOUS
Status: DISCONTINUED | OUTPATIENT
Start: 2019-03-20 | End: 2019-03-20 | Stop reason: HOSPADM

## 2019-03-20 RX ORDER — HEPARIN 100 UNIT/ML
500 SYRINGE INTRAVENOUS
Status: CANCELLED | OUTPATIENT
Start: 2019-03-20

## 2019-03-20 RX ADMIN — TOCILIZUMAB 754 MG: 20 INJECTION, SOLUTION, CONCENTRATE INTRAVENOUS at 11:03

## 2019-03-20 RX ADMIN — METHYLPREDNISOLONE SODIUM SUCCINATE 80 MG: 125 INJECTION, POWDER, FOR SOLUTION INTRAMUSCULAR; INTRAVENOUS at 10:03

## 2019-03-20 RX ADMIN — DIPHENHYDRAMINE HYDROCHLORIDE 12.5 MG: 50 INJECTION INTRAMUSCULAR; INTRAVENOUS at 10:03

## 2019-03-20 NOTE — PLAN OF CARE
Problem: Adult Inpatient Plan of Care  Goal: Plan of Care Review  Pt educated on hand washing and infection control.  Pt verbalized understanding.

## 2019-03-25 ENCOUNTER — OFFICE VISIT (OUTPATIENT)
Dept: RHEUMATOLOGY | Facility: CLINIC | Age: 59
End: 2019-03-25
Payer: MEDICARE

## 2019-03-25 VITALS — WEIGHT: 218.06 LBS | BODY MASS INDEX: 41.2 KG/M2

## 2019-03-25 DIAGNOSIS — R11.0 CHEMOTHERAPY-INDUCED NAUSEA: ICD-10-CM

## 2019-03-25 DIAGNOSIS — Z79.899 HIGH RISK MEDICATION USE: ICD-10-CM

## 2019-03-25 DIAGNOSIS — T45.1X5A CHEMOTHERAPY-INDUCED NAUSEA: ICD-10-CM

## 2019-03-25 DIAGNOSIS — M79.7 FIBROMYALGIA: ICD-10-CM

## 2019-03-25 DIAGNOSIS — M05.79 RHEUMATOID ARTHRITIS INVOLVING MULTIPLE SITES WITH POSITIVE RHEUMATOID FACTOR: Primary | ICD-10-CM

## 2019-03-25 PROBLEM — I82.4Y3 DEEP VEIN THROMBOSIS (DVT) OF PROXIMAL VEIN OF BOTH LOWER EXTREMITIES: Status: ACTIVE | Noted: 2018-11-06

## 2019-03-25 PROCEDURE — 3008F BODY MASS INDEX DOCD: CPT | Mod: CPTII,S$GLB,, | Performed by: INTERNAL MEDICINE

## 2019-03-25 PROCEDURE — 99214 PR OFFICE/OUTPT VISIT, EST, LEVL IV, 30-39 MIN: ICD-10-PCS | Mod: S$GLB,,, | Performed by: INTERNAL MEDICINE

## 2019-03-25 PROCEDURE — 99999 PR PBB SHADOW E&M-EST. PATIENT-LVL II: ICD-10-PCS | Mod: PBBFAC,,, | Performed by: INTERNAL MEDICINE

## 2019-03-25 PROCEDURE — 99214 OFFICE O/P EST MOD 30 MIN: CPT | Mod: S$GLB,,, | Performed by: INTERNAL MEDICINE

## 2019-03-25 PROCEDURE — 3008F PR BODY MASS INDEX (BMI) DOCUMENTED: ICD-10-PCS | Mod: CPTII,S$GLB,, | Performed by: INTERNAL MEDICINE

## 2019-03-25 PROCEDURE — 99999 PR PBB SHADOW E&M-EST. PATIENT-LVL II: CPT | Mod: PBBFAC,,, | Performed by: INTERNAL MEDICINE

## 2019-03-25 RX ORDER — ONDANSETRON HYDROCHLORIDE 8 MG/1
8 TABLET, FILM COATED ORAL EVERY 12 HOURS PRN
Qty: 8 TABLET | Refills: 5 | Status: SHIPPED | OUTPATIENT
Start: 2019-03-25 | End: 2019-04-22

## 2019-03-25 ASSESSMENT — ROUTINE ASSESSMENT OF PATIENT INDEX DATA (RAPID3)
FATIGUE SCORE: 5.5
PATIENT GLOBAL ASSESSMENT SCORE: 6.5
PAIN SCORE: 10
AM STIFFNESS SCORE: 1, YES
MDHAQ FUNCTION SCORE: 1.3
PSYCHOLOGICAL DISTRESS SCORE: 5.5
TOTAL RAPID3 SCORE: 6.94

## 2019-03-25 NOTE — ASSESSMENT & PLAN NOTE
Moderate disease activity on current regimen  Will increase MTX to 12.5 (dose limited by nausea)  Will cont Actemra  Plan repeat lab in 3 mo and RTC after

## 2019-03-25 NOTE — PROGRESS NOTES
Subjective:       Patient ID: Robert Smith is a 58 y.o. female.    Chief Complaint: Follow-up and Disease Management    Hx RA dx 2003; dx at [facility] ; started with hands and all over body  On prednisone 5 mg/d since 2003  Rx here 2007 Dr Max and Chung Rx MTX and Humira  MTX nausea with po and SQ  LFA tried 2007 by Dr Terry and she did not tolerate with nausea and hospitalization  2008 Dr Mann retried MTX; did not tolerate  2010-12 Dr Rosenbaum treated with Plaquenil(HCQ)  Humira quit working 2010 2010 Simponi tried - caused weak and shaky reaction  2011 Orencia tried - caused muscle aches, shakes and jaw pain  2011 Enbrel tried - did not work  Dr Paris [id number];  Had Rituxin 2016  Earlier 2017 took Xeljanz for a few months; had a lot of nausea and she felt arms and hands getting numb  SSZ July 2017 caused nausea and hepatitis       ENBREL retried  Aug 2017 - stopped Dec due to lack of effectiveness  Cimzia Jan 2018 caused urticarial rash     Current:  Now Actemra 8 mg/kg monthly IV since Mar 2018  MTX 10 mg (Otrexup)   mg  Prednisone 5 mg     Hx 2 knee replacements; 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 and had to be redone     DXA scan shows low bone mass     Has felt worse since last infusion of Actemra March 20   Now Has had more swelling and pain in hands and feet    RLE pain last week for which went to ER; pain post and lateral hip to below knee; XR and CT of hip ok and they gave her 2 shots one of morphine and one of dilaudid    Upper and lower back pain  Saw Dr Mcghee, Pain management imaged back and said something in mid back and has not gotten back to her with f/u appt    C/o more memory difficulties  takes muscle relaxer at night to help relieve tightness in muscles L abd pain  Stopped Cymbalta abruptly 1 month     Swelling in legs and hands  Fell last month; tripped on a rock and fell backwards; sore for a few days    Review of Systems   Constitutional: Positive for fatigue. Negative for fever and unexpected weight  change.   HENT: Negative for mouth sores and trouble swallowing.         Dry mouth   Eyes: Negative for redness.        Dry eyes   Respiratory: Negative for cough and shortness of breath.    Cardiovascular: Negative for chest pain.   Gastrointestinal: Negative for abdominal pain, constipation and diarrhea.   Skin: Negative for rash.   Neurological: Positive for headaches.   Hematological: Negative for adenopathy. Does not bruise/bleed easily.   Psychiatric/Behavioral: Negative for sleep disturbance.           Objective:   Wt 98.9 kg (218 lb 0.6 oz)   BMI 41.20 kg/m²      Physical Exam     Physical Exam     Tenderness:   Right hand: 1st MCP, 2nd MCP, 3rd MCP, 4th MCP, 1st PIP and 3rd PIP  Left hand: 2nd MCP and 3rd MCP  RLE: tibiofemoral  LLE: tibiofemoral    Swelling:   Right hand: 1st MCP, 2nd MCP, 3rd MCP, 4th MCP, 1st PIP and 3rd PIP  Left hand: 2nd MCP and 3rd MCP    HERNANDEZ-28 tender joint count: 10  HERNANDEZ-28 swollen joint count: 8  ESR (mm/hr): 3  Patient global assessment: 65  HERNANDEZ-28 score: 4.24 (Moderate Disease Activity)    Lab Results   Component Value Date    SEDRATE 3 03/13/2019      Assessment:       1. Rheumatoid arthritis involving multiple sites with positive rheumatoid factor    2. Chemotherapy-induced nausea    3. High risk medication use    4. Fibromyalgia          Plan:       Problem List Items Addressed This Visit        Active Problems    Rheumatoid arthritis with positive rheumatoid factor - Primary     Moderate disease activity on current regimen  Will increase MTX to 12.5 (dose limited by nausea)  Will cont Actemra  Plan repeat lab in 3 mo and RTC after         Relevant Medications    methotrexate, PF, (OTREXUP, PF,) 12.5 mg/0.4 mL AtIn    High risk medication use     No interval infections         Fibromyalgia     Is worse without cymbalta and I advised her to restart that     She will f/u with pain mgt re: pain meds           Other Visit Diagnoses     Chemotherapy-induced nausea        Relevant  Medications    ondansetron (ZOFRAN) 8 MG tablet

## 2019-03-25 NOTE — ASSESSMENT & PLAN NOTE
Is worse without cymbalta and I advised her to restart that     She will f/u with pain mgt re: pain meds

## 2019-03-26 ENCOUNTER — TELEPHONE (OUTPATIENT)
Dept: RHEUMATOLOGY | Facility: CLINIC | Age: 59
End: 2019-03-26

## 2019-03-26 NOTE — TELEPHONE ENCOUNTER
----- Message from Katie Muniz sent at 3/26/2019  3:40 PM CDT -----  Contact: Self/ [phone]  Patient would like a call back to speak with a nurse about her medications.

## 2019-03-26 NOTE — TELEPHONE ENCOUNTER
Patient called and as per Dr. Rodriguez told to begin cymbalta 30mg/day x 2 weeks, and then increase to 60mg/day.  Patient verbalizes understanding.

## 2019-04-01 ENCOUNTER — TELEPHONE (OUTPATIENT)
Dept: RHEUMATOLOGY | Facility: CLINIC | Age: 59
End: 2019-04-01

## 2019-04-01 NOTE — TELEPHONE ENCOUNTER
----- Message from Katie Muniz sent at 4/1/2019  8:47 AM CDT -----  Contact: Self/ [phone]  Patient would like a call back to speak with a nurse about her medication that is making her feel sick.

## 2019-04-01 NOTE — TELEPHONE ENCOUNTER
Patient states she has been taking the cymbalta 30mg/day and it makes her nauseated.  She has been taking the nausea medication, but still feels nauseated as to where she cannot eat.  She also states, now she remembers that is the reason she stopped this medication. Please call her to discuss.

## 2019-04-01 NOTE — TELEPHONE ENCOUNTER
Called patient, and told as per Dr. Rodriguez he would like patient to stay on medication x 1 month, if she can tolerate the nausea.  Patient verbalizes understanding, stating, she will try to tolerate medication.

## 2019-04-02 ENCOUNTER — TELEPHONE (OUTPATIENT)
Dept: PHARMACY | Facility: CLINIC | Age: 59
End: 2019-04-02

## 2019-04-02 NOTE — TELEPHONE ENCOUNTER
Pt confirmed shipping of Otrexup (dose increase) on 4/3 to arrive on . Pt requested new sharps. Address and  verified. $3.80 copay in [facility]. Pt has 1 doses remaining for . Pt reported no missed doses. Pt did not start any new medications. Pt had no further questions.

## 2019-04-04 DIAGNOSIS — L29.9 ITCH: ICD-10-CM

## 2019-04-04 RX ORDER — HYDROXYZINE HYDROCHLORIDE 25 MG/1
TABLET, FILM COATED ORAL
Qty: 60 TABLET | Refills: 2 | Status: SHIPPED | OUTPATIENT
Start: 2019-04-04 | End: 2020-05-04

## 2019-04-18 ENCOUNTER — INFUSION (OUTPATIENT)
Dept: INFECTIOUS DISEASES | Facility: HOSPITAL | Age: 59
End: 2019-04-18
Attending: INTERNAL MEDICINE
Payer: MEDICARE

## 2019-04-18 VITALS
RESPIRATION RATE: 18 BRPM | SYSTOLIC BLOOD PRESSURE: 135 MMHG | OXYGEN SATURATION: 98 % | TEMPERATURE: 98 F | HEART RATE: 79 BPM | BODY MASS INDEX: 38.32 KG/M2 | WEIGHT: 202.81 LBS | DIASTOLIC BLOOD PRESSURE: 64 MMHG

## 2019-04-18 DIAGNOSIS — M05.79 RHEUMATOID ARTHRITIS INVOLVING MULTIPLE SITES WITH POSITIVE RHEUMATOID FACTOR: Primary | ICD-10-CM

## 2019-04-18 PROCEDURE — 25000003 PHARM REV CODE 250: Performed by: INTERNAL MEDICINE

## 2019-04-18 PROCEDURE — 96365 THER/PROPH/DIAG IV INF INIT: CPT

## 2019-04-18 PROCEDURE — 96375 TX/PRO/DX INJ NEW DRUG ADDON: CPT

## 2019-04-18 PROCEDURE — 63600175 PHARM REV CODE 636 W HCPCS: Performed by: INTERNAL MEDICINE

## 2019-04-18 RX ORDER — HEPARIN 100 UNIT/ML
500 SYRINGE INTRAVENOUS
Status: CANCELLED | OUTPATIENT
Start: 2019-05-16

## 2019-04-18 RX ORDER — DIPHENHYDRAMINE HYDROCHLORIDE 50 MG/ML
12.5 INJECTION INTRAMUSCULAR; INTRAVENOUS ONCE
Status: CANCELLED | OUTPATIENT
Start: 2019-05-16

## 2019-04-18 RX ORDER — SODIUM CHLORIDE 0.9 % (FLUSH) 0.9 %
10 SYRINGE (ML) INJECTION
Status: CANCELLED | OUTPATIENT
Start: 2019-05-16

## 2019-04-18 RX ORDER — DIPHENHYDRAMINE HYDROCHLORIDE 50 MG/ML
12.5 INJECTION INTRAMUSCULAR; INTRAVENOUS ONCE
Status: COMPLETED | OUTPATIENT
Start: 2019-04-18 | End: 2019-04-18

## 2019-04-18 RX ADMIN — DIPHENHYDRAMINE HYDROCHLORIDE 12.5 MG: 50 INJECTION INTRAMUSCULAR; INTRAVENOUS at 09:04

## 2019-04-18 RX ADMIN — TOCILIZUMAB 736 MG: 20 INJECTION, SOLUTION, CONCENTRATE INTRAVENOUS at 10:04

## 2019-04-25 ENCOUNTER — TELEPHONE (OUTPATIENT)
Dept: PHARMACY | Facility: CLINIC | Age: 59
End: 2019-04-25

## 2019-05-08 ENCOUNTER — TELEPHONE (OUTPATIENT)
Dept: RHEUMATOLOGY | Facility: CLINIC | Age: 59
End: 2019-05-08

## 2019-05-08 DIAGNOSIS — Z79.899 HIGH RISK MEDICATION USE: Primary | ICD-10-CM

## 2019-05-08 NOTE — TELEPHONE ENCOUNTER
----- Message from Macrina Mata RN sent at 5/7/2019  4:44 PM CDT -----  Please put lab orders in epic

## 2019-05-16 ENCOUNTER — INFUSION (OUTPATIENT)
Dept: INFECTIOUS DISEASES | Facility: HOSPITAL | Age: 59
End: 2019-05-16
Attending: INTERNAL MEDICINE
Payer: MEDICARE

## 2019-05-16 VITALS
RESPIRATION RATE: 16 BRPM | HEART RATE: 73 BPM | WEIGHT: 208 LBS | DIASTOLIC BLOOD PRESSURE: 79 MMHG | OXYGEN SATURATION: 98 % | TEMPERATURE: 98 F | BODY MASS INDEX: 39.3 KG/M2 | SYSTOLIC BLOOD PRESSURE: 169 MMHG

## 2019-05-16 DIAGNOSIS — M05.79 RHEUMATOID ARTHRITIS INVOLVING MULTIPLE SITES WITH POSITIVE RHEUMATOID FACTOR: Primary | ICD-10-CM

## 2019-05-16 PROCEDURE — 63600175 PHARM REV CODE 636 W HCPCS: Performed by: INTERNAL MEDICINE

## 2019-05-16 PROCEDURE — 25000003 PHARM REV CODE 250: Performed by: INTERNAL MEDICINE

## 2019-05-16 PROCEDURE — 96365 THER/PROPH/DIAG IV INF INIT: CPT

## 2019-05-16 PROCEDURE — 96375 TX/PRO/DX INJ NEW DRUG ADDON: CPT

## 2019-05-16 RX ORDER — DIPHENHYDRAMINE HYDROCHLORIDE 50 MG/ML
12.5 INJECTION INTRAMUSCULAR; INTRAVENOUS ONCE
Status: CANCELLED | OUTPATIENT
Start: 2019-06-13

## 2019-05-16 RX ORDER — HEPARIN 100 UNIT/ML
500 SYRINGE INTRAVENOUS
Status: CANCELLED | OUTPATIENT
Start: 2019-06-13

## 2019-05-16 RX ORDER — SODIUM CHLORIDE 0.9 % (FLUSH) 0.9 %
10 SYRINGE (ML) INJECTION
Status: CANCELLED | OUTPATIENT
Start: 2019-06-13

## 2019-05-16 RX ORDER — DIPHENHYDRAMINE HYDROCHLORIDE 50 MG/ML
12.5 INJECTION INTRAMUSCULAR; INTRAVENOUS ONCE
Status: COMPLETED | OUTPATIENT
Start: 2019-05-16 | End: 2019-05-16

## 2019-05-16 RX ADMIN — TOCILIZUMAB 756 MG: 20 INJECTION, SOLUTION, CONCENTRATE INTRAVENOUS at 09:05

## 2019-05-16 RX ADMIN — DIPHENHYDRAMINE HYDROCHLORIDE 12.5 MG: 50 INJECTION INTRAMUSCULAR; INTRAVENOUS at 09:05

## 2019-05-24 ENCOUNTER — TELEPHONE (OUTPATIENT)
Dept: PHARMACY | Facility: CLINIC | Age: 59
End: 2019-05-24

## 2019-06-06 ENCOUNTER — TELEPHONE (OUTPATIENT)
Dept: INFECTIOUS DISEASES | Facility: HOSPITAL | Age: 59
End: 2019-06-06

## 2019-06-10 ENCOUNTER — LAB VISIT (OUTPATIENT)
Dept: LAB | Facility: HOSPITAL | Age: 59
End: 2019-06-10
Attending: INTERNAL MEDICINE
Payer: MEDICARE

## 2019-06-10 DIAGNOSIS — Z79.899 HIGH RISK MEDICATION USE: ICD-10-CM

## 2019-06-10 LAB
ALBUMIN SERPL BCP-MCNC: 3.1 G/DL (ref 3.5–5.2)
ALP SERPL-CCNC: 67 U/L (ref 55–135)
ALT SERPL W/O P-5'-P-CCNC: 13 U/L (ref 10–44)
ANION GAP SERPL CALC-SCNC: 6 MMOL/L (ref 8–16)
AST SERPL-CCNC: 17 U/L (ref 10–40)
BASOPHILS # BLD AUTO: 0.01 K/UL (ref 0–0.2)
BASOPHILS NFR BLD: 0.2 % (ref 0–1.9)
BILIRUB SERPL-MCNC: 0.8 MG/DL (ref 0.1–1)
BUN SERPL-MCNC: 24 MG/DL (ref 6–20)
CALCIUM SERPL-MCNC: 10.3 MG/DL (ref 8.7–10.5)
CHLORIDE SERPL-SCNC: 108 MMOL/L (ref 95–110)
CHOLEST SERPL-MCNC: 175 MG/DL (ref 120–199)
CHOLEST/HDLC SERPL: 3.5 {RATIO} (ref 2–5)
CO2 SERPL-SCNC: 31 MMOL/L (ref 23–29)
CREAT SERPL-MCNC: 0.7 MG/DL (ref 0.5–1.4)
CRP SERPL-MCNC: 4.5 MG/L (ref 0–8.2)
DIFFERENTIAL METHOD: ABNORMAL
EOSINOPHIL # BLD AUTO: 0.5 K/UL (ref 0–0.5)
EOSINOPHIL NFR BLD: 8.9 % (ref 0–8)
ERYTHROCYTE [DISTWIDTH] IN BLOOD BY AUTOMATED COUNT: 12.6 % (ref 11.5–14.5)
ERYTHROCYTE [SEDIMENTATION RATE] IN BLOOD BY WESTERGREN METHOD: 3 MM/HR (ref 0–36)
EST. GFR  (AFRICAN AMERICAN): >60 ML/MIN/1.73 M^2
EST. GFR  (NON AFRICAN AMERICAN): >60 ML/MIN/1.73 M^2
GLUCOSE SERPL-MCNC: 84 MG/DL (ref 70–110)
HCT VFR BLD AUTO: 45.7 % (ref 37–48.5)
HDLC SERPL-MCNC: 50 MG/DL (ref 40–75)
HDLC SERPL: 28.6 % (ref 20–50)
HGB BLD-MCNC: 13.9 G/DL (ref 12–16)
IMM GRANULOCYTES # BLD AUTO: 0.04 K/UL (ref 0–0.04)
IMM GRANULOCYTES NFR BLD AUTO: 0.7 % (ref 0–0.5)
LDLC SERPL CALC-MCNC: 97.4 MG/DL (ref 63–159)
LYMPHOCYTES # BLD AUTO: 1.7 K/UL (ref 1–4.8)
LYMPHOCYTES NFR BLD: 31 % (ref 18–48)
MCH RBC QN AUTO: 31.6 PG (ref 27–31)
MCHC RBC AUTO-ENTMCNC: 30.4 G/DL (ref 32–36)
MCV RBC AUTO: 104 FL (ref 82–98)
MONOCYTES # BLD AUTO: 0.2 K/UL (ref 0.3–1)
MONOCYTES NFR BLD: 3.6 % (ref 4–15)
NEUTROPHILS # BLD AUTO: 3.1 K/UL (ref 1.8–7.7)
NEUTROPHILS NFR BLD: 55.6 % (ref 38–73)
NONHDLC SERPL-MCNC: 125 MG/DL
NRBC BLD-RTO: 0 /100 WBC
PLATELET # BLD AUTO: 114 K/UL (ref 150–350)
PMV BLD AUTO: 11.4 FL (ref 9.2–12.9)
POTASSIUM SERPL-SCNC: 4.9 MMOL/L (ref 3.5–5.1)
PROT SERPL-MCNC: 5.5 G/DL (ref 6–8.4)
RBC # BLD AUTO: 4.4 M/UL (ref 4–5.4)
SODIUM SERPL-SCNC: 145 MMOL/L (ref 136–145)
TRIGL SERPL-MCNC: 138 MG/DL (ref 30–150)
WBC # BLD AUTO: 5.51 K/UL (ref 3.9–12.7)

## 2019-06-10 PROCEDURE — 80061 LIPID PANEL: CPT

## 2019-06-10 PROCEDURE — 80053 COMPREHEN METABOLIC PANEL: CPT

## 2019-06-10 PROCEDURE — 85025 COMPLETE CBC W/AUTO DIFF WBC: CPT

## 2019-06-10 PROCEDURE — 86140 C-REACTIVE PROTEIN: CPT

## 2019-06-10 PROCEDURE — 85652 RBC SED RATE AUTOMATED: CPT

## 2019-06-10 PROCEDURE — 36415 COLL VENOUS BLD VENIPUNCTURE: CPT | Mod: PO

## 2019-06-12 ENCOUNTER — OFFICE VISIT (OUTPATIENT)
Dept: RHEUMATOLOGY | Facility: CLINIC | Age: 59
End: 2019-06-12
Payer: MEDICARE

## 2019-06-12 ENCOUNTER — INFUSION (OUTPATIENT)
Dept: INFECTIOUS DISEASES | Facility: HOSPITAL | Age: 59
End: 2019-06-12
Attending: INTERNAL MEDICINE
Payer: MEDICARE

## 2019-06-12 VITALS
SYSTOLIC BLOOD PRESSURE: 138 MMHG | DIASTOLIC BLOOD PRESSURE: 89 MMHG | BODY MASS INDEX: 40.83 KG/M2 | HEART RATE: 86 BPM | WEIGHT: 216.25 LBS | HEIGHT: 61 IN

## 2019-06-12 VITALS
TEMPERATURE: 98 F | DIASTOLIC BLOOD PRESSURE: 66 MMHG | BODY MASS INDEX: 40.04 KG/M2 | OXYGEN SATURATION: 99 % | HEIGHT: 61 IN | HEART RATE: 88 BPM | RESPIRATION RATE: 18 BRPM | WEIGHT: 212.06 LBS | SYSTOLIC BLOOD PRESSURE: 151 MMHG

## 2019-06-12 DIAGNOSIS — M05.79 RHEUMATOID ARTHRITIS INVOLVING MULTIPLE SITES WITH POSITIVE RHEUMATOID FACTOR: Primary | ICD-10-CM

## 2019-06-12 DIAGNOSIS — Z79.899 HIGH RISK MEDICATION USE: ICD-10-CM

## 2019-06-12 DIAGNOSIS — M79.7 FIBROMYALGIA: ICD-10-CM

## 2019-06-12 PROBLEM — I21.4 NSTEMI (NON-ST ELEVATED MYOCARDIAL INFARCTION): Status: ACTIVE | Noted: 2019-05-18

## 2019-06-12 PROBLEM — E78.5 HYPERLIPIDEMIA: Status: ACTIVE | Noted: 2019-06-12

## 2019-06-12 PROBLEM — K21.9 GASTROESOPHAGEAL REFLUX DISEASE: Status: ACTIVE | Noted: 2019-06-12

## 2019-06-12 PROCEDURE — 99214 OFFICE O/P EST MOD 30 MIN: CPT | Mod: S$GLB,,, | Performed by: INTERNAL MEDICINE

## 2019-06-12 PROCEDURE — 63600175 PHARM REV CODE 636 W HCPCS: Mod: JG | Performed by: INTERNAL MEDICINE

## 2019-06-12 PROCEDURE — 96375 TX/PRO/DX INJ NEW DRUG ADDON: CPT

## 2019-06-12 PROCEDURE — 25000003 PHARM REV CODE 250: Performed by: INTERNAL MEDICINE

## 2019-06-12 PROCEDURE — 3008F BODY MASS INDEX DOCD: CPT | Mod: CPTII,S$GLB,, | Performed by: INTERNAL MEDICINE

## 2019-06-12 PROCEDURE — 96365 THER/PROPH/DIAG IV INF INIT: CPT

## 2019-06-12 PROCEDURE — 3008F PR BODY MASS INDEX (BMI) DOCUMENTED: ICD-10-PCS | Mod: CPTII,S$GLB,, | Performed by: INTERNAL MEDICINE

## 2019-06-12 PROCEDURE — 99999 PR PBB SHADOW E&M-EST. PATIENT-LVL III: ICD-10-PCS | Mod: PBBFAC,,, | Performed by: INTERNAL MEDICINE

## 2019-06-12 PROCEDURE — 99214 PR OFFICE/OUTPT VISIT, EST, LEVL IV, 30-39 MIN: ICD-10-PCS | Mod: S$GLB,,, | Performed by: INTERNAL MEDICINE

## 2019-06-12 PROCEDURE — 99999 PR PBB SHADOW E&M-EST. PATIENT-LVL III: CPT | Mod: PBBFAC,,, | Performed by: INTERNAL MEDICINE

## 2019-06-12 RX ORDER — METOPROLOL SUCCINATE 25 MG/1
12.5 TABLET, EXTENDED RELEASE ORAL DAILY
COMMUNITY
Start: 2019-05-22 | End: 2023-03-01

## 2019-06-12 RX ORDER — DIPHENHYDRAMINE HYDROCHLORIDE 50 MG/ML
12.5 INJECTION INTRAMUSCULAR; INTRAVENOUS ONCE
Status: CANCELLED | OUTPATIENT
Start: 2019-07-10

## 2019-06-12 RX ORDER — HEPARIN 100 UNIT/ML
500 SYRINGE INTRAVENOUS
Status: CANCELLED | OUTPATIENT
Start: 2019-07-10

## 2019-06-12 RX ORDER — SODIUM CHLORIDE 0.9 % (FLUSH) 0.9 %
10 SYRINGE (ML) INJECTION
Status: CANCELLED | OUTPATIENT
Start: 2019-07-10

## 2019-06-12 RX ORDER — TIZANIDINE 4 MG/1
4 TABLET ORAL
COMMUNITY
Start: 2018-10-31 | End: 2021-11-26

## 2019-06-12 RX ORDER — LISINOPRIL 2.5 MG/1
TABLET ORAL
COMMUNITY
Start: 2019-06-06 | End: 2020-01-27

## 2019-06-12 RX ORDER — LEVOCETIRIZINE DIHYDROCHLORIDE 5 MG/1
5 TABLET, FILM COATED ORAL
COMMUNITY
Start: 2019-05-16 | End: 2020-05-11

## 2019-06-12 RX ORDER — PREDNISONE 1 MG/1
4 TABLET ORAL DAILY
Qty: 120 TABLET | Refills: 2 | Status: SHIPPED | OUTPATIENT
Start: 2019-06-12 | End: 2019-07-18

## 2019-06-12 RX ORDER — DIPHENHYDRAMINE HYDROCHLORIDE 50 MG/ML
12.5 INJECTION INTRAMUSCULAR; INTRAVENOUS ONCE
Status: COMPLETED | OUTPATIENT
Start: 2019-06-12 | End: 2019-06-12

## 2019-06-12 RX ADMIN — DIPHENHYDRAMINE HYDROCHLORIDE 12.5 MG: 50 INJECTION INTRAMUSCULAR; INTRAVENOUS at 01:06

## 2019-06-12 RX ADMIN — TOCILIZUMAB 770 MG: 20 INJECTION, SOLUTION, CONCENTRATE INTRAVENOUS at 02:06

## 2019-06-12 ASSESSMENT — ROUTINE ASSESSMENT OF PATIENT INDEX DATA (RAPID3)
PSYCHOLOGICAL DISTRESS SCORE: 3.3
MDHAQ FUNCTION SCORE: .3
TOTAL RAPID3 SCORE: 2.83
PATIENT GLOBAL ASSESSMENT SCORE: 2.5
AM STIFFNESS SCORE: 0, NO
PAIN SCORE: 5

## 2019-06-12 NOTE — PROGRESS NOTES
"Subjective:       Patient ID: Robert Smith is a 58 y.o. female.    Chief Complaint: Disease Management    Hx RA dx 2003; dx at [facility] ; started with hands and all over body  On prednisone 5 mg/d since 2003  Rx here 2007 Dr Max and Violettekem Rx MTX and Humira  MTX nausea with po and SQ  LFA tried 2007 by Dr Terry and she did not tolerate with nausea and hospitalization  2008 Dr Mann retried MTX; did not tolerate  2010-12 Dr Rosenbaum treated with Plaquenil(HCQ)  Humira quit working 2010 2010 Simponi tried - caused weak and shaky reaction  2011 Orencia tried - caused muscle aches, shakes and jaw pain  2011 Enbrel tried - did not work  Dr Paris [id number];  Had Rituxin 2016  Earlier 2017 took Xeljanz for a few months; had a lot of nausea and she felt arms and hands getting numb  SSZ July 2017 caused nausea and hepatitis       ENBREL retried  Aug 2017 - stopped Dec due to lack of effectiveness  Cimzia Jan 2018 caused urticarial rash     Current:  Now Actemra 8 mg/kg monthly IV since Mar 2018  Prednisone 5 mg     Hx 2 knee replacements; 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 and had to be redone     DXA scan shows low bone mass        Is doing better    Stopped MTX May 13 and feels more energy   Has noted nodules on R thumb and 3rd DIP  May 18 admitted with chest pain and dx with NSTEMI; angiogram showed "no blockages"  Has dyspnea and has breathing tests scheduled at [facility]    Had episodes of diaphoresis and red face for 6 mo before NSTEMI; BP was "way high"; now on metoprolol and lisinopril      Review of Systems   Constitutional: Positive for unexpected weight change. Negative for fatigue and fever.   HENT: Negative for mouth sores and trouble swallowing.         Dry mouth   Eyes: Negative for redness.        Dry eyes   Respiratory: Positive for shortness of breath. Negative for cough.    Cardiovascular: Positive for chest pain.   Gastrointestinal: Positive for constipation. Negative for abdominal pain and diarrhea.   Skin: Negative for rash. " "  Neurological: Positive for headaches.   Hematological: Negative for adenopathy. Bruises/bleeds easily.         Objective:   /89   Pulse 86   Ht 5' 1" (1.549 m)   Wt 98.1 kg (216 lb 4.3 oz)   BMI 40.86 kg/m²      Physical Exam   Constitutional: She is well-developed, well-nourished, and in no distress.   Well groomed and not anxious   HENT:   Mouth/Throat: Oropharynx is clear and moist.   Eyes: Conjunctivae are normal.   Cardiovascular: Normal rate and regular rhythm.    Pulmonary/Chest: She has wheezes. She has no rales.   Lymphadenopathy:     She has no cervical adenopathy.   Skin: No rash noted.     Nodules R thumb volar IP and R 3rd DIP dorsal and R 5th PIP radial aspect        Physical Exam     Tenderness:   Right hand: 3rd MCP  Left hand: 3rd MCP  RLE: tibiofemoral and tibiotalar  LLE: tibiofemoral and tibiotalar    Swelling:   Right hand: 2nd MCP and 3rd MCP  Left hand: 2nd MCP and 3rd MCP  RLE: tibiofemoral and tibiotalar  LLE: tibiofemoral and tibiotalar    HERNANDEZ-28 tender joint count: 4  HERNANDEZ-28 swollen joint count: 6  ESR (mm/hr): 3  Patient global assessment: 25  HERNANDEZ-28 score: 2.92 (Low Disease Activity)            Lab Results   Component Value Date    SEDRATE 3 06/10/2019     Lab Results   Component Value Date    CRP 4.5 06/10/2019      Assessment:       1. Rheumatoid arthritis involving multiple sites with positive rheumatoid factor    2. High risk medication use    3. Fibromyalgia            Plan:       Problem List Items Addressed This Visit        Active Problems    Rheumatoid arthritis with positive rheumatoid factor - Primary     RA improved; HERNANDEZ is low activity  She has hand nodules likely secondary to MTX which she already stopped   As she is better I will recommend steroid taper and see how she does on Actemra monotherapy         Relevant Medications    predniSONE (DELTASONE) 1 MG tablet    High risk medication use     Thrombocytopenia due to either Actemra or MTX   Hand nodules likely " due to MTX  Had recent NSTEMI and current dyspnea with wheeze  Will continue to monitor lab including platelets         Fibromyalgia     Currently on no meds due to side effects   Will stress exercise and stress management

## 2019-06-12 NOTE — PLAN OF CARE
Problem: Adult Inpatient Plan of Care  Goal: Plan of Care Review  Outcome: Ongoing (interventions implemented as appropriate)  Patient received Actemra infusion and received education and counseling, instructed to call with any questions or concerns.

## 2019-06-12 NOTE — ASSESSMENT & PLAN NOTE
Thrombocytopenia due to either Actemra or MTX   Hand nodules likely due to MTX  Had recent NSTEMI and current dyspnea with wheeze  Will continue to monitor lab including platelets

## 2019-06-12 NOTE — ASSESSMENT & PLAN NOTE
RA improved; HERNANDEZ is low activity  She has hand nodules likely secondary to MTX which she already stopped   As she is better I will recommend steroid taper and see how she does on Actemra monotherapy

## 2019-07-18 ENCOUNTER — TELEPHONE (OUTPATIENT)
Dept: RHEUMATOLOGY | Facility: CLINIC | Age: 59
End: 2019-07-18

## 2019-07-18 DIAGNOSIS — M05.79 RHEUMATOID ARTHRITIS INVOLVING MULTIPLE SITES WITH POSITIVE RHEUMATOID FACTOR: ICD-10-CM

## 2019-07-18 RX ORDER — DEXLANSOPRAZOLE 60 MG/1
CAPSULE, DELAYED RELEASE ORAL
COMMUNITY
Start: 2019-06-27 | End: 2020-01-27

## 2019-07-18 RX ORDER — PREDNISONE 1 MG/1
5 TABLET ORAL DAILY
Qty: 150 TABLET | Refills: 2 | Status: SHIPPED | OUTPATIENT
Start: 2019-07-18 | End: 2020-02-17 | Stop reason: ALTCHOICE

## 2019-07-18 RX ORDER — MONTELUKAST SODIUM 10 MG/1
TABLET ORAL
COMMUNITY
Start: 2019-07-01 | End: 2020-05-04

## 2019-07-18 RX ORDER — SUCRALFATE 1 G/10ML
SUSPENSION ORAL
COMMUNITY
Start: 2019-06-27 | End: 2020-01-27

## 2019-07-18 NOTE — TELEPHONE ENCOUNTER
----- Message from Erin White MA sent at 7/18/2019 10:41 AM CDT -----  Contact: Self/ [phone]  Pt stated that she is in a lot of pain that not even the pain medication is helping she wanted to speak to you about this issue dr michael Khan  ----- Message -----  From: Katie Muniz  Sent: 7/18/2019   8:39 AM  To: Michael RODRIGUEZ Staff    Patient would like a call back to speak with the doctor's nurse about her medication predniSONE (DELTASONE) 1 MG and her injections. Patient also want to speak with a nurse about her pain level.

## 2019-07-18 NOTE — TELEPHONE ENCOUNTER
I got voice mail and Left message  Call her later and advised to increase prednisone back to 5 mg/d; I will change the Rx  WD

## 2019-07-19 ENCOUNTER — TELEPHONE (OUTPATIENT)
Dept: RHEUMATOLOGY | Facility: CLINIC | Age: 59
End: 2019-07-19

## 2019-07-19 NOTE — TELEPHONE ENCOUNTER
----- Message from Erin White MA sent at 7/19/2019  9:45 AM CDT -----  Contact: Self  Did you call this pt?  ----- Message -----  From: Stephanie Parham  Sent: 7/19/2019   9:31 AM  To: Michael RODRIGUEZ Staff    Pt missed call from clinic and is needing another return call @ [phone]

## 2019-07-19 NOTE — TELEPHONE ENCOUNTER
"Phone:  Joint pain has gotten worse "excruciating" since stopped Otrexup injections.  She was only on 10-12.5 mg/week of methotrexate; she says nausea and sweats were frequent on mtx  She also has hand nodules that I blamed on MTX    I advise her to take Actemra next week, cont pred 5 mg/d, stay off of MTX  Call me in 2-3 weeks with progress report    She is interested in naltrexone that helped her friend  I advise her to Discuss naltrexone with pain doctor  "

## 2019-07-20 ENCOUNTER — HOSPITAL ENCOUNTER (EMERGENCY)
Facility: HOSPITAL | Age: 59
Discharge: HOME | End: 2019-07-20
Attending: INTERNAL MEDICINE
Payer: MEDICARE

## 2019-07-20 VITALS
BODY MASS INDEX: 39.27 KG/M2 | RESPIRATION RATE: 16 BRPM | SYSTOLIC BLOOD PRESSURE: 125 MMHG | DIASTOLIC BLOOD PRESSURE: 77 MMHG | HEART RATE: 80 BPM | TEMPERATURE: 99 F | HEIGHT: 61 IN | WEIGHT: 208 LBS | OXYGEN SATURATION: 98 %

## 2019-07-20 DIAGNOSIS — S62.617A CLOSED DISPLACED FRACTURE OF PROXIMAL PHALANX OF LEFT LITTLE FINGER, INITIAL ENCOUNTER: Primary | ICD-10-CM

## 2019-07-20 PROCEDURE — 29125 APPL SHORT ARM SPLINT STATIC: CPT | Mod: LT,ER

## 2019-07-20 PROCEDURE — 99283 EMERGENCY DEPT VISIT LOW MDM: CPT | Mod: 25,ER
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
## 2019-07-24 ENCOUNTER — INFUSION (OUTPATIENT)
Dept: INFECTIOUS DISEASES | Facility: HOSPITAL | Age: 59
End: 2019-07-24
Attending: INTERNAL MEDICINE
Payer: MEDICARE

## 2019-07-24 VITALS
HEART RATE: 76 BPM | WEIGHT: 208.25 LBS | OXYGEN SATURATION: 97 % | DIASTOLIC BLOOD PRESSURE: 65 MMHG | BODY MASS INDEX: 39.32 KG/M2 | TEMPERATURE: 98 F | RESPIRATION RATE: 18 BRPM | SYSTOLIC BLOOD PRESSURE: 147 MMHG | HEIGHT: 61 IN

## 2019-07-24 DIAGNOSIS — M05.79 RHEUMATOID ARTHRITIS INVOLVING MULTIPLE SITES WITH POSITIVE RHEUMATOID FACTOR: Primary | ICD-10-CM

## 2019-07-24 PROCEDURE — 63600175 PHARM REV CODE 636 W HCPCS: Mod: JG | Performed by: INTERNAL MEDICINE

## 2019-07-24 PROCEDURE — 25000003 PHARM REV CODE 250: Performed by: INTERNAL MEDICINE

## 2019-07-24 PROCEDURE — 96365 THER/PROPH/DIAG IV INF INIT: CPT

## 2019-07-24 PROCEDURE — 96375 TX/PRO/DX INJ NEW DRUG ADDON: CPT

## 2019-07-24 RX ORDER — DIPHENHYDRAMINE HYDROCHLORIDE 50 MG/ML
12.5 INJECTION INTRAMUSCULAR; INTRAVENOUS ONCE
Status: CANCELLED | OUTPATIENT
Start: 2019-08-07

## 2019-07-24 RX ORDER — SODIUM CHLORIDE 0.9 % (FLUSH) 0.9 %
10 SYRINGE (ML) INJECTION
Status: CANCELLED | OUTPATIENT
Start: 2019-08-07

## 2019-07-24 RX ORDER — DIPHENHYDRAMINE HYDROCHLORIDE 50 MG/ML
12.5 INJECTION INTRAMUSCULAR; INTRAVENOUS ONCE
Status: COMPLETED | OUTPATIENT
Start: 2019-07-24 | End: 2019-07-24

## 2019-07-24 RX ORDER — SODIUM CHLORIDE 0.9 % (FLUSH) 0.9 %
10 SYRINGE (ML) INJECTION
Status: DISCONTINUED | OUTPATIENT
Start: 2019-07-24 | End: 2019-07-24 | Stop reason: HOSPADM

## 2019-07-24 RX ORDER — HEPARIN 100 UNIT/ML
500 SYRINGE INTRAVENOUS
Status: CANCELLED | OUTPATIENT
Start: 2019-08-07

## 2019-07-24 RX ADMIN — DIPHENHYDRAMINE HYDROCHLORIDE 12.5 MG: 50 INJECTION INTRAMUSCULAR; INTRAVENOUS at 10:07

## 2019-07-24 RX ADMIN — TOCILIZUMAB 756 MG: 20 INJECTION, SOLUTION, CONCENTRATE INTRAVENOUS at 11:07

## 2019-07-24 NOTE — PLAN OF CARE
Problem: Adult Inpatient Plan of Care  Goal: Plan of Care Review  Outcome: Ongoing (interventions implemented as appropriate)  Pt educated on handwashing and infection control.  Pt verbalized understanding.

## 2019-08-23 ENCOUNTER — INFUSION (OUTPATIENT)
Dept: INFECTIOUS DISEASES | Facility: HOSPITAL | Age: 59
End: 2019-08-23
Attending: INTERNAL MEDICINE
Payer: MEDICARE

## 2019-08-23 VITALS
RESPIRATION RATE: 17 BRPM | TEMPERATURE: 98 F | SYSTOLIC BLOOD PRESSURE: 142 MMHG | WEIGHT: 216.5 LBS | BODY MASS INDEX: 40.87 KG/M2 | HEIGHT: 61 IN | OXYGEN SATURATION: 99 % | HEART RATE: 75 BPM | DIASTOLIC BLOOD PRESSURE: 83 MMHG

## 2019-08-23 DIAGNOSIS — M05.79 RHEUMATOID ARTHRITIS INVOLVING MULTIPLE SITES WITH POSITIVE RHEUMATOID FACTOR: Primary | ICD-10-CM

## 2019-08-23 PROCEDURE — 96375 TX/PRO/DX INJ NEW DRUG ADDON: CPT

## 2019-08-23 PROCEDURE — 96365 THER/PROPH/DIAG IV INF INIT: CPT

## 2019-08-23 PROCEDURE — 63600175 PHARM REV CODE 636 W HCPCS: Mod: JG | Performed by: INTERNAL MEDICINE

## 2019-08-23 RX ORDER — DIPHENHYDRAMINE HYDROCHLORIDE 50 MG/ML
12.5 INJECTION INTRAMUSCULAR; INTRAVENOUS ONCE
Status: COMPLETED | OUTPATIENT
Start: 2019-08-23 | End: 2019-08-23

## 2019-08-23 RX ORDER — SODIUM CHLORIDE 0.9 % (FLUSH) 0.9 %
10 SYRINGE (ML) INJECTION
Status: CANCELLED | OUTPATIENT
Start: 2019-09-20

## 2019-08-23 RX ORDER — HEPARIN 100 UNIT/ML
500 SYRINGE INTRAVENOUS
Status: CANCELLED | OUTPATIENT
Start: 2019-09-20

## 2019-08-23 RX ORDER — DIPHENHYDRAMINE HYDROCHLORIDE 50 MG/ML
12.5 INJECTION INTRAMUSCULAR; INTRAVENOUS ONCE
Status: CANCELLED | OUTPATIENT
Start: 2019-09-20

## 2019-08-23 RX ADMIN — TOCILIZUMAB 786 MG: 20 INJECTION, SOLUTION, CONCENTRATE INTRAVENOUS at 10:08

## 2019-08-23 RX ADMIN — DIPHENHYDRAMINE HYDROCHLORIDE 12.5 MG: 50 INJECTION INTRAMUSCULAR; INTRAVENOUS at 10:08

## 2019-08-28 ENCOUNTER — TELEPHONE (OUTPATIENT)
Dept: RHEUMATOLOGY | Facility: CLINIC | Age: 59
End: 2019-08-28

## 2019-08-28 PROBLEM — I82.4Z1 ACUTE DEEP VEIN THROMBOSIS (DVT) OF DISTAL END OF RIGHT LOWER EXTREMITY: Status: ACTIVE | Noted: 2019-07-31

## 2019-08-28 PROBLEM — S62.655D CLOSED NONDISPLACED FRACTURE OF MIDDLE PHALANX OF LEFT RING FINGER WITH ROUTINE HEALING: Status: ACTIVE | Noted: 2019-07-29

## 2019-08-28 RX ORDER — TOPIRAMATE 50 MG/1
TABLET, FILM COATED ORAL
COMMUNITY
Start: 2019-08-28 | End: 2019-10-23

## 2019-08-28 RX ORDER — LOSARTAN POTASSIUM 25 MG/1
12.5 TABLET ORAL
COMMUNITY
Start: 2019-08-08 | End: 2019-10-23

## 2019-08-28 RX ORDER — GABAPENTIN 300 MG/1
300 CAPSULE ORAL
COMMUNITY
Start: 2019-08-14 | End: 2019-10-16

## 2019-08-28 RX ORDER — LIDOCAINE, CAPSAICIN, MENTHOL, METHYL SALICYLATE .025; 4; 5; 2 G/1; G/1; G/1; G/1
PATCH TOPICAL
COMMUNITY
Start: 2019-08-15 | End: 2019-10-23

## 2019-08-28 NOTE — TELEPHONE ENCOUNTER
A little bit better since took Actemra infusion 2 days ago  Remains off of methotrexate   On prednisone 4 mg and will decrease to 3 mg/d     Went to pain doctor today who started new med for pain, topamax  She could not function with gabapentin    She has f/u with me in October

## 2019-08-28 NOTE — TELEPHONE ENCOUNTER
----- Message from Mary Kimball MA sent at 8/23/2019  1:42 PM CDT -----  Contact: self      ----- Message -----  From: Stephanie Parham  Sent: 8/23/2019  11:58 AM  To: Michael RODRIGUEZ Staff    Pt is requesting a call back in regards to pain, she stated the pain medication is not helping and she can barely walk contact pt @ [phone]

## 2019-09-10 ENCOUNTER — LAB VISIT (OUTPATIENT)
Dept: LAB | Facility: HOSPITAL | Age: 59
End: 2019-09-10
Attending: INTERNAL MEDICINE
Payer: MEDICARE

## 2019-09-10 ENCOUNTER — TELEPHONE (OUTPATIENT)
Dept: RHEUMATOLOGY | Facility: CLINIC | Age: 59
End: 2019-09-10

## 2019-09-10 DIAGNOSIS — Z79.899 HIGH RISK MEDICATION USE: ICD-10-CM

## 2019-09-10 LAB
ALBUMIN SERPL BCP-MCNC: 3.3 G/DL (ref 3.5–5.2)
ALP SERPL-CCNC: 75 U/L (ref 55–135)
ALT SERPL W/O P-5'-P-CCNC: 19 U/L (ref 10–44)
ANION GAP SERPL CALC-SCNC: 4 MMOL/L (ref 8–16)
AST SERPL-CCNC: 20 U/L (ref 10–40)
BASOPHILS # BLD AUTO: 0.01 K/UL (ref 0–0.2)
BASOPHILS NFR BLD: 0.3 % (ref 0–1.9)
BILIRUB SERPL-MCNC: 0.7 MG/DL (ref 0.1–1)
BUN SERPL-MCNC: 13 MG/DL (ref 6–20)
CALCIUM SERPL-MCNC: 9.9 MG/DL (ref 8.7–10.5)
CHLORIDE SERPL-SCNC: 110 MMOL/L (ref 95–110)
CHOLEST SERPL-MCNC: 196 MG/DL (ref 120–199)
CHOLEST/HDLC SERPL: 4.8 {RATIO} (ref 2–5)
CO2 SERPL-SCNC: 27 MMOL/L (ref 23–29)
CREAT SERPL-MCNC: 0.8 MG/DL (ref 0.5–1.4)
CRP SERPL-MCNC: 3.1 MG/L (ref 0–8.2)
DIFFERENTIAL METHOD: ABNORMAL
EOSINOPHIL # BLD AUTO: 0.4 K/UL (ref 0–0.5)
EOSINOPHIL NFR BLD: 12.1 % (ref 0–8)
ERYTHROCYTE [DISTWIDTH] IN BLOOD BY AUTOMATED COUNT: 12.2 % (ref 11.5–14.5)
ERYTHROCYTE [SEDIMENTATION RATE] IN BLOOD BY WESTERGREN METHOD: 19 MM/HR (ref 0–36)
EST. GFR  (AFRICAN AMERICAN): >60 ML/MIN/1.73 M^2
EST. GFR  (NON AFRICAN AMERICAN): >60 ML/MIN/1.73 M^2
GLUCOSE SERPL-MCNC: 87 MG/DL (ref 70–110)
HCT VFR BLD AUTO: 43.2 % (ref 37–48.5)
HDLC SERPL-MCNC: 41 MG/DL (ref 40–75)
HDLC SERPL: 20.9 % (ref 20–50)
HGB BLD-MCNC: 12.7 G/DL (ref 12–16)
IMM GRANULOCYTES # BLD AUTO: 0.01 K/UL (ref 0–0.04)
IMM GRANULOCYTES NFR BLD AUTO: 0.3 % (ref 0–0.5)
LDLC SERPL CALC-MCNC: 124.6 MG/DL (ref 63–159)
LYMPHOCYTES # BLD AUTO: 1.1 K/UL (ref 1–4.8)
LYMPHOCYTES NFR BLD: 31.7 % (ref 18–48)
MCH RBC QN AUTO: 29.4 PG (ref 27–31)
MCHC RBC AUTO-ENTMCNC: 29.4 G/DL (ref 32–36)
MCV RBC AUTO: 100 FL (ref 82–98)
MONOCYTES # BLD AUTO: 0.1 K/UL (ref 0.3–1)
MONOCYTES NFR BLD: 4 % (ref 4–15)
NEUTROPHILS # BLD AUTO: 1.8 K/UL (ref 1.8–7.7)
NEUTROPHILS NFR BLD: 51.6 % (ref 38–73)
NONHDLC SERPL-MCNC: 155 MG/DL
NRBC BLD-RTO: 0 /100 WBC
PLATELET # BLD AUTO: 127 K/UL (ref 150–350)
PMV BLD AUTO: 11.2 FL (ref 9.2–12.9)
POTASSIUM SERPL-SCNC: 4 MMOL/L (ref 3.5–5.1)
PROT SERPL-MCNC: 5.7 G/DL (ref 6–8.4)
RBC # BLD AUTO: 4.32 M/UL (ref 4–5.4)
SODIUM SERPL-SCNC: 141 MMOL/L (ref 136–145)
TRIGL SERPL-MCNC: 152 MG/DL (ref 30–150)
WBC # BLD AUTO: 3.47 K/UL (ref 3.9–12.7)

## 2019-09-10 PROCEDURE — 85025 COMPLETE CBC W/AUTO DIFF WBC: CPT

## 2019-09-10 PROCEDURE — 36415 COLL VENOUS BLD VENIPUNCTURE: CPT | Mod: PO

## 2019-09-10 PROCEDURE — 80053 COMPREHEN METABOLIC PANEL: CPT

## 2019-09-10 PROCEDURE — 85652 RBC SED RATE AUTOMATED: CPT

## 2019-09-10 PROCEDURE — 80061 LIPID PANEL: CPT

## 2019-09-10 PROCEDURE — 86140 C-REACTIVE PROTEIN: CPT

## 2019-09-20 ENCOUNTER — INFUSION (OUTPATIENT)
Dept: INFECTIOUS DISEASES | Facility: HOSPITAL | Age: 59
End: 2019-09-20
Attending: INTERNAL MEDICINE
Payer: MEDICARE

## 2019-09-20 VITALS
TEMPERATURE: 98 F | HEIGHT: 61 IN | WEIGHT: 210.44 LBS | OXYGEN SATURATION: 98 % | HEART RATE: 71 BPM | SYSTOLIC BLOOD PRESSURE: 144 MMHG | BODY MASS INDEX: 39.73 KG/M2 | DIASTOLIC BLOOD PRESSURE: 69 MMHG | RESPIRATION RATE: 16 BRPM

## 2019-09-20 DIAGNOSIS — M05.79 RHEUMATOID ARTHRITIS INVOLVING MULTIPLE SITES WITH POSITIVE RHEUMATOID FACTOR: Primary | ICD-10-CM

## 2019-09-20 PROCEDURE — 96375 TX/PRO/DX INJ NEW DRUG ADDON: CPT

## 2019-09-20 PROCEDURE — 96365 THER/PROPH/DIAG IV INF INIT: CPT

## 2019-09-20 PROCEDURE — 63600175 PHARM REV CODE 636 W HCPCS: Mod: JG | Performed by: INTERNAL MEDICINE

## 2019-09-20 PROCEDURE — 96374 THER/PROPH/DIAG INJ IV PUSH: CPT

## 2019-09-20 RX ORDER — SODIUM CHLORIDE 0.9 % (FLUSH) 0.9 %
10 SYRINGE (ML) INJECTION
Status: CANCELLED | OUTPATIENT
Start: 2019-10-18

## 2019-09-20 RX ORDER — DIPHENHYDRAMINE HYDROCHLORIDE 50 MG/ML
12.5 INJECTION INTRAMUSCULAR; INTRAVENOUS ONCE
Status: CANCELLED | OUTPATIENT
Start: 2019-10-18

## 2019-09-20 RX ORDER — HEPARIN 100 UNIT/ML
500 SYRINGE INTRAVENOUS
Status: CANCELLED | OUTPATIENT
Start: 2019-10-18

## 2019-09-20 RX ORDER — DIPHENHYDRAMINE HYDROCHLORIDE 50 MG/ML
12.5 INJECTION INTRAMUSCULAR; INTRAVENOUS ONCE
Status: COMPLETED | OUTPATIENT
Start: 2019-09-20 | End: 2019-09-20

## 2019-09-20 RX ADMIN — TOCILIZUMAB 764 MG: 20 INJECTION, SOLUTION, CONCENTRATE INTRAVENOUS at 10:09

## 2019-09-20 RX ADMIN — DIPHENHYDRAMINE HYDROCHLORIDE 12.5 MG: 50 INJECTION, SOLUTION INTRAMUSCULAR; INTRAVENOUS at 09:09

## 2019-10-16 ENCOUNTER — OFFICE VISIT (OUTPATIENT)
Dept: RHEUMATOLOGY | Facility: CLINIC | Age: 59
End: 2019-10-16
Payer: MEDICARE

## 2019-10-16 VITALS
HEART RATE: 62 BPM | DIASTOLIC BLOOD PRESSURE: 83 MMHG | SYSTOLIC BLOOD PRESSURE: 138 MMHG | HEIGHT: 61 IN | WEIGHT: 218.5 LBS | BODY MASS INDEX: 41.25 KG/M2

## 2019-10-16 DIAGNOSIS — I82.4Y3 ACUTE DEEP VEIN THROMBOSIS (DVT) OF PROXIMAL VEIN OF BOTH LOWER EXTREMITIES: ICD-10-CM

## 2019-10-16 DIAGNOSIS — Z79.899 HIGH RISK MEDICATION USE: ICD-10-CM

## 2019-10-16 DIAGNOSIS — M79.7 FIBROMYALGIA: ICD-10-CM

## 2019-10-16 DIAGNOSIS — M05.79 RHEUMATOID ARTHRITIS INVOLVING MULTIPLE SITES WITH POSITIVE RHEUMATOID FACTOR: Primary | ICD-10-CM

## 2019-10-16 PROBLEM — R29.898 WEAKNESS OF BOTH HANDS: Status: RESOLVED | Noted: 2018-10-08 | Resolved: 2019-10-16

## 2019-10-16 PROCEDURE — 3008F BODY MASS INDEX DOCD: CPT | Mod: CPTII,S$GLB,, | Performed by: INTERNAL MEDICINE

## 2019-10-16 PROCEDURE — 3008F PR BODY MASS INDEX (BMI) DOCUMENTED: ICD-10-PCS | Mod: CPTII,S$GLB,, | Performed by: INTERNAL MEDICINE

## 2019-10-16 PROCEDURE — 99999 PR PBB SHADOW E&M-EST. PATIENT-LVL III: ICD-10-PCS | Mod: PBBFAC,,, | Performed by: INTERNAL MEDICINE

## 2019-10-16 PROCEDURE — 99214 OFFICE O/P EST MOD 30 MIN: CPT | Mod: S$GLB,,, | Performed by: INTERNAL MEDICINE

## 2019-10-16 PROCEDURE — 99214 PR OFFICE/OUTPT VISIT, EST, LEVL IV, 30-39 MIN: ICD-10-PCS | Mod: S$GLB,,, | Performed by: INTERNAL MEDICINE

## 2019-10-16 PROCEDURE — 99999 PR PBB SHADOW E&M-EST. PATIENT-LVL III: CPT | Mod: PBBFAC,,, | Performed by: INTERNAL MEDICINE

## 2019-10-16 RX ORDER — GABAPENTIN 100 MG/1
100 CAPSULE ORAL 2 TIMES DAILY PRN
Qty: 60 CAPSULE | Refills: 2 | Status: SHIPPED | OUTPATIENT
Start: 2019-10-16 | End: 2019-11-22 | Stop reason: SDUPTHER

## 2019-10-16 ASSESSMENT — ROUTINE ASSESSMENT OF PATIENT INDEX DATA (RAPID3)
MDHAQ FUNCTION SCORE: 1.1
PAIN SCORE: 8
PSYCHOLOGICAL DISTRESS SCORE: 4.4
FATIGUE SCORE: 6.5
TOTAL RAPID3 SCORE: 6.55
PATIENT GLOBAL ASSESSMENT SCORE: 8

## 2019-10-16 NOTE — ASSESSMENT & PLAN NOTE
Has not responded well to Actemra; still has high disease activity   Previously did not tolerate tofacitinib  Now that upadacitinib is available I recommend this in effort to control her RA    Will send Andresc Rx to [facility]  Stop IV Actemra  Cont prednisone wean and D/C  Plan lab in 3 m and RTC Feb

## 2019-10-16 NOTE — ASSESSMENT & PLAN NOTE
This may be a problem as upadacitinib has a black box warning for DVT though trial data did not show increased thrombotic complications

## 2019-10-16 NOTE — ASSESSMENT & PLAN NOTE
Mild leukopenia and thrombopenia on Actemra which we will stop due to poor response    Plan repeat lab in Dec at which time should be on BENSON inhibitor

## 2019-10-16 NOTE — ASSESSMENT & PLAN NOTE
She is very tender with central sensitization of pain and all rheumatoid joints are exquisitely tender    Restart low dose of gabapentin 100 mg bid to see if this may help pain without increasing forgetfulness

## 2019-10-16 NOTE — PROGRESS NOTES
"              After Visit Summary   12/15/2017    Shell Hughes    MRN: [id number]           Patient Information     Date Of Birth          1984        Visit Information        Provider Department      12/15/2017 2:30 PM MarchChantell MD [facility]        Today's Diagnoses     Hypertrophic cardiomyopathy (H)    -  1      Care Instructions    You were seen today in the Adult Congenital and Cardiovascular Genetics Clinic at the [facility].    Cardiology Providers you saw during your visit:  Dr. Berry March     Diagnosis:  Hypertrophic Cardiomyopathy    Results:  The results of your echo was discussed with you today    Recommendations:    1.  Continue to eat a heart healthy, low salt diet.  2.  Continue to get 20-30 minutes of aerobic activity, 4-5 days per week.  Examples of aerobic activity include walking, running, swimming, cycling, etc.  3.  Continue to observe good oral hygiene, with regular dental visits.  4.  Please wear a 7 day ziopatch monitor. We will call you with the results.       Vitals:    12/15/17 1343   BP: 132/84   BP Location: Left arm   Patient Position: Chair   Cuff Size: Adult Large   Pulse: 82   SpO2: 96%   Weight: 113.9 kg (251 lb 3.2 oz)   Height: 1.626 m (5' 4\")         Follow-up:  Follow up as scheduled.     For after hours urgent needs, call [phone] and ask to speak to the Adult Congenital Physician on call.  Mention Job Code 0401.    For emergencies call 911.    For any scheduling needs and to contact your nurse in the Adult Congenital and Cardiovascular Genetics Clinic, please call Sudarshan Esquivel Procedure , at [phone].    Thank you for your visit today!  If you have questions or concerns about today's visit, please call me.    Josh Tovar, RN, BSN  Cardiology Care Coordinator  [facility]  [email or web address]  Ph.[phone]    [facility] " Subjective:       Patient ID: Robert Smith is a 59 y.o. female.    Chief Complaint: Follow-up    Hx RA dx 2003; dx at [facility] ; started with hands and all over body  On prednisone 5 mg/d since 2003  Rx here 2007 Dr Max and Zakem Rx MTX and Humira  MTX nausea with po and SQ  LFA tried 2007 by Dr Terry and she did not tolerate with nausea and hospitalization  2008 Dr Mann retried MTX; did not tolerate  2010-12 Dr Rosenbaum treated with Plaquenil(HCQ)  Humira quit working 2010 2010 Simponi tried - caused weak and shaky reaction  2011 Orencia tried - caused muscle aches, shakes and jaw pain  2011 Enbrel tried - did not work  Dr Paris [phone];  Had Rituxin 2016  Earlier 2017 took Xeljanz for a few months; had a lot of nausea and she felt arms and hands getting numb  SSZ July 2017 caused nausea and hepatitis       ENBREL retried  Aug 2017 - stopped Dec due to lack of effectiveness  Cimzia Jan 2018 caused urticarial rash     Current:  Now Actemra 8 mg/kg monthly IV since Mar 2018  Prednisone down to 2 mg/d     Hx 2 knee replacements; 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 and had to be redone     DXA scan shows low bone mass  Eliquis for blood clot in legs; heme recommended lifelong anti-coagulation since last was unprovoked (she notes she was in bed all the time with pain)    Hurting in hands, MCP and fingers; knees    feet have been horrible with pain , numbness, and tingling  Pain doctor gave Topiramate that did not help her    Gabapentin 300 mg; no longer taking; Memory is bad and was worse on gabapentin; tolerated 100 mg dose (her 's)  Tizanidine at night for cramping in legs; helps a little    Anxiety on prn diazepam  MAEVE on PPI  Hives/urticaria on antihistamines  HTN on losartan and lisinopril and metoprolol (she is not adherent)  Asthma on montelukast and inhalers      Review of Systems   Constitutional: Negative for fever and unexpected weight change.   HENT: Negative for mouth sores and trouble swallowing.         No Dry  "mouth   Eyes: Negative for redness.        No Dry eyes   Respiratory: Negative for cough and shortness of breath.    Cardiovascular: Negative for chest pain.   Gastrointestinal: Negative for abdominal pain, constipation and diarrhea.   Genitourinary: Negative for dysuria and genital sores.   Skin: Negative for rash.   Neurological: Negative for headaches.   Hematological: Negative for adenopathy. Does not bruise/bleed easily.   Psychiatric/Behavioral: Negative for sleep disturbance.         Objective:   /83   Pulse 62   Ht 5' 1" (1.549 m)   Wt 99.1 kg (218 lb 7.6 oz)   BMI 41.28 kg/m²      Physical Exam   Constitutional: She is well-developed, well-nourished, and in no distress.   HENT:   Mouth/Throat: Oropharynx is clear and moist.   Eyes: Conjunctivae are normal.   Cardiovascular: Normal rate and regular rhythm.    Pulmonary/Chest: She has no wheezes. She has no rales.   Lymphadenopathy:     She has no cervical adenopathy.   Skin: No rash noted.     Musculoskeletal: She exhibits tenderness.   Very swollen MCP joints bilaterally           10/16/2019   Tender (HERNANDEZ-28) 24 / 28    Swollen (HERNANDEZ-28) 4 / 28    Provider Global Assessment 85 (mm)   Patient Global Assessment 80 (mm)   ESR 19 (mm/hr)   CRP 3.1 (mg/L)   HERNANDEZ-28 (ESR) 6.48 (High disease activity)   HERNANDEZ-28 (CRP) 5.89 (High disease activity)     Lab Results   Component Value Date    SEDRATE 19 09/10/2019     Lab Results   Component Value Date    CRP 3.1 09/10/2019       Assessment:       1. Rheumatoid arthritis involving multiple sites with positive rheumatoid factor    2. Fibromyalgia    3. High risk medication use            Plan:       Problem List Items Addressed This Visit        Active Problems    Rheumatoid arthritis with positive rheumatoid factor - Primary     Has not responded well to Actemra; still has high disease activity   Previously did not tolerate tofacitinib  Now that upadacitinib is available I recommend this in effort to control her " [facility]  Mail Code [id number]  [address] [place]  [address]           Follow-ups after your visit        Your next 10 appointments already scheduled     Dec 21, 2017  2:15 PM CST   Ob Follow Up with UR EXAM RM 1   [facility] ([facility])    [address]  [place] [address]   [phone]            Dec 21, 2017  3:00 PM CST   (Arrive by 2:45 PM)   MFM BPP SINGLE with URMFMUSR3   [facility] [place] ([facility])    [address]  [facility] [address]   [phone]            Dec 28, 2017  2:15 PM CST   (Arrive by 2:00 PM)   MFM BPP SINGLE with URMFMUSR4   [facility] [place] ([facility])    [address]  [facility] [address]   [phone]            Dec 28, 2017  3:00 PM CST   Ob Follow Up with UR EXAM RM 1   [facility] ([facility])    [address]  [facility] [place] [address]   [phone]            Jan 04, 2018  2:15 PM CST   (Arrive by 2:00 PM)   MFM US COMPRE SINGLE F/U with URMFMUSR4   [facility] [place] ([facility])    [address]  [facility] [address]   [phone]           Wear comfortable clothes and leave your valuables at home.            Jan 04, 2018  3:00 PM CST   Ob Follow Up with UR EXAM RM 1   [facility] ([facility])    [address]  [facility] [address]   [phone]            Feb 27, 2018  8:15 AM CST   (Arrive by 8:00 AM)   MR CARDIAC W CONTRAST STRESS AND FLOW with UUMR4, UU CV MR NURSE   [facility] ([facility]  [place])    [address] [facility] [address] [phone]           Take your medicines as usual, unless your doctor tells you not to.   If you take Viagra, Levitra or Cialis, stop taking it 48 hours before your test.   If you take Aggrenox or dipyridamole (Persantine, Permole), stop taking it 48 hours before your test.   If you take Theophylline or Aminophylline, stop taking it 12 hours before your test.   If you are diabetic and take oral hypoglycemics, do not take them on the day of your test.  The day before your exam, drink extra fluids at least six 8-ounce glasses (unless your doctor wants you to limit your fluids).  Stop all caffeine 12 hours before the test. This includes coffee, tea, soda, chocolate and certain medicines (such as Anacin, Excedrin and NoDoz). Also avoid decaf coffee and tea, as these contain small amounts of caffeine.  Do not eat or drink for 3 hours before your exam. You may drink water and take your morning medicines.  You may need a blood test (creatinine test) within 30 days of your exam. Follow your doctor s orders if this is arranged before your exam.  If you are very claustrophobic, please let you doctor know. You may get a sedative medicine from your doctor before you arrive. Bring the medicine to the exam. Do not take it at home. Arrive one hour early. Bring someone who can take you home after the test. Your medicine will make you sleepy. After the exam, you may not drive, take a bus or take a taxi by yourself.  The MRI machine uses a strong magnet. Please wear clothes without metal (snaps, zippers). A sweatsuit works well, or we may give you a hospital gown.  Please remove any body piercings and hair extensions before you arrive. You will remove watches, jewelry, hairpins, wallets, dentures, partial dental plates and hearing aids. You may wear contact lenses, and you may be able to wear your rings. We have a safe place to keep your personal items, but it is  RA    Will send Renvoc Rx to [facility]  Stop IV Actemra  Cont prednisone wean and D/C  Plan lab in 3 m and RTC Feb         Relevant Medications    upadacitinib (RINVOQ) 15 mg ER 24 hr tablet    High risk medication use     Mild leukopenia and thrombopenia on Actemra which we will stop due to poor response    Plan repeat lab in Dec at which time should be on BENSON inhibitor           Fibromyalgia     She is very tender with central sensitization of pain and all rheumatoid joints are exquisitely tender    Restart low dose of gabapentin 100 mg bid to see if this may help pain without increasing forgetfulness         Relevant Medications    gabapentin (NEURONTIN) 100 MG capsule             safer to leave them at home.   **IMPORTANT** THE INSTRUCTIONS BELOW ARE ONLY FOR THOSE PATIENTS WHO HAVE BEEN TOLD THEY WILL RECEIVE SEDATION OR GENERAL ANESTHESIA DURING THEIR MRI PROCEDURE:  IF YOU WILL RECEIVE SEDATION (take medicine to help you relax during your exam):   You must get the medicine from your doctor before you arrive. Bring the medicine to the exam. Do not take it at home.   Arrive one hour early. Bring someone who can take you home after the test. Your medicine will make you sleepy. After the exam, you may not drive, take a bus or take a taxi by yourself.   No eating 8 hours before your exam. You may have clear liquids up until 4 hours before your exam. (Clear liquids include water, clear tea, black coffee and fruit juice without pulp.)  IF YOU WILL RECEIVE ANESTHESIA (be asleep for your exam):   Arrive 1 1/2 hours early. Bring someone who can take you home after the test. You may not drive, take a bus or take a taxi by yourself.   No eating 8 hours before your exam. You may have clear liquids up until 4 hours before your exam. (Clear liquids include water, clear tea, black coffee and fruit juice without pulp.)  If you have any questions, please contact your Imaging Department exam site.            Feb 27, 2018 10:30 AM CST   (Arrive by 10:15 AM)   NEW CONGENITAL HEART with Hussein Gonsalves MD   [facility] ([facility])    [address] [place] [address] [phone]            Feb 27, 2018 11:00 AM CST   (Arrive by 10:45 AM)   Return Genetic with Chantell Conde MD   [facility] ([facility])    [address] [place] [address] [phone]              Who to contact     If you have questions or need follow up information about today's clinic visit or your schedule please contact [facility] directly at [phone].  Normal or non-critical lab and imaging results  "will be communicated to you by MyChart, letter or phone within 4 business days after the clinic has received the results. If you do not hear from us within 7 days, please contact the clinic through Telebit or phone. If you have a critical or abnormal lab result, we will notify you by phone as soon as possible.  Submit refill requests through Telebit or call your pharmacy and they will forward the refill request to us. Please allow 3 business days for your refill to be completed.          Additional Information About Your Visit        Telebit Information     Telebit lets you send messages to your doctor, view your test results, renew your prescriptions, schedule appointments and more. To sign up, go to [email or web address] . Click on \"Log in\" on the left side of the screen, which will take you to the Welcome page. Then click on \"Sign up Now\" on the right side of the page.     You will be asked to enter the access code listed below, as well as some personal information. Please follow the directions to create your username and password.     Your access code is: [email or web address]  Expires: 3/14/2018  4:27 PM     Your access code will  in 90 days. If you need help or a new code, please call your [facility] clinic or [phone].        Care EveryWhere ID     This is your Care EveryWhere ID. This could be used by other organizations to access your [facility] medical records  [id number]        Your Vitals Were     Pulse Height Pulse Oximetry BMI (Body Mass Index)          82 1.626 m (5' 4\") 96% 43.12 kg/m2         Blood Pressure from Last 3 Encounters:   12/15/17 132/84   17 132/74   17 122/80    Weight from Last 3 Encounters:   12/15/17 113.9 kg (251 lb 3.2 oz)   17 114.3 kg (251 lb 14.4 oz)   17 111.4 kg (245 lb 8 oz)              We Performed the Following     EKG 12-lead, tracing only (Same Day)        Primary Care Provider Office Phone # Fax #    Carmenza Gilmore [phone] " [phone]       [facility] [address] [facility] [address]        Equal Access to Services     COLTEN FARIAS : Hadii aad ku hadnickochaparro Juliannashelby, wajeffersonda luqadaha, qaybta kaalmada gopalbrielle, lolis alizain hayaamonty marroquinelizabeth stubbs shanon cerrato. So [facility] [phone].    ATENCIÓN: Si habla español, tiene a rosenthal disposición servicios gratuitos de asistencia lingüística. Llame al [phone].    We comply with applicable federal civil rights laws and [place] laws. We do not discriminate on the basis of race, color, national origin, age, disability, sex, sexual orientation, or gender identity.            Thank you!     Thank you for choosing [facility]  for your care. Our goal is always to provide you with excellent care. Hearing back from our patients is one way we can continue to improve our services. Please take a few minutes to complete the written survey that you may receive in the mail after your visit with us. Thank you!             Your Updated Medication List - Protect others around you: Learn how to safely use, store and throw away your medicines at www.disposemymeds.org.          This list is accurate as of: 12/15/17  3:07 PM.  Always use your most recent med list.                   Brand Name Dispense Instructions for use Diagnosis    blood glucose monitoring lancets     150 each    Use to test blood sugar 4 times daily or as directed.  Ok to substitute alternative if insurance prefers.    Diet controlled gestational diabetes mellitus (GDM) in third trimester, Abnormal glucose tolerance test       blood glucose monitoring test strip    ONETOUCH VERIO IQ    150 strip    Use to test blood sugar 4 times daily or as directed.  Ok to substitute alternative if insurance prefers.    Diet controlled gestational diabetes mellitus (GDM) in third trimester, Abnormal glucose tolerance test       KETO-DIASTIX Strp     50 each    1 strip by In Vitro route daily as needed    Diet controlled  gestational diabetes mellitus (GDM) in third trimester, Abnormal glucose tolerance test       metoprolol 25 MG tablet    LOPRESSOR    45 tablet    Take 0.5 tablets (12.5 mg) by mouth daily    Hypertrophic cardiomyopathy (H)       prenatal multivitamin plus iron 27-0.8 MG Tabs per tablet      Take 1 tablet by mouth daily        ranitidine 150 MG tablet    ZANTAC    60 tablet    Take 1 tablet (150 mg) by mouth 2 times daily    Gastroesophageal reflux in pregnancy in third trimester

## 2019-10-17 ENCOUNTER — TELEPHONE (OUTPATIENT)
Dept: PHARMACY | Facility: CLINIC | Age: 59
End: 2019-10-17

## 2019-10-17 NOTE — TELEPHONE ENCOUNTER
LVM for callback to inform patient that [facility] received prescription for Rinvoq and prior authorization is required.  [facility] will be back in touch once insurance determination is received.

## 2019-10-23 ENCOUNTER — HOSPITAL ENCOUNTER (EMERGENCY)
Facility: HOSPITAL | Age: 59
Discharge: HOME | End: 2019-10-23
Attending: EMERGENCY MEDICINE
Payer: MEDICARE

## 2019-10-23 VITALS
RESPIRATION RATE: 18 BRPM | OXYGEN SATURATION: 98 % | HEART RATE: 67 BPM | HEIGHT: 61 IN | WEIGHT: 250 LBS | TEMPERATURE: 98 F | DIASTOLIC BLOOD PRESSURE: 86 MMHG | BODY MASS INDEX: 47.2 KG/M2 | SYSTOLIC BLOOD PRESSURE: 136 MMHG

## 2019-10-23 DIAGNOSIS — M79.605 LEG PAIN, BILATERAL: ICD-10-CM

## 2019-10-23 DIAGNOSIS — G89.29 OTHER CHRONIC PAIN: Primary | ICD-10-CM

## 2019-10-23 DIAGNOSIS — R52 PAIN: ICD-10-CM

## 2019-10-23 DIAGNOSIS — M79.604 LEG PAIN, BILATERAL: ICD-10-CM

## 2019-10-23 PROCEDURE — 99284 EMERGENCY DEPT VISIT MOD MDM: CPT | Mod: 25

## 2019-10-23 NOTE — ED PROVIDER NOTES
"Encounter Date: 10/23/2019    SCRIBE #1 NOTE: I, Tyler Alegre, am scribing for, and in the presence of,  Arleen Poole NP. I have scribed the following portions of the note - Other sections scribed: HPI,ROS, PE.       History     Chief Complaint   Patient presents with    Hip Pain     Left hip, groin, and leg pain x 3 days.  "I had blood clots in my right leg and this feels exactly like it."     CC: Left hip pain     HPI:  This is a 59 y.o. female with a PMHx DVT who presents to the Emergency Department with a cc of left hip, groin and left leg pain that started about 3 days ago. Pt report she had a blood clot on her right leg 2 months ago where she had a stent put in. Reports associated left leg swelling. Denies further associated symptoms. Notes she is currently on taking Eliquis. Reports visiting hematologist who said her clotting was normal. No alleviating factors. Similar symptoms to prior blood clot.         The history is provided by the patient. No  was used.     Review of patient's allergies indicates:   Allergen Reactions    Sulfasalazine      HEPATITIS    Sulfa (sulfonamide antibiotics) Nausea And Vomiting    Clindamycin Diarrhea    Daypro [oxaprozin] Nausea Only    Orencia  [abatacept]      Other reaction(s): Muscle pain    Percocet  [oxycodone-acetaminophen]      Other reaction(s): Vomiting    Simponi  [golimumab]      Other reaction(s): Unknown    Cimzia [certolizumab pegol] Rash    Ciprofloxacin Rash    Sulfamethoxazole-trimethoprim Rash     Past Medical History:   Diagnosis Date    Acid reflux     Allergy     Anxiety     Degenerative disc disease     Depression     DVT of leg (deep venous thrombosis) 2009    rt leg    Fibromyalgia     Fibromyalgia     Long-term current use of steroids 11/12/2012    NSTEMI (non-ST elevated myocardial infarction) 5/18/2019    Osteoporosis, postmenopausal     chronic steroid use    RA (rheumatoid arthritis)      Past Surgical " History:   Procedure Laterality Date    ANKLE FRACTURE SURGERY      BACK SURGERY      CARPAL TUNNEL RELEASE      CHOLECYSTECTOMY      HARDWARE REMOVAL      JOINT REPLACEMENT Right     right knee    KNEE SURGERY      Bilateral    KNEE SURGERY Left     revision x 2    SPINE SURGERY      TONSILLECTOMY      TUBAL LIGATION       Family History   Problem Relation Age of Onset    COPD Mother     Heart attack Father     Emphysema Maternal Grandfather     Breast cancer Neg Hx     Colon cancer Neg Hx     Ovarian cancer Neg Hx      Social History     Tobacco Use    Smoking status: Never Smoker    Smokeless tobacco: Never Used   Substance Use Topics    Alcohol use: No     Alcohol/week: 0.0 standard drinks    Drug use: No     Review of Systems   Constitutional: Negative for fever.   HENT: Negative for ear pain.    Eyes: Negative for pain.   Respiratory: Negative for shortness of breath.    Cardiovascular: Negative for chest pain.   Gastrointestinal: Negative for nausea and vomiting.   Genitourinary: Negative for dysuria.   Musculoskeletal: Negative for back pain.   Skin: Negative for rash.   Neurological: Negative for headaches.       Physical Exam     Initial Vitals [10/23/19 1530]   BP Pulse Resp Temp SpO2   (!) 137/91 80 18 98.2 °F (36.8 °C) 96 %      MAP       --         Physical Exam    Constitutional: She appears well-developed. No distress.   HENT:   Head: Normocephalic and atraumatic.   Eyes: Conjunctivae are normal. Pupils are equal, round, and reactive to light.   Neck: Normal range of motion. Neck supple.   Cardiovascular: Normal rate and normal heart sounds.   Pulmonary/Chest: Breath sounds normal. No respiratory distress.   Musculoskeletal: Normal range of motion. She exhibits edema. She exhibits no tenderness.   Mild bilateral edema to the lower extremities.   Negative adenopathy to groin   Neurological: She is alert and oriented to person, place, and time. She has normal strength. GCS score is  15. GCS eye subscore is 4. GCS verbal subscore is 5. GCS motor subscore is 6.   Skin: Skin is warm. Capillary refill takes less than 2 seconds.         ED Course   Procedures  Labs Reviewed - No data to display       Imaging Results          US Lower Extremity Veins Bilateral (Final result)  Result time 10/23/19 17:06:50    Final result by Navneet King MD (10/23/19 17:06:50)                 Impression:      No evidence of deep venous thrombosis in either lower extremity.      Electronically signed by: Navneet King MD  Date:    10/23/2019  Time:    17:06             Narrative:    EXAMINATION:  US LOWER EXTREMITY VEINS BILATERAL    CLINICAL HISTORY:  Pain, unspecified    TECHNIQUE:  Duplex and color flow Doppler and dynamic compression was performed of the bilateral lower extremity veins was performed.    COMPARISON:  Left lower extremity DVT study 12/08/2016    FINDINGS:  Right thigh veins: The common femoral, femoral, popliteal, upper greater saphenous, and deep femoral veins are patent and free of thrombus. The veins are normally compressible and have normal phasic flow and augmentation response.    Right calf veins: The visualized calf veins are patent.    Left thigh veins: The common femoral, femoral, popliteal, upper greater saphenous, and deep femoral veins are patent and free of thrombus. The veins are normally compressible and have normal phasic flow and augmentation response.    Left calf veins: The visualized calf veins are patent.    Miscellaneous: None                                 Medical Decision Making:   Differential Diagnosis:   Chronic pain, DVT, fear complaint  ED Management:  Diagnosis management comments: This is an urgent evaluation of a 59-year-old female that presented to the ER with c/o pain to her left leg which she attributes to blood clots . Pts exam was as above.     Ultrasound results were reviewed and discussed with pt.  No DVT to either extremity.   Based on exam today - I have  low suspicion for medical, surgical or other life threatening condition and I believe pt is safe for discharge and outpatient f/u.    Pt verbalizes understanding of d/c instructions and will return for worsening condition.                Scribe Attestation:   Scribe #1: I performed the above scribed service and the documentation accurately describes the services I performed. I attest to the accuracy of the note.               Clinical Impression:       ICD-10-CM ICD-9-CM   1. Other chronic pain G89.29 338.29   2. Pain R52 780.96   3. Leg pain, bilateral M79.604 729.5    M79.605          Disposition:   Disposition: Discharged  Condition: Stable    I, Arleen Poole NP-C  , personally performed the services described in this documentation. All medical record entries made by the scribe were at my direction and in my presence. I have reviewed the chart and agree that the record reflects my personal performance and is accurate and complete.                    Arleen Poole NP  10/23/19 8010

## 2019-10-23 NOTE — DISCHARGE INSTRUCTIONS
Return to ER for worsening symptoms or any other concerns.  Otherwise follow up with her primary care provider/pain management for further evaluation and management of her pain.

## 2019-10-28 NOTE — TELEPHONE ENCOUNTER
DOCUMENTATION ONLY:  Prior authorization for rinvoq approved from 10/27/2019-12/31/2020 ref # [id number]. $0 co-pay

## 2019-11-01 ENCOUNTER — TELEPHONE (OUTPATIENT)
Dept: RHEUMATOLOGY | Facility: CLINIC | Age: 59
End: 2019-11-01

## 2019-11-01 NOTE — TELEPHONE ENCOUNTER
----- Message from Durga Castorena sent at 11/1/2019  1:26 PM CDT -----  Contact: Self   Pt would like a call back in regards to an infusion.     [phone]

## 2019-11-01 NOTE — TELEPHONE ENCOUNTER
Called the pt and let her know that dr Alberto Argueta was out of the office until Tuesday and that jennifer was on vacation. I told her that I did not know what medication for her infusion was changed but that I would hold the message for dr argueta for him on Tuesday and she hung up on me.

## 2019-11-04 ENCOUNTER — TELEPHONE (OUTPATIENT)
Dept: PHARMACY | Facility: CLINIC | Age: 59
End: 2019-11-04

## 2019-11-04 NOTE — TELEPHONE ENCOUNTER
Rinvoq initial consult / shipment complete on .  Confirmed that pt tried / failed MTX PO and injection.  Pt complains of 10/10 pain causing her whole body to hurt.  More specifically, the shoulders, hands, feet, and back are all painful.  Pt reports swelling and pain in her feet.  Morning stiffness lasts about 2 weeks.  Pt experiences muscle weakness, for which plans to start water therapy.  Pt has a difficult time tying shoes, buttoning shirts, and bending down to pick items up from the floor.  Medication list reviewed and reconciled with pt.  Pt no longer takes lisinopril due to cough.  She recently added Eliquis due hospitalization for blood clot.  Pt states blood clot has dissolved.  No DDI's with changes to medication list.  Warnings and possible side effects of Rinvoq reviewed.  Pt states that she has been having nausea lately. Pt advised that nausea is documented before she starts Rinvoq.   Dosage and administration reviewed with pt.  Pt advised to take Rinvoq around the same time of day with or without food.  Pt advised not to crush, chew, or cut this extended release tablet.  Goals of therapy reviewed.  [facility] services, refill procedures, hours of operation, on call pharmacist, and welcome packet reviewed.  Pt confirmed shipping of Rinvoq on  for delivery on .  Pt will begin taking the medication on  when she receives it.  Pt address and  verified.  $0.00 copay in 004.  Pt had not further questions or concerns.

## 2019-11-05 ENCOUNTER — TELEPHONE (OUTPATIENT)
Dept: RHEUMATOLOGY | Facility: CLINIC | Age: 59
End: 2019-11-05

## 2019-11-05 NOTE — TELEPHONE ENCOUNTER
Left a message for the pt to call me back and let me know what medications she was looking to talk to the dr in regards too

## 2019-11-22 DIAGNOSIS — M79.7 FIBROMYALGIA: ICD-10-CM

## 2019-11-22 RX ORDER — GABAPENTIN 100 MG/1
CAPSULE ORAL
Qty: 60 CAPSULE | Refills: 2 | Status: SHIPPED | OUTPATIENT
Start: 2019-11-22 | End: 2020-01-27

## 2019-11-26 ENCOUNTER — TELEPHONE (OUTPATIENT)
Dept: PHARMACY | Facility: CLINIC | Age: 59
End: 2019-11-26

## 2019-11-29 ENCOUNTER — TELEPHONE (OUTPATIENT)
Dept: PHARMACY | Facility: CLINIC | Age: 59
End: 2019-11-29

## 2019-12-10 ENCOUNTER — LAB VISIT (OUTPATIENT)
Dept: LAB | Facility: HOSPITAL | Age: 59
End: 2019-12-10
Attending: INTERNAL MEDICINE
Payer: MEDICARE

## 2019-12-10 DIAGNOSIS — Z79.899 HIGH RISK MEDICATION USE: ICD-10-CM

## 2019-12-10 LAB
ALBUMIN SERPL BCP-MCNC: 3.2 G/DL (ref 3.5–5.2)
ALP SERPL-CCNC: 114 U/L (ref 55–135)
ALT SERPL W/O P-5'-P-CCNC: 18 U/L (ref 10–44)
ANION GAP SERPL CALC-SCNC: 5 MMOL/L (ref 8–16)
AST SERPL-CCNC: 30 U/L (ref 10–40)
BASOPHILS # BLD AUTO: 0.01 K/UL (ref 0–0.2)
BASOPHILS NFR BLD: 0.1 % (ref 0–1.9)
BILIRUB SERPL-MCNC: 0.7 MG/DL (ref 0.1–1)
BUN SERPL-MCNC: 13 MG/DL (ref 8–23)
CALCIUM SERPL-MCNC: 10.4 MG/DL (ref 8.7–10.5)
CHLORIDE SERPL-SCNC: 102 MMOL/L (ref 95–110)
CO2 SERPL-SCNC: 30 MMOL/L (ref 23–29)
CREAT SERPL-MCNC: 0.8 MG/DL (ref 0.5–1.4)
CRP SERPL-MCNC: 19.2 MG/L (ref 0–8.2)
DIFFERENTIAL METHOD: ABNORMAL
EOSINOPHIL # BLD AUTO: 0.2 K/UL (ref 0–0.5)
EOSINOPHIL NFR BLD: 3.1 % (ref 0–8)
ERYTHROCYTE [DISTWIDTH] IN BLOOD BY AUTOMATED COUNT: 13.2 % (ref 11.5–14.5)
ERYTHROCYTE [SEDIMENTATION RATE] IN BLOOD BY WESTERGREN METHOD: 69 MM/HR (ref 0–36)
EST. GFR  (AFRICAN AMERICAN): >60 ML/MIN/1.73 M^2
EST. GFR  (NON AFRICAN AMERICAN): >60 ML/MIN/1.73 M^2
GLUCOSE SERPL-MCNC: 102 MG/DL (ref 70–110)
HCT VFR BLD AUTO: 44.7 % (ref 37–48.5)
HGB BLD-MCNC: 13.4 G/DL (ref 12–16)
IMM GRANULOCYTES # BLD AUTO: 0.03 K/UL (ref 0–0.04)
IMM GRANULOCYTES NFR BLD AUTO: 0.4 % (ref 0–0.5)
LYMPHOCYTES # BLD AUTO: 1 K/UL (ref 1–4.8)
LYMPHOCYTES NFR BLD: 14.5 % (ref 18–48)
MCH RBC QN AUTO: 28.8 PG (ref 27–31)
MCHC RBC AUTO-ENTMCNC: 30 G/DL (ref 32–36)
MCV RBC AUTO: 96 FL (ref 82–98)
MONOCYTES # BLD AUTO: 0.2 K/UL (ref 0.3–1)
MONOCYTES NFR BLD: 3.3 % (ref 4–15)
NEUTROPHILS # BLD AUTO: 5.2 K/UL (ref 1.8–7.7)
NEUTROPHILS NFR BLD: 78.6 % (ref 38–73)
NRBC BLD-RTO: 0 /100 WBC
PLATELET # BLD AUTO: 195 K/UL (ref 150–350)
PMV BLD AUTO: 10.9 FL (ref 9.2–12.9)
POTASSIUM SERPL-SCNC: 4.7 MMOL/L (ref 3.5–5.1)
PROT SERPL-MCNC: 6.7 G/DL (ref 6–8.4)
RBC # BLD AUTO: 4.66 M/UL (ref 4–5.4)
SODIUM SERPL-SCNC: 137 MMOL/L (ref 136–145)
WBC # BLD AUTO: 6.68 K/UL (ref 3.9–12.7)

## 2019-12-10 PROCEDURE — 85025 COMPLETE CBC W/AUTO DIFF WBC: CPT

## 2019-12-10 PROCEDURE — 85652 RBC SED RATE AUTOMATED: CPT

## 2019-12-10 PROCEDURE — 80053 COMPREHEN METABOLIC PANEL: CPT

## 2019-12-10 PROCEDURE — 86140 C-REACTIVE PROTEIN: CPT

## 2019-12-10 PROCEDURE — 36415 COLL VENOUS BLD VENIPUNCTURE: CPT | Mod: PO

## 2019-12-27 ENCOUNTER — TELEPHONE (OUTPATIENT)
Dept: PHARMACY | Facility: CLINIC | Age: 59
End: 2019-12-27

## 2019-12-27 NOTE — TELEPHONE ENCOUNTER
Rinvoq refill confirmed. We will ship Rinvoq refill on  via fedex to arrive on . $0 copay- 004. Confirmed 2 patient identifiers - name and . Therapy appropriate.     Patient was unsure of number of doses remaining but states she thinks she should be getting close to running out.  Pt reports she was experiencing some nausea prior to starting Rinvoq so she is unsure if it is related to the medication. She has appointment on  and states she will have some tests run to further assess.No missed doses, no new medications, no new allergies or health conditions reported at this time. All questions answered and addressed to patients satisfaction. Advised to call [facility] and provider if any issues arise.  Pt verbalized understanding.

## 2020-01-24 DIAGNOSIS — M05.79 RHEUMATOID ARTHRITIS INVOLVING MULTIPLE SITES WITH POSITIVE RHEUMATOID FACTOR: ICD-10-CM

## 2020-01-27 ENCOUNTER — TELEPHONE (OUTPATIENT)
Dept: PHARMACY | Facility: CLINIC | Age: 60
End: 2020-01-27

## 2020-02-10 ENCOUNTER — TELEPHONE (OUTPATIENT)
Dept: RHEUMATOLOGY | Facility: CLINIC | Age: 60
End: 2020-02-10

## 2020-02-10 PROBLEM — G47.33 OSA ON CPAP: Status: ACTIVE | Noted: 2019-11-18

## 2020-02-10 RX ORDER — LOSARTAN POTASSIUM 25 MG/1
TABLET ORAL
COMMUNITY
Start: 2020-01-28 | End: 2020-05-04

## 2020-02-10 RX ORDER — APIXABAN 5 MG/1
5 TABLET, FILM COATED ORAL 2 TIMES DAILY
COMMUNITY
Start: 2020-01-30

## 2020-02-10 RX ORDER — DICLOFENAC SODIUM 10 MG/G
GEL TOPICAL
COMMUNITY
Start: 2020-01-26 | End: 2022-01-28 | Stop reason: SDUPTHER

## 2020-02-10 RX ORDER — ESOMEPRAZOLE MAGNESIUM 40 MG/1
CAPSULE, DELAYED RELEASE ORAL
COMMUNITY
Start: 2019-12-13 | End: 2020-05-04

## 2020-02-10 RX ORDER — UMECLIDINIUM BROMIDE AND VILANTEROL TRIFENATATE 62.5; 25 UG/1; UG/1
POWDER RESPIRATORY (INHALATION)
COMMUNITY
Start: 2019-11-22 | End: 2020-05-11

## 2020-02-10 NOTE — TELEPHONE ENCOUNTER
----- Message from Mariah Hernadez sent at 2/10/2020  2:10 PM CST -----  Contact: pt @ [phone]  Asking if blood work is needed prior to her visist with the doctor. Please call.

## 2020-02-12 ENCOUNTER — LAB VISIT (OUTPATIENT)
Dept: LAB | Facility: HOSPITAL | Age: 60
End: 2020-02-12
Attending: INTERNAL MEDICINE
Payer: MEDICARE

## 2020-02-12 DIAGNOSIS — Z79.899 HIGH RISK MEDICATION USE: ICD-10-CM

## 2020-02-12 LAB
BASOPHILS # BLD AUTO: 0.01 K/UL (ref 0–0.2)
BASOPHILS NFR BLD: 0.2 % (ref 0–1.9)
DIFFERENTIAL METHOD: ABNORMAL
EOSINOPHIL # BLD AUTO: 0.1 K/UL (ref 0–0.5)
EOSINOPHIL NFR BLD: 0.9 % (ref 0–8)
ERYTHROCYTE [DISTWIDTH] IN BLOOD BY AUTOMATED COUNT: 14.4 % (ref 11.5–14.5)
ERYTHROCYTE [SEDIMENTATION RATE] IN BLOOD BY WESTERGREN METHOD: 15 MM/HR (ref 0–36)
HCT VFR BLD AUTO: 47.2 % (ref 37–48.5)
HGB BLD-MCNC: 13.6 G/DL (ref 12–16)
IMM GRANULOCYTES # BLD AUTO: 0.02 K/UL (ref 0–0.04)
IMM GRANULOCYTES NFR BLD AUTO: 0.3 % (ref 0–0.5)
LYMPHOCYTES # BLD AUTO: 1 K/UL (ref 1–4.8)
LYMPHOCYTES NFR BLD: 15.5 % (ref 18–48)
MCH RBC QN AUTO: 28.9 PG (ref 27–31)
MCHC RBC AUTO-ENTMCNC: 28.8 G/DL (ref 32–36)
MCV RBC AUTO: 100 FL (ref 82–98)
MONOCYTES # BLD AUTO: 0.3 K/UL (ref 0.3–1)
MONOCYTES NFR BLD: 4.1 % (ref 4–15)
NEUTROPHILS # BLD AUTO: 5.3 K/UL (ref 1.8–7.7)
NEUTROPHILS NFR BLD: 79 % (ref 38–73)
NRBC BLD-RTO: 0 /100 WBC
PLATELET # BLD AUTO: 156 K/UL (ref 150–350)
PMV BLD AUTO: 10.9 FL (ref 9.2–12.9)
RBC # BLD AUTO: 4.7 M/UL (ref 4–5.4)
WBC # BLD AUTO: 6.64 K/UL (ref 3.9–12.7)

## 2020-02-12 PROCEDURE — 85025 COMPLETE CBC W/AUTO DIFF WBC: CPT

## 2020-02-12 PROCEDURE — 80053 COMPREHEN METABOLIC PANEL: CPT

## 2020-02-12 PROCEDURE — 86140 C-REACTIVE PROTEIN: CPT

## 2020-02-12 PROCEDURE — 85652 RBC SED RATE AUTOMATED: CPT

## 2020-02-12 PROCEDURE — 36415 COLL VENOUS BLD VENIPUNCTURE: CPT | Mod: PO

## 2020-02-13 LAB
ALBUMIN SERPL BCP-MCNC: 3.6 G/DL (ref 3.5–5.2)
ALP SERPL-CCNC: 109 U/L (ref 55–135)
ALT SERPL W/O P-5'-P-CCNC: 12 U/L (ref 10–44)
ANION GAP SERPL CALC-SCNC: 11 MMOL/L (ref 8–16)
AST SERPL-CCNC: 18 U/L (ref 10–40)
BILIRUB SERPL-MCNC: 0.6 MG/DL (ref 0.1–1)
BUN SERPL-MCNC: 17 MG/DL (ref 8–23)
CALCIUM SERPL-MCNC: 10.6 MG/DL (ref 8.7–10.5)
CHLORIDE SERPL-SCNC: 104 MMOL/L (ref 95–110)
CO2 SERPL-SCNC: 25 MMOL/L (ref 23–29)
CREAT SERPL-MCNC: 0.8 MG/DL (ref 0.5–1.4)
CRP SERPL-MCNC: 2 MG/L (ref 0–8.2)
EST. GFR  (AFRICAN AMERICAN): >60 ML/MIN/1.73 M^2
EST. GFR  (NON AFRICAN AMERICAN): >60 ML/MIN/1.73 M^2
GLUCOSE SERPL-MCNC: 100 MG/DL (ref 70–110)
POTASSIUM SERPL-SCNC: 5 MMOL/L (ref 3.5–5.1)
PROT SERPL-MCNC: 7 G/DL (ref 6–8.4)
SODIUM SERPL-SCNC: 140 MMOL/L (ref 136–145)

## 2020-02-17 ENCOUNTER — TELEPHONE (OUTPATIENT)
Dept: RHEUMATOLOGY | Facility: CLINIC | Age: 60
End: 2020-02-17

## 2020-02-17 ENCOUNTER — OFFICE VISIT (OUTPATIENT)
Dept: RHEUMATOLOGY | Facility: CLINIC | Age: 60
End: 2020-02-17
Payer: MEDICARE

## 2020-02-17 VITALS
WEIGHT: 195.75 LBS | HEART RATE: 69 BPM | DIASTOLIC BLOOD PRESSURE: 80 MMHG | HEIGHT: 61 IN | SYSTOLIC BLOOD PRESSURE: 134 MMHG | BODY MASS INDEX: 36.96 KG/M2

## 2020-02-17 DIAGNOSIS — M79.7 FIBROMYALGIA: ICD-10-CM

## 2020-02-17 DIAGNOSIS — Z79.899 HIGH RISK MEDICATION USE: Primary | ICD-10-CM

## 2020-02-17 DIAGNOSIS — M05.79 RHEUMATOID ARTHRITIS INVOLVING MULTIPLE SITES WITH POSITIVE RHEUMATOID FACTOR: ICD-10-CM

## 2020-02-17 DIAGNOSIS — Z79.899 HIGH RISK MEDICATION USE: ICD-10-CM

## 2020-02-17 PROCEDURE — 99999 PR PBB SHADOW E&M-EST. PATIENT-LVL III: ICD-10-PCS | Mod: PBBFAC,,, | Performed by: INTERNAL MEDICINE

## 2020-02-17 PROCEDURE — 3008F PR BODY MASS INDEX (BMI) DOCUMENTED: ICD-10-PCS | Mod: CPTII,S$GLB,, | Performed by: INTERNAL MEDICINE

## 2020-02-17 PROCEDURE — 3008F BODY MASS INDEX DOCD: CPT | Mod: CPTII,S$GLB,, | Performed by: INTERNAL MEDICINE

## 2020-02-17 PROCEDURE — 99214 OFFICE O/P EST MOD 30 MIN: CPT | Mod: S$GLB,,, | Performed by: INTERNAL MEDICINE

## 2020-02-17 PROCEDURE — 99999 PR PBB SHADOW E&M-EST. PATIENT-LVL III: CPT | Mod: PBBFAC,,, | Performed by: INTERNAL MEDICINE

## 2020-02-17 PROCEDURE — 99214 PR OFFICE/OUTPT VISIT, EST, LEVL IV, 30-39 MIN: ICD-10-PCS | Mod: S$GLB,,, | Performed by: INTERNAL MEDICINE

## 2020-02-17 RX ORDER — DICYCLOMINE HYDROCHLORIDE 10 MG/1
20 CAPSULE ORAL 2 TIMES DAILY PRN
COMMUNITY
Start: 2019-12-16

## 2020-02-17 ASSESSMENT — ROUTINE ASSESSMENT OF PATIENT INDEX DATA (RAPID3)
TOTAL RAPID3 SCORE: 2.39
PATIENT GLOBAL ASSESSMENT SCORE: 5
MDHAQ FUNCTION SCORE: .5
PAIN SCORE: .5
PSYCHOLOGICAL DISTRESS SCORE: 9.9
FATIGUE SCORE: 10

## 2020-02-17 NOTE — ASSESSMENT & PLAN NOTE
RA is better on current med; now off of prednisone  PE minimal synovitis, lab normal    Appears that RA is better on upadacitinib  Continue but can take after lunch or supper  Will need lab q3m

## 2020-02-17 NOTE — ASSESSMENT & PLAN NOTE
Lab ok on Rinvoq  Nausea likely due to oxycodone; she will discuss with pain clinic doctors    Cont q3m lab on Rinvoq

## 2020-02-17 NOTE — PROGRESS NOTES
Subjective:       Patient ID: Robert Smith is a 59 y.o. female.    Chief Complaint: Disease Management    HPI   Hx RA dx 2003; dx at [facility] ; started with hands and all over body  On prednisone 5 mg/d since 2003  Rx here 2007 Dr Max and Stephaniem Rx MTX and Humira  MTX nausea with po and SQ  LFA tried 2007 by Dr Terry and she did not tolerate with nausea and hospitalization  2008 Dr Mann retried MTX; did not tolerate  2010-12 Dr Rosenbaum treated with Plaquenil(HCQ)  Humira quit working 2010 2010 Simponi tried - caused weak and shaky reaction  2011 Orencia tried - caused muscle aches, shakes and jaw pain  2011 Enbrel tried - did not work  Dr Paris [id number];  Had Rituxin 2016  Earlier 2017 took Xeljanz for a few months; had a lot of nausea and she felt arms and hands getting numb  SSZ July 2017 caused nausea and hepatitis       ENBREL retried  Aug 2017 - stopped Dec due to lack of effectiveness  Cimzia Jan 2018 caused urticarial rash   Actemra March 2018 - Oct 2019 with partial improvement  Nov 2019 started upadacitinib/Rinvoq    Current:  Now Rinvoq since Nov 2019  Off of Prednisone      Hx 2 knee replacements; 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 and had to be redone    Recent R knee pain  R hand/wrist at times     [facility] Mcghee and Agustin West and is on Xtampza plus hydrocodone; has had a lot of back pain  Has uncontrolled anxiety    has cancer; mother very sick; lives next door  C/o nausea   Not taking BP meds; says BP has been too low      Review of Systems   Constitutional: Positive for unexpected weight change. Negative for fatigue and fever.   HENT: Negative for mouth sores and trouble swallowing.         Dry mouth   Eyes: Negative for redness.        Dry eyes   Respiratory: Negative for cough and shortness of breath.    Cardiovascular: Negative for chest pain.   Gastrointestinal: Positive for constipation. Negative for abdominal pain and diarrhea.   Skin: Negative for rash.   Neurological: Positive for  "headaches.   Hematological: Negative for adenopathy. Does not bruise/bleed easily.   Psychiatric/Behavioral: Negative for sleep disturbance.         Objective:   /80   Pulse 69   Ht 5' 1" (1.549 m)   Wt 88.8 kg (195 lb 12.3 oz)   BMI 36.99 kg/m²      Physical Exam   Constitutional: She is well-developed, well-nourished, and in no distress.   HENT:   Mouth/Throat: Oropharynx is clear and moist.   Eyes: Conjunctivae are normal.   Cardiovascular: Normal rate and regular rhythm.    Pulmonary/Chest: She has no wheezes. She has no rales.   Lymphadenopathy:     She has no cervical adenopathy.   Skin: No rash noted.           10/16/2019 2/17/2020   Tender (HERNANDEZ-28) 24 / 28 1 / 28    Swollen (HERNANDEZ-28) 4 / 28  3 / 28    Provider Global Assessment 85 (mm) Not documented   Patient Global Assessment 80 (mm) Not documented   ESR 19 (mm/hr) 15 (mm/hr)   CRP 3.1 (mg/L) 2 (mg/L)   HERNANDEZ-28 (ESR) 6.48 (High disease activity) Not documented   HERNANDEZ-28 (CRP) 5.89 (High disease activity) Not documented     Lab Results   Component Value Date    SEDRATE 15 02/12/2020          Assessment:       1. Rheumatoid arthritis involving multiple sites with positive rheumatoid factor    2. High risk medication use    3. Fibromyalgia            Plan:       Problem List Items Addressed This Visit        Active Problems    Rheumatoid arthritis with positive rheumatoid factor     RA is better on current med; now off of prednisone  PE minimal synovitis, lab normal    Appears that RA is better on upadacitinib  Continue but can take after lunch or supper  Will need lab q3m         High risk medication use     Lab ok on Rinvoq  Nausea likely due to oxycodone; she will discuss with pain clinic doctors    Cont q3m lab on Rinvoq         Fibromyalgia     Diffuse body pain is persistent but stable           lab in May and Aug  RTC me Aug      "

## 2020-02-17 NOTE — TELEPHONE ENCOUNTER
----- Message from Macrina Mata RN sent at 2/17/2020 11:32 AM CST -----  Please put lab orders in epic

## 2020-02-26 ENCOUNTER — TELEPHONE (OUTPATIENT)
Dept: PHARMACY | Facility: CLINIC | Age: 60
End: 2020-02-26

## 2020-02-26 NOTE — TELEPHONE ENCOUNTER
Refill and followup call for Rinvoq. Patient confirmed need of the refill. Will deliver via FedEx on 3/2 to arrive on 3/3 with patient consent. Copay $3.90 at 004 with auth to charge [facility]. Address confirmed. Patient has 7 doses remaining (7 day supply). Patient denies missed doses and no side effects. Patient states that she will be taking new medications but she did not know the names of them. She will call back to inform [facility] of new meds after she picks them up. No new allergies/medical conditions. Labs are stable. No ER/Urgent care visits in past month. Patient taking the medication as directed. Patient denies any further questions. Confirmed 2 patient identifiers - Name and .    Bill Michaud, PharmD  Clinical Pharmacist  [facility]  P: [phone]

## 2020-03-24 ENCOUNTER — TELEPHONE (OUTPATIENT)
Dept: PHARMACY | Facility: CLINIC | Age: 60
End: 2020-03-24

## 2020-04-10 ENCOUNTER — HOSPITAL ENCOUNTER (EMERGENCY)
Facility: HOSPITAL | Age: 60
Discharge: HOME | End: 2020-04-10
Attending: EMERGENCY MEDICINE
Payer: MEDICARE

## 2020-04-10 VITALS
DIASTOLIC BLOOD PRESSURE: 93 MMHG | SYSTOLIC BLOOD PRESSURE: 155 MMHG | RESPIRATION RATE: 17 BRPM | WEIGHT: 186 LBS | TEMPERATURE: 98 F | OXYGEN SATURATION: 98 % | HEART RATE: 60 BPM | BODY MASS INDEX: 35.12 KG/M2 | HEIGHT: 61 IN

## 2020-04-10 DIAGNOSIS — R11.0 NAUSEA: Primary | ICD-10-CM

## 2020-04-10 DIAGNOSIS — R07.9 CHEST PAIN: ICD-10-CM

## 2020-04-10 LAB
ALBUMIN SERPL-MCNC: 3.6 G/DL (ref 3.3–5.5)
ALBUMIN SERPL-MCNC: 3.7 G/DL (ref 3.3–5.5)
ALLENS TEST: ABNORMAL
ALP SERPL-CCNC: 105 U/L (ref 42–141)
ALP SERPL-CCNC: 99 U/L (ref 42–141)
BILIRUB SERPL-MCNC: 1.4 MG/DL (ref 0.2–1.6)
BILIRUB SERPL-MCNC: 1.4 MG/DL (ref 0.2–1.6)
BILIRUBIN, POC UA: NEGATIVE
BLOOD, POC UA: NEGATIVE
BUN SERPL-MCNC: 9 MG/DL (ref 7–22)
CALCIUM SERPL-MCNC: 11.1 MG/DL (ref 8–10.3)
CHLORIDE SERPL-SCNC: 108 MMOL/L (ref 98–108)
CLARITY, POC UA: CLEAR
COLOR, POC UA: YELLOW
CREAT SERPL-MCNC: 0.7 MG/DL (ref 0.6–1.2)
GLUCOSE SERPL-MCNC: 99 MG/DL (ref 73–118)
GLUCOSE, POC UA: NEGATIVE
HCO3 UR-SCNC: 27.3 MMOL/L (ref 24–28)
KETONES, POC UA: NEGATIVE
LDH SERPL L TO P-CCNC: 0.98 MMOL/L (ref 0.5–2.2)
LEUKOCYTE EST, POC UA: ABNORMAL
NITRITE, POC UA: NEGATIVE
PCO2 BLDA: 46.7 MMHG (ref 35–45)
PH SMN: 7.38 [PH] (ref 7.35–7.45)
PH UR STRIP: 7.5 [PH]
PO2 BLDA: 41 MMHG (ref 40–60)
POC ALT (SGPT): 21 U/L (ref 10–47)
POC ALT (SGPT): 24 U/L (ref 10–47)
POC AMYLASE: 19 U/L (ref 14–97)
POC AST (SGOT): 30 U/L (ref 11–38)
POC AST (SGOT): 32 U/L (ref 11–38)
POC B-TYPE NATRIURETIC PEPTIDE: 31.1 PG/ML (ref 0–100)
POC BE: 1 MMOL/L
POC CARDIAC TROPONIN I: 0 NG/ML
POC GGT: 25 U/L (ref 5–65)
POC SATURATED O2: 74 % (ref 95–100)
POC TCO2: 29 MMOL/L (ref 18–33)
POC TCO2: 29 MMOL/L (ref 24–29)
POTASSIUM BLD-SCNC: 4.1 MMOL/L (ref 3.6–5.1)
PROTEIN, POC UA: NEGATIVE
PROTEIN, POC: 6.7 G/DL (ref 6.4–8.1)
PROTEIN, POC: 6.7 G/DL (ref 6.4–8.1)
SAMPLE: ABNORMAL
SAMPLE: NORMAL
SITE: ABNORMAL
SODIUM BLD-SCNC: 145 MMOL/L (ref 128–145)
SPECIFIC GRAVITY, POC UA: 1.02
UROBILINOGEN, POC UA: 0.2 E.U./DL

## 2020-04-10 PROCEDURE — 84484 ASSAY OF TROPONIN QUANT: CPT | Mod: ER

## 2020-04-10 PROCEDURE — 93010 ELECTROCARDIOGRAM REPORT: CPT | Mod: ,,, | Performed by: INTERNAL MEDICINE

## 2020-04-10 PROCEDURE — 93005 ELECTROCARDIOGRAM TRACING: CPT | Mod: ER

## 2020-04-10 PROCEDURE — 83880 ASSAY OF NATRIURETIC PEPTIDE: CPT | Mod: ER

## 2020-04-10 PROCEDURE — 99285 EMERGENCY DEPT VISIT HI MDM: CPT | Mod: 25,ER

## 2020-04-10 PROCEDURE — 85025 COMPLETE CBC W/AUTO DIFF WBC: CPT | Mod: ER

## 2020-04-10 PROCEDURE — 25500020 PHARM REV CODE 255: Mod: ER | Performed by: EMERGENCY MEDICINE

## 2020-04-10 PROCEDURE — 82803 BLOOD GASES ANY COMBINATION: CPT | Mod: ER

## 2020-04-10 PROCEDURE — 80053 COMPREHEN METABOLIC PANEL: CPT | Mod: ER

## 2020-04-10 PROCEDURE — 96374 THER/PROPH/DIAG INJ IV PUSH: CPT | Mod: ER,59

## 2020-04-10 PROCEDURE — 82150 ASSAY OF AMYLASE: CPT | Mod: ER

## 2020-04-10 PROCEDURE — 93010 EKG 12-LEAD: ICD-10-PCS | Mod: ,,, | Performed by: INTERNAL MEDICINE

## 2020-04-10 PROCEDURE — 63600175 PHARM REV CODE 636 W HCPCS: Mod: ER | Performed by: EMERGENCY MEDICINE

## 2020-04-10 RX ORDER — ONDANSETRON 2 MG/ML
8 INJECTION INTRAMUSCULAR; INTRAVENOUS
Status: COMPLETED | OUTPATIENT
Start: 2020-04-10 | End: 2020-04-10

## 2020-04-10 RX ORDER — ONDANSETRON 8 MG/1
8 TABLET, ORALLY DISINTEGRATING ORAL EVERY 6 HOURS PRN
Qty: 30 TABLET | Refills: 0 | Status: SHIPPED | OUTPATIENT
Start: 2020-04-10 | End: 2020-05-11

## 2020-04-10 RX ADMIN — IOHEXOL 100 ML: 350 INJECTION, SOLUTION INTRAVENOUS at 08:04

## 2020-04-10 RX ADMIN — ONDANSETRON 8 MG: 2 INJECTION, SOLUTION INTRAMUSCULAR; INTRAVENOUS at 07:04

## 2020-04-10 NOTE — ED PROVIDER NOTES
Encounter Date: 4/10/2020       History     Chief Complaint   Patient presents with    Fatigue     pt c/o abd pain/nausea over past 3 months; since yesterday pt having weakness and elevated qs=271/82 at home     59 y.o. Female with GERD, anxiety, depression, DVT (on Xarelto), RA and others presents to ED c/o nausea and decreased appetite x 3 + months and dizziness with standing since yesterday. She reports eating less over the last 3 months due to nausea. She reports losing over 50 lbs in the last several months. She states she was scheduled to have a virtual visit with her doctor today to arrange an upper endoscopy. She states she is concerned about having stomach cancer (mother diagnosed with stomach cancer). Denies fever, abdominal pain, dysuria, BRPBP, melena, diarrhea or constipation.     The history is provided by the patient.     Review of patient's allergies indicates:   Allergen Reactions    Sulfasalazine      HEPATITIS    Sulfa (sulfonamide antibiotics) Nausea And Vomiting    Clindamycin Diarrhea    Daypro [oxaprozin] Nausea Only    Orencia  [abatacept]      Other reaction(s): Muscle pain    Percocet  [oxycodone-acetaminophen]      Other reaction(s): Vomiting    Simponi  [golimumab]      Other reaction(s): Unknown    Cimzia [certolizumab pegol] Rash    Ciprofloxacin Rash    Sulfamethoxazole-trimethoprim Rash     Past Medical History:   Diagnosis Date    Acid reflux     Allergy     Anxiety     Degenerative disc disease     Depression     DVT of leg (deep venous thrombosis) 2009    rt leg    Fibromyalgia     Fibromyalgia     Long-term current use of steroids 11/12/2012    NSTEMI (non-ST elevated myocardial infarction) 5/18/2019    Osteoporosis, postmenopausal     chronic steroid use    RA (rheumatoid arthritis)      Past Surgical History:   Procedure Laterality Date    ANKLE FRACTURE SURGERY      BACK SURGERY      CARPAL TUNNEL RELEASE      CHOLECYSTECTOMY      HARDWARE REMOVAL       JOINT REPLACEMENT Right     right knee    KNEE SURGERY      Bilateral    KNEE SURGERY Left     revision x 2    SPINE SURGERY      TONSILLECTOMY      TUBAL LIGATION       Family History   Problem Relation Age of Onset    COPD Mother     Heart attack Father     Emphysema Maternal Grandfather     Breast cancer Neg Hx     Colon cancer Neg Hx     Ovarian cancer Neg Hx      Social History     Tobacco Use    Smoking status: Never Smoker    Smokeless tobacco: Never Used   Substance Use Topics    Alcohol use: No     Alcohol/week: 0.0 standard drinks    Drug use: No     Review of Systems   Constitutional: Positive for appetite change (decreased) and fatigue. Negative for fever.   HENT: Negative.  Negative for sore throat.    Respiratory: Negative for cough.    Cardiovascular: Negative for palpitations and leg swelling.   Gastrointestinal: Positive for nausea. Negative for abdominal pain, blood in stool, constipation, diarrhea and vomiting.   Genitourinary: Negative for dysuria.   Neurological: Positive for dizziness.   Psychiatric/Behavioral: The patient is nervous/anxious.    All other systems reviewed and are negative.      Physical Exam     Initial Vitals [04/10/20 0653]   BP Pulse Resp Temp SpO2   (!) 151/84 64 18 97.6 °F (36.4 °C) 95 %      MAP       --         Physical Exam    Nursing note and vitals reviewed.  Constitutional: She appears well-developed and well-nourished. She is not diaphoretic. No distress.   HENT:   Head: Normocephalic and atraumatic.   Eyes: Conjunctivae are normal. Pupils are equal, round, and reactive to light.   Neck: Normal range of motion. Neck supple.   Cardiovascular: Normal rate and intact distal pulses.   Pulmonary/Chest: No accessory muscle usage. No tachypnea. No respiratory distress.   Abdominal: She exhibits no distension. There is no tenderness.   Musculoskeletal: Normal range of motion. She exhibits no tenderness.   Neurological: She is alert and oriented to person,  place, and time. She has normal strength. Gait normal. GCS eye subscore is 4. GCS verbal subscore is 5. GCS motor subscore is 6.   Skin: Skin is warm. Capillary refill takes less than 2 seconds.   Psychiatric: Her speech is normal. Her mood appears anxious. She exhibits a depressed mood.         ED Course   Procedures  Labs Reviewed   POCT URINALYSIS W/O SCOPE - Abnormal; Notable for the following components:       Result Value    Leukocytes, UA Trace (*)     All other components within normal limits   ISTAT PROCEDURE - Abnormal; Notable for the following components:    POC PCO2 46.7 (*)     POC SATURATED O2 74 (*)     All other components within normal limits   POCT CMP - Abnormal; Notable for the following components:    Calcium, POC 11.1 (*)     All other components within normal limits   TROPONIN ISTAT   POCT CBC   POCT URINALYSIS W/O SCOPE   POCT CMP   POCT B-TYPE NATRIURETIC PEPTIDE (BNP)   POCT TROPONIN   POCT LIVER PANEL   POCT LIVER PANEL   POCT B-TYPE NATRIURETIC PEPTIDE (BNP)     EKG Readings: (Independently Interpreted)   Initial Reading: No STEMI. Rhythm: Sinus Bradycardia. Heart Rate: 58. Conduction: RBBB. ST Segments: Normal ST Segments. T Waves: Normal. Axis: Left Axis Deviation.     ECG Results          EKG 12-lead (In process)  Result time 04/10/20 09:55:04    In process by Interface, Lab In [facility] (04/10/20 09:55:04)                 Narrative:    Test Reason : R07.9,    Vent. Rate : 058 BPM     Atrial Rate : 058 BPM     P-R Int : 200 ms          QRS Dur : 148 ms      QT Int : 448 ms       P-R-T Axes : 050 -32 036 degrees     QTc Int : 439 ms    Sinus bradycardia  Left axis deviation  Right bundle branch block  Moderate voltage criteria for LVH, may be normal variant  Abnormal ECG  When compared with ECG of 13-JAN-2018 14:54,  The axis Shifted left    Referred By: [email or web address]   SELF           Confirmed By:                             Imaging Results          CT Abdomen Pelvis With Contrast (Final  result)  Result time 04/10/20 09:04:08    Final result by Anibal Wild DO (04/10/20 09:04:08)                 Impression:      1. No acute pathology noted within the abdomen or pelvis.  2. Incidental findings as discussed above.      Electronically signed by: Anibal Wild DO  Date:    04/10/2020  Time:    09:04             Narrative:    EXAMINATION:  CT ABDOMEN PELVIS WITH CONTRAST    CLINICAL HISTORY:  Weight loss, unintended, non-localized abd pain;    TECHNIQUE:  Low dose axial images, sagittal and coronal reformations were obtained from the lung bases to the pubic symphysis following the IV administration of 100 mL of Omnipaque 350 .  Oral contrast was not given.    COMPARISON:  Ultrasound from 08/10/2017    FINDINGS:  ABDOMEN    Lung bases: Unremarkable    Liver/gallbladder/biliary: The liver demonstrates no focal abnormality. The gallbladder is surgically absent.  No biliary ductal dilation.    Pancreas: The pancreas is unremarkable in appearance.    Spleen: The spleen is not enlarged.    Adrenals: Unremarkable    Kidneys: The kidneys are equally perfused.  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  Pixel intensity measurements obtained on series 602, image 28.  Additional tiny hypodensity seen within the interpolar region of the right kidney which is too small to further characterize but may represent a tiny cyst..    Bowel/Mesentery: There is no evidence of bowel obstruction. A small hiatal hernia is noted.  Minimal sigmoid diverticulosis noted.  No mesenteric stranding or adenopathy.    Retroperitoneum: No adenopathy.  The aorta demonstrates a normal caliber.  A right common iliac vein to external iliac vein stent is noted.    PELVIS:    Genitourinary/Reproductive  organs: Unremarkable    Adenopathy: None    Free Fluid: No free fluid    Osseus Structures/Soft tissues: Postoperative changes noted at L5-S1 level.  No suspicious appearing osseous abnormalities identified.                                                             Labs Reviewed  Admission on 04/10/2020, Discharged on 04/10/2020   Component Date Value Ref Range Status    Glucose, UA 04/10/2020 Negative   Final    Bilirubin, UA 04/10/2020 Negative   Final    Ketones, UA 04/10/2020 Negative   Final    Spec Grav UA 04/10/2020 1.020   Final    Blood, UA 04/10/2020 Negative   Final    PH, UA 04/10/2020 7.5   Final    Protein, UA 04/10/2020 Negative   Final    Urobilinogen, UA 04/10/2020 0.2  E.U./dL Final    Nitrite, UA 04/10/2020 Negative   Final    Leukocytes, UA 04/10/2020 Trace*  Final    Color, UA 04/10/2020 Yellow   Final    Clarity, UA 04/10/2020 Clear   Final    POC PH 04/10/2020 7.375  7.35 - 7.45 Final    POC PCO2 04/10/2020 46.7* 35 - 45 mmHg Final    POC PO2 04/10/2020 41  40 - 60 mmHg Final    POC HCO3 04/10/2020 27.3  24 - 28 mmol/L Final    POC BE 04/10/2020 1  -2 to 2 mmol/L Final    POC SATURATED O2 04/10/2020 74* 95 - 100 % Final    POC Lactate 04/10/2020 0.98  0.5 - 2.2 mmol/L Final    POC TCO2 04/10/2020 29  24 - 29 mmol/L Final    Sample 04/10/2020 VENOUS   Final    Site 04/10/2020 Other   Final    Allens Test 04/10/2020 N/A   Final    Albumin, POC 04/10/2020 3.6  3.3 - 5.5 g/dL Final    Alkaline Phosphatase, POC 04/10/2020 105  42 - 141 U/L Final    ALT (SGPT), POC 04/10/2020 21  10 - 47 U/L Final    Amylase, POC 04/10/2020 19  14 - 97 U/L Final    AST (SGOT), POC 04/10/2020 30  11 - 38 U/L Final    POC GGT 04/10/2020 25  5 - 65 U/L Final    Bilirubin 04/10/2020 1.4  0.2 - 1.6 mg/dL Final    Protein 04/10/2020 6.7  6.4 - 8.1 g/dL Final    Albumin, POC 04/10/2020 3.7  3.3 - 5.5 g/dL Final    Alkaline Phosphatase, POC 04/10/2020 99  42 - 141 U/L Final    ALT (SGPT),  POC 04/10/2020 24  10 - 47 U/L Final    AST (SGOT), POC 04/10/2020 32  11 - 38 U/L Final    POC BUN 04/10/2020 9  7 - 22 mg/dL Final    Calcium, POC 04/10/2020 11.1* 8 - 10.3 mg/dL Final    POC Chloride 04/10/2020 108  98 - 108 mmol/L Final    POC Creatinine 04/10/2020 0.7  0.6 - 1.2 mg/dL Final    POC Glucose 04/10/2020 99  73 - 118 mg/dL Final    POC Potassium 04/10/2020 4.1  3.6 - 5.1 mmol/L Final    POC Sodium 04/10/2020 145  128 - 145 mmol/L Final    Bilirubin 04/10/2020 1.4  0.2 - 1.6 mg/dL Final    POC TCO2 04/10/2020 29  18 - 33 mmol/L Final    Protein 04/10/2020 6.7  6.4 - 8.1 g/dL Final    POC Cardiac Troponin I 04/10/2020 0.00  <0.09 ng/mL Final    Sample 04/10/2020 unknown   Final    POC B-Type Natriuretic Peptide 04/10/2020 31.1  0 - 100 pg/mL Final        Imaging Reviewed    Imaging Results          CT Abdomen Pelvis With Contrast (Final result)  Result time 04/10/20 09:04:08    Final result by Anibal Wild DO (04/10/20 09:04:08)                 Impression:      1. No acute pathology noted within the abdomen or pelvis.  2. Incidental findings as discussed above.      Electronically signed by: Anibal Wild DO  Date:    04/10/2020  Time:    09:04             Narrative:    EXAMINATION:  CT ABDOMEN PELVIS WITH CONTRAST    CLINICAL HISTORY:  Weight loss, unintended, non-localized abd pain;    TECHNIQUE:  Low dose axial images, sagittal and coronal reformations were obtained from the lung bases to the pubic symphysis following the IV administration of 100 mL of Omnipaque 350 .  Oral contrast was not given.    COMPARISON:  Ultrasound from 08/10/2017    FINDINGS:  ABDOMEN    Lung bases: Unremarkable    Liver/gallbladder/biliary: The liver demonstrates no focal abnormality. The gallbladder is surgically absent.  No biliary ductal dilation.    Pancreas: The pancreas is unremarkable in appearance.    Spleen: The spleen is not enlarged.    Adrenals: Unremarkable    Kidneys: The kidneys are  equally perfused.  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  Pixel intensity measurements obtained on series 602, image 28.  Additional tiny hypodensity seen within the interpolar region of the right kidney which is too small to further characterize but may represent a tiny cyst..    Bowel/Mesentery: There is no evidence of bowel obstruction. A small hiatal hernia is noted.  Minimal sigmoid diverticulosis noted.  No mesenteric stranding or adenopathy.    Retroperitoneum: No adenopathy.  The aorta demonstrates a normal caliber.  A right common iliac vein to external iliac vein stent is noted.    PELVIS:    Genitourinary/Reproductive organs: Unremarkable    Adenopathy: None    Free Fluid: No free fluid    Osseus Structures/Soft tissues: Postoperative changes noted at L5-S1 level.  No suspicious appearing osseous abnormalities identified.                                Medications given in ED    Medications   ondansetron injection 8 mg (8 mg Intravenous Given 4/10/20 0743)   iohexoL (OMNIPAQUE 350) injection 100 mL (100 mLs Intravenous Given 4/10/20 9039)       Note was created using voice recognition software. Note may have occasional typographical errors that may not have been identified and edited despite good belinda initial review prior to signing.               Clinical Impression:       ICD-10-CM ICD-9-CM   1. Nausea R11.0 787.02   2. Chest pain R07.9 786.50             ED Disposition Condition    Discharge Stable        ED Prescriptions     Medication Sig Dispense Start Date End Date Auth. Provider    ondansetron (ZOFRAN-ODT) 8 MG TbDL Take 1 tablet (8 mg total) by mouth every 6 (six) hours as needed (nausea). 30 tablet 4/10/2020  Gwyn Mccollum MD        Follow-up Information      Follow up With Specialties Details Why Contact Info    Tani Chan MD Family Medicine Call today to schedule an appointment, for re-evaluation of today's complaint, and ongoing care [address]  [place] [address] [phone]      Eugene Mendoza MD Gastroenterology Call today to schedule an appointment, for re-evaluation of today's complaint [address] [place] [address]  [id number]  [facility]  [place] [place] [address] [phone]                                       Gwyn Mccollum MD  04/10/20 1017

## 2020-04-17 ENCOUNTER — TELEPHONE (OUTPATIENT)
Dept: PHARMACY | Facility: CLINIC | Age: 60
End: 2020-04-17

## 2020-04-17 DIAGNOSIS — M06.9 RHEUMATOID ARTHRITIS, INVOLVING UNSPECIFIED SITE, UNSPECIFIED RHEUMATOID FACTOR PRESENCE: Primary | ICD-10-CM

## 2020-04-17 RX ORDER — DEXLANSOPRAZOLE 60 MG/1
60 CAPSULE, DELAYED RELEASE ORAL DAILY
COMMUNITY
End: 2020-10-14

## 2020-04-17 NOTE — TELEPHONE ENCOUNTER
Contacted patient in regards to Rinvoq follow up.      Labs reviewed, treatment appropriate. Medication list reviewed and updated. Patient confirms she is still using Zofran as prescribed following recent ED visit. She states she is no longer taking Xtampza and that she was started on Dexilant 60 mg daily to replace Nexium. Patient denies changes in allergies or health conditions.    Denies missed doses and confirms taking Rinvoq once daily as prescribed. Denies recent infections. Reports recent ED visit due to nausea on 4/10- discharged with Zofran that she states helps somewhat. She states she plans to follow-up with gastro next month for further work up. She reports some muscle soreness, but nausea is her biggest concern at this time in regards to side effects. She states she stopped taking pain medicine (Xtampza) because she thought it was causing nausea but has not had any relief. She did acknowledge that the was experiencing nausea prior to starting Rinvoq.     Denies missed days from work or planned activities due to RA. Overall patient states she is not in as much pain as she was. She experiences some pain in her back and knee (6/10, 10 best) but she no longer has pain in her shoulders and feet. She states pain is worse in the morning and she experiences morning stiffness for ~1 hour. Patient denies other questions or concerns at this time. Patient aware to contact [facility] if any issues arise.

## 2020-04-20 ENCOUNTER — TELEPHONE (OUTPATIENT)
Dept: RHEUMATOLOGY | Facility: CLINIC | Age: 60
End: 2020-04-20

## 2020-04-20 NOTE — TELEPHONE ENCOUNTER
Returned patient's call.  Patient states she is having tightness in her chest, and pain radiating up and down both arms.  Patient told to call 911, because of the tightness and radiating pain.  Patient crying stating she is afraid to go to the ER because of the COVID-.  Patient again instructed to call 911.   Patient states her  should be home in 10 minutes, and she will have him bring her to the ER.  Dr. Rodriguez informed.

## 2020-04-20 NOTE — TELEPHONE ENCOUNTER
I called her;  is coming to get her and take her to ER for chest pain    Meantime I told her to stop Rinvoq until further notice    WD

## 2020-04-20 NOTE — TELEPHONE ENCOUNTER
----- Message from Nella Solorzano PharmD sent at 4/20/2020 12:18 PM CDT -----  Regarding: Rinvoq  Good afternoon Dr. Rodriguez and staff,    Patient called [facility] to reported her nausea has significantly worsened, despite always eating with Rinvoq doses and use of Phenergan. She indicated her appetite has been poor from nausea, and said she lost 50 lbs since starting Rinvoq in November 2019. She reported muscle tightness in arms that started around November and said this has significantly worsened as well. She also reported light-headed and weakness that newly started 3 days ago. Patient is holding Rinvoq dose today until further notice. Please advise how patient is to proceed, and let me know if I can further assist. Thank you    Best,    Nella Solorzano, PharmD  Clinical Pharmacist  [facility]  [phone]

## 2020-04-21 ENCOUNTER — TELEPHONE (OUTPATIENT)
Dept: RHEUMATOLOGY | Facility: CLINIC | Age: 60
End: 2020-04-21

## 2020-04-21 NOTE — TELEPHONE ENCOUNTER
----- Message from Sharmin Copeland sent at 4/21/2020  1:04 PM CDT -----  Contact: pt   Please call pt at [phone]    Patient has questions regarding the upadacitinib (RINVOQ) 15 mg ER 24 hr tablet    Thank you

## 2020-04-21 NOTE — TELEPHONE ENCOUNTER
Returned call.  Patient states the ER kept her overnight  Labs were done, but no diagnosis .  She called to ask if she should continue Rinvoq.  Patient told not to continue Rinvoq as per Dr. Rodriguez note of 4/20/20.  Patient states she is scheduled for an endoscopy next month.  Patient understands not to continue taking Rinvoq.  Dr. Rodriguez informed.

## 2020-04-29 ENCOUNTER — TELEPHONE (OUTPATIENT)
Dept: RHEUMATOLOGY | Facility: CLINIC | Age: 60
End: 2020-04-29

## 2020-04-29 DIAGNOSIS — M05.79 RHEUMATOID ARTHRITIS INVOLVING MULTIPLE SITES WITH POSITIVE RHEUMATOID FACTOR: Primary | ICD-10-CM

## 2020-04-30 ENCOUNTER — TELEPHONE (OUTPATIENT)
Dept: PHARMACY | Facility: CLINIC | Age: 60
End: 2020-04-30

## 2020-04-30 RX ORDER — DULOXETIN HYDROCHLORIDE 60 MG/1
CAPSULE, DELAYED RELEASE ORAL
COMMUNITY
Start: 2020-04-17 | End: 2020-05-04

## 2020-04-30 RX ORDER — AMITRIPTYLINE HYDROCHLORIDE 10 MG/1
TABLET, FILM COATED ORAL
COMMUNITY
Start: 2020-03-31 | End: 2020-05-04

## 2020-04-30 NOTE — TELEPHONE ENCOUNTER
----- Message from Sharimn Copeland sent at 4/29/2020  2:07 PM CDT -----  Contact: pt   Please call pt at [phone]    Patient is having severe body pain and would like to discuss medications     Thank you  
Patient called stating she is having severe pain to her back, and hands and feet.  Patient states she does see pain medicine, but the 7.5 norco is not helping at all.  Please call patient.  
Was sick last week with nausea; that stopped after stopped Rinvoq; also had tightness in arms and chest that went away  Says feels better without nausea but now joint pain is worse    Had similar reactions to Xeljanz as well as some biologics.    Hx of failure to tolerate many dmards, but has recurrent RA requiring Rx    She is willing to try baricitinib so will send Rx to [facility]    Please schedule virtual visit in May    
OBS

## 2020-04-30 NOTE — TELEPHONE ENCOUNTER
Informed Patient  that [facility] received prescription for OLUMIANT and prior authorization is required.  [facility] will be back in touch once insurance determination is received.

## 2020-05-04 ENCOUNTER — TELEPHONE (OUTPATIENT)
Dept: RHEUMATOLOGY | Facility: CLINIC | Age: 60
End: 2020-05-04

## 2020-05-04 ENCOUNTER — TELEPHONE (OUTPATIENT)
Dept: PHARMACY | Facility: CLINIC | Age: 60
End: 2020-05-04

## 2020-05-04 ENCOUNTER — OFFICE VISIT (OUTPATIENT)
Dept: RHEUMATOLOGY | Facility: CLINIC | Age: 60
End: 2020-05-04
Payer: MEDICARE

## 2020-05-04 DIAGNOSIS — M05.79 RHEUMATOID ARTHRITIS INVOLVING MULTIPLE SITES WITH POSITIVE RHEUMATOID FACTOR: Primary | ICD-10-CM

## 2020-05-04 DIAGNOSIS — Z79.899 HIGH RISK MEDICATION USE: ICD-10-CM

## 2020-05-04 DIAGNOSIS — M79.7 FIBROMYALGIA: ICD-10-CM

## 2020-05-04 DIAGNOSIS — I82.5Y3 CHRONIC DEEP VEIN THROMBOSIS (DVT) OF PROXIMAL VEIN OF BOTH LOWER EXTREMITIES: ICD-10-CM

## 2020-05-04 PROBLEM — E24.2 IATROGENIC CUSHINGOID FEATURES: Status: RESOLVED | Noted: 2018-09-07 | Resolved: 2020-05-04

## 2020-05-04 PROBLEM — I82.4Z1 ACUTE DEEP VEIN THROMBOSIS (DVT) OF DISTAL END OF RIGHT LOWER EXTREMITY: Status: RESOLVED | Noted: 2019-07-31 | Resolved: 2020-05-04

## 2020-05-04 PROBLEM — E66.01 SEVERE OBESITY (BMI 35.0-39.9) WITH COMORBIDITY: Status: ACTIVE | Noted: 2020-04-23

## 2020-05-04 PROCEDURE — 99442 PR PHYSICIAN TELEPHONE EVALUATION 11-20 MIN: CPT | Mod: 95,,, | Performed by: INTERNAL MEDICINE

## 2020-05-04 PROCEDURE — 99442 PR PHYSICIAN TELEPHONE EVALUATION 11-20 MIN: ICD-10-PCS | Mod: 95,,, | Performed by: INTERNAL MEDICINE

## 2020-05-04 RX ORDER — OLOPATADINE HYDROCHLORIDE 1 MG/ML
SOLUTION/ DROPS OPHTHALMIC
COMMUNITY
Start: 2020-04-23 | End: 2021-03-19

## 2020-05-04 NOTE — ASSESSMENT & PLAN NOTE
Has persistent diffuse body pain in addition to joint pain from RA  Currently on Paxil and diazepam so not able to try duloxetine or milnacipran

## 2020-05-04 NOTE — Clinical Note
Needs lab at [facility]; orders sent for cbc, cmp, esr, crp and quantiferonNew Rx sent to [facility] for sarilumab/KevzaraSchedule f/u lab (usual 4) in 6 weeks with f/u visit 2 months

## 2020-05-04 NOTE — TELEPHONE ENCOUNTER
Oriana the chart tells me I sent it to you but [employer] called us  Do you see the Rx at [facility]?  Ten Rodriguez MD  Rheumatology  Mobile: [phone]

## 2020-05-04 NOTE — ASSESSMENT & PLAN NOTE
upadicitinib induced nausea has resolved  Tolerated previous IL-6 Rx with Actemra though had inadequate therapeutic response  Needs lab before starting new biologic; I will order to be done at [facility] including repeat Tb since last was 2018

## 2020-05-04 NOTE — TELEPHONE ENCOUNTER
----- Message from Macrina Mata RN sent at 5/4/2020 12:00 PM CDT -----  Contact: [facility] [place] [facility] reve   Since this medication requires a PA-please send to [facility]  ----- Message -----  From: Mary Kimball MA  Sent: 5/1/2020   4:24 PM CDT  To: HOA [address] Dr is gone already #L  ----- Message -----  From: Manasa Anderson  Sent: 5/1/2020   2:54 PM CDT  To: Michael RODRIGUEZ Staff    [facility] [place] pharmacy reve would like to receive a call back regarding prior authorization for olumiant. [facility] to advise.    Contact info  ref [phone]

## 2020-05-04 NOTE — TELEPHONE ENCOUNTER
----- Message from Macrina Mata RN sent at 5/4/2020  4:14 PM CDT -----  Contact: pt    This is her pain management doc#  ----- Message -----  From: Sharmin Copeland  Sent: 5/4/2020   1:25 PM CDT  To: Michael RODRIGUEZ Staff    Please call pt at [phone]    Patient is calling with updated pain mgmt info    Patient uses [facility] at office# [phone]/fax# [phone] ([place] office)    Thank you

## 2020-05-04 NOTE — TELEPHONE ENCOUNTER
LVM for callback to inform patient that [facility] received prescription for Kevzara and prior authorization is required.  [facility] will be back in touch once insurance determination is received.

## 2020-05-04 NOTE — TELEPHONE ENCOUNTER
DOCUMENTATION ONLY:     Prior Authorization for Olumiant APPROVED from 1/1/20 to 12/31/20.      Case ID# [id number]    Co-Pay: $unknown    Patient Assistance IS NOT required.     Forward to clinical pharmacist for consult & shipment.      STACEY

## 2020-05-04 NOTE — ASSESSMENT & PLAN NOTE
RA sx have worsened since stopping Rinvoq due to severe nausea which has since resolved; had similar issue with Xeljanz    Will need a DMARD but with her hx of intolerance I recommend that we try sarilumab/Kevzara; she had partial response to Actemra, another IL-6 blocker last year    Meantime she will need to continue pain management with [facility] as I do not want to give her prednisone if avoidable

## 2020-05-04 NOTE — PROGRESS NOTES
Established Patient - Audio Only Telehealth Visit     The patient location is: home  The chief complaint leading to consultation is: RA disease management  Visit type: Virtual visit with audio only (telephone)  Total time spent with patient: 11:03 - 11:23        The reason for the audio only service rather than synchronous audio and video virtual visit was related to technical difficulties or patient preference/necessity.     Each patient to whom I provide medical services by telemedicine is:  (1) informed of the relationship between the physician and patient and the respective role of any other health care provider with respect to management of the patient; and (2) notified that they may decline to receive medical services by telemedicine and may withdraw from such care at any time. Patient verbally consented to receive this service via voice-only telephone call.       HPI:   Hx RA dx 2003; dx at [facility] ; started with hands and all over body  On prednisone 5 mg/d since 2003  Rx here 2007 Dr Max and Chung Rx MTX and Humira  MTX nausea with po and SQ  LFA tried 2007 by Dr Terry and she did not tolerate with nausea and hospitalization  2008 Dr Mann retried MTX; did not tolerate  2010-12 Dr Rosenbaum treated with Plaquenil(HCQ)  Humira quit working 2010 2010 Simponi tried - caused weak and shaky reaction  2011 Orencia tried - caused muscle aches, shakes and jaw pain  2011 Enbrel tried - did not work  Dr Paris [id number];  Had Rituxin 2016  Earlier 2017 took Xeljanz for a few months; had a lot of nausea and she felt arms and hands getting numb  SSZ July 2017 caused nausea and hepatitis       ENBREL retried  Aug 2017 - stopped Dec due to lack of effectiveness  Cimzia Jan 2018 caused urticarial rash   Actemra March 2018 - Oct 2019 with partial improvement  Nov 2019 started upadacitinib/Rinvoq     Current:  Now Rinvoq since Nov 2019  Off of Prednisone      Nausea finally improved after stopping Rinvoq    Meds:    Diazepam  Paxil just prescribed for anxiety  Dicyclomine and Zofran; has not needed since stopped Rinvoq  Dexilant; still takes daily   Metoprolol  Eliquis     Tizanidine 4 mg hs prn for muscle pain and leg cramps    Now hand and wrist pain is worse; usha ein feet; also knees and back and this morning in the shoulders  Swelling in hands  Morning stiffness; about 1/2 hour  Will lie down during day intermittently    Assessment and plan:   Problem List as of 5/4/2020 Reviewed: 5/4/2020 11:33 AM by Ten Rodriguez MD       Active Problems    Rheumatoid arthritis with positive rheumatoid factor    Last Assessment & Plan 5/4/2020 Office Visit Written 5/4/2020 11:24 AM by Ten Rodriguez MD     RA sx have worsened since stopping Rinvoq due to severe nausea which has since resolved; had similar issue with Xeljanz    Will need a DMARD but with her hx of intolerance I recommend that we try sarilumab/Kevzara; she had partial response to Actemra, another IL-6 blocker last year    Meantime she will need to continue pain management with [facility] as I do not want to give her prednisone if avoidable           High risk medication use    Last Assessment & Plan 5/4/2020 Office Visit Written 5/4/2020 11:27 AM by Ten Rodriguez MD     upadicitinib induced nausea has resolved  Tolerated previous IL-6 Rx with Actemra though had inadequate therapeutic response  Needs lab before starting new biologic; I will order to be done at [facility] including repeat Tb since last was 2018         Fibromyalgia    Last Assessment & Plan 5/4/2020 Office Visit Written 5/4/2020 11:33 AM by Ten Rodriguez MD     Has persistent diffuse body pain in addition to joint pain from RA  Currently on Paxil and diazepam so not able to try duloxetine or milnacipran         Deep vein thrombosis (DVT) of proximal vein of both lower extremities    Last Assessment & Plan 5/4/2020 Office Visit Written 5/4/2020 11:34 AM by Ten Rodriguez MD      Still on Eliquis  No longer on upadacitinib         Infected prosthetic knee joint    Fibula fracture    Prosthetic joint infection    s/p LEFT knee resection arthroplasty    Low back pain    Other idiopathic scoliosis, thoracic region    Last Assessment & Plan 9/7/2018 Telephone Written 9/7/2018  3:45 PM by Ten Rodriguez MD     Will order PT for exercises for upper back pain related to DJD and scoliosis         Boutonniere deformity    Gastroesophageal reflux disease    Hyperlipidemia    NSTEMI (non-ST elevated myocardial infarction)    Closed nondisplaced fracture of middle phalanx of left ring finger with routine healing    LONNIE on CPAP    Severe obesity (BMI 35.0-39.9) with comorbidity                  This service was not originating from a related E/M service provided within the previous 7 days nor will  to an E/M service or procedure within the next 24 hours or my soonest available appointment.  Prevailing standard of care was able to be met in this audio-only visit.      No flowsheet data found.

## 2020-05-05 ENCOUNTER — LAB VISIT (OUTPATIENT)
Dept: LAB | Facility: HOSPITAL | Age: 60
End: 2020-05-05
Attending: INTERNAL MEDICINE
Payer: MEDICARE

## 2020-05-05 DIAGNOSIS — Z79.899 HIGH RISK MEDICATION USE: ICD-10-CM

## 2020-05-05 LAB
ALBUMIN SERPL BCP-MCNC: 3 G/DL (ref 3.5–5.2)
ALP SERPL-CCNC: 115 U/L (ref 55–135)
ALT SERPL W/O P-5'-P-CCNC: 10 U/L (ref 10–44)
ANION GAP SERPL CALC-SCNC: 8 MMOL/L (ref 8–16)
AST SERPL-CCNC: 12 U/L (ref 10–40)
BASOPHILS # BLD AUTO: 0.02 K/UL (ref 0–0.2)
BASOPHILS NFR BLD: 0.3 % (ref 0–1.9)
BILIRUB SERPL-MCNC: 0.7 MG/DL (ref 0.1–1)
BUN SERPL-MCNC: 15 MG/DL (ref 8–23)
CALCIUM SERPL-MCNC: 10.4 MG/DL (ref 8.7–10.5)
CHLORIDE SERPL-SCNC: 104 MMOL/L (ref 95–110)
CO2 SERPL-SCNC: 27 MMOL/L (ref 23–29)
CREAT SERPL-MCNC: 0.7 MG/DL (ref 0.5–1.4)
CRP SERPL-MCNC: 44.7 MG/L (ref 0–8.2)
DIFFERENTIAL METHOD: ABNORMAL
EOSINOPHIL # BLD AUTO: 0.2 K/UL (ref 0–0.5)
EOSINOPHIL NFR BLD: 2.9 % (ref 0–8)
ERYTHROCYTE [DISTWIDTH] IN BLOOD BY AUTOMATED COUNT: 13.1 % (ref 11.5–14.5)
ERYTHROCYTE [SEDIMENTATION RATE] IN BLOOD BY WESTERGREN METHOD: 65 MM/HR (ref 0–36)
EST. GFR  (AFRICAN AMERICAN): >60 ML/MIN/1.73 M^2
EST. GFR  (NON AFRICAN AMERICAN): >60 ML/MIN/1.73 M^2
GLUCOSE SERPL-MCNC: 106 MG/DL (ref 70–110)
HCT VFR BLD AUTO: 45.4 % (ref 37–48.5)
HGB BLD-MCNC: 13.8 G/DL (ref 12–16)
IMM GRANULOCYTES # BLD AUTO: 0.02 K/UL (ref 0–0.04)
IMM GRANULOCYTES NFR BLD AUTO: 0.3 % (ref 0–0.5)
LYMPHOCYTES # BLD AUTO: 1.1 K/UL (ref 1–4.8)
LYMPHOCYTES NFR BLD: 18 % (ref 18–48)
MCH RBC QN AUTO: 30.3 PG (ref 27–31)
MCHC RBC AUTO-ENTMCNC: 30.4 G/DL (ref 32–36)
MCV RBC AUTO: 100 FL (ref 82–98)
MONOCYTES # BLD AUTO: 0.2 K/UL (ref 0.3–1)
MONOCYTES NFR BLD: 3.3 % (ref 4–15)
NEUTROPHILS # BLD AUTO: 4.6 K/UL (ref 1.8–7.7)
NEUTROPHILS NFR BLD: 75.2 % (ref 38–73)
NRBC BLD-RTO: 0 /100 WBC
PLATELET # BLD AUTO: 165 K/UL (ref 150–350)
PMV BLD AUTO: 11 FL (ref 9.2–12.9)
POTASSIUM SERPL-SCNC: 4.5 MMOL/L (ref 3.5–5.1)
PROT SERPL-MCNC: 6.7 G/DL (ref 6–8.4)
RBC # BLD AUTO: 4.55 M/UL (ref 4–5.4)
SODIUM SERPL-SCNC: 139 MMOL/L (ref 136–145)
WBC # BLD AUTO: 6.15 K/UL (ref 3.9–12.7)

## 2020-05-05 PROCEDURE — 36415 COLL VENOUS BLD VENIPUNCTURE: CPT | Mod: PO

## 2020-05-05 PROCEDURE — 86140 C-REACTIVE PROTEIN: CPT

## 2020-05-05 PROCEDURE — 85025 COMPLETE CBC W/AUTO DIFF WBC: CPT

## 2020-05-05 PROCEDURE — 85652 RBC SED RATE AUTOMATED: CPT

## 2020-05-05 PROCEDURE — 80053 COMPREHEN METABOLIC PANEL: CPT

## 2020-05-06 NOTE — TELEPHONE ENCOUNTER
DOCUMENTATION ONLY:     Prior Authorization for Kevzara APPROVED from 5/6/20 to 12/31/20.      Case ID# none    Co-Pay: $0.00    Patient Assistance IS NOT required.     Forward to clinical pharmacist for consult & shipment.      STACEY

## 2020-05-08 ENCOUNTER — TELEPHONE (OUTPATIENT)
Dept: RHEUMATOLOGY | Facility: CLINIC | Age: 60
End: 2020-05-08

## 2020-05-08 NOTE — TELEPHONE ENCOUNTER
----- Message from Mary Kimball MA sent at 5/7/2020  3:15 PM CDT -----  Contact: pt      ----- Message -----  From: Manasa Anderson  Sent: 5/7/2020   9:32 AM CDT  To: Michael RODRIGUEZ Staff    Pt would like to receive a call back regarding her pain. Please contact the pt to advise.    Contact info [phone]

## 2020-05-08 NOTE — TELEPHONE ENCOUNTER
Quantiferon was ordered but not obtained with last lab    She had negative TB test Dec 2018 and has no history of exposure to TB  so please release Kevzara for her to start ASAP; we can get Quantiferon with subsequent lab in an abundance of caution    WD

## 2020-05-11 ENCOUNTER — TELEPHONE (OUTPATIENT)
Dept: RHEUMATOLOGY | Facility: CLINIC | Age: 60
End: 2020-05-11

## 2020-05-11 NOTE — TELEPHONE ENCOUNTER
Pt was reached on  regarding Kevzara Initial Consult and injection training. Shipping  for patient to receive . Patient said she will start medication on . Patient verified address. Copay is $0 in 004, [facility]. Pt is existing [facility] patient and she is familiar with [facility] clinical services and refill process. Patient voiced understanding. No further questions or concerns. Verified patient name and  for HIPAA purposes.

## 2020-05-11 NOTE — TELEPHONE ENCOUNTER
Patient called stating you were to write note or call her pain management physician and ask if there is anything they could give her to control her pain, until she gets the new medication.  The pain specialists number is in the chart note of 5/4/2020

## 2020-05-11 NOTE — TELEPHONE ENCOUNTER
----- Message from Yahir Mata sent at 5/11/2020  1:42 PM CDT -----  Contact: self   Pt is asking for a call back in regards to paper work being faxed over to pain management. She is asking for a call back also because she stated that she is in a lot of pain.        Contact info- [phone]

## 2020-05-20 ENCOUNTER — TELEPHONE (OUTPATIENT)
Dept: RHEUMATOLOGY | Facility: CLINIC | Age: 60
End: 2020-05-20

## 2020-05-20 NOTE — TELEPHONE ENCOUNTER
----- Message from Erin White MA sent at 5/18/2020  1:43 PM CDT -----  Contact: pt  Dr. Rodriguez   The patient wants you to call or write to her pain clinic to either increase her pain medication I told her I would send you a message. The pain clinic is [facility], she wants you to call her I could barely understand her  ----- Message -----  From: Manasa Anderson  Sent: 5/18/2020  11:18 AM CDT  To: Michael RODRIGUEZ Staff    Pt would like to receive a call back regarding body pain. Please contact the pt to advise.    Contact info [phone]

## 2020-05-22 ENCOUNTER — PATIENT MESSAGE (OUTPATIENT)
Dept: RHEUMATOLOGY | Facility: CLINIC | Age: 60
End: 2020-05-22

## 2020-05-25 ENCOUNTER — TELEPHONE (OUTPATIENT)
Dept: RHEUMATOLOGY | Facility: CLINIC | Age: 60
End: 2020-05-25

## 2020-05-25 ENCOUNTER — OFFICE VISIT (OUTPATIENT)
Dept: RHEUMATOLOGY | Facility: CLINIC | Age: 60
End: 2020-05-25
Payer: MEDICARE

## 2020-05-25 DIAGNOSIS — I82.5Y3 CHRONIC DEEP VEIN THROMBOSIS (DVT) OF PROXIMAL VEIN OF BOTH LOWER EXTREMITIES: ICD-10-CM

## 2020-05-25 DIAGNOSIS — Z79.899 HIGH RISK MEDICATION USE: ICD-10-CM

## 2020-05-25 DIAGNOSIS — M05.79 RHEUMATOID ARTHRITIS INVOLVING MULTIPLE SITES WITH POSITIVE RHEUMATOID FACTOR: Primary | ICD-10-CM

## 2020-05-25 DIAGNOSIS — M79.7 FIBROMYALGIA: ICD-10-CM

## 2020-05-25 DIAGNOSIS — G47.33 OSA ON CPAP: ICD-10-CM

## 2020-05-25 PROCEDURE — 99442 PR PHYSICIAN TELEPHONE EVALUATION 11-20 MIN: CPT | Mod: 95,,, | Performed by: INTERNAL MEDICINE

## 2020-05-25 PROCEDURE — 99442 PR PHYSICIAN TELEPHONE EVALUATION 11-20 MIN: ICD-10-PCS | Mod: 95,,, | Performed by: INTERNAL MEDICINE

## 2020-05-25 RX ORDER — PREDNISONE 5 MG/1
10 TABLET ORAL 2 TIMES DAILY
Qty: 120 TABLET | Refills: 2 | Status: SHIPPED | OUTPATIENT
Start: 2020-05-25 | End: 2020-07-14

## 2020-05-25 NOTE — TELEPHONE ENCOUNTER
----- Message from Bry Costa sent at 5/25/2020  9:57 AM CDT -----  Contact: self  Mg  Pt is having serious pain would like to speak w  or nurse     Contact

## 2020-05-25 NOTE — PROGRESS NOTES
Established Patient - Audio Only Telehealth Visit     The patient location is: home   The chief complaint leading to consultation is: RA , FMS, pain  Visit type: Virtual visit with audio only (telephone)  Total time spent with patient: 15 min on phone; 20 min on chart       The reason for the audio only service rather than synchronous audio and video virtual visit was related to technical difficulties or patient preference/necessity.     Each patient to whom I provide medical services by telemedicine is:  (1) informed of the relationship between the physician and patient and the respective role of any other health care provider with respect to management of the patient; and (2) notified that they may decline to receive medical services by telemedicine and may withdraw from such care at any time. Patient verbally consented to receive this service via voice-only telephone call.       HPI:   Hx RA dx 2003; dx at [facility] ; started with hands and all over body  On prednisone 5 mg/d since 2003  Rx here 2007 Dr Max and Chung Rx MTX and Humira  MTX nausea with po and SQ  LFA tried 2007 by Dr Terry and she did not tolerate with nausea and hospitalization  2008 Dr Mann retried MTX; did not tolerate  2010-12 Dr Rosenbaum treated with Plaquenil(HCQ)  Humira quit working 2010 2010 Simponi tried - caused weak and shaky reaction  2011 Orencia tried - caused muscle aches, shakes and jaw pain  2011 Enbrel tried - did not work  Dr Paris [id number];  Had Rituxin 2016  Earlier 2017 took Xeljanz for a few months; had a lot of nausea and she felt arms and hands getting numb  SSZ July 2017 caused nausea and hepatitis       ENBREL retried  Aug 2017 - stopped Dec due to lack of effectiveness  Cimzia Jan 2018 caused urticarial rash   Actemra March 2018 - Oct 2019 with partial improvement  Nov 2019 started upadacitinib/Rinvoq; stopped April 2020 due to side effects    Current:  Now Kevzara       Nausea finally improved after stopping  Rinvoq     Meds:   Diazepam  Paxil just prescribed for anxiety  Dicyclomine and Zofran; has not needed since stopped Rinvoq  Dexilant; still takes daily   Metoprolol  Eliquis     Tizanidine 4 mg hs prn for muscle pain and leg cramps  Stopped Rinvoq April  Started Kevzara injection 2 weeks ago; has only injected once      Swelling in feet and ankles and hands and elbows  Difficulty getting a deep breath for 3 days; denies wheeze; some cough; she felt some pain in ribs; used her inhaler and it helped a little         Assessment and plan:      Problem List as of 5/25/2020 Reviewed: 5/25/2020  5:11 PM by Ten Rodriguez MD       Active Problems    Rheumatoid arthritis with positive rheumatoid factor    Last Assessment & Plan 5/25/2020 Office Visit Written 5/25/2020  5:08 PM by Ten Rodriguez MD     RA is ongoing and has only had 1 shot of Kevzara   She has pain amplified by Fibromyalgia but often severe synovitis    I will prescribe prednisone in effort to reduce RA sx while for first 2 months of Kevzara then reassess and taper prednisone    Schedule lab late June and keep July appt with me    Meantime she will f/u with pain clinic for help with pain management         High risk medication use    Last Assessment & Plan 5/25/2020 Office Visit Written 5/25/2020  5:09 PM by Ten Rodriguez MD     She has multiple somatic c/o but none that are likely related to Kevzara injection           Fibromyalgia    Last Assessment & Plan 5/25/2020 Office Visit Written 5/25/2020  5:10 PM by Ten Rodriguez MD     Diffuse body pain and allodynia in spite of  tizanidine,          Deep vein thrombosis (DVT) of proximal vein of both lower extremities    Last Assessment & Plan 5/25/2020 Office Visit Written 5/25/2020  5:10 PM by Ten Rodriguez MD     Continues on Eliquis                       This service was not originating from a related E/M service provided within the previous 7 days nor will  to an  E/M service or procedure within the next 24 hours or my soonest available appointment.  Prevailing standard of care was able to be met in this audio-only visit.

## 2020-05-25 NOTE — ASSESSMENT & PLAN NOTE
RA is ongoing and has only had 1 shot of Kevzara   She has pain amplified by Fibromyalgia but often severe synovitis    I will prescribe prednisone in effort to reduce RA sx while for first 2 months of Kevzara then reassess and taper prednisone    Schedule lab late June and keep July appt with me    Meantime she will f/u with pain clinic for help with pain management

## 2020-05-25 NOTE — TELEPHONE ENCOUNTER
Patient states she is in a lot of pain.  She has swelling in her joints, hands elbows and top of feet.  Patient would like to speak to Dr. Rodriguez,

## 2020-06-02 ENCOUNTER — TELEPHONE (OUTPATIENT)
Dept: PHARMACY | Facility: CLINIC | Age: 60
End: 2020-06-02

## 2020-06-02 NOTE — TELEPHONE ENCOUNTER
Refill call regarding Ronnjulia at [facility]. Copay $0. Patient is in need of a refill, will ship  to arrive  with patient consent. Patient has not had any medication/ dose or instruction changes. No new allergies or side effects reported with this shipment. Medication is being taken as prescribed by physician and properly stored. Two patient identifiers:  and Address verified. Patient began taking prednisone, and no interactions exist per [facility]. Sharps container needed. Next injection .

## 2020-06-06 ENCOUNTER — TELEPHONE (OUTPATIENT)
Dept: RHEUMATOLOGY | Facility: CLINIC | Age: 60
End: 2020-06-06

## 2020-06-06 NOTE — TELEPHONE ENCOUNTER
Received call from patient     She is currently on 10mg BID of prednisone for RA flare.  She is experiencing side effects (flutter, discomfort, nausea) which is she is attributing to the usage of steroid.    States that the steroid helped with her swelling.  Taken only 2 doses of Kevzara at this time.      Recommended patient to decrease steroid to 10mg daily to see if side effects would decrease/alleviated.  Explained that steroid will help alleviated her RA flare while Kevzara would take some time to work.      Patient will message rheumatology if she continues to experience symptoms with the decrease steroid.

## 2020-06-08 DIAGNOSIS — M05.79 RHEUMATOID ARTHRITIS INVOLVING MULTIPLE SITES WITH POSITIVE RHEUMATOID FACTOR: ICD-10-CM

## 2020-06-08 DIAGNOSIS — Z79.899 HIGH RISK MEDICATION USE: ICD-10-CM

## 2020-06-11 DIAGNOSIS — Z79.899 HIGH RISK MEDICATION USE: ICD-10-CM

## 2020-06-11 DIAGNOSIS — M05.79 RHEUMATOID ARTHRITIS INVOLVING MULTIPLE SITES WITH POSITIVE RHEUMATOID FACTOR: ICD-10-CM

## 2020-06-18 ENCOUNTER — OFFICE VISIT (OUTPATIENT)
Dept: RHEUMATOLOGY | Facility: CLINIC | Age: 60
End: 2020-06-18
Payer: MEDICARE

## 2020-06-18 VITALS
HEART RATE: 80 BPM | TEMPERATURE: 98 F | DIASTOLIC BLOOD PRESSURE: 81 MMHG | SYSTOLIC BLOOD PRESSURE: 137 MMHG | BODY MASS INDEX: 34.96 KG/M2 | HEIGHT: 61 IN | WEIGHT: 185.19 LBS

## 2020-06-18 DIAGNOSIS — M05.79 RHEUMATOID ARTHRITIS INVOLVING MULTIPLE SITES WITH POSITIVE RHEUMATOID FACTOR: Primary | ICD-10-CM

## 2020-06-18 DIAGNOSIS — M79.7 FIBROMYALGIA: ICD-10-CM

## 2020-06-18 DIAGNOSIS — Z79.899 HIGH RISK MEDICATION USE: ICD-10-CM

## 2020-06-18 PROCEDURE — 99999 PR PBB SHADOW E&M-EST. PATIENT-LVL IV: CPT | Mod: PBBFAC,,, | Performed by: INTERNAL MEDICINE

## 2020-06-18 PROCEDURE — 99214 OFFICE O/P EST MOD 30 MIN: CPT | Mod: S$GLB,,, | Performed by: INTERNAL MEDICINE

## 2020-06-18 PROCEDURE — 99214 PR OFFICE/OUTPT VISIT, EST, LEVL IV, 30-39 MIN: ICD-10-PCS | Mod: S$GLB,,, | Performed by: INTERNAL MEDICINE

## 2020-06-18 PROCEDURE — 3008F BODY MASS INDEX DOCD: CPT | Mod: CPTII,S$GLB,, | Performed by: INTERNAL MEDICINE

## 2020-06-18 PROCEDURE — 3008F PR BODY MASS INDEX (BMI) DOCUMENTED: ICD-10-PCS | Mod: CPTII,S$GLB,, | Performed by: INTERNAL MEDICINE

## 2020-06-18 PROCEDURE — 99999 PR PBB SHADOW E&M-EST. PATIENT-LVL IV: ICD-10-PCS | Mod: PBBFAC,,, | Performed by: INTERNAL MEDICINE

## 2020-06-18 RX ORDER — PAROXETINE HYDROCHLORIDE HEMIHYDRATE 12.5 MG/1
TABLET, FILM COATED, EXTENDED RELEASE ORAL
COMMUNITY
Start: 2020-05-30 | End: 2020-10-14

## 2020-06-18 RX ORDER — CELECOXIB 200 MG/1
200 CAPSULE ORAL DAILY PRN
Qty: 30 CAPSULE | Refills: 2 | Status: SHIPPED | OUTPATIENT
Start: 2020-06-18 | End: 2020-09-08

## 2020-06-18 RX ORDER — HYDROCODONE BITARTRATE AND ACETAMINOPHEN 10; 325 MG/1; MG/1
1 TABLET ORAL EVERY 8 HOURS PRN
COMMUNITY
Start: 2020-06-11 | End: 2023-02-06

## 2020-06-18 RX ORDER — PANTOPRAZOLE SODIUM 40 MG/1
40 TABLET, DELAYED RELEASE ORAL DAILY
COMMUNITY
Start: 2020-06-03

## 2020-06-18 ASSESSMENT — ROUTINE ASSESSMENT OF PATIENT INDEX DATA (RAPID3)
FATIGUE SCORE: 8
WHEN YOU AWAKENED IN THE MORNING OVER THE LAST WEEK, PLEASE INDICATE THE AMOUNT OF TIME IT TAKES UNTIL YOU ARE AS LIMBER AS YOU WILL BE FOR THE DAY: 1 H
MDHAQ FUNCTION SCORE: 0.8
PATIENT GLOBAL ASSESSMENT SCORE: 6.5
AM STIFFNESS SCORE: 1, YES
PSYCHOLOGICAL DISTRESS SCORE: 9.9

## 2020-06-18 NOTE — PROGRESS NOTES
Subjective:       Patient ID: Robert Smith is a 59 y.o. female.    Chief Complaint: Disease Management    Hx RA dx 2003; dx at [facility] ; started with hands and all over body  On prednisone 5 mg/d since 2003  Rx here 2007 Dr Max and Zakem Rx MTX and Humira  MTX nausea with po and SQ  LFA tried 2007 by Dr Terry and she did not tolerate with nausea and hospitalization  2008 Dr Mann retried MTX; did not tolerate  2010-12 Dr Rosenbaum treated with Plaquenil(HCQ)  Humira quit working 2010 2010 Simponi tried - caused weak and shaky reaction  2011 Orencia tried - caused muscle aches, shakes and jaw pain  2011 Enbrel tried - did not work  Dr Paris [id number];  Had Rituxin 2016  Earlier 2017 took Xeljanz for a few months; had a lot of nausea and she felt arms and hands getting numb  SSZ July 2017 caused nausea and hepatitis       ENBREL retried  Aug 2017 - stopped Dec due to lack of effectiveness  Cimzia Jan 2018 caused urticarial rash   Actemra March 2018 - Oct 2019 with partial improvement  Nov 2019 started upadacitinib/Rinvoq; stopped April 2020 due to side effects     Current:  Now Kevzara approx 6 weeks (3 shots so far, q2wk)     Meds:   Diazepam prn (couple days/ week; usually 1/2 tab)  Zofran  Metoprolol some days; not when BP is low  Eliquis  Protonix      Pain better since Kevzara  Still with nausea  Heart fluttering stopped when stopped prednisone  Had GI tests that were ok  Leg cramps at night; vascular u/s showed no blood clots  Trying to walk more    Has reduced hydrocodone to BID most days    Review of Systems   Constitutional: Negative for fatigue, fever and unexpected weight change.   HENT: Negative for mouth sores and trouble swallowing.         Dry mouth   Eyes: Negative for redness.        Dry eyes   Respiratory: Negative for cough and shortness of breath.    Cardiovascular: Negative for chest pain.   Gastrointestinal: Positive for constipation. Negative for abdominal pain and diarrhea.   Genitourinary:  "Negative for dysuria and genital sores.   Skin: Negative for color change and rash.   Neurological: Positive for headaches.   Hematological: Negative for adenopathy. Does not bruise/bleed easily.   Psychiatric/Behavioral: Negative for sleep disturbance.         Objective:   /81   Pulse 80   Temp 98.4 °F (36.9 °C)   Ht 5' 1" (1.549 m)   Wt 84 kg (185 lb 3 oz)   BMI 34.99 kg/m²      Physical Exam   Constitutional: She is well-developed, well-nourished, and in no distress.   HENT:   Mouth/Throat: Oropharynx is clear and moist.   Eyes: Conjunctivae are normal.   Cardiovascular: Normal rate and regular rhythm.    Pulmonary/Chest: No respiratory distress. She has no wheezes. She has no rales.   No increased work of breathing   Lymphadenopathy:     She has no cervical adenopathy.   Neurological:   Alert, awake, with no abnormal movements   Skin: No rash noted.     No rash on face; skin clear   Psychiatric:   Normal mood and affect; clear, rational thinking; speech normal and not pressured         10/16/2019 2/17/2020 6/18/2020   HERNANDEZ-28 (ESR) 6.48 (High disease activity) 3.64 (Moderate disease activity) 6.96 (High disease activity)   HERNANDEZ-28 (CRP) 5.89 (High disease activity) 3.1 (Low disease activity) 6.62 (High disease activity)   Tender (HERNANDEZ-28) 24 / 28 1 / 28 16 / 28    Swollen (HERNANDEZ-28) 4 / 28  3 / 28  12 / 28    Provider Global 85 mm 25 mm 75 mm   Patient Global 80 mm 50 mm 65 mm   ESR 19 mm/hr 15 mm/hr 58 mm/hr   CRP 3.1 mg/L 2 mg/L 70.2 mg/L     Lab Results   Component Value Date    SEDRATE 58 (H) 06/19/2020     Lab Results   Component Value Date    CRP 70.2 (H) 06/19/2020         Assessment:       1. Rheumatoid arthritis involving multiple sites with positive rheumatoid factor    2. High risk medication use    3. Fibromyalgia            Plan:       Problem List Items Addressed This Visit        Active Problems    Rheumatoid arthritis with positive rheumatoid factor - Primary     RA very active but " subjectively better with Kevzara    Stomach remains an issue with chronic nausea  Will try adding celecoxib for additional relief; she has GI intolerance to other nsaids including meloxicam    Will get lab today and plan to continue Kevzara if lab ok  Then RTC me in 3 month with lab         Relevant Medications    celecoxib (CELEBREX) 200 MG capsule    High risk medication use     So far no new AE on Kevzara and nausea no worse than previous    Will get lab to monitor for med AE         Fibromyalgia     Still symptomatic   Trying to exercise more           lab in 3m and RTC after  Get lipid with next lab on account of IL-6 inhibitor

## 2020-06-18 NOTE — ASSESSMENT & PLAN NOTE
So far no new AE on Kevzara and nausea no worse than previous    Will get lab to monitor for med AE

## 2020-06-18 NOTE — ASSESSMENT & PLAN NOTE
RA very active but subjectively better with Kevzara    Stomach remains an issue with chronic nausea  Will try adding celecoxib for additional relief; she has GI intolerance to other nsaids including meloxicam    Will get lab today and plan to continue Kevzara if lab ok  Then RTC me in 3 month with lab

## 2020-06-19 ENCOUNTER — LAB VISIT (OUTPATIENT)
Dept: LAB | Facility: HOSPITAL | Age: 60
End: 2020-06-19
Attending: INTERNAL MEDICINE
Payer: MEDICARE

## 2020-06-19 DIAGNOSIS — Z79.899 HIGH RISK MEDICATION USE: ICD-10-CM

## 2020-06-19 LAB
ALBUMIN SERPL BCP-MCNC: 2.8 G/DL (ref 3.5–5.2)
ALP SERPL-CCNC: 97 U/L (ref 55–135)
ALT SERPL W/O P-5'-P-CCNC: 18 U/L (ref 10–44)
ANION GAP SERPL CALC-SCNC: 5 MMOL/L (ref 8–16)
AST SERPL-CCNC: 17 U/L (ref 10–40)
BASOPHILS # BLD AUTO: 0.02 K/UL (ref 0–0.2)
BASOPHILS NFR BLD: 0.5 % (ref 0–1.9)
BILIRUB SERPL-MCNC: 0.9 MG/DL (ref 0.1–1)
BUN SERPL-MCNC: 16 MG/DL (ref 8–23)
CALCIUM SERPL-MCNC: 10.2 MG/DL (ref 8.7–10.5)
CHLORIDE SERPL-SCNC: 105 MMOL/L (ref 95–110)
CO2 SERPL-SCNC: 29 MMOL/L (ref 23–29)
CREAT SERPL-MCNC: 0.7 MG/DL (ref 0.5–1.4)
CRP SERPL-MCNC: 70.2 MG/L (ref 0–8.2)
DIFFERENTIAL METHOD: ABNORMAL
EOSINOPHIL # BLD AUTO: 0.1 K/UL (ref 0–0.5)
EOSINOPHIL NFR BLD: 3.1 % (ref 0–8)
ERYTHROCYTE [DISTWIDTH] IN BLOOD BY AUTOMATED COUNT: 13.7 % (ref 11.5–14.5)
ERYTHROCYTE [SEDIMENTATION RATE] IN BLOOD BY WESTERGREN METHOD: 58 MM/HR (ref 0–36)
EST. GFR  (AFRICAN AMERICAN): >60 ML/MIN/1.73 M^2
EST. GFR  (NON AFRICAN AMERICAN): >60 ML/MIN/1.73 M^2
GLUCOSE SERPL-MCNC: 80 MG/DL (ref 70–110)
HCT VFR BLD AUTO: 40.4 % (ref 37–48.5)
HGB BLD-MCNC: 12.3 G/DL (ref 12–16)
IMM GRANULOCYTES # BLD AUTO: 0.02 K/UL (ref 0–0.04)
IMM GRANULOCYTES NFR BLD AUTO: 0.5 % (ref 0–0.5)
LYMPHOCYTES # BLD AUTO: 1.1 K/UL (ref 1–4.8)
LYMPHOCYTES NFR BLD: 28.2 % (ref 18–48)
MCH RBC QN AUTO: 30.4 PG (ref 27–31)
MCHC RBC AUTO-ENTMCNC: 30.4 G/DL (ref 32–36)
MCV RBC AUTO: 100 FL (ref 82–98)
MONOCYTES # BLD AUTO: 0.3 K/UL (ref 0.3–1)
MONOCYTES NFR BLD: 6.7 % (ref 4–15)
NEUTROPHILS # BLD AUTO: 2.4 K/UL (ref 1.8–7.7)
NEUTROPHILS NFR BLD: 61 % (ref 38–73)
NRBC BLD-RTO: 0 /100 WBC
PLATELET # BLD AUTO: 136 K/UL (ref 150–350)
PMV BLD AUTO: 11.3 FL (ref 9.2–12.9)
POTASSIUM SERPL-SCNC: 4.7 MMOL/L (ref 3.5–5.1)
PROT SERPL-MCNC: 6 G/DL (ref 6–8.4)
RBC # BLD AUTO: 4.05 M/UL (ref 4–5.4)
SODIUM SERPL-SCNC: 139 MMOL/L (ref 136–145)
WBC # BLD AUTO: 3.87 K/UL (ref 3.9–12.7)

## 2020-06-19 PROCEDURE — 85652 RBC SED RATE AUTOMATED: CPT

## 2020-06-19 PROCEDURE — 36415 COLL VENOUS BLD VENIPUNCTURE: CPT | Mod: PO

## 2020-06-19 PROCEDURE — 85025 COMPLETE CBC W/AUTO DIFF WBC: CPT

## 2020-06-19 PROCEDURE — 86140 C-REACTIVE PROTEIN: CPT

## 2020-06-19 PROCEDURE — 80053 COMPREHEN METABOLIC PANEL: CPT

## 2020-06-30 ENCOUNTER — TELEPHONE (OUTPATIENT)
Dept: PHARMACY | Facility: CLINIC | Age: 60
End: 2020-06-30

## 2020-07-06 ENCOUNTER — TELEPHONE (OUTPATIENT)
Dept: RHEUMATOLOGY | Facility: CLINIC | Age: 60
End: 2020-07-06

## 2020-07-06 DIAGNOSIS — M79.7 FIBROMYALGIA: ICD-10-CM

## 2020-07-06 NOTE — TELEPHONE ENCOUNTER
----- Message from Karena Gambino sent at 7/6/2020  2:44 PM CDT -----  Regarding: speak with nurse  Contact: patient  [phone]-please call patient need to speak with the nurse said medication not working still in a lot of pain waiting on a call from the nurse thanks.

## 2020-07-06 NOTE — TELEPHONE ENCOUNTER
Patient stating the celebrex is not working at all, she taking 200mg once a day.  She is still having joint pains.  Please call patient to discuss.

## 2020-07-07 RX ORDER — CYANOCOBALAMIN 1000 UG/ML
INJECTION, SOLUTION INTRAMUSCULAR; SUBCUTANEOUS
COMMUNITY
Start: 2020-06-18

## 2020-07-07 RX ORDER — GABAPENTIN 100 MG/1
100 CAPSULE ORAL 2 TIMES DAILY PRN
Qty: 60 CAPSULE | Refills: 2 | Status: SHIPPED | OUTPATIENT
Start: 2020-07-07 | End: 2020-07-07

## 2020-07-14 ENCOUNTER — TELEPHONE (OUTPATIENT)
Dept: RHEUMATOLOGY | Facility: CLINIC | Age: 60
End: 2020-07-14

## 2020-07-14 NOTE — TELEPHONE ENCOUNTER
----- Message from Karena Gambino sent at 7/14/2020  9:28 AM CDT -----  Regarding: speak with nurse  Contact: patient  [phone]-please call patient said she is in pain and very swollen need to speak with the nurse concerning this waiting on a call back thanks.

## 2020-07-14 NOTE — TELEPHONE ENCOUNTER
Telephone:    Worse over last 2 weeks  Started swelling last week  Now cannot put shoes on  Hands and elbows are swollen  Elbows and shoulders hurt  Knees hurt and swollen  Pain in legs    Off of prednisone  Taking celebrex  Taking gabapentin 100 mg BID; she tried 300 mg hs with no help  Taking Kevzara ; last injection July 7; takes every 2 weeks  On norco for pain  Stopped the vitamin    Willing to come in Friday morning; please add her at 10:30 am

## 2020-07-14 NOTE — TELEPHONE ENCOUNTER
"Patient states her legs and feet are swollen twice their size, she has "knots" on elbow and on top of shoulder, and she cannot lift her hands to  Brush her hair.  Please call patient to discuss.  "

## 2020-07-17 ENCOUNTER — LAB VISIT (OUTPATIENT)
Dept: LAB | Facility: HOSPITAL | Age: 60
End: 2020-07-17
Attending: INTERNAL MEDICINE
Payer: MEDICARE

## 2020-07-17 ENCOUNTER — OFFICE VISIT (OUTPATIENT)
Dept: RHEUMATOLOGY | Facility: CLINIC | Age: 60
End: 2020-07-17
Payer: MEDICARE

## 2020-07-17 VITALS
HEART RATE: 69 BPM | SYSTOLIC BLOOD PRESSURE: 126 MMHG | HEIGHT: 61 IN | DIASTOLIC BLOOD PRESSURE: 73 MMHG | BODY MASS INDEX: 37 KG/M2 | TEMPERATURE: 98 F | WEIGHT: 196 LBS

## 2020-07-17 DIAGNOSIS — M05.79 RHEUMATOID ARTHRITIS INVOLVING MULTIPLE SITES WITH POSITIVE RHEUMATOID FACTOR: Primary | ICD-10-CM

## 2020-07-17 DIAGNOSIS — Z79.899 HIGH RISK MEDICATION USE: ICD-10-CM

## 2020-07-17 DIAGNOSIS — M79.7 FIBROMYALGIA: ICD-10-CM

## 2020-07-17 LAB
ALBUMIN SERPL BCP-MCNC: 2.9 G/DL (ref 3.5–5.2)
ALP SERPL-CCNC: 134 U/L (ref 55–135)
ALT SERPL W/O P-5'-P-CCNC: 9 U/L (ref 10–44)
ANION GAP SERPL CALC-SCNC: 6 MMOL/L (ref 8–16)
AST SERPL-CCNC: 15 U/L (ref 10–40)
BASOPHILS # BLD AUTO: 0.01 K/UL (ref 0–0.2)
BASOPHILS NFR BLD: 0.3 % (ref 0–1.9)
BILIRUB SERPL-MCNC: 0.6 MG/DL (ref 0.1–1)
BUN SERPL-MCNC: 11 MG/DL (ref 8–23)
CALCIUM SERPL-MCNC: 10.2 MG/DL (ref 8.7–10.5)
CHLORIDE SERPL-SCNC: 106 MMOL/L (ref 95–110)
CO2 SERPL-SCNC: 30 MMOL/L (ref 23–29)
CREAT SERPL-MCNC: 0.7 MG/DL (ref 0.5–1.4)
CRP SERPL-MCNC: 38.7 MG/L (ref 0–8.2)
DIFFERENTIAL METHOD: ABNORMAL
EOSINOPHIL # BLD AUTO: 0.3 K/UL (ref 0–0.5)
EOSINOPHIL NFR BLD: 9.5 % (ref 0–8)
ERYTHROCYTE [DISTWIDTH] IN BLOOD BY AUTOMATED COUNT: 14.3 % (ref 11.5–14.5)
ERYTHROCYTE [SEDIMENTATION RATE] IN BLOOD BY WESTERGREN METHOD: 59 MM/HR (ref 0–36)
EST. GFR  (AFRICAN AMERICAN): >60 ML/MIN/1.73 M^2
EST. GFR  (NON AFRICAN AMERICAN): >60 ML/MIN/1.73 M^2
GLUCOSE SERPL-MCNC: 90 MG/DL (ref 70–110)
HBV CORE AB SERPL QL IA: NEGATIVE
HBV SURFACE AB SER-ACNC: NEGATIVE M[IU]/ML
HBV SURFACE AG SERPL QL IA: NEGATIVE
HCT VFR BLD AUTO: 42.1 % (ref 37–48.5)
HGB BLD-MCNC: 12.9 G/DL (ref 12–16)
IMM GRANULOCYTES # BLD AUTO: 0.01 K/UL (ref 0–0.04)
IMM GRANULOCYTES NFR BLD AUTO: 0.3 % (ref 0–0.5)
LYMPHOCYTES # BLD AUTO: 0.8 K/UL (ref 1–4.8)
LYMPHOCYTES NFR BLD: 22.5 % (ref 18–48)
MCH RBC QN AUTO: 29.5 PG (ref 27–31)
MCHC RBC AUTO-ENTMCNC: 30.6 G/DL (ref 32–36)
MCV RBC AUTO: 96 FL (ref 82–98)
MONOCYTES # BLD AUTO: 0.1 K/UL (ref 0.3–1)
MONOCYTES NFR BLD: 3.7 % (ref 4–15)
NEUTROPHILS # BLD AUTO: 2.2 K/UL (ref 1.8–7.7)
NEUTROPHILS NFR BLD: 63.7 % (ref 38–73)
NRBC BLD-RTO: 0 /100 WBC
PLATELET # BLD AUTO: 187 K/UL (ref 150–350)
PMV BLD AUTO: 10.1 FL (ref 9.2–12.9)
POTASSIUM SERPL-SCNC: 4.9 MMOL/L (ref 3.5–5.1)
PROT SERPL-MCNC: 6.5 G/DL (ref 6–8.4)
RBC # BLD AUTO: 4.37 M/UL (ref 4–5.4)
SODIUM SERPL-SCNC: 142 MMOL/L (ref 136–145)
WBC # BLD AUTO: 3.47 K/UL (ref 3.9–12.7)

## 2020-07-17 PROCEDURE — 85652 RBC SED RATE AUTOMATED: CPT

## 2020-07-17 PROCEDURE — 99214 PR OFFICE/OUTPT VISIT, EST, LEVL IV, 30-39 MIN: ICD-10-PCS | Mod: 25,S$GLB,, | Performed by: INTERNAL MEDICINE

## 2020-07-17 PROCEDURE — 87340 HEPATITIS B SURFACE AG IA: CPT

## 2020-07-17 PROCEDURE — 86704 HEP B CORE ANTIBODY TOTAL: CPT

## 2020-07-17 PROCEDURE — 86140 C-REACTIVE PROTEIN: CPT

## 2020-07-17 PROCEDURE — 99999 PR PBB SHADOW E&M-EST. PATIENT-LVL V: CPT | Mod: PBBFAC,,, | Performed by: INTERNAL MEDICINE

## 2020-07-17 PROCEDURE — 96372 THER/PROPH/DIAG INJ SC/IM: CPT | Mod: S$GLB,,, | Performed by: INTERNAL MEDICINE

## 2020-07-17 PROCEDURE — 96372 PR INJECTION,THERAP/PROPH/DIAG2ST, IM OR SUBCUT: ICD-10-PCS | Mod: S$GLB,,, | Performed by: INTERNAL MEDICINE

## 2020-07-17 PROCEDURE — 80053 COMPREHEN METABOLIC PANEL: CPT

## 2020-07-17 PROCEDURE — 99999 PR PBB SHADOW E&M-EST. PATIENT-LVL V: ICD-10-PCS | Mod: PBBFAC,,, | Performed by: INTERNAL MEDICINE

## 2020-07-17 PROCEDURE — 3008F PR BODY MASS INDEX (BMI) DOCUMENTED: ICD-10-PCS | Mod: CPTII,S$GLB,, | Performed by: INTERNAL MEDICINE

## 2020-07-17 PROCEDURE — 3008F BODY MASS INDEX DOCD: CPT | Mod: CPTII,S$GLB,, | Performed by: INTERNAL MEDICINE

## 2020-07-17 PROCEDURE — 85025 COMPLETE CBC W/AUTO DIFF WBC: CPT

## 2020-07-17 PROCEDURE — 86706 HEP B SURFACE ANTIBODY: CPT

## 2020-07-17 PROCEDURE — 99214 OFFICE O/P EST MOD 30 MIN: CPT | Mod: 25,S$GLB,, | Performed by: INTERNAL MEDICINE

## 2020-07-17 RX ORDER — GABAPENTIN 300 MG/1
300 CAPSULE ORAL 3 TIMES DAILY
Qty: 90 CAPSULE | Refills: 2 | Status: SHIPPED | OUTPATIENT
Start: 2020-07-17 | End: 2020-10-09

## 2020-07-17 RX ORDER — HEPARIN 100 UNIT/ML
500 SYRINGE INTRAVENOUS
Status: CANCELLED | OUTPATIENT
Start: 2020-07-20

## 2020-07-17 RX ORDER — ACETAMINOPHEN 325 MG/1
650 TABLET ORAL
Status: CANCELLED | OUTPATIENT
Start: 2020-07-20

## 2020-07-17 RX ORDER — KETOROLAC TROMETHAMINE 30 MG/ML
60 INJECTION, SOLUTION INTRAMUSCULAR; INTRAVENOUS
Status: COMPLETED | OUTPATIENT
Start: 2020-07-17 | End: 2020-07-17

## 2020-07-17 RX ORDER — SODIUM CHLORIDE 0.9 % (FLUSH) 0.9 %
10 SYRINGE (ML) INJECTION
Status: CANCELLED | OUTPATIENT
Start: 2020-07-20

## 2020-07-17 RX ORDER — FAMOTIDINE 10 MG/ML
20 INJECTION INTRAVENOUS
Status: CANCELLED | OUTPATIENT
Start: 2020-07-20

## 2020-07-17 RX ADMIN — KETOROLAC TROMETHAMINE 60 MG: 30 INJECTION, SOLUTION INTRAMUSCULAR; INTRAVENOUS at 12:07

## 2020-07-17 ASSESSMENT — ROUTINE ASSESSMENT OF PATIENT INDEX DATA (RAPID3)
WHEN YOU AWAKENED IN THE MORNING OVER THE LAST WEEK, PLEASE INDICATE THE AMOUNT OF TIME IT TAKES UNTIL YOU ARE AS LIMBER AS YOU WILL BE FOR THE DAY: 1
MDHAQ FUNCTION SCORE: 1.2
AM STIFFNESS SCORE: 1, YES
PSYCHOLOGICAL DISTRESS SCORE: 9.9
FATIGUE SCORE: 8
TOTAL RAPID3 SCORE: 8
PATIENT GLOBAL ASSESSMENT SCORE: 10
PAIN SCORE: 10

## 2020-07-17 NOTE — ASSESSMENT & PLAN NOTE
RA much worse in spite of Kevzara    She has failed all previous agents    She only had RTX once at [facility] so can try this again

## 2020-07-17 NOTE — ASSESSMENT & PLAN NOTE
Pain is severe and unrelieved by hydrocodone    Will increase gabapentin 300 tid  Will give a shot of toradol today

## 2020-07-17 NOTE — PROGRESS NOTES
Subjective:       Patient ID: Robert Smith is a 59 y.o. female.    Chief Complaint: Disease Management and Pain    Hx RA dx 2003; dx at [facility] ; started with hands and all over body  On prednisone 5 mg/d since 2003  Rx here 2007 Dr Max and Violettekem Rx MTX and Humira  MTX nausea with po and SQ  LFA tried 2007 by Dr Terry and she did not tolerate with nausea and hospitalization  2008 Dr Mann retried MTX; did not tolerate  2010-12 Dr Rosenbaum treated with Plaquenil(HCQ)  Humira quit working 2010 2010 Simponi tried - caused weak and shaky reaction  2011 Orencia tried - caused muscle aches, shakes and jaw pain  2011 Enbrel tried - did not work  Dr Paris [id number];  Had Rituxin 2016  Earlier 2017 took Xeljanz for a few months; had a lot of nausea and she felt arms and hands getting numb  SSZ July 2017 caused nausea and hepatitis   ENBREL retried  Aug 2017 - stopped Dec due to lack of effectiveness  Cimzia Jan 2018 caused urticarial rash  Actemra March 2018 - Oct 2019 with partial improvement  Nov 2019 started upadacitinib/Rinvoq; stopped April 2020 due to side effects     Current:  Now Kevzara since May     Meds:   Diazepam prn (couple days/ week; usually 1/2 tab)  Zofran  Metoprolol some days; not when BP is low  Eliquis  Protonix     Off of prednisone because of heart flutters  Pain is now worst it has ever been  Hands are swollen  Does not remember taking RTX in 2016    She increased gabapentin to 300 hs     Review of Systems   Constitutional: Negative for fatigue, fever and unexpected weight change.   HENT: Negative for mouth sores and trouble swallowing.         Dry mouth   Eyes: Negative for redness.        Dry eyes   Respiratory: Negative for cough and shortness of breath.    Cardiovascular: Negative for chest pain.   Gastrointestinal: Positive for constipation. Negative for abdominal pain and diarrhea.   Genitourinary: Negative for dysuria and genital sores.   Skin: Negative for color change and rash.  "  Neurological: Negative for headaches.   Hematological: Negative for adenopathy. Does not bruise/bleed easily.   Psychiatric/Behavioral: Negative for sleep disturbance.         Objective:   /73 (BP Location: Left arm, Patient Position: Sitting, BP Method: Large (Automatic))   Pulse 69   Temp 97.7 °F (36.5 °C) (Oral)   Ht 5' 1" (1.549 m)   Wt 88.9 kg (195 lb 15.8 oz)   BMI 37.03 kg/m²      Physical Exam                      10/16/2019 2/17/2020 6/18/2020 7/17/2020   HERNANDEZ-28 (ESR) 6.48 (High disease activity) 3.64 (Moderate disease activity) 6.96 (High disease activity) 8.55 (High disease activity)   HERNANDEZ-28 (CRP) 5.89 (High disease activity) 3.1 (Low disease activity) 6.62 (High disease activity) 8.2 (High disease activity)   Tender (HERNANDEZ-28) 24 / 28 1 / 28 16 / 28 28 / 28    Swollen (HERNANDEZ-28) 4 / 28  3 / 28  12 / 28  23 / 28    Provider Global 85 mm 25 mm 75 mm 100 mm   Patient Global 80 mm 50 mm 65 mm 100 mm   ESR 19 mm/hr 15 mm/hr 58 mm/hr 58 mm/hr   CRP 3.1 mg/L 2 mg/L 70.2 mg/L 70.2 mg/L     Lab Results   Component Value Date    SEDRATE 59 (H) 07/17/2020          Assessment:       1. Rheumatoid arthritis involving multiple sites with positive rheumatoid factor    2. High risk medication use    3. Fibromyalgia            Plan:       Problem List Items Addressed This Visit        Active Problems    Rheumatoid arthritis with positive rheumatoid factor - Primary     RA much worse in spite of Kevzara    She has failed all previous agents    She only had RTX once at [facility] so can try this again           Relevant Medications    ketorolac injection 60 mg (Completed)    Other Relevant Orders    COVID-19 Routine Screening    High risk medication use     Will order labs to clear for RTX         Relevant Orders    Hepatitis B Surface Antigen    Hepatitis B Core Antibody, Total    Hepatitis B Surface Ab, Qualitative    Quantiferon Gold TB    COVID-19 Routine Screening    Fibromyalgia     Pain is severe and " unrelieved by hydrocodone    Will increase gabapentin 300 tid  Will give a shot of toradol today         Relevant Medications    gabapentin (NEURONTIN) 300 MG capsule

## 2020-07-17 NOTE — PROGRESS NOTES
"Subjective:       Patient ID: Robert Smith is a 59 y.o. female.    Chief Complaint: Disease Management and Pain    HPI  Review of Systems      Objective:   /73 (BP Location: Left arm, Patient Position: Sitting, BP Method: Large (Automatic))   Pulse 69   Temp 97.7 °F (36.5 °C) (Oral)   Ht 5' 1" (1.549 m)   Wt 88.9 kg (195 lb 15.8 oz)   BMI 37.03 kg/m²      Physical Exam      10/16/2019 2/17/2020 6/18/2020   HERNANDEZ-28 (ESR) 6.48 (High disease activity) 3.64 (Moderate disease activity) 6.96 (High disease activity)   HERNANDEZ-28 (CRP) 5.89 (High disease activity) 3.1 (Low disease activity) 6.62 (High disease activity)   Tender (HERNANDEZ-28) 24 / 28 1 / 28 16 / 28    Swollen (HERNANDEZ-28) 4 / 28  3 / 28  12 / 28    Provider Global 85 mm 25 mm 75 mm   Patient Global 80 mm 50 mm 65 mm   ESR 19 mm/hr 15 mm/hr 58 mm/hr   CRP 3.1 mg/L 2 mg/L 70.2 mg/L     Lab Results   Component Value Date    SEDRATE 58 (H) 06/19/2020          Assessment:       No diagnosis found.        Plan:       Problem List Items Addressed This Visit     None              "

## 2020-07-22 DIAGNOSIS — Z79.899 HIGH RISK MEDICATION USE: Primary | ICD-10-CM

## 2020-07-24 ENCOUNTER — LAB VISIT (OUTPATIENT)
Dept: FAMILY MEDICINE | Facility: CLINIC | Age: 60
End: 2020-07-24
Payer: MEDICARE

## 2020-07-24 DIAGNOSIS — Z79.899 HIGH RISK MEDICATION USE: ICD-10-CM

## 2020-07-24 PROCEDURE — U0003 INFECTIOUS AGENT DETECTION BY NUCLEIC ACID (DNA OR RNA); SEVERE ACUTE RESPIRATORY SYNDROME CORONAVIRUS 2 (SARS-COV-2) (CORONAVIRUS DISEASE [COVID-19]), AMPLIFIED PROBE TECHNIQUE, MAKING USE OF HIGH THROUGHPUT TECHNOLOGIES AS DESCRIBED BY CMS-2020-01-R: HCPCS

## 2020-07-25 LAB — SARS-COV-2 RNA RESP QL NAA+PROBE: NOT DETECTED

## 2020-07-29 DIAGNOSIS — M05.79 RHEUMATOID ARTHRITIS INVOLVING MULTIPLE SITES WITH POSITIVE RHEUMATOID FACTOR: ICD-10-CM

## 2020-07-29 DIAGNOSIS — Z79.899 HIGH RISK MEDICATION USE: ICD-10-CM

## 2020-07-30 ENCOUNTER — TELEPHONE (OUTPATIENT)
Dept: RHEUMATOLOGY | Facility: CLINIC | Age: 60
End: 2020-07-30

## 2020-07-30 NOTE — TELEPHONE ENCOUNTER
Advised that Covid test was normal    She has RTX scheduled tomorrow    She has been in severe pain; took prednisone 5 mg daily x 2 and got a little bit of relief    Advised to take usual morning meds tomorrow as well as bring mid day meds with her

## 2020-07-30 NOTE — TELEPHONE ENCOUNTER
----- Message from Manasa Andersno sent at 7/30/2020  1:08 PM CDT -----  Pt would like to receive a call back regarding her covid test results  Please contact the pt to advise.    Contact info [phone]

## 2020-07-31 ENCOUNTER — TELEPHONE (OUTPATIENT)
Dept: PHARMACY | Facility: CLINIC | Age: 60
End: 2020-07-31

## 2020-07-31 ENCOUNTER — INFUSION (OUTPATIENT)
Dept: INFUSION THERAPY | Facility: HOSPITAL | Age: 60
End: 2020-07-31
Payer: MEDICARE

## 2020-07-31 VITALS
WEIGHT: 196 LBS | RESPIRATION RATE: 18 BRPM | DIASTOLIC BLOOD PRESSURE: 64 MMHG | BODY MASS INDEX: 37 KG/M2 | SYSTOLIC BLOOD PRESSURE: 135 MMHG | HEIGHT: 61 IN | HEART RATE: 69 BPM | OXYGEN SATURATION: 97 % | TEMPERATURE: 98 F

## 2020-07-31 DIAGNOSIS — M05.79 RHEUMATOID ARTHRITIS INVOLVING MULTIPLE SITES WITH POSITIVE RHEUMATOID FACTOR: Primary | ICD-10-CM

## 2020-07-31 PROCEDURE — 96375 TX/PRO/DX INJ NEW DRUG ADDON: CPT

## 2020-07-31 PROCEDURE — 25000003 PHARM REV CODE 250: Performed by: INTERNAL MEDICINE

## 2020-07-31 PROCEDURE — 96367 TX/PROPH/DG ADDL SEQ IV INF: CPT

## 2020-07-31 PROCEDURE — 96415 CHEMO IV INFUSION ADDL HR: CPT

## 2020-07-31 PROCEDURE — S0028 INJECTION, FAMOTIDINE, 20 MG: HCPCS | Performed by: INTERNAL MEDICINE

## 2020-07-31 PROCEDURE — 63600175 PHARM REV CODE 636 W HCPCS: Performed by: INTERNAL MEDICINE

## 2020-07-31 PROCEDURE — 96413 CHEMO IV INFUSION 1 HR: CPT

## 2020-07-31 PROCEDURE — A4216 STERILE WATER/SALINE, 10 ML: HCPCS | Performed by: INTERNAL MEDICINE

## 2020-07-31 RX ORDER — SODIUM CHLORIDE 0.9 % (FLUSH) 0.9 %
10 SYRINGE (ML) INJECTION
Status: CANCELLED | OUTPATIENT
Start: 2020-08-14

## 2020-07-31 RX ORDER — ACETAMINOPHEN 325 MG/1
650 TABLET ORAL
Status: COMPLETED | OUTPATIENT
Start: 2020-07-31 | End: 2020-07-31

## 2020-07-31 RX ORDER — HEPARIN 100 UNIT/ML
500 SYRINGE INTRAVENOUS
Status: CANCELLED | OUTPATIENT
Start: 2020-08-14

## 2020-07-31 RX ORDER — FAMOTIDINE 10 MG/ML
20 INJECTION INTRAVENOUS
Status: CANCELLED | OUTPATIENT
Start: 2020-08-14

## 2020-07-31 RX ORDER — FAMOTIDINE 10 MG/ML
20 INJECTION INTRAVENOUS
Status: COMPLETED | OUTPATIENT
Start: 2020-07-31 | End: 2020-07-31

## 2020-07-31 RX ORDER — SODIUM CHLORIDE 0.9 % (FLUSH) 0.9 %
10 SYRINGE (ML) INJECTION
Status: DISCONTINUED | OUTPATIENT
Start: 2020-07-31 | End: 2020-07-31 | Stop reason: HOSPADM

## 2020-07-31 RX ORDER — ACETAMINOPHEN 325 MG/1
650 TABLET ORAL
Status: CANCELLED | OUTPATIENT
Start: 2020-08-14

## 2020-07-31 RX ADMIN — DIPHENHYDRAMINE HYDROCHLORIDE 25 MG: 50 INJECTION INTRAMUSCULAR; INTRAVENOUS at 08:07

## 2020-07-31 RX ADMIN — RITUXIMAB 1000 MG: 10 INJECTION, SOLUTION INTRAVENOUS at 09:07

## 2020-07-31 RX ADMIN — Medication 10 ML: at 02:07

## 2020-07-31 RX ADMIN — SODIUM CHLORIDE: 0.9 INJECTION, SOLUTION INTRAVENOUS at 08:07

## 2020-07-31 RX ADMIN — ACETAMINOPHEN 650 MG: 325 TABLET ORAL at 08:07

## 2020-07-31 RX ADMIN — FAMOTIDINE 20 MG: 10 INJECTION, SOLUTION INTRAVENOUS at 08:07

## 2020-07-31 RX ADMIN — Medication 10 ML: at 08:07

## 2020-07-31 RX ADMIN — DEXTROSE MONOHYDRATE: 50 INJECTION, SOLUTION INTRAVENOUS at 08:07

## 2020-07-31 RX ADMIN — HYDROCORTISONE SODIUM SUCCINATE 100 MG: 100 INJECTION, POWDER, FOR SOLUTION INTRAMUSCULAR; INTRAVENOUS at 10:07

## 2020-07-31 NOTE — PLAN OF CARE
Duration of rituxan infusion tolerated with out further issues, vitals remained stable throughout. PIV flushed and removed catheter intact. Pt received verbal education on administered medications and questions answered to her satisfaction, scheduled to rtn 8/12/20 for labs and next infusion 8/14/20. D/c to home ambulatory  to transport pt home no distress.

## 2020-07-31 NOTE — NURSING
1030 rate increased to 200ml/hr @1040 pt begins to c/o scratchy throat, hoarseness  noted upon speach. rituxan infusion paused. Hydrocortisone 100mg administered IVP per protocal. Dr. Rodriguez's office notified.

## 2020-08-11 ENCOUNTER — TELEPHONE (OUTPATIENT)
Dept: RHEUMATOLOGY | Facility: CLINIC | Age: 60
End: 2020-08-11

## 2020-08-11 NOTE — TELEPHONE ENCOUNTER
----- Message from Rosalba Coronel sent at 8/11/2020 12:19 PM CDT -----  Regarding: ADVICE  Contact: self  Pt ask what vitamin she can take while she is getting her infusions Pt ask for a call      Contact info   [phone] (home)

## 2020-08-12 ENCOUNTER — LAB VISIT (OUTPATIENT)
Dept: LAB | Facility: HOSPITAL | Age: 60
End: 2020-08-12
Attending: INTERNAL MEDICINE
Payer: MEDICARE

## 2020-08-12 DIAGNOSIS — Z79.899 HIGH RISK MEDICATION USE: ICD-10-CM

## 2020-08-12 LAB
ALBUMIN SERPL BCP-MCNC: 2.4 G/DL (ref 3.5–5.2)
ALP SERPL-CCNC: 92 U/L (ref 55–135)
ALT SERPL W/O P-5'-P-CCNC: 10 U/L (ref 10–44)
ANION GAP SERPL CALC-SCNC: 4 MMOL/L (ref 8–16)
AST SERPL-CCNC: 14 U/L (ref 10–40)
BASOPHILS # BLD AUTO: 0.02 K/UL (ref 0–0.2)
BASOPHILS NFR BLD: 0.2 % (ref 0–1.9)
BILIRUB SERPL-MCNC: 0.5 MG/DL (ref 0.1–1)
BUN SERPL-MCNC: 18 MG/DL (ref 8–23)
CALCIUM SERPL-MCNC: 10 MG/DL (ref 8.7–10.5)
CHLORIDE SERPL-SCNC: 107 MMOL/L (ref 95–110)
CO2 SERPL-SCNC: 29 MMOL/L (ref 23–29)
CREAT SERPL-MCNC: 0.6 MG/DL (ref 0.5–1.4)
CRP SERPL-MCNC: 63.4 MG/L (ref 0–8.2)
DIFFERENTIAL METHOD: ABNORMAL
EOSINOPHIL # BLD AUTO: 0.4 K/UL (ref 0–0.5)
EOSINOPHIL NFR BLD: 4.2 % (ref 0–8)
ERYTHROCYTE [DISTWIDTH] IN BLOOD BY AUTOMATED COUNT: 14.2 % (ref 11.5–14.5)
ERYTHROCYTE [SEDIMENTATION RATE] IN BLOOD BY WESTERGREN METHOD: 115 MM/HR (ref 0–36)
EST. GFR  (AFRICAN AMERICAN): >60 ML/MIN/1.73 M^2
EST. GFR  (NON AFRICAN AMERICAN): >60 ML/MIN/1.73 M^2
GLUCOSE SERPL-MCNC: 82 MG/DL (ref 70–110)
HCT VFR BLD AUTO: 38.9 % (ref 37–48.5)
HGB BLD-MCNC: 11 G/DL (ref 12–16)
IMM GRANULOCYTES # BLD AUTO: 0.05 K/UL (ref 0–0.04)
IMM GRANULOCYTES NFR BLD AUTO: 0.5 % (ref 0–0.5)
LYMPHOCYTES # BLD AUTO: 0.7 K/UL (ref 1–4.8)
LYMPHOCYTES NFR BLD: 7.9 % (ref 18–48)
MCH RBC QN AUTO: 28.5 PG (ref 27–31)
MCHC RBC AUTO-ENTMCNC: 28.3 G/DL (ref 32–36)
MCV RBC AUTO: 101 FL (ref 82–98)
MONOCYTES # BLD AUTO: 0.4 K/UL (ref 0.3–1)
MONOCYTES NFR BLD: 4 % (ref 4–15)
NEUTROPHILS # BLD AUTO: 7.7 K/UL (ref 1.8–7.7)
NEUTROPHILS NFR BLD: 83.2 % (ref 38–73)
NRBC BLD-RTO: 0 /100 WBC
PLATELET # BLD AUTO: 245 K/UL (ref 150–350)
PMV BLD AUTO: 10.4 FL (ref 9.2–12.9)
POTASSIUM SERPL-SCNC: 4.2 MMOL/L (ref 3.5–5.1)
PROT SERPL-MCNC: 6.1 G/DL (ref 6–8.4)
RBC # BLD AUTO: 3.86 M/UL (ref 4–5.4)
SODIUM SERPL-SCNC: 140 MMOL/L (ref 136–145)
WBC # BLD AUTO: 9.21 K/UL (ref 3.9–12.7)

## 2020-08-12 PROCEDURE — 80053 COMPREHEN METABOLIC PANEL: CPT

## 2020-08-12 PROCEDURE — 85652 RBC SED RATE AUTOMATED: CPT

## 2020-08-12 PROCEDURE — 85025 COMPLETE CBC W/AUTO DIFF WBC: CPT

## 2020-08-12 PROCEDURE — 36415 COLL VENOUS BLD VENIPUNCTURE: CPT | Mod: PO

## 2020-08-12 PROCEDURE — 86140 C-REACTIVE PROTEIN: CPT

## 2020-08-12 NOTE — TELEPHONE ENCOUNTER
----- Message from Ten Rodriguez MD sent at 8/12/2020  3:49 PM CDT -----  Contact: pt  Tell her that she can take Centrum Silver as well as a magnesium supplement for muscle cramps  WD  ----- Message -----  From: Margi Zamora MA  Sent: 8/12/2020   3:29 PM CDT  To: Ten Rodriguez MD      ----- Message -----  From: Sharmin Copeland  Sent: 8/12/2020  11:17 AM CDT  To: [facility] Staff    Please call pt at [phone]    Patient is having severe upper & lower extremity pain & would like to know what vitamin can be taken?    2nd call request     Thank you

## 2020-08-14 ENCOUNTER — INFUSION (OUTPATIENT)
Dept: INFUSION THERAPY | Facility: HOSPITAL | Age: 60
End: 2020-08-14
Attending: INTERNAL MEDICINE
Payer: MEDICARE

## 2020-08-14 VITALS
HEART RATE: 63 BPM | BODY MASS INDEX: 36.8 KG/M2 | SYSTOLIC BLOOD PRESSURE: 142 MMHG | WEIGHT: 194.88 LBS | DIASTOLIC BLOOD PRESSURE: 62 MMHG | TEMPERATURE: 98 F | RESPIRATION RATE: 18 BRPM | HEIGHT: 61 IN

## 2020-08-14 DIAGNOSIS — M05.79 RHEUMATOID ARTHRITIS INVOLVING MULTIPLE SITES WITH POSITIVE RHEUMATOID FACTOR: Primary | ICD-10-CM

## 2020-08-14 PROCEDURE — S0028 INJECTION, FAMOTIDINE, 20 MG: HCPCS | Performed by: INTERNAL MEDICINE

## 2020-08-14 PROCEDURE — 25000003 PHARM REV CODE 250: Performed by: INTERNAL MEDICINE

## 2020-08-14 PROCEDURE — 96415 CHEMO IV INFUSION ADDL HR: CPT

## 2020-08-14 PROCEDURE — 63600175 PHARM REV CODE 636 W HCPCS: Performed by: INTERNAL MEDICINE

## 2020-08-14 PROCEDURE — 96413 CHEMO IV INFUSION 1 HR: CPT

## 2020-08-14 PROCEDURE — 96375 TX/PRO/DX INJ NEW DRUG ADDON: CPT

## 2020-08-14 PROCEDURE — 96367 TX/PROPH/DG ADDL SEQ IV INF: CPT

## 2020-08-14 RX ORDER — FAMOTIDINE 10 MG/ML
20 INJECTION INTRAVENOUS
Status: COMPLETED | OUTPATIENT
Start: 2020-08-14 | End: 2020-08-14

## 2020-08-14 RX ORDER — HEPARIN 100 UNIT/ML
500 SYRINGE INTRAVENOUS
Status: CANCELLED | OUTPATIENT
Start: 2020-08-14

## 2020-08-14 RX ORDER — FAMOTIDINE 10 MG/ML
20 INJECTION INTRAVENOUS
Status: CANCELLED | OUTPATIENT
Start: 2020-08-14

## 2020-08-14 RX ORDER — ACETAMINOPHEN 325 MG/1
650 TABLET ORAL
Status: CANCELLED | OUTPATIENT
Start: 2020-08-14

## 2020-08-14 RX ORDER — ACETAMINOPHEN 325 MG/1
650 TABLET ORAL
Status: COMPLETED | OUTPATIENT
Start: 2020-08-14 | End: 2020-08-14

## 2020-08-14 RX ORDER — SODIUM CHLORIDE 0.9 % (FLUSH) 0.9 %
10 SYRINGE (ML) INJECTION
Status: DISCONTINUED | OUTPATIENT
Start: 2020-08-14 | End: 2020-08-14 | Stop reason: HOSPADM

## 2020-08-14 RX ORDER — SODIUM CHLORIDE 0.9 % (FLUSH) 0.9 %
10 SYRINGE (ML) INJECTION
Status: CANCELLED | OUTPATIENT
Start: 2020-08-14

## 2020-08-14 RX ADMIN — FAMOTIDINE 20 MG: 10 INJECTION INTRAVENOUS at 09:08

## 2020-08-14 RX ADMIN — RITUXIMAB 1000 MG: 10 INJECTION, SOLUTION INTRAVENOUS at 09:08

## 2020-08-14 RX ADMIN — DEXTROSE MONOHYDRATE: 50 INJECTION, SOLUTION INTRAVENOUS at 08:08

## 2020-08-14 RX ADMIN — ACETAMINOPHEN 650 MG: 325 TABLET ORAL at 08:08

## 2020-08-14 RX ADMIN — SODIUM CHLORIDE: 0.9 INJECTION, SOLUTION INTRAVENOUS at 08:08

## 2020-08-14 RX ADMIN — DIPHENHYDRAMINE HYDROCHLORIDE 25 MG: 50 INJECTION INTRAMUSCULAR; INTRAVENOUS at 08:08

## 2020-08-14 NOTE — NURSING
0800 Pt here for Rituxan infusion, no new complaints or concerns at present; pt reports scratchy throat, breathing difficulty last infusion, also stomach discomfort w/ hx of acid indigestion; discussed treatment plan for today, all questions answered and pt agrees to proceed

## 2020-08-14 NOTE — PLAN OF CARE
1423  Infusion completed, pt tolerated well; pt instructed to remain well hydrated; discussed when to contact MD, when to report to ER; AVS, copy of prior 2 lab draw results given to and reviewed w/ pt, next appt not yet made; pt verbalized understanding of all discussed

## 2020-08-17 ENCOUNTER — TELEPHONE (OUTPATIENT)
Dept: RHEUMATOLOGY | Facility: CLINIC | Age: 60
End: 2020-08-17

## 2020-08-17 RX ORDER — PREDNISONE 5 MG/1
TABLET ORAL
COMMUNITY
Start: 2020-08-11 | End: 2020-10-14

## 2020-08-17 NOTE — TELEPHONE ENCOUNTER
----- Message from Macrina Mata RN sent at 8/17/2020  3:08 PM CDT -----    ----- Message -----  From: Pam Munguia  Sent: 8/17/2020  11:40 AM CDT  To: [facility] Staff        Patient started new infusion therapy on Friday past.  Has been having a great deal of pain since  Has taken a prednisone but hurt her hiatal hernia, so needs to know what else can be done for her current pain      Please contact patient @# [phone]

## 2020-08-17 NOTE — TELEPHONE ENCOUNTER
C/o stomach pain at site of hiatal hernia  Taking protonix and still hurts  Was in so much pain yesterday  Took one prednisone and stomach was worse  Pain today a little better  Taking gabapentin TID  Taking celebrex  Has been taking prednisone 5 mg once daily    Advised to stop celebrex

## 2020-08-28 ENCOUNTER — TELEPHONE (OUTPATIENT)
Dept: RHEUMATOLOGY | Facility: CLINIC | Age: 60
End: 2020-08-28

## 2020-08-28 NOTE — TELEPHONE ENCOUNTER
----- Message from Mary Kimball MA sent at 8/24/2020  2:30 PM CDT -----  Contact: self    ----- Message -----  From: Bry Costa  Sent: 8/24/2020  11:10 AM CDT  To: [facility] Staff    Pt would like to speak with Dr or nurse she said nothing is working for her pain    Contact  [phone]

## 2020-08-28 NOTE — TELEPHONE ENCOUNTER
Arthritis pain feeling a little better    C/o pain related to hiatal hernia; taking a little blue pill for stomach    Also says BP low    Mother ; put her to rest today  Took Tums and felt better almost immediately    Had RTX x 2  Has f/u with me in October    Also c/o upper back pain c/w Fibromyalgia; still on gabapentin 300 TID; says it makes her forgetful  Try gabapentin 600 mg at night in place of tizanidine    WD

## 2020-10-02 ENCOUNTER — HOSPITAL ENCOUNTER (EMERGENCY)
Facility: HOSPITAL | Age: 60
Discharge: HOME | End: 2020-10-02
Attending: EMERGENCY MEDICINE
Payer: MEDICARE

## 2020-10-02 VITALS
HEART RATE: 70 BPM | SYSTOLIC BLOOD PRESSURE: 126 MMHG | DIASTOLIC BLOOD PRESSURE: 78 MMHG | OXYGEN SATURATION: 97 % | BODY MASS INDEX: 34.93 KG/M2 | WEIGHT: 185 LBS | TEMPERATURE: 98 F | HEIGHT: 61 IN | RESPIRATION RATE: 18 BRPM

## 2020-10-02 DIAGNOSIS — E83.52 HYPERCALCEMIA: ICD-10-CM

## 2020-10-02 DIAGNOSIS — R07.89 CHEST WALL PAIN: ICD-10-CM

## 2020-10-02 DIAGNOSIS — R09.1 PLEURISY: Primary | ICD-10-CM

## 2020-10-02 DIAGNOSIS — M94.0 COSTOCHONDRITIS: ICD-10-CM

## 2020-10-02 DIAGNOSIS — R07.9 CHEST PAIN: ICD-10-CM

## 2020-10-02 LAB
ALBUMIN SERPL-MCNC: 2.9 G/DL (ref 3.3–5.5)
ALP SERPL-CCNC: 110 U/L (ref 42–141)
BILIRUB SERPL-MCNC: 0.8 MG/DL (ref 0.2–1.6)
BILIRUBIN, POC UA: NEGATIVE
BLOOD, POC UA: NEGATIVE
BUN SERPL-MCNC: 13 MG/DL (ref 7–22)
CALCIUM SERPL-MCNC: 10.6 MG/DL (ref 8–10.3)
CHLORIDE SERPL-SCNC: 104 MMOL/L (ref 98–108)
CLARITY, POC UA: CLEAR
COLOR, POC UA: YELLOW
CREAT SERPL-MCNC: 1 MG/DL (ref 0.6–1.2)
CTP QC/QA: YES
GLUCOSE SERPL-MCNC: 120 MG/DL (ref 73–118)
GLUCOSE, POC UA: NEGATIVE
KETONES, POC UA: NEGATIVE
LEUKOCYTE EST, POC UA: NEGATIVE
NITRITE, POC UA: NEGATIVE
PH UR STRIP: 6 [PH]
POC ALT (SGPT): 16 U/L (ref 10–47)
POC AST (SGOT): 27 U/L (ref 11–38)
POC B-TYPE NATRIURETIC PEPTIDE: 220 PG/ML (ref 0–100)
POC CARDIAC TROPONIN I: 0.01 NG/ML
POC TCO2: 30 MMOL/L (ref 18–33)
POTASSIUM BLD-SCNC: 4.4 MMOL/L (ref 3.6–5.1)
PROTEIN, POC UA: ABNORMAL
PROTEIN, POC: 6.1 G/DL (ref 6.4–8.1)
SAMPLE: NORMAL
SARS-COV-2 RDRP RESP QL NAA+PROBE: NEGATIVE
SODIUM BLD-SCNC: 141 MMOL/L (ref 128–145)
SPECIFIC GRAVITY, POC UA: >=1.03
UROBILINOGEN, POC UA: 0.2 E.U./DL

## 2020-10-02 PROCEDURE — 99285 EMERGENCY DEPT VISIT HI MDM: CPT | Mod: 25,ER

## 2020-10-02 PROCEDURE — 93010 EKG 12-LEAD: ICD-10-PCS | Mod: ,,, | Performed by: INTERNAL MEDICINE

## 2020-10-02 PROCEDURE — 81003 URINALYSIS AUTO W/O SCOPE: CPT | Mod: ER

## 2020-10-02 PROCEDURE — 93010 ELECTROCARDIOGRAM REPORT: CPT | Mod: ,,, | Performed by: INTERNAL MEDICINE

## 2020-10-02 PROCEDURE — 80053 COMPREHEN METABOLIC PANEL: CPT | Mod: ER

## 2020-10-02 PROCEDURE — 85025 COMPLETE CBC W/AUTO DIFF WBC: CPT | Mod: ER

## 2020-10-02 PROCEDURE — 93005 ELECTROCARDIOGRAM TRACING: CPT | Mod: ER

## 2020-10-02 PROCEDURE — 84484 ASSAY OF TROPONIN QUANT: CPT | Mod: ER

## 2020-10-02 PROCEDURE — 83880 ASSAY OF NATRIURETIC PEPTIDE: CPT | Mod: ER

## 2020-10-02 PROCEDURE — U0002 COVID-19 LAB TEST NON-CDC: HCPCS | Mod: ER | Performed by: PHYSICIAN ASSISTANT

## 2020-10-02 NOTE — ED PROVIDER NOTES
"Encounter Date: 10/2/2020    SCRIBE #1 NOTE: I, Milly Lieberman, am scribing for, and in the presence of,  JESSICA Nguyen. I have scribed the following portions of the note - Other sections scribed: HPI, ROS, PE.       History     Chief Complaint   Patient presents with    Pleurisy      Pt to ER with c/o right sided chest discomfort. recent diagnosis of pleurisy. Pt had follow up with NP and told she has punamonia. Pt reports " not feelign well.      Robert Smith is a 59 y.o. female with rheumatoid arthritis and fibromyalgia who presents to the ED complaining of persistent, non-radiating, right sided chest pain x 4.5 weeks ago while visiting [place]. Endorses headache and nausea x 2 days ago. Reports cough, PND and intermittent chills. Pain reproduced with deep breaths. Patient states she was dx with Pleurisy x 08/31/2020 s/p ED visit in [place]. Upon returning to [place], patient reports being dx with Pneumonia s/p PCP visit x 09/16/2020. Notes completing 10-day course of Rx'd antibiotics with no relief. Took Mucinex PTA. Currently on Eliquis 5mg. Denies fever, abdominal pain, vomiting and diarrhea. Denies rash. Denies urinary complaints. Denies recent known sick contacts. Denies UTD Flu shot.     The history is provided by the patient. No  was used.     Review of patient's allergies indicates:   Allergen Reactions    Sulfasalazine      HEPATITIS    Sulfa (sulfonamide antibiotics) Nausea And Vomiting    Clindamycin Diarrhea    Daypro [oxaprozin] Nausea Only    Orencia  [abatacept]      Other reaction(s): Muscle pain    Percocet  [oxycodone-acetaminophen]      Other reaction(s): Vomiting    Simponi  [golimumab]      Other reaction(s): Unknown    Cimzia [certolizumab pegol] Rash    Ciprofloxacin Rash    Sulfamethoxazole-trimethoprim Rash     Past Medical History:   Diagnosis Date    Acid reflux     Allergy     Anxiety     Degenerative disc disease     Depression     DVT of leg " (deep venous thrombosis) 2009    rt leg    Fibromyalgia     Fibromyalgia     Long-term current use of steroids 11/12/2012    NSTEMI (non-ST elevated myocardial infarction) 5/18/2019    Osteoporosis, postmenopausal     chronic steroid use    RA (rheumatoid arthritis)      Past Surgical History:   Procedure Laterality Date    ANKLE FRACTURE SURGERY      BACK SURGERY      CARPAL TUNNEL RELEASE      CHOLECYSTECTOMY      HARDWARE REMOVAL      JOINT REPLACEMENT Right     right knee    KNEE SURGERY      Bilateral    KNEE SURGERY Left     revision x 2    SPINE SURGERY      TONSILLECTOMY      TUBAL LIGATION       Family History   Problem Relation Age of Onset    COPD Mother     Heart attack Father     Emphysema Maternal Grandfather     Breast cancer Neg Hx     Colon cancer Neg Hx     Ovarian cancer Neg Hx      Social History     Tobacco Use    Smoking status: Never Smoker    Smokeless tobacco: Never Used   Substance Use Topics    Alcohol use: No     Alcohol/week: 0.0 standard drinks    Drug use: No     Review of Systems   Constitutional: Positive for chills. Negative for fever.   HENT: Positive for postnasal drip. Negative for congestion and sore throat.    Respiratory: Positive for cough. Negative for shortness of breath.    Cardiovascular: Positive for chest pain.   Gastrointestinal: Positive for nausea. Negative for abdominal pain, diarrhea and vomiting.   Genitourinary: Negative for dysuria, frequency, hematuria and urgency.   Musculoskeletal: Positive for arthralgias (chronic; at baseline) and myalgias (chronic; at baseline).   Skin: Negative for rash.   Neurological: Positive for headaches. Negative for dizziness and light-headedness.       Physical Exam     Initial Vitals [10/02/20 1404]   BP Pulse Resp Temp SpO2   126/78 70 18 98.2 °F (36.8 °C) 97 %      MAP       --         Physical Exam    Nursing note and vitals reviewed.  Constitutional: She appears well-developed and well-nourished. No  distress.   HENT:   Head: Normocephalic and atraumatic.   Right Ear: Tympanic membrane normal.   Left Ear: Tympanic membrane normal.   Nose: Nose normal.   Mouth/Throat: Uvula is midline, oropharynx is clear and moist and mucous membranes are normal.   Eyes: EOM are normal. Pupils are equal, round, and reactive to light.   Neck: Trachea normal, normal range of motion, full passive range of motion without pain and phonation normal. Neck supple. No stridor present. No spinous process tenderness and no muscular tenderness present. Normal range of motion present. No neck rigidity.   Cardiovascular: Normal rate, regular rhythm and normal heart sounds. Exam reveals no gallop and no friction rub.    No murmur heard.  Pulmonary/Chest: Effort normal and breath sounds normal. No respiratory distress. She has no wheezes. She has no rhonchi. She has no rales.   Tenderness localized to right lower ribs.   Abdominal: Soft. Bowel sounds are normal. There is no abdominal tenderness. There is no rebound and no guarding.   Musculoskeletal: Normal range of motion.   Neurological: She is alert and oriented to person, place, and time. She has normal strength. No cranial nerve deficit or sensory deficit.   Skin: Skin is warm and dry. Capillary refill takes less than 2 seconds. No rash noted.   Psychiatric: She has a normal mood and affect.         ED Course   Procedures  Labs Reviewed   POCT URINALYSIS W/O SCOPE - Abnormal; Notable for the following components:       Result Value    Spec Grav UA >=1.030 (*)     Protein, UA 1+ (*)     All other components within normal limits   POCT CMP - Abnormal; Notable for the following components:    Calcium, POC 10.6 (*)     POC Glucose 120 (*)     Protein 6.1 (*)     All other components within normal limits   POCT B-TYPE NATRIURETIC PEPTIDE (BNP) - Abnormal; Notable for the following components:    POC B-Type Natriuretic Peptide 220 (*)     All other components within normal limits   TROPONIN  ISTAT   POCT CBC   SARS-COV-2 RDRP GENE   POCT URINALYSIS W/O SCOPE   POCT CMP   POCT TROPONIN   POCT B-TYPE NATRIURETIC PEPTIDE (BNP)     EKG Readings: (Independently Interpreted)   No STEMI. Rate of 72 bpm. Normal Sinus Rhythm. RBBB. Left Axis Deviation. Normal EKG. QTc normal at 453. When compared to prior EKG dated 04/10/2020 rate increased by 14 bpm. RBBB was present.        ECG Results          EKG 12-lead (In process)  Result time 10/02/20 15:34:57    In process by Interface, Lab In [facility] (10/02/20 15:34:57)                 Narrative:    Test Reason : R07.9,    Vent. Rate : 072 BPM     Atrial Rate : 072 BPM     P-R Int : 182 ms          QRS Dur : 144 ms      QT Int : 414 ms       P-R-T Axes : 052 -27 057 degrees     QTc Int : 453 ms    Normal sinus rhythm  Right bundle branch block  LVH with repolarization abnormality  Abnormal ECG  When compared with ECG of 10-APR-2020 07:22,  No significant change was found    Referred By: [email or web address]   SELF           Confirmed By:                             Imaging Results          X-Ray Chest PA And Lateral (Final result)  Result time 10/02/20 14:59:59    Final result by Sandoval Fierro MD (10/02/20 14:59:59)                 Impression:      Stable chest.  No new active process.      Electronically signed by: Sandoval Fierro MD  Date:    10/02/2020  Time:    14:59             Narrative:    EXAMINATION:  XR CHEST PA AND LATERAL    CLINICAL HISTORY:  Cough;    TECHNIQUE:  PA and lateral views of the chest were performed.    COMPARISON:  [facility] thousand eighteen, 2016 chest x-rays.    FINDINGS:  Heart normal.  Partial rotation frontal view left, limited prominent right perihilar markings adjacent right lung, with related differences in positioning technique.  Increased AP diameter chest, some flattening hemidiaphragms, postop cholecystectomy, osteopenia and mild remote anterior wedge deformity several mid dorsal spine vertebral bodies.                                      Medical Decision Making:   History:   Old Medical Records: I decided to obtain old medical records.  Independently Interpreted Test(s):   I have ordered and independently interpreted X-rays - see prior notes.  I have ordered and independently interpreted EKG Reading(s) - see prior notes  Clinical Tests:   Lab Tests: Ordered and Reviewed  Radiological Study: Ordered and Reviewed  Medical Tests: Ordered and Reviewed  ED Management:  Hemodynamically stable.  Nontoxic and in no acute distress.  Patient is overall well-appearing, pleasant, conversational.  COVID negative.  Chest x-ray read reports stable chest.  No new active process.  EKG shows NSR with right bundle branch block.  No significant changes when compared to EKG in April of this year.  BMP mildly elevated at 220.  Will discharge home with PCP and cardiology follow up with.  Patient verbalizes understanding and is agreeable with plan.  Strict ED return precautions given for any worsening or additional concerning symptoms.      I have reviewed the notes, assessments, and/or procedures performed by NP/PA, I concur with her/his documentation of Robertjazmyne Smith.  Attending:   Physician Attestation Statement: I have reviewed this case with my non-physician provider.   Physician Attestation Statement: I have provided a face to face evaluation of this patient at the request of my non-physician provider.  I agree with the HPI, review of systems, and physical exam, as documented.  The patient's condition warranted physician involvement.  Other Attend Additions:   Physical Exam: Awake alert oriented ×4 speaking clearly in full sentences.    Regular rate and rhythm no murmur gallop or rub. Tenderness localized to right lower ribs.   Clear to auscultation bilateral without wheeze crackles or Rales   Abdomen soft, nontender, nondistended. Bowel sounds ×4.   Pulses palpable ×4 no clubbing cyanosis or edema.   Skin no rash, no wound.             Medical Decision  Making:   I reviewed radiology and Lab Data.  The treatment regimen was reviewed by me.  Patient reports feeling better after treatment in the emergency room.  I agree with management as documented.     I, Dr. Carol Gr, personally performed the services described in this documentation. This document was produced by a scribe under my direction and in my presence. All medical record entries made by the scribe were at my direction and in my presence.  I have reviewed the chart and agree that the record reflects my personal performance and is accurate and complete. Carol Gr, DO.     10/02/2020 6:29 PM            Scribe Attestation:   Scribe #1: I performed the above scribed service and the documentation accurately describes the services I performed. I attest to the accuracy of the note.                      Clinical Impression:       ICD-10-CM ICD-9-CM   1. Pleurisy  R09.1 511.0   2. Chest pain  R07.9 786.50   3. Costochondritis  M94.0 733.6   4. Chest wall pain  R07.89 786.52   5. Hypercalcemia  E83.52 275.42                      Disposition:   Disposition: Discharged  Condition: Stable     ED Disposition Condition    Discharge Stable       Scribe attestation: I, DAVIS SHAH, personally performed the services described in this documentation.  All medical record entries made by the scribe were at my direction and in my presence.  I have reviewed the chart and agree that the record reflects my personal performance and is accurate and complete.    ED Prescriptions     None        Follow-up Information     Follow up With Specialties Details Why Contact Info    Tani Chan MD Family Medicine Schedule an appointment as soon as possible for a visit   [address] [place] [address] [phone]      [facility] Emergency Department Emergency Medicine Go to  If symptoms worsen [id number] [facility] [address] [phone]                                       Davis Shah,  BEN  10/02/20 [id number]

## 2020-10-02 NOTE — DISCHARGE INSTRUCTIONS
Please follow discharge instructions provided and make sure to follow-up with her PCP to discuss today's emergency department visit, today's lab results and for further evaluation and management.  Please return emergency department immediately if her symptoms worsen or you develop any additional concerning symptoms.

## 2020-10-14 ENCOUNTER — OFFICE VISIT (OUTPATIENT)
Dept: RHEUMATOLOGY | Facility: CLINIC | Age: 60
End: 2020-10-14
Payer: MEDICARE

## 2020-10-14 DIAGNOSIS — M05.79 RHEUMATOID ARTHRITIS INVOLVING MULTIPLE SITES WITH POSITIVE RHEUMATOID FACTOR: Primary | ICD-10-CM

## 2020-10-14 DIAGNOSIS — M79.7 FIBROMYALGIA: ICD-10-CM

## 2020-10-14 DIAGNOSIS — Z79.899 HIGH RISK MEDICATION USE: ICD-10-CM

## 2020-10-14 DIAGNOSIS — I82.5Y3 CHRONIC DEEP VEIN THROMBOSIS (DVT) OF PROXIMAL VEIN OF BOTH LOWER EXTREMITIES: ICD-10-CM

## 2020-10-14 PROBLEM — J45.909 ASTHMA: Status: ACTIVE | Noted: 2020-09-16

## 2020-10-14 PROCEDURE — 99214 PR OFFICE/OUTPT VISIT, EST, LEVL IV, 30-39 MIN: ICD-10-PCS | Mod: 95,,, | Performed by: INTERNAL MEDICINE

## 2020-10-14 PROCEDURE — 99214 OFFICE O/P EST MOD 30 MIN: CPT | Mod: 95,,, | Performed by: INTERNAL MEDICINE

## 2020-10-14 RX ORDER — LOSARTAN POTASSIUM 25 MG/1
25 TABLET ORAL
COMMUNITY
Start: 2020-01-28 | End: 2021-03-19

## 2020-10-14 RX ORDER — GABAPENTIN 300 MG/1
CAPSULE ORAL
Qty: 120 CAPSULE | Refills: 2 | Status: SHIPPED | OUTPATIENT
Start: 2020-10-14 | End: 2020-12-02 | Stop reason: SDUPTHER

## 2020-10-14 RX ORDER — LEVOCETIRIZINE DIHYDROCHLORIDE 5 MG/1
5 TABLET, FILM COATED ORAL NIGHTLY
COMMUNITY
Start: 2020-10-02 | End: 2021-03-19

## 2020-10-14 RX ORDER — METHOCARBAMOL 500 MG/1
500 TABLET, FILM COATED ORAL DAILY
COMMUNITY
Start: 2020-09-30 | End: 2021-03-19

## 2020-10-14 RX ORDER — TRAMADOL HYDROCHLORIDE 50 MG/1
TABLET ORAL
COMMUNITY
Start: 2020-09-01 | End: 2021-03-19

## 2020-10-14 ASSESSMENT — ROUTINE ASSESSMENT OF PATIENT INDEX DATA (RAPID3)
PATIENT GLOBAL ASSESSMENT SCORE: 8.5
FATIGUE SCORE: 10
PSYCHOLOGICAL DISTRESS SCORE: 7.7
MDHAQ FUNCTION SCORE: 1.2
PAIN SCORE: 8
TOTAL RAPID3 SCORE: 6.83

## 2020-10-14 NOTE — PROGRESS NOTES
Subjective:       Patient ID: Robert Smith is a 60 y.o. female.    Chief Complaint: Disease Management    Hx RA dx 2003; dx at [facility] ; started with hands and all over body  On prednisone 5 mg/d since 2003  Rx here 2007 Dr Max and Stephaniem Rx MTX and Humira  MTX nausea with po and SQ  LFA tried 2007 by Dr Terry and she did not tolerate with nausea and hospitalization  2008 Dr Mann retried MTX; did not tolerate  2010-12 Dr Rosenbaum treated with Plaquenil(HCQ)  Humira quit working 2010 2010 Simponi tried - caused weak and shaky reaction  2011 Orencia tried - caused muscle aches, shakes and jaw pain  2011 Enbrel tried - did not work  Dr Paris [id number];  Had Rituxin 2016  Earlier 2017 took Xeljanz for a few months; had a lot of nausea and she felt arms and hands getting numb  SSZ July 2017 caused nausea and hepatitis   ENBREL retried  Aug 2017 - stopped Dec due to lack of effectiveness  Cimzia Jan 2018 caused urticarial rash  Actemra March 2018 - Oct 2019 with partial improvement  Nov 2019 started upadacitinib/Rinvoq; stopped April 2020 due to side effects  July/Aug 2020 RTX      Meds:   Diazepam prn (couple days/ week; usually 1/2 tab)  Metoprolol some days; not when BP is low (says not taking losartan)  Eliquis  Protonix   Methocarbamol 500 one in am for leg pain   Tizanidine 4 mg at night  levocetirizine most nights  Albuterol inhaler  Metoprolol 25 for BP   Eliquis  No paxil    The patient location is: daughter's home in [place]  The chief complaint leading to consultation is: RA    Visit type: audiovisual  Video froze so switched to telephone during encounter    Face to Face time with patient: 20  30 minutes of total time spent on the encounter, which includes face to face time and non-face to face time preparing to see the patient (eg, review of tests), Obtaining and/or reviewing separately obtained history, Documenting clinical information in the electronic or other health record, Independently interpreting results  (not separately reported) and communicating results to the patient/family/caregiver, or Care coordination (not separately reported).     Each patient to whom he or she provides medical services by telemedicine is:  (1) informed of the relationship between the physician and patient and the respective role of any other health care provider with respect to management of the patient; and (2) notified that he or she may decline to receive medical services by telemedicine and may withdraw from such care at any time.    Notes:      Rituximab infusions July 31 and Aug 14  Felt better after infusions  Hands less swollen  Trying to move more; walking a little bit in the morning; stiff  Pain in joints at tip of finger and pain all over    Developed pain R chest; went to ER and PCP and told had pleurisy then pneumonia; took antibiotics x 7 day; had little cough; had no fever ; had negative Covid test    Rapid3 Question Responses and Scores 10/13/2020   MDHAQ Score 1.2   Psychologic Score 7.7   Pain Score 8   When you awakened in the morning OVER THE LAST WEEK, did you feel stiff? Yes   If Yes, please indicate the number of hours until you are as limber as you will be for the day 1   Fatigue Score 10   Global Health Score 8.5   RAPID3 Score 6.83       Review of Systems   Constitutional: Negative for fever and unexpected weight change.   HENT: Negative for mouth sores and trouble swallowing.    Eyes: Negative for redness.   Respiratory: Negative for cough and shortness of breath.    Cardiovascular: Negative for chest pain.   Gastrointestinal: Positive for constipation. Negative for diarrhea.   Genitourinary: Negative for dysuria and genital sores.   Skin: Negative for rash.   Neurological: Positive for headaches.   Hematological: Does not bruise/bleed easily.         Objective:   There were no vitals taken for this visit.     Physical Exam   Constitutional: She is well-developed, well-nourished, and in no distress.    Pulmonary/Chest: No respiratory distress.   No increased work of breathing   Neurological:   Alert, awake, with no abnormal movements   Skin:     No rash on face; skin clear   Psychiatric:   Normal mood and affect; clear, rational thinking; speech normal and not pressured         Assessment:       1. Rheumatoid arthritis involving multiple sites with positive rheumatoid factor    2. Fibromyalgia    3. High risk medication use    4. Chronic deep vein thrombosis (DVT) of proximal vein of both lower extremities          Plan:       Problem List Items Addressed This Visit        Active Problems    Rheumatoid arthritis with positive rheumatoid factor - Primary     RA may be better after RTX; less hand swelling though has persistent fibromyalgia and now describes DIP pain     Also recent pneumonia and pleurisy sx; likely viral but could be rheumatoid    For now will continue to follow to see how she responds to RTX ; I recommend lab in Nov which she will do at [facility] in [place] if still there; if she does reasonably well then would plan another RTX infusion in Jan or Feb         Relevant Orders    Sedimentation rate    Comprehensive Metabolic Panel    CBC auto differential    C-Reactive Protein    High risk medication use     Already has had pleurisy and possibly pneumonia; sx resolved except for some residual R sided chest pain    Advised her to get flu shot; she has been afraid of vaccines (really should get prevnar and pneumovax as well)    Is likely to stay in [place], [place] which is near [place] so will need to find MD's there         Relevant Orders    Sedimentation rate    Comprehensive Metabolic Panel    CBC auto differential    C-Reactive Protein    Fibromyalgia     Fibromyalgia persistently symptomatic  Also c/o restless legs disrupting sleep  Advised increase gabapentin to 600 hs and d/c tizanidine         Relevant Medications    gabapentin (NEURONTIN) 300 MG capsule    Deep vein thrombosis (DVT) of proximal vein of  both lower extremities     Continues on apixiban

## 2020-10-23 NOTE — PROGRESS NOTES
Premed of  Benadryl 12.5 mg IVP administered as ordered per md, Tolerated Actemra infusion without any difficulty, future appt scheduled, discharged from unit without any difficulties   cheryl and son by phone son by phone; left message for Daughter at ()

## 2020-12-02 DIAGNOSIS — M79.7 FIBROMYALGIA: ICD-10-CM

## 2020-12-03 RX ORDER — GABAPENTIN 300 MG/1
CAPSULE ORAL
Qty: 120 CAPSULE | Refills: 2 | Status: SHIPPED | OUTPATIENT
Start: 2020-12-03 | End: 2021-05-06 | Stop reason: SDUPTHER

## 2020-12-04 ENCOUNTER — HOSPITAL ENCOUNTER (EMERGENCY)
Facility: HOSPITAL | Age: 60
Discharge: HOME | End: 2020-12-04
Attending: EMERGENCY MEDICINE
Payer: MEDICARE

## 2020-12-04 VITALS
WEIGHT: 180 LBS | BODY MASS INDEX: 33.99 KG/M2 | RESPIRATION RATE: 18 BRPM | DIASTOLIC BLOOD PRESSURE: 70 MMHG | HEART RATE: 70 BPM | OXYGEN SATURATION: 100 % | HEIGHT: 61 IN | SYSTOLIC BLOOD PRESSURE: 114 MMHG | TEMPERATURE: 98 F

## 2020-12-04 DIAGNOSIS — N39.0 ACUTE URINARY TRACT INFECTION: Primary | ICD-10-CM

## 2020-12-04 DIAGNOSIS — R07.9 CHEST PAIN: ICD-10-CM

## 2020-12-04 LAB
ALBUMIN SERPL-MCNC: 3.4 G/DL (ref 3.3–5.5)
ALP SERPL-CCNC: 115 U/L (ref 42–141)
BILIRUB SERPL-MCNC: 0.8 MG/DL (ref 0.2–1.6)
BILIRUBIN, POC UA: NEGATIVE
BLOOD, POC UA: NEGATIVE
BUN SERPL-MCNC: 16 MG/DL (ref 7–22)
CALCIUM SERPL-MCNC: 10.9 MG/DL (ref 8–10.3)
CHLORIDE SERPL-SCNC: 107 MMOL/L (ref 98–108)
CLARITY, POC UA: CLEAR
COLOR, POC UA: YELLOW
CREAT SERPL-MCNC: 0.6 MG/DL (ref 0.6–1.2)
GLUCOSE SERPL-MCNC: 81 MG/DL (ref 73–118)
GLUCOSE, POC UA: NEGATIVE
KETONES, POC UA: NEGATIVE
LEUKOCYTE EST, POC UA: ABNORMAL
NITRITE, POC UA: NEGATIVE
PH UR STRIP: 7 [PH]
POC ALT (SGPT): 17 U/L (ref 10–47)
POC AST (SGOT): 20 U/L (ref 11–38)
POC B-TYPE NATRIURETIC PEPTIDE: 107 PG/ML (ref 0–100)
POC CARDIAC TROPONIN I: 0 NG/ML
POC PTINR: 1.1 (ref 0.9–1.2)
POC PTWBT: 12.8 SEC (ref 9.7–14.3)
POC TCO2: 30 MMOL/L (ref 18–33)
POTASSIUM BLD-SCNC: 4.3 MMOL/L (ref 3.6–5.1)
PROTEIN, POC UA: NEGATIVE
PROTEIN, POC: 6.9 G/DL (ref 6.4–8.1)
SAMPLE: NORMAL
SAMPLE: NORMAL
SODIUM BLD-SCNC: 144 MMOL/L (ref 128–145)
SPECIFIC GRAVITY, POC UA: 1.02
UROBILINOGEN, POC UA: 1 E.U./DL

## 2020-12-04 PROCEDURE — 83880 ASSAY OF NATRIURETIC PEPTIDE: CPT | Mod: ER

## 2020-12-04 PROCEDURE — 93010 EKG 12-LEAD: ICD-10-PCS | Mod: ,,, | Performed by: INTERNAL MEDICINE

## 2020-12-04 PROCEDURE — 84484 ASSAY OF TROPONIN QUANT: CPT | Mod: ER

## 2020-12-04 PROCEDURE — 99285 EMERGENCY DEPT VISIT HI MDM: CPT | Mod: 25,ER

## 2020-12-04 PROCEDURE — 93010 ELECTROCARDIOGRAM REPORT: CPT | Mod: ,,, | Performed by: INTERNAL MEDICINE

## 2020-12-04 PROCEDURE — 81003 URINALYSIS AUTO W/O SCOPE: CPT | Mod: ER

## 2020-12-04 PROCEDURE — 96375 TX/PRO/DX INJ NEW DRUG ADDON: CPT | Mod: ER

## 2020-12-04 PROCEDURE — 25000003 PHARM REV CODE 250: Mod: ER | Performed by: EMERGENCY MEDICINE

## 2020-12-04 PROCEDURE — 93005 ELECTROCARDIOGRAM TRACING: CPT | Mod: ER

## 2020-12-04 PROCEDURE — 87088 URINE BACTERIA CULTURE: CPT

## 2020-12-04 PROCEDURE — 96365 THER/PROPH/DIAG IV INF INIT: CPT | Mod: ER

## 2020-12-04 PROCEDURE — 63600175 PHARM REV CODE 636 W HCPCS: Mod: ER | Performed by: EMERGENCY MEDICINE

## 2020-12-04 PROCEDURE — 85610 PROTHROMBIN TIME: CPT | Mod: ER

## 2020-12-04 PROCEDURE — 80053 COMPREHEN METABOLIC PANEL: CPT | Mod: ER

## 2020-12-04 PROCEDURE — 87086 URINE CULTURE/COLONY COUNT: CPT

## 2020-12-04 PROCEDURE — 87147 CULTURE TYPE IMMUNOLOGIC: CPT

## 2020-12-04 PROCEDURE — 85025 COMPLETE CBC W/AUTO DIFF WBC: CPT | Mod: ER

## 2020-12-04 RX ORDER — ACETAMINOPHEN 500 MG
500 TABLET ORAL EVERY 6 HOURS PRN
Qty: 30 TABLET | Refills: 0 | Status: SHIPPED | OUTPATIENT
Start: 2020-12-04 | End: 2022-01-28

## 2020-12-04 RX ORDER — IBUPROFEN 600 MG/1
600 TABLET ORAL EVERY 6 HOURS PRN
Qty: 20 TABLET | Refills: 0 | Status: SHIPPED | OUTPATIENT
Start: 2020-12-04 | End: 2021-09-27

## 2020-12-04 RX ORDER — KETOROLAC TROMETHAMINE 30 MG/ML
15 INJECTION, SOLUTION INTRAMUSCULAR; INTRAVENOUS
Status: COMPLETED | OUTPATIENT
Start: 2020-12-04 | End: 2020-12-04

## 2020-12-04 RX ORDER — ACETAMINOPHEN 500 MG
1000 TABLET ORAL EVERY 6 HOURS PRN
Qty: 30 TABLET | Refills: 0 | Status: SHIPPED | OUTPATIENT
Start: 2020-12-04 | End: 2020-12-04 | Stop reason: SDUPTHER

## 2020-12-04 RX ORDER — ONDANSETRON 2 MG/ML
4 INJECTION INTRAMUSCULAR; INTRAVENOUS
Status: COMPLETED | OUTPATIENT
Start: 2020-12-04 | End: 2020-12-04

## 2020-12-04 RX ORDER — ASPIRIN 325 MG
325 TABLET ORAL
Status: COMPLETED | OUTPATIENT
Start: 2020-12-04 | End: 2020-12-04

## 2020-12-04 RX ORDER — NITROFURANTOIN 25; 75 MG/1; MG/1
100 CAPSULE ORAL 2 TIMES DAILY
Qty: 10 CAPSULE | Refills: 0 | Status: SHIPPED | OUTPATIENT
Start: 2020-12-04 | End: 2020-12-09

## 2020-12-04 RX ADMIN — ONDANSETRON 4 MG: 2 INJECTION INTRAMUSCULAR; INTRAVENOUS at 03:12

## 2020-12-04 RX ADMIN — KETOROLAC TROMETHAMINE 15 MG: 30 INJECTION, SOLUTION INTRAMUSCULAR at 04:12

## 2020-12-04 RX ADMIN — CEFTRIAXONE 1 G: 1 INJECTION, SOLUTION INTRAVENOUS at 03:12

## 2020-12-04 RX ADMIN — ASPIRIN 325 MG ORAL TABLET 325 MG: 325 PILL ORAL at 03:12

## 2020-12-04 NOTE — Clinical Note
"Robert Singh" Luis was seen and treated in our emergency department on 12/4/2020.  She may return to work on 12/06/2020.       If you have any questions or concerns, please don't hesitate to call.      Carol Gr, DO"

## 2020-12-04 NOTE — ED PROVIDER NOTES
Encounter Date: 12/4/2020    SCRIBE #1 NOTE: IMallory, am scribing for, and in the presence of,  Dr. Gr. I have scribed the following portions of the note - Other sections scribed: HPI, ROS, PE.       History     Chief Complaint   Patient presents with    Chest Pain     states pain in LEFT chest going through the back, x 3 weeks. comes and goes, worse with exercise     Robert Smith is a 60 y.o. female with a history of fibromyalgia and rheumatoid arthritis who presents to the ED complaining of intermittent left chest/rib pain radiating to her left back x 3 weeks. Pain worsens with movement and walking, but improves slightly when she lies down and rest. Denies chest pain currently, but reports mild left back pain. Patient reports that she had pneumonia in her right lung about 1 month ago, but was told that it had resolved. Denies tobacco, alcohol, or drug use. Denies personal history of heart attacks, but states that her father was in his 50s and her brother was 40 when they had their first heart attacks. Patient reports history of leaky valve. Allergic to sulfa drugs, clindamycin, Daypro, Orencia, Percocet, Simponi, Cimzia, ciprofloxacin.    The history is provided by the patient. No  was used.     Review of patient's allergies indicates:   Allergen Reactions    Sulfasalazine      HEPATITIS    Sulfa (sulfonamide antibiotics) Nausea And Vomiting    Clindamycin Diarrhea    Daypro [oxaprozin] Nausea Only    Orencia  [abatacept]      Other reaction(s): Muscle pain    Percocet  [oxycodone-acetaminophen]      Other reaction(s): Vomiting    Simponi  [golimumab]      Other reaction(s): Unknown    Cimzia [certolizumab pegol] Rash    Ciprofloxacin Rash    Sulfamethoxazole-trimethoprim Rash     Past Medical History:   Diagnosis Date    Acid reflux     Allergy     Anxiety     Degenerative disc disease     Depression     DVT of leg (deep venous thrombosis) 2009    rt leg    Fibromyalgia      Fibromyalgia     Long-term current use of steroids 11/12/2012    NSTEMI (non-ST elevated myocardial infarction) 5/18/2019    Osteoporosis, postmenopausal     chronic steroid use    RA (rheumatoid arthritis)      Past Surgical History:   Procedure Laterality Date    ANKLE FRACTURE SURGERY      BACK SURGERY      CARPAL TUNNEL RELEASE      CHOLECYSTECTOMY      HARDWARE REMOVAL      JOINT REPLACEMENT Right     right knee    KNEE SURGERY      Bilateral    KNEE SURGERY Left     revision x 2    SPINE SURGERY      TONSILLECTOMY      TUBAL LIGATION       Family History   Problem Relation Age of Onset    COPD Mother     Heart attack Father     Emphysema Maternal Grandfather     Breast cancer Neg Hx     Colon cancer Neg Hx     Ovarian cancer Neg Hx      Social History     Tobacco Use    Smoking status: Never Smoker    Smokeless tobacco: Never Used   Substance Use Topics    Alcohol use: No     Alcohol/week: 0.0 standard drinks    Drug use: No     Review of Systems   Constitutional: Negative for fever.   HENT: Negative for sore throat.    Respiratory: Negative for shortness of breath.    Cardiovascular: Positive for chest pain.   Gastrointestinal: Negative for nausea.   Genitourinary: Positive for frequency. Negative for dysuria.   Musculoskeletal: Positive for back pain.   Skin: Negative for rash.   Neurological: Negative for weakness.   Hematological: Does not bruise/bleed easily.   All other systems reviewed and are negative.      Physical Exam     Patient gave consent to have physical exam performed.    Initial Vitals [12/04/20 1428]   BP Pulse Resp Temp SpO2   128/71 71 18 98.2 °F (36.8 °C) 100 %      MAP       --         Physical Exam    Nursing note and vitals reviewed.  Constitutional: She appears well-developed and well-nourished.   HENT:   Head: Normocephalic and atraumatic.   Right Ear: External ear normal.   Left Ear: External ear normal.   Nose: Nose normal.   Mouth/Throat: Oropharynx  is clear and moist.   Eyes: Conjunctivae and EOM are normal. Pupils are equal, round, and reactive to light.   Neck: Normal range of motion and phonation normal. Neck supple.   Cardiovascular: Normal rate, regular rhythm, normal heart sounds and intact distal pulses. Exam reveals no gallop and no friction rub.    No murmur heard.  Pulmonary/Chest: Effort normal and breath sounds normal. No stridor. No respiratory distress. She has no wheezes. She has no rhonchi. She has no rales. She exhibits tenderness (left).   Abdominal: Soft. Bowel sounds are normal. She exhibits no distension. There is no abdominal tenderness. There is no rigidity, no rebound and no guarding.   Musculoskeletal: Normal range of motion. No tenderness or edema.   Neurological: She is alert and oriented to person, place, and time. She has normal strength. No cranial nerve deficit or sensory deficit. GCS score is 15. GCS eye subscore is 4. GCS verbal subscore is 5. GCS motor subscore is 6.   Skin: Skin is warm and dry. Capillary refill takes less than 2 seconds. No rash noted.   Psychiatric: She has a normal mood and affect. Her behavior is normal.         ED Course   Procedures  Labs Reviewed   POCT URINALYSIS W/O SCOPE - Abnormal; Notable for the following components:       Result Value    Leukocytes, UA 1+ (*)     All other components within normal limits   POCT CMP - Abnormal; Notable for the following components:    Calcium, POC 10.9 (*)     All other components within normal limits   POCT B-TYPE NATRIURETIC PEPTIDE (BNP) - Abnormal; Notable for the following components:    POC B-Type Natriuretic Peptide 107 (*)     All other components within normal limits   CULTURE, URINE   TROPONIN ISTAT   POCT CBC   POCT URINALYSIS W/O SCOPE   POCT CMP   POCT PROTIME-INR   POCT TROPONIN   POCT B-TYPE NATRIURETIC PEPTIDE (BNP)   ISTAT PROCEDURE           EKG Readings: (Independently Interpreted)   Initial Reading: No STEMI. Rhythm: Normal Sinus Rhythm. Heart  Rate: 71. Conduction: RBBB. Axis: Left Axis Deviation.   Abnormal EKG. LVH and RBBB appreciated. QTc 471. When compared to prior EKG on 04/10/20, rate has increased by 13 BPM today.     ECG Results          EKG 12-lead (In process)  Result time 12/04/20 14:59:58    In process by Interface, Lab In [facility] (12/04/20 14:59:58)                 Narrative:    Test Reason : R07.9,    Vent. Rate : 071 BPM     Atrial Rate : 071 BPM     P-R Int : 190 ms          QRS Dur : 146 ms      QT Int : 434 ms       P-R-T Axes : 045 -30 030 degrees     QTc Int : 471 ms    Normal sinus rhythm  Left axis deviation  Right bundle branch block  Voltage criteria for left ventricular hypertrophy  Abnormal ECG  When compared with ECG of 02-OCT-2020 14:13,  No significant change was found    Referred By: [email or web address]   SELF           Confirmed By:                             Imaging Results          X-Ray Chest PA And Lateral (Final result)  Result time 12/04/20 15:29:22    Final result by Jerald Tran Jr., MD (12/04/20 15:29:22)                 Impression:      No acute cardiopulmonary abnormality.      Electronically signed by: Jerald Reid Jr  Date:    12/04/2020  Time:    15:29             Narrative:    EXAMINATION:  XR CHEST PA AND LATERAL    CLINICAL HISTORY:  Chest Pain;    TECHNIQUE:  PA and lateral views of the chest were performed.    COMPARISON:  10/02/2020    FINDINGS:  The cardiac silhouette is borderline in size..  There is some tortuosity and atherosclerosis of the thoracic aorta.    The lungs are clear, with normal appearance of pulmonary vasculature and no pleural effusion or pneumothorax.    Bones are intact.                                 Medical Decision Making:   History:   Old Medical Records: I decided to obtain old medical records.  Independently Interpreted Test(s):   I have ordered and independently interpreted X-rays - see prior notes.  I have ordered and independently interpreted EKG Reading(s) - see prior  notes  Clinical Tests:   Lab Tests: Ordered and Reviewed  Radiological Study: Ordered and Reviewed  Medical Tests: Ordered and Reviewed    Chief complaint: chest pain  Differential diagnosis: STEMI, NSTEMI, cardiac arrhythmia, chest wall pain, rib fracture, chronic pain  Patient states she takes Norco daily for her chronic pain.  Patient denies allergy to Tylenol and ibuprofen.  Treatment in the ED: PE, ceftriaxone, Zofran, apps print, and Toradol.  Patient reports feeling better after treatment in the ER.      Discussed treatment, prescriptions, labs, and imaging results.    Fill and take prescriptions as directed.  Return to the ED if symptoms worsen or do not resolve.   Answered questions and discussed discharge plan.    Patient feels better and is ready for discharge.  Follow up with PCP/specialist in 1 day.            Scribe Attestation:   Scribe #1: I performed the above scribed service and the documentation accurately describes the services I performed. I attest to the accuracy of the note.     I, Dr. Carol Gr, personally performed the services described in this documentation. This document was produced by a scribe under my direction and in my presence. All medical record entries made by the scribe were at my direction and in my presence.  I have reviewed the chart and agree that the record reflects my personal performance and is accurate and complete. Carol Gr DO.     12/04/2020 4:53 PM                    Clinical Impression:     ICD-10-CM ICD-9-CM   1. Acute urinary tract infection  N39.0 599.0   2. Chest pain  R07.9 786.50                          ED Disposition Condition    Discharge Stable        ED Prescriptions     Medication Sig Dispense Start Date End Date Auth. Provider    nitrofurantoin, macrocrystal-monohydrate, (MACROBID) 100 MG capsule Take 1 capsule (100 mg total) by mouth 2 (two) times daily. for 5 days 10 capsule 12/4/2020 12/9/2020 Carol Gr DO    ibuprofen  (ADVIL,MOTRIN) 600 MG tablet Take 1 tablet (600 mg total) by mouth every 6 (six) hours as needed for Pain (Take with food as needed for mild-to-moderate pain). 20 tablet 12/4/2020  Carol Gr DO    acetaminophen (TYLENOL) 500 MG tablet Take 2 tablets (1,000 mg total) by mouth every 6 (six) hours as needed for Pain. 30 tablet 12/4/2020  Carol Gr DO        Follow-up Information    None                                      Carol Gr DO  12/04/20 2382

## 2020-12-06 LAB — BACTERIA UR CULT: ABNORMAL

## 2020-12-10 ENCOUNTER — TELEPHONE (OUTPATIENT)
Dept: RHEUMATOLOGY | Facility: CLINIC | Age: 60
End: 2020-12-10

## 2020-12-10 NOTE — TELEPHONE ENCOUNTER
----- Message from Emilee Cuevas sent at 12/10/2020  3:23 PM CST -----  Regarding: Body Pain  Contact: pt  Reason:Pt ask for a call pertaining to coming in for Back pain/knee pain    Communication:[phone]

## 2021-01-13 ENCOUNTER — LAB VISIT (OUTPATIENT)
Dept: LAB | Facility: HOSPITAL | Age: 61
End: 2021-01-13
Attending: INTERNAL MEDICINE
Payer: MEDICARE

## 2021-01-13 DIAGNOSIS — Z79.899 HIGH RISK MEDICATION USE: ICD-10-CM

## 2021-01-13 LAB
BASOPHILS # BLD AUTO: 0.01 K/UL (ref 0–0.2)
BASOPHILS NFR BLD: 0.1 % (ref 0–1.9)
DIFFERENTIAL METHOD: ABNORMAL
EOSINOPHIL # BLD AUTO: 0.2 K/UL (ref 0–0.5)
EOSINOPHIL NFR BLD: 3 % (ref 0–8)
ERYTHROCYTE [DISTWIDTH] IN BLOOD BY AUTOMATED COUNT: 13.5 % (ref 11.5–14.5)
ERYTHROCYTE [SEDIMENTATION RATE] IN BLOOD BY WESTERGREN METHOD: 108 MM/HR (ref 0–36)
HCT VFR BLD AUTO: 44.4 % (ref 37–48.5)
HGB BLD-MCNC: 12.8 G/DL (ref 12–16)
IMM GRANULOCYTES # BLD AUTO: 0.02 K/UL (ref 0–0.04)
IMM GRANULOCYTES NFR BLD AUTO: 0.3 % (ref 0–0.5)
LYMPHOCYTES # BLD AUTO: 0.8 K/UL (ref 1–4.8)
LYMPHOCYTES NFR BLD: 11.1 % (ref 18–48)
MCH RBC QN AUTO: 28.2 PG (ref 27–31)
MCHC RBC AUTO-ENTMCNC: 28.8 G/DL (ref 32–36)
MCV RBC AUTO: 98 FL (ref 82–98)
MONOCYTES # BLD AUTO: 0.3 K/UL (ref 0.3–1)
MONOCYTES NFR BLD: 3.6 % (ref 4–15)
NEUTROPHILS # BLD AUTO: 5.9 K/UL (ref 1.8–7.7)
NEUTROPHILS NFR BLD: 81.9 % (ref 38–73)
NRBC BLD-RTO: 0 /100 WBC
PLATELET # BLD AUTO: 219 K/UL (ref 150–350)
PMV BLD AUTO: 12 FL (ref 9.2–12.9)
RBC # BLD AUTO: 4.54 M/UL (ref 4–5.4)
WBC # BLD AUTO: 7.23 K/UL (ref 3.9–12.7)

## 2021-01-13 PROCEDURE — 36415 COLL VENOUS BLD VENIPUNCTURE: CPT | Mod: PO

## 2021-01-13 PROCEDURE — 85025 COMPLETE CBC W/AUTO DIFF WBC: CPT

## 2021-01-13 PROCEDURE — 85652 RBC SED RATE AUTOMATED: CPT

## 2021-01-13 PROCEDURE — 80053 COMPREHEN METABOLIC PANEL: CPT

## 2021-01-13 PROCEDURE — 86140 C-REACTIVE PROTEIN: CPT

## 2021-01-14 LAB
ALBUMIN SERPL BCP-MCNC: 2.8 G/DL (ref 3.5–5.2)
ALP SERPL-CCNC: 120 U/L (ref 55–135)
ALT SERPL W/O P-5'-P-CCNC: 8 U/L (ref 10–44)
ANION GAP SERPL CALC-SCNC: 11 MMOL/L (ref 8–16)
AST SERPL-CCNC: 18 U/L (ref 10–40)
BILIRUB SERPL-MCNC: 0.8 MG/DL (ref 0.1–1)
BUN SERPL-MCNC: 15 MG/DL (ref 8–23)
CALCIUM SERPL-MCNC: 9.9 MG/DL (ref 8.7–10.5)
CHLORIDE SERPL-SCNC: 106 MMOL/L (ref 95–110)
CO2 SERPL-SCNC: 23 MMOL/L (ref 23–29)
CREAT SERPL-MCNC: 0.8 MG/DL (ref 0.5–1.4)
CRP SERPL-MCNC: 55.3 MG/L (ref 0–8.2)
EST. GFR  (AFRICAN AMERICAN): >60 ML/MIN/1.73 M^2
EST. GFR  (NON AFRICAN AMERICAN): >60 ML/MIN/1.73 M^2
GLUCOSE SERPL-MCNC: 198 MG/DL (ref 70–110)
POTASSIUM SERPL-SCNC: 4.2 MMOL/L (ref 3.5–5.1)
PROT SERPL-MCNC: 6.7 G/DL (ref 6–8.4)
SODIUM SERPL-SCNC: 140 MMOL/L (ref 136–145)

## 2021-01-20 ENCOUNTER — OFFICE VISIT (OUTPATIENT)
Dept: RHEUMATOLOGY | Facility: CLINIC | Age: 61
End: 2021-01-20
Payer: MEDICARE

## 2021-01-20 VITALS
HEART RATE: 77 BPM | WEIGHT: 192.25 LBS | BODY MASS INDEX: 36.3 KG/M2 | HEIGHT: 61 IN | DIASTOLIC BLOOD PRESSURE: 80 MMHG | SYSTOLIC BLOOD PRESSURE: 120 MMHG

## 2021-01-20 DIAGNOSIS — M79.7 FIBROMYALGIA: ICD-10-CM

## 2021-01-20 DIAGNOSIS — M05.79 RHEUMATOID ARTHRITIS INVOLVING MULTIPLE SITES WITH POSITIVE RHEUMATOID FACTOR: Primary | ICD-10-CM

## 2021-01-20 DIAGNOSIS — Z79.899 HIGH RISK MEDICATION USE: ICD-10-CM

## 2021-01-20 PROCEDURE — 3008F BODY MASS INDEX DOCD: CPT | Mod: CPTII,S$GLB,, | Performed by: INTERNAL MEDICINE

## 2021-01-20 PROCEDURE — 99999 PR PBB SHADOW E&M-EST. PATIENT-LVL III: CPT | Mod: PBBFAC,,, | Performed by: INTERNAL MEDICINE

## 2021-01-20 PROCEDURE — 1125F AMNT PAIN NOTED PAIN PRSNT: CPT | Mod: S$GLB,,, | Performed by: INTERNAL MEDICINE

## 2021-01-20 PROCEDURE — 99214 OFFICE O/P EST MOD 30 MIN: CPT | Mod: S$GLB,,, | Performed by: INTERNAL MEDICINE

## 2021-01-20 PROCEDURE — 99999 PR PBB SHADOW E&M-EST. PATIENT-LVL III: ICD-10-PCS | Mod: PBBFAC,,, | Performed by: INTERNAL MEDICINE

## 2021-01-20 PROCEDURE — 99214 PR OFFICE/OUTPT VISIT, EST, LEVL IV, 30-39 MIN: ICD-10-PCS | Mod: S$GLB,,, | Performed by: INTERNAL MEDICINE

## 2021-01-20 PROCEDURE — 1125F PR PAIN SEVERITY QUANTIFIED, PAIN PRESENT: ICD-10-PCS | Mod: S$GLB,,, | Performed by: INTERNAL MEDICINE

## 2021-01-20 PROCEDURE — 3008F PR BODY MASS INDEX (BMI) DOCUMENTED: ICD-10-PCS | Mod: CPTII,S$GLB,, | Performed by: INTERNAL MEDICINE

## 2021-01-20 RX ORDER — IBUPROFEN 100 MG/5ML
1000 SUSPENSION, ORAL (FINAL DOSE FORM) ORAL DAILY
COMMUNITY
End: 2023-03-01

## 2021-01-20 RX ORDER — MINERAL OIL
180 ENEMA (ML) RECTAL
COMMUNITY
Start: 2021-01-13 | End: 2022-01-28

## 2021-01-20 RX ORDER — FLUTICASONE PROPIONATE 50 MCG
1 SPRAY, SUSPENSION (ML) NASAL DAILY
COMMUNITY
Start: 2020-12-22 | End: 2023-02-06

## 2021-01-20 RX ORDER — ACETAMINOPHEN 325 MG/1
650 TABLET ORAL
Status: CANCELLED | OUTPATIENT
Start: 2021-01-20

## 2021-01-20 RX ORDER — MECLIZINE HYDROCHLORIDE 25 MG/1
TABLET ORAL
COMMUNITY
Start: 2021-01-04

## 2021-01-20 RX ORDER — FAMOTIDINE 10 MG/ML
20 INJECTION INTRAVENOUS
Status: CANCELLED | OUTPATIENT
Start: 2021-01-20

## 2021-01-20 RX ORDER — AMOXICILLIN 875 MG/1
875 TABLET, FILM COATED ORAL
COMMUNITY
Start: 2021-01-19 | End: 2021-01-29

## 2021-01-20 RX ORDER — CHOLECALCIFEROL (VITAMIN D3) 25 MCG
1000 TABLET ORAL DAILY
COMMUNITY
End: 2023-03-01

## 2021-01-20 RX ORDER — SODIUM CHLORIDE 0.9 % (FLUSH) 0.9 %
10 SYRINGE (ML) INJECTION
Status: CANCELLED | OUTPATIENT
Start: 2021-01-20

## 2021-01-20 RX ORDER — HEPARIN 100 UNIT/ML
500 SYRINGE INTRAVENOUS
Status: CANCELLED | OUTPATIENT
Start: 2021-01-20

## 2021-01-20 ASSESSMENT — ROUTINE ASSESSMENT OF PATIENT INDEX DATA (RAPID3)
MDHAQ FUNCTION SCORE: 1.6
FATIGUE SCORE: 5.5
PSYCHOLOGICAL DISTRESS SCORE: 7.7
PATIENT GLOBAL ASSESSMENT SCORE: 8
PAIN SCORE: 10
AM STIFFNESS SCORE: 1, YES
TOTAL RAPID3 SCORE: 7.78

## 2021-01-21 ENCOUNTER — TELEPHONE (OUTPATIENT)
Dept: RHEUMATOLOGY | Facility: CLINIC | Age: 61
End: 2021-01-21

## 2021-01-21 DIAGNOSIS — Z79.899 HIGH RISK MEDICATION USE: Primary | ICD-10-CM

## 2021-01-22 ENCOUNTER — TELEPHONE (OUTPATIENT)
Dept: RHEUMATOLOGY | Facility: CLINIC | Age: 61
End: 2021-01-22

## 2021-01-27 ENCOUNTER — LAB VISIT (OUTPATIENT)
Dept: LAB | Facility: HOSPITAL | Age: 61
End: 2021-01-27
Attending: INTERNAL MEDICINE
Payer: MEDICARE

## 2021-01-27 ENCOUNTER — LAB VISIT (OUTPATIENT)
Dept: FAMILY MEDICINE | Facility: CLINIC | Age: 61
End: 2021-01-27
Payer: MEDICARE

## 2021-01-27 DIAGNOSIS — Z79.899 HIGH RISK MEDICATION USE: ICD-10-CM

## 2021-01-27 LAB
IGA SERPL-MCNC: 405 MG/DL (ref 40–350)
IGG SERPL-MCNC: 1299 MG/DL (ref 650–1600)
IGM SERPL-MCNC: 45 MG/DL (ref 50–300)
SARS-COV-2 RNA RESP QL NAA+PROBE: NOT DETECTED

## 2021-01-27 PROCEDURE — U0003 INFECTIOUS AGENT DETECTION BY NUCLEIC ACID (DNA OR RNA); SEVERE ACUTE RESPIRATORY SYNDROME CORONAVIRUS 2 (SARS-COV-2) (CORONAVIRUS DISEASE [COVID-19]), AMPLIFIED PROBE TECHNIQUE, MAKING USE OF HIGH THROUGHPUT TECHNOLOGIES AS DESCRIBED BY CMS-2020-01-R: HCPCS

## 2021-01-27 PROCEDURE — 87340 HEPATITIS B SURFACE AG IA: CPT

## 2021-01-27 PROCEDURE — 82784 ASSAY IGA/IGD/IGG/IGM EACH: CPT | Mod: 59

## 2021-01-27 PROCEDURE — 36415 COLL VENOUS BLD VENIPUNCTURE: CPT | Mod: PO

## 2021-01-27 PROCEDURE — 82784 ASSAY IGA/IGD/IGG/IGM EACH: CPT

## 2021-01-27 PROCEDURE — 86704 HEP B CORE ANTIBODY TOTAL: CPT

## 2021-01-28 ENCOUNTER — TELEPHONE (OUTPATIENT)
Dept: RHEUMATOLOGY | Facility: CLINIC | Age: 61
End: 2021-01-28

## 2021-01-28 LAB
HBV CORE AB SERPL QL IA: NEGATIVE
HBV SURFACE AG SERPL QL IA: NEGATIVE

## 2021-01-29 ENCOUNTER — TELEPHONE (OUTPATIENT)
Dept: RHEUMATOLOGY | Facility: CLINIC | Age: 61
End: 2021-01-29

## 2021-01-29 ENCOUNTER — INFUSION (OUTPATIENT)
Dept: INFUSION THERAPY | Facility: HOSPITAL | Age: 61
End: 2021-01-29
Attending: INTERNAL MEDICINE
Payer: MEDICARE

## 2021-01-29 VITALS
RESPIRATION RATE: 18 BRPM | TEMPERATURE: 98 F | SYSTOLIC BLOOD PRESSURE: 137 MMHG | DIASTOLIC BLOOD PRESSURE: 67 MMHG | HEART RATE: 75 BPM

## 2021-01-29 DIAGNOSIS — M05.79 RHEUMATOID ARTHRITIS INVOLVING MULTIPLE SITES WITH POSITIVE RHEUMATOID FACTOR: Primary | ICD-10-CM

## 2021-01-29 PROCEDURE — 96413 CHEMO IV INFUSION 1 HR: CPT

## 2021-01-29 PROCEDURE — 96375 TX/PRO/DX INJ NEW DRUG ADDON: CPT

## 2021-01-29 PROCEDURE — 96367 TX/PROPH/DG ADDL SEQ IV INF: CPT

## 2021-01-29 PROCEDURE — 25000003 PHARM REV CODE 250: Performed by: INTERNAL MEDICINE

## 2021-01-29 PROCEDURE — 63600175 PHARM REV CODE 636 W HCPCS: Performed by: INTERNAL MEDICINE

## 2021-01-29 PROCEDURE — 96415 CHEMO IV INFUSION ADDL HR: CPT

## 2021-01-29 RX ORDER — SODIUM CHLORIDE 0.9 % (FLUSH) 0.9 %
10 SYRINGE (ML) INJECTION
Status: CANCELLED | OUTPATIENT
Start: 2021-02-12

## 2021-01-29 RX ORDER — FAMOTIDINE 10 MG/ML
20 INJECTION INTRAVENOUS
Status: COMPLETED | OUTPATIENT
Start: 2021-01-29 | End: 2021-01-29

## 2021-01-29 RX ORDER — ACETAMINOPHEN 325 MG/1
650 TABLET ORAL
Status: CANCELLED | OUTPATIENT
Start: 2021-02-12

## 2021-01-29 RX ORDER — SODIUM CHLORIDE 0.9 % (FLUSH) 0.9 %
10 SYRINGE (ML) INJECTION
Status: DISCONTINUED | OUTPATIENT
Start: 2021-01-29 | End: 2021-01-29 | Stop reason: HOSPADM

## 2021-01-29 RX ORDER — FAMOTIDINE 10 MG/ML
20 INJECTION INTRAVENOUS
Status: CANCELLED | OUTPATIENT
Start: 2021-02-12

## 2021-01-29 RX ORDER — ACETAMINOPHEN 325 MG/1
650 TABLET ORAL
Status: COMPLETED | OUTPATIENT
Start: 2021-01-29 | End: 2021-01-29

## 2021-01-29 RX ORDER — HEPARIN 100 UNIT/ML
500 SYRINGE INTRAVENOUS
Status: CANCELLED | OUTPATIENT
Start: 2021-02-12

## 2021-01-29 RX ADMIN — DEXTROSE: 50 INJECTION, SOLUTION INTRAVENOUS at 08:01

## 2021-01-29 RX ADMIN — ACETAMINOPHEN 650 MG: 325 TABLET ORAL at 07:01

## 2021-01-29 RX ADMIN — FAMOTIDINE 20 MG: 10 INJECTION INTRAVENOUS at 07:01

## 2021-01-29 RX ADMIN — RITUXIMAB 1000 MG: 10 INJECTION, SOLUTION INTRAVENOUS at 08:01

## 2021-01-29 RX ADMIN — DIPHENHYDRAMINE HYDROCHLORIDE 25 MG: 50 INJECTION INTRAMUSCULAR; INTRAVENOUS at 07:01

## 2021-02-08 ENCOUNTER — TELEPHONE (OUTPATIENT)
Dept: RHEUMATOLOGY | Facility: CLINIC | Age: 61
End: 2021-02-08

## 2021-02-12 ENCOUNTER — INFUSION (OUTPATIENT)
Dept: INFUSION THERAPY | Facility: HOSPITAL | Age: 61
End: 2021-02-12
Attending: INTERNAL MEDICINE
Payer: MEDICARE

## 2021-02-12 VITALS
TEMPERATURE: 98 F | DIASTOLIC BLOOD PRESSURE: 70 MMHG | RESPIRATION RATE: 18 BRPM | SYSTOLIC BLOOD PRESSURE: 139 MMHG | HEART RATE: 70 BPM

## 2021-02-12 DIAGNOSIS — M05.79 RHEUMATOID ARTHRITIS INVOLVING MULTIPLE SITES WITH POSITIVE RHEUMATOID FACTOR: Primary | ICD-10-CM

## 2021-02-12 PROCEDURE — 96413 CHEMO IV INFUSION 1 HR: CPT

## 2021-02-12 PROCEDURE — 25000003 PHARM REV CODE 250: Performed by: INTERNAL MEDICINE

## 2021-02-12 PROCEDURE — 96415 CHEMO IV INFUSION ADDL HR: CPT

## 2021-02-12 PROCEDURE — 96367 TX/PROPH/DG ADDL SEQ IV INF: CPT

## 2021-02-12 PROCEDURE — 63600175 PHARM REV CODE 636 W HCPCS: Performed by: INTERNAL MEDICINE

## 2021-02-12 PROCEDURE — 96375 TX/PRO/DX INJ NEW DRUG ADDON: CPT

## 2021-02-12 RX ORDER — SODIUM CHLORIDE 0.9 % (FLUSH) 0.9 %
10 SYRINGE (ML) INJECTION
Status: CANCELLED | OUTPATIENT
Start: 2021-02-12

## 2021-02-12 RX ORDER — SODIUM CHLORIDE 0.9 % (FLUSH) 0.9 %
10 SYRINGE (ML) INJECTION
Status: DISCONTINUED | OUTPATIENT
Start: 2021-02-12 | End: 2021-02-12 | Stop reason: HOSPADM

## 2021-02-12 RX ORDER — FAMOTIDINE 10 MG/ML
20 INJECTION INTRAVENOUS
Status: CANCELLED | OUTPATIENT
Start: 2021-02-12

## 2021-02-12 RX ORDER — HEPARIN 100 UNIT/ML
500 SYRINGE INTRAVENOUS
Status: CANCELLED | OUTPATIENT
Start: 2021-02-12

## 2021-02-12 RX ORDER — ACETAMINOPHEN 325 MG/1
650 TABLET ORAL
Status: COMPLETED | OUTPATIENT
Start: 2021-02-12 | End: 2021-02-12

## 2021-02-12 RX ORDER — FAMOTIDINE 10 MG/ML
20 INJECTION INTRAVENOUS
Status: COMPLETED | OUTPATIENT
Start: 2021-02-12 | End: 2021-02-12

## 2021-02-12 RX ORDER — ACETAMINOPHEN 325 MG/1
650 TABLET ORAL
Status: CANCELLED | OUTPATIENT
Start: 2021-02-12

## 2021-02-12 RX ADMIN — RITUXIMAB 1000 MG: 10 INJECTION, SOLUTION INTRAVENOUS at 08:02

## 2021-02-12 RX ADMIN — FAMOTIDINE 20 MG: 10 INJECTION INTRAVENOUS at 07:02

## 2021-02-12 RX ADMIN — ACETAMINOPHEN 650 MG: 325 TABLET ORAL at 07:02

## 2021-02-12 RX ADMIN — DIPHENHYDRAMINE HYDROCHLORIDE 25 MG: 50 INJECTION, SOLUTION INTRAMUSCULAR; INTRAVENOUS at 07:02

## 2021-02-12 RX ADMIN — DEXTROSE: 50 INJECTION, SOLUTION INTRAVENOUS at 08:02

## 2021-02-23 ENCOUNTER — PATIENT MESSAGE (OUTPATIENT)
Dept: RHEUMATOLOGY | Facility: CLINIC | Age: 61
End: 2021-02-23

## 2021-03-10 ENCOUNTER — TELEPHONE (OUTPATIENT)
Dept: RHEUMATOLOGY | Facility: CLINIC | Age: 61
End: 2021-03-10

## 2021-03-15 ENCOUNTER — TELEPHONE (OUTPATIENT)
Dept: RHEUMATOLOGY | Facility: CLINIC | Age: 61
End: 2021-03-15

## 2021-03-19 ENCOUNTER — LAB VISIT (OUTPATIENT)
Dept: LAB | Facility: HOSPITAL | Age: 61
End: 2021-03-19
Attending: INTERNAL MEDICINE
Payer: MEDICARE

## 2021-03-19 ENCOUNTER — OFFICE VISIT (OUTPATIENT)
Dept: RHEUMATOLOGY | Facility: CLINIC | Age: 61
End: 2021-03-19
Payer: MEDICARE

## 2021-03-19 VITALS
BODY MASS INDEX: 34.75 KG/M2 | SYSTOLIC BLOOD PRESSURE: 133 MMHG | HEART RATE: 64 BPM | DIASTOLIC BLOOD PRESSURE: 78 MMHG | HEIGHT: 61 IN | WEIGHT: 184.06 LBS

## 2021-03-19 DIAGNOSIS — M79.7 FIBROMYALGIA: Primary | ICD-10-CM

## 2021-03-19 DIAGNOSIS — M05.79 RHEUMATOID ARTHRITIS INVOLVING MULTIPLE SITES WITH POSITIVE RHEUMATOID FACTOR: ICD-10-CM

## 2021-03-19 DIAGNOSIS — Z79.899 HIGH RISK MEDICATION USE: ICD-10-CM

## 2021-03-19 PROCEDURE — 1125F PR PAIN SEVERITY QUANTIFIED, PAIN PRESENT: ICD-10-PCS | Mod: S$GLB,,, | Performed by: INTERNAL MEDICINE

## 2021-03-19 PROCEDURE — 99999 PR PBB SHADOW E&M-EST. PATIENT-LVL V: ICD-10-PCS | Mod: PBBFAC,,, | Performed by: INTERNAL MEDICINE

## 2021-03-19 PROCEDURE — 1125F AMNT PAIN NOTED PAIN PRSNT: CPT | Mod: S$GLB,,, | Performed by: INTERNAL MEDICINE

## 2021-03-19 PROCEDURE — 3008F PR BODY MASS INDEX (BMI) DOCUMENTED: ICD-10-PCS | Mod: CPTII,S$GLB,, | Performed by: INTERNAL MEDICINE

## 2021-03-19 PROCEDURE — 99214 PR OFFICE/OUTPT VISIT, EST, LEVL IV, 30-39 MIN: ICD-10-PCS | Mod: S$GLB,,, | Performed by: INTERNAL MEDICINE

## 2021-03-19 PROCEDURE — 99214 OFFICE O/P EST MOD 30 MIN: CPT | Mod: S$GLB,,, | Performed by: INTERNAL MEDICINE

## 2021-03-19 PROCEDURE — 99999 PR PBB SHADOW E&M-EST. PATIENT-LVL V: CPT | Mod: PBBFAC,,, | Performed by: INTERNAL MEDICINE

## 2021-03-19 PROCEDURE — 3008F BODY MASS INDEX DOCD: CPT | Mod: CPTII,S$GLB,, | Performed by: INTERNAL MEDICINE

## 2021-03-19 RX ORDER — POLYETHYLENE GLYCOL 3350 17 G/17G
17 POWDER, FOR SOLUTION ORAL DAILY PRN
COMMUNITY

## 2021-03-19 ASSESSMENT — ROUTINE ASSESSMENT OF PATIENT INDEX DATA (RAPID3)
PAIN SCORE: 5
PATIENT GLOBAL ASSESSMENT SCORE: 5
FATIGUE SCORE: 3
MDHAQ FUNCTION SCORE: 0.5
TOTAL RAPID3 SCORE: 3.89
PSYCHOLOGICAL DISTRESS SCORE: 8.8

## 2021-03-22 ENCOUNTER — PATIENT MESSAGE (OUTPATIENT)
Dept: RHEUMATOLOGY | Facility: CLINIC | Age: 61
End: 2021-03-22

## 2021-04-28 ENCOUNTER — HOSPITAL ENCOUNTER (EMERGENCY)
Facility: HOSPITAL | Age: 61
Discharge: HOME | End: 2021-04-28
Attending: EMERGENCY MEDICINE
Payer: MEDICARE

## 2021-04-28 VITALS
HEIGHT: 61 IN | WEIGHT: 185 LBS | SYSTOLIC BLOOD PRESSURE: 119 MMHG | DIASTOLIC BLOOD PRESSURE: 64 MMHG | TEMPERATURE: 99 F | HEART RATE: 63 BPM | OXYGEN SATURATION: 99 % | RESPIRATION RATE: 20 BRPM | BODY MASS INDEX: 34.93 KG/M2

## 2021-04-28 DIAGNOSIS — M79.606 LEG PAIN: ICD-10-CM

## 2021-04-28 DIAGNOSIS — M79.604 RIGHT LEG PAIN: ICD-10-CM

## 2021-04-28 PROCEDURE — 99284 EMERGENCY DEPT VISIT MOD MDM: CPT | Mod: 25,ER

## 2021-04-28 PROCEDURE — 96372 THER/PROPH/DIAG INJ SC/IM: CPT | Mod: ER

## 2021-04-28 PROCEDURE — 63600175 PHARM REV CODE 636 W HCPCS: Mod: ER | Performed by: EMERGENCY MEDICINE

## 2021-04-28 RX ORDER — DICLOFENAC SODIUM 10 MG/G
2 GEL TOPICAL 3 TIMES DAILY PRN
Qty: 150 G | Refills: 0 | Status: SHIPPED | OUTPATIENT
Start: 2021-04-28 | End: 2022-01-28

## 2021-04-28 RX ORDER — KETOROLAC TROMETHAMINE 30 MG/ML
30 INJECTION, SOLUTION INTRAMUSCULAR; INTRAVENOUS
Status: COMPLETED | OUTPATIENT
Start: 2021-04-28 | End: 2021-04-28

## 2021-04-28 RX ADMIN — KETOROLAC TROMETHAMINE 30 MG: 30 INJECTION, SOLUTION INTRAMUSCULAR at 05:04

## 2021-05-04 ENCOUNTER — TELEPHONE (OUTPATIENT)
Dept: RHEUMATOLOGY | Facility: CLINIC | Age: 61
End: 2021-05-04

## 2021-05-05 PROBLEM — I82.409 DEEP VENOUS THROMBOSIS: Status: ACTIVE | Noted: 2021-04-06

## 2021-05-06 DIAGNOSIS — M79.7 FIBROMYALGIA: ICD-10-CM

## 2021-05-06 RX ORDER — GABAPENTIN 300 MG/1
CAPSULE ORAL
Qty: 120 CAPSULE | Refills: 2 | Status: SHIPPED | OUTPATIENT
Start: 2021-05-06 | End: 2021-09-27

## 2021-05-13 ENCOUNTER — TELEPHONE (OUTPATIENT)
Dept: ORTHOPEDICS | Facility: CLINIC | Age: 61
End: 2021-05-13

## 2021-05-19 ENCOUNTER — OFFICE VISIT (OUTPATIENT)
Dept: ORTHOPEDICS | Facility: CLINIC | Age: 61
End: 2021-05-19
Payer: MEDICARE

## 2021-05-19 VITALS
SYSTOLIC BLOOD PRESSURE: 131 MMHG | HEART RATE: 66 BPM | HEIGHT: 61 IN | BODY MASS INDEX: 34.82 KG/M2 | WEIGHT: 184.44 LBS | DIASTOLIC BLOOD PRESSURE: 78 MMHG

## 2021-05-19 DIAGNOSIS — Z96.651 STATUS POST RIGHT KNEE REPLACEMENT: Primary | ICD-10-CM

## 2021-05-19 DIAGNOSIS — G89.29 CHRONIC RIGHT-SIDED LOW BACK PAIN WITH RIGHT-SIDED SCIATICA: ICD-10-CM

## 2021-05-19 DIAGNOSIS — M54.41 CHRONIC RIGHT-SIDED LOW BACK PAIN WITH RIGHT-SIDED SCIATICA: ICD-10-CM

## 2021-05-19 PROCEDURE — 99999 PR PBB SHADOW E&M-EST. PATIENT-LVL II: ICD-10-PCS | Mod: PBBFAC,,, | Performed by: PHYSICIAN ASSISTANT

## 2021-05-19 PROCEDURE — 99213 PR OFFICE/OUTPT VISIT, EST, LEVL III, 20-29 MIN: ICD-10-PCS | Mod: S$GLB,,, | Performed by: PHYSICIAN ASSISTANT

## 2021-05-19 PROCEDURE — 99999 PR PBB SHADOW E&M-EST. PATIENT-LVL II: CPT | Mod: PBBFAC,,, | Performed by: PHYSICIAN ASSISTANT

## 2021-05-19 PROCEDURE — 99213 OFFICE O/P EST LOW 20 MIN: CPT | Mod: S$GLB,,, | Performed by: PHYSICIAN ASSISTANT

## 2021-05-19 RX ORDER — METHYLPREDNISOLONE 4 MG/1
TABLET ORAL
Qty: 1 PACKAGE | Refills: 0 | Status: SHIPPED | OUTPATIENT
Start: 2021-05-19 | End: 2021-10-19 | Stop reason: SDUPTHER

## 2021-06-09 ENCOUNTER — CLINICAL SUPPORT (OUTPATIENT)
Dept: REHABILITATION | Facility: HOSPITAL | Age: 61
End: 2021-06-09
Attending: PHYSICIAN ASSISTANT
Payer: MEDICARE

## 2021-06-09 DIAGNOSIS — M25.661 DECREASED RANGE OF MOTION (ROM) OF RIGHT KNEE: ICD-10-CM

## 2021-06-09 DIAGNOSIS — Z96.651 STATUS POST RIGHT KNEE REPLACEMENT: ICD-10-CM

## 2021-06-09 DIAGNOSIS — G89.29 CHRONIC RIGHT-SIDED LOW BACK PAIN WITH RIGHT-SIDED SCIATICA: ICD-10-CM

## 2021-06-09 DIAGNOSIS — M54.41 CHRONIC RIGHT-SIDED LOW BACK PAIN WITH RIGHT-SIDED SCIATICA: ICD-10-CM

## 2021-06-09 DIAGNOSIS — R26.2 DIFFICULTY WALKING: ICD-10-CM

## 2021-06-09 PROCEDURE — 97110 THERAPEUTIC EXERCISES: CPT | Mod: PN

## 2021-06-09 PROCEDURE — 97161 PT EVAL LOW COMPLEX 20 MIN: CPT | Mod: PN

## 2021-06-15 ENCOUNTER — CLINICAL SUPPORT (OUTPATIENT)
Dept: REHABILITATION | Facility: HOSPITAL | Age: 61
End: 2021-06-15
Attending: PHYSICIAN ASSISTANT
Payer: MEDICARE

## 2021-06-15 DIAGNOSIS — M25.661 DECREASED RANGE OF MOTION (ROM) OF RIGHT KNEE: ICD-10-CM

## 2021-06-15 DIAGNOSIS — R26.2 DIFFICULTY WALKING: ICD-10-CM

## 2021-06-15 PROCEDURE — 97110 THERAPEUTIC EXERCISES: CPT | Mod: PN,CQ

## 2021-06-21 ENCOUNTER — CLINICAL SUPPORT (OUTPATIENT)
Dept: REHABILITATION | Facility: HOSPITAL | Age: 61
End: 2021-06-21
Attending: PHYSICIAN ASSISTANT
Payer: MEDICARE

## 2021-06-21 DIAGNOSIS — R26.2 DIFFICULTY WALKING: ICD-10-CM

## 2021-06-21 DIAGNOSIS — M25.661 DECREASED RANGE OF MOTION (ROM) OF RIGHT KNEE: ICD-10-CM

## 2021-06-21 PROCEDURE — 97110 THERAPEUTIC EXERCISES: CPT | Mod: PN,CQ

## 2021-06-25 ENCOUNTER — CLINICAL SUPPORT (OUTPATIENT)
Dept: REHABILITATION | Facility: HOSPITAL | Age: 61
End: 2021-06-25
Attending: PHYSICIAN ASSISTANT
Payer: MEDICARE

## 2021-06-25 DIAGNOSIS — R26.2 DIFFICULTY WALKING: Primary | ICD-10-CM

## 2021-06-25 DIAGNOSIS — M25.661 DECREASED RANGE OF MOTION (ROM) OF RIGHT KNEE: ICD-10-CM

## 2021-06-25 PROCEDURE — 97110 THERAPEUTIC EXERCISES: CPT | Mod: PN,CQ

## 2021-06-29 ENCOUNTER — CLINICAL SUPPORT (OUTPATIENT)
Dept: REHABILITATION | Facility: HOSPITAL | Age: 61
End: 2021-06-29
Attending: PHYSICIAN ASSISTANT
Payer: MEDICARE

## 2021-06-29 DIAGNOSIS — R26.2 DIFFICULTY WALKING: ICD-10-CM

## 2021-06-29 DIAGNOSIS — M25.661 DECREASED RANGE OF MOTION (ROM) OF RIGHT KNEE: ICD-10-CM

## 2021-06-29 PROCEDURE — 97110 THERAPEUTIC EXERCISES: CPT | Mod: PN,CQ

## 2021-07-06 ENCOUNTER — CLINICAL SUPPORT (OUTPATIENT)
Dept: REHABILITATION | Facility: HOSPITAL | Age: 61
End: 2021-07-06
Attending: PHYSICIAN ASSISTANT
Payer: MEDICARE

## 2021-07-06 DIAGNOSIS — R26.2 DIFFICULTY WALKING: ICD-10-CM

## 2021-07-06 DIAGNOSIS — M25.661 DECREASED RANGE OF MOTION (ROM) OF RIGHT KNEE: ICD-10-CM

## 2021-07-06 PROCEDURE — 97530 THERAPEUTIC ACTIVITIES: CPT | Mod: PN

## 2021-07-07 ENCOUNTER — PATIENT MESSAGE (OUTPATIENT)
Dept: RHEUMATOLOGY | Facility: CLINIC | Age: 61
End: 2021-07-07

## 2021-07-09 ENCOUNTER — CLINICAL SUPPORT (OUTPATIENT)
Dept: REHABILITATION | Facility: HOSPITAL | Age: 61
End: 2021-07-09
Attending: PHYSICIAN ASSISTANT
Payer: MEDICARE

## 2021-07-09 DIAGNOSIS — R26.2 DIFFICULTY WALKING: ICD-10-CM

## 2021-07-09 DIAGNOSIS — M25.661 DECREASED RANGE OF MOTION (ROM) OF RIGHT KNEE: ICD-10-CM

## 2021-07-09 PROCEDURE — 97110 THERAPEUTIC EXERCISES: CPT | Mod: PN

## 2021-07-12 ENCOUNTER — TELEPHONE (OUTPATIENT)
Dept: RHEUMATOLOGY | Facility: CLINIC | Age: 61
End: 2021-07-12

## 2021-07-12 DIAGNOSIS — Z79.899 HIGH RISK MEDICATION USE: Primary | ICD-10-CM

## 2021-07-12 DIAGNOSIS — M05.79 RHEUMATOID ARTHRITIS INVOLVING MULTIPLE SITES WITH POSITIVE RHEUMATOID FACTOR: ICD-10-CM

## 2021-07-12 RX ORDER — MEPERIDINE HYDROCHLORIDE 50 MG/ML
25 INJECTION INTRAMUSCULAR; INTRAVENOUS; SUBCUTANEOUS
Status: CANCELLED | OUTPATIENT
Start: 2021-08-02

## 2021-07-12 RX ORDER — DIPHENHYDRAMINE HCL 25 MG
25 CAPSULE ORAL
Status: CANCELLED | OUTPATIENT
Start: 2021-08-02

## 2021-07-12 RX ORDER — ACETAMINOPHEN 325 MG/1
650 TABLET ORAL
Status: CANCELLED | OUTPATIENT
Start: 2021-08-02

## 2021-07-12 RX ORDER — SODIUM CHLORIDE 0.9 % (FLUSH) 0.9 %
10 SYRINGE (ML) INJECTION
Status: CANCELLED | OUTPATIENT
Start: 2021-08-02

## 2021-07-12 RX ORDER — HEPARIN 100 UNIT/ML
500 SYRINGE INTRAVENOUS
Status: CANCELLED | OUTPATIENT
Start: 2021-08-02

## 2021-07-14 ENCOUNTER — CLINICAL SUPPORT (OUTPATIENT)
Dept: REHABILITATION | Facility: HOSPITAL | Age: 61
End: 2021-07-14
Attending: PHYSICIAN ASSISTANT
Payer: MEDICARE

## 2021-07-14 DIAGNOSIS — M25.661 DECREASED RANGE OF MOTION (ROM) OF RIGHT KNEE: ICD-10-CM

## 2021-07-14 DIAGNOSIS — R26.2 DIFFICULTY WALKING: ICD-10-CM

## 2021-07-14 PROCEDURE — 97110 THERAPEUTIC EXERCISES: CPT | Mod: PN

## 2021-07-20 ENCOUNTER — LAB VISIT (OUTPATIENT)
Dept: LAB | Facility: HOSPITAL | Age: 61
End: 2021-07-20
Attending: INTERNAL MEDICINE
Payer: MEDICARE

## 2021-07-20 ENCOUNTER — CLINICAL SUPPORT (OUTPATIENT)
Dept: REHABILITATION | Facility: HOSPITAL | Age: 61
End: 2021-07-20
Attending: PHYSICIAN ASSISTANT
Payer: MEDICARE

## 2021-07-20 DIAGNOSIS — R26.2 DIFFICULTY WALKING: ICD-10-CM

## 2021-07-20 DIAGNOSIS — Z79.899 HIGH RISK MEDICATION USE: ICD-10-CM

## 2021-07-20 DIAGNOSIS — M25.661 DECREASED RANGE OF MOTION (ROM) OF RIGHT KNEE: ICD-10-CM

## 2021-07-20 DIAGNOSIS — M05.79 RHEUMATOID ARTHRITIS INVOLVING MULTIPLE SITES WITH POSITIVE RHEUMATOID FACTOR: ICD-10-CM

## 2021-07-20 LAB
ALBUMIN SERPL BCP-MCNC: 3.6 G/DL (ref 3.5–5.2)
ALP SERPL-CCNC: 102 U/L (ref 55–135)
ALT SERPL W/O P-5'-P-CCNC: 13 U/L (ref 10–44)
ANION GAP SERPL CALC-SCNC: 7 MMOL/L (ref 8–16)
AST SERPL-CCNC: 18 U/L (ref 10–40)
BASOPHILS # BLD AUTO: 0.01 K/UL (ref 0–0.2)
BASOPHILS NFR BLD: 0.2 % (ref 0–1.9)
BILIRUB SERPL-MCNC: 0.7 MG/DL (ref 0.1–1)
BUN SERPL-MCNC: 13 MG/DL (ref 8–23)
CALCIUM SERPL-MCNC: 11.1 MG/DL (ref 8.7–10.5)
CHLORIDE SERPL-SCNC: 108 MMOL/L (ref 95–110)
CO2 SERPL-SCNC: 28 MMOL/L (ref 23–29)
CREAT SERPL-MCNC: 0.8 MG/DL (ref 0.5–1.4)
CRP SERPL-MCNC: 6.6 MG/L (ref 0–8.2)
DIFFERENTIAL METHOD: ABNORMAL
EOSINOPHIL # BLD AUTO: 0.2 K/UL (ref 0–0.5)
EOSINOPHIL NFR BLD: 3.4 % (ref 0–8)
ERYTHROCYTE [DISTWIDTH] IN BLOOD BY AUTOMATED COUNT: 13.1 % (ref 11.5–14.5)
ERYTHROCYTE [SEDIMENTATION RATE] IN BLOOD BY WESTERGREN METHOD: 21 MM/HR (ref 0–36)
EST. GFR  (AFRICAN AMERICAN): >60 ML/MIN/1.73 M^2
EST. GFR  (NON AFRICAN AMERICAN): >60 ML/MIN/1.73 M^2
GLUCOSE SERPL-MCNC: 64 MG/DL (ref 70–110)
HCT VFR BLD AUTO: 43.1 % (ref 37–48.5)
HGB BLD-MCNC: 13.3 G/DL (ref 12–16)
IGA SERPL-MCNC: 208 MG/DL (ref 40–350)
IGG SERPL-MCNC: 775 MG/DL (ref 650–1600)
IGM SERPL-MCNC: 31 MG/DL (ref 50–300)
IMM GRANULOCYTES # BLD AUTO: 0.02 K/UL (ref 0–0.04)
IMM GRANULOCYTES NFR BLD AUTO: 0.4 % (ref 0–0.5)
LYMPHOCYTES # BLD AUTO: 1 K/UL (ref 1–4.8)
LYMPHOCYTES NFR BLD: 17.9 % (ref 18–48)
MCH RBC QN AUTO: 29.4 PG (ref 27–31)
MCHC RBC AUTO-ENTMCNC: 30.9 G/DL (ref 32–36)
MCV RBC AUTO: 95 FL (ref 82–98)
MONOCYTES # BLD AUTO: 0.4 K/UL (ref 0.3–1)
MONOCYTES NFR BLD: 6.9 % (ref 4–15)
NEUTROPHILS # BLD AUTO: 3.9 K/UL (ref 1.8–7.7)
NEUTROPHILS NFR BLD: 71.2 % (ref 38–73)
NRBC BLD-RTO: 0 /100 WBC
PLATELET # BLD AUTO: 167 K/UL (ref 150–450)
PMV BLD AUTO: 11.1 FL (ref 9.2–12.9)
POTASSIUM SERPL-SCNC: 4.8 MMOL/L (ref 3.5–5.1)
PROT SERPL-MCNC: 6.7 G/DL (ref 6–8.4)
RBC # BLD AUTO: 4.52 M/UL (ref 4–5.4)
SODIUM SERPL-SCNC: 143 MMOL/L (ref 136–145)
WBC # BLD AUTO: 5.53 K/UL (ref 3.9–12.7)

## 2021-07-20 PROCEDURE — 85652 RBC SED RATE AUTOMATED: CPT | Performed by: INTERNAL MEDICINE

## 2021-07-20 PROCEDURE — 36415 COLL VENOUS BLD VENIPUNCTURE: CPT | Mod: PO | Performed by: INTERNAL MEDICINE

## 2021-07-20 PROCEDURE — 85025 COMPLETE CBC W/AUTO DIFF WBC: CPT | Performed by: INTERNAL MEDICINE

## 2021-07-20 PROCEDURE — 97110 THERAPEUTIC EXERCISES: CPT | Mod: PN

## 2021-07-20 PROCEDURE — 80053 COMPREHEN METABOLIC PANEL: CPT | Performed by: INTERNAL MEDICINE

## 2021-07-20 PROCEDURE — 82784 ASSAY IGA/IGD/IGG/IGM EACH: CPT | Mod: 59 | Performed by: INTERNAL MEDICINE

## 2021-07-20 PROCEDURE — 86140 C-REACTIVE PROTEIN: CPT | Performed by: INTERNAL MEDICINE

## 2021-07-20 PROCEDURE — 82784 ASSAY IGA/IGD/IGG/IGM EACH: CPT | Performed by: INTERNAL MEDICINE

## 2021-07-27 ENCOUNTER — DOCUMENTATION ONLY (OUTPATIENT)
Dept: REHABILITATION | Facility: HOSPITAL | Age: 61
End: 2021-07-27

## 2021-08-03 ENCOUNTER — CLINICAL SUPPORT (OUTPATIENT)
Dept: REHABILITATION | Facility: HOSPITAL | Age: 61
End: 2021-08-03
Attending: PHYSICIAN ASSISTANT
Payer: MEDICARE

## 2021-08-03 DIAGNOSIS — M25.661 DECREASED RANGE OF MOTION (ROM) OF RIGHT KNEE: ICD-10-CM

## 2021-08-03 DIAGNOSIS — R26.2 DIFFICULTY WALKING: ICD-10-CM

## 2021-08-03 PROCEDURE — 97110 THERAPEUTIC EXERCISES: CPT | Mod: PN

## 2021-08-10 ENCOUNTER — CLINICAL SUPPORT (OUTPATIENT)
Dept: REHABILITATION | Facility: HOSPITAL | Age: 61
End: 2021-08-10
Attending: PHYSICIAN ASSISTANT
Payer: MEDICARE

## 2021-08-10 DIAGNOSIS — R26.2 DIFFICULTY WALKING: ICD-10-CM

## 2021-08-10 DIAGNOSIS — M25.661 DECREASED RANGE OF MOTION (ROM) OF RIGHT KNEE: ICD-10-CM

## 2021-08-10 PROCEDURE — 97110 THERAPEUTIC EXERCISES: CPT | Mod: PN

## 2021-08-13 ENCOUNTER — CLINICAL SUPPORT (OUTPATIENT)
Dept: REHABILITATION | Facility: HOSPITAL | Age: 61
End: 2021-08-13
Attending: PHYSICIAN ASSISTANT
Payer: MEDICARE

## 2021-08-13 DIAGNOSIS — R26.2 DIFFICULTY WALKING: ICD-10-CM

## 2021-08-13 DIAGNOSIS — M25.661 DECREASED RANGE OF MOTION (ROM) OF RIGHT KNEE: ICD-10-CM

## 2021-08-13 PROCEDURE — 97110 THERAPEUTIC EXERCISES: CPT | Mod: PN,CQ

## 2021-08-20 ENCOUNTER — CLINICAL SUPPORT (OUTPATIENT)
Dept: REHABILITATION | Facility: HOSPITAL | Age: 61
End: 2021-08-20
Attending: PHYSICIAN ASSISTANT
Payer: MEDICARE

## 2021-08-20 DIAGNOSIS — R26.2 DIFFICULTY WALKING: ICD-10-CM

## 2021-08-20 DIAGNOSIS — M25.661 DECREASED RANGE OF MOTION (ROM) OF RIGHT KNEE: ICD-10-CM

## 2021-08-20 PROCEDURE — 97110 THERAPEUTIC EXERCISES: CPT | Mod: PN,CQ

## 2021-08-23 ENCOUNTER — TELEPHONE (OUTPATIENT)
Dept: RHEUMATOLOGY | Facility: CLINIC | Age: 61
End: 2021-08-23

## 2021-08-24 ENCOUNTER — CLINICAL SUPPORT (OUTPATIENT)
Dept: REHABILITATION | Facility: HOSPITAL | Age: 61
End: 2021-08-24
Attending: PHYSICIAN ASSISTANT
Payer: MEDICARE

## 2021-08-24 DIAGNOSIS — R26.2 DIFFICULTY WALKING: ICD-10-CM

## 2021-08-24 DIAGNOSIS — M25.661 DECREASED RANGE OF MOTION (ROM) OF RIGHT KNEE: ICD-10-CM

## 2021-08-24 PROCEDURE — 97110 THERAPEUTIC EXERCISES: CPT | Mod: PN

## 2021-09-27 ENCOUNTER — OFFICE VISIT (OUTPATIENT)
Dept: RHEUMATOLOGY | Facility: CLINIC | Age: 61
End: 2021-09-27
Payer: MEDICARE

## 2021-09-27 VITALS
WEIGHT: 194.44 LBS | BODY MASS INDEX: 36.71 KG/M2 | HEART RATE: 70 BPM | DIASTOLIC BLOOD PRESSURE: 73 MMHG | SYSTOLIC BLOOD PRESSURE: 111 MMHG | HEIGHT: 61 IN

## 2021-09-27 DIAGNOSIS — M79.7 FIBROMYALGIA: ICD-10-CM

## 2021-09-27 DIAGNOSIS — Z79.899 HIGH RISK MEDICATION USE: ICD-10-CM

## 2021-09-27 DIAGNOSIS — M05.79 RHEUMATOID ARTHRITIS INVOLVING MULTIPLE SITES WITH POSITIVE RHEUMATOID FACTOR: Primary | ICD-10-CM

## 2021-09-27 PROCEDURE — 3008F PR BODY MASS INDEX (BMI) DOCUMENTED: ICD-10-PCS | Mod: CPTII,S$GLB,, | Performed by: INTERNAL MEDICINE

## 2021-09-27 PROCEDURE — 3074F PR MOST RECENT SYSTOLIC BLOOD PRESSURE < 130 MM HG: ICD-10-PCS | Mod: CPTII,S$GLB,, | Performed by: INTERNAL MEDICINE

## 2021-09-27 PROCEDURE — 1159F PR MEDICATION LIST DOCUMENTED IN MEDICAL RECORD: ICD-10-PCS | Mod: CPTII,S$GLB,, | Performed by: INTERNAL MEDICINE

## 2021-09-27 PROCEDURE — 99214 OFFICE O/P EST MOD 30 MIN: CPT | Mod: S$GLB,,, | Performed by: INTERNAL MEDICINE

## 2021-09-27 PROCEDURE — 3078F PR MOST RECENT DIASTOLIC BLOOD PRESSURE < 80 MM HG: ICD-10-PCS | Mod: CPTII,S$GLB,, | Performed by: INTERNAL MEDICINE

## 2021-09-27 PROCEDURE — 1159F MED LIST DOCD IN RCRD: CPT | Mod: CPTII,S$GLB,, | Performed by: INTERNAL MEDICINE

## 2021-09-27 PROCEDURE — 99999 PR PBB SHADOW E&M-EST. PATIENT-LVL V: CPT | Mod: PBBFAC,,, | Performed by: INTERNAL MEDICINE

## 2021-09-27 PROCEDURE — 99214 PR OFFICE/OUTPT VISIT, EST, LEVL IV, 30-39 MIN: ICD-10-PCS | Mod: S$GLB,,, | Performed by: INTERNAL MEDICINE

## 2021-09-27 PROCEDURE — 3078F DIAST BP <80 MM HG: CPT | Mod: CPTII,S$GLB,, | Performed by: INTERNAL MEDICINE

## 2021-09-27 PROCEDURE — 3008F BODY MASS INDEX DOCD: CPT | Mod: CPTII,S$GLB,, | Performed by: INTERNAL MEDICINE

## 2021-09-27 PROCEDURE — 1160F PR REVIEW ALL MEDS BY PRESCRIBER/CLIN PHARMACIST DOCUMENTED: ICD-10-PCS | Mod: CPTII,S$GLB,, | Performed by: INTERNAL MEDICINE

## 2021-09-27 PROCEDURE — 99999 PR PBB SHADOW E&M-EST. PATIENT-LVL V: ICD-10-PCS | Mod: PBBFAC,,, | Performed by: INTERNAL MEDICINE

## 2021-09-27 PROCEDURE — 3074F SYST BP LT 130 MM HG: CPT | Mod: CPTII,S$GLB,, | Performed by: INTERNAL MEDICINE

## 2021-09-27 PROCEDURE — 1160F RVW MEDS BY RX/DR IN RCRD: CPT | Mod: CPTII,S$GLB,, | Performed by: INTERNAL MEDICINE

## 2021-09-27 RX ORDER — HYDROXYZINE HYDROCHLORIDE 25 MG/1
25 TABLET, FILM COATED ORAL EVERY 6 HOURS PRN
COMMUNITY
Start: 2021-07-08 | End: 2023-03-01

## 2021-09-27 RX ORDER — LEVOCETIRIZINE DIHYDROCHLORIDE 5 MG/1
TABLET, FILM COATED ORAL
COMMUNITY
Start: 2021-07-30 | End: 2022-09-12 | Stop reason: CLARIF

## 2021-09-27 RX ORDER — MOXIFLOXACIN 5 MG/ML
SOLUTION/ DROPS OPHTHALMIC
COMMUNITY
Start: 2021-06-10 | End: 2022-01-28

## 2021-09-27 RX ORDER — METHOCARBAMOL 500 MG/1
500 TABLET, FILM COATED ORAL 2 TIMES DAILY
COMMUNITY
Start: 2021-08-18

## 2021-09-27 RX ORDER — MONTELUKAST SODIUM 10 MG/1
10 TABLET ORAL NIGHTLY
COMMUNITY
Start: 2021-08-22

## 2021-09-27 RX ORDER — HEPARIN 100 UNIT/ML
500 SYRINGE INTRAVENOUS
Status: CANCELLED | OUTPATIENT
Start: 2021-09-28

## 2021-09-27 RX ORDER — ONDANSETRON HYDROCHLORIDE 8 MG/1
8 TABLET, FILM COATED ORAL EVERY 8 HOURS PRN
COMMUNITY
Start: 2021-08-24

## 2021-09-27 RX ORDER — CELECOXIB 200 MG/1
CAPSULE ORAL
COMMUNITY
End: 2022-01-28

## 2021-09-27 RX ORDER — SODIUM CHLORIDE 0.9 % (FLUSH) 0.9 %
10 SYRINGE (ML) INJECTION
Status: CANCELLED | OUTPATIENT
Start: 2021-09-28

## 2021-09-27 RX ORDER — DIPHENHYDRAMINE HCL 25 MG
25 CAPSULE ORAL
Status: CANCELLED | OUTPATIENT
Start: 2021-09-28

## 2021-09-27 RX ORDER — PAROXETINE HYDROCHLORIDE HEMIHYDRATE 12.5 MG/1
TABLET, FILM COATED, EXTENDED RELEASE ORAL
COMMUNITY
End: 2022-01-28

## 2021-09-27 RX ORDER — MEPERIDINE HYDROCHLORIDE 50 MG/ML
25 INJECTION INTRAMUSCULAR; INTRAVENOUS; SUBCUTANEOUS
Status: CANCELLED | OUTPATIENT
Start: 2021-09-28

## 2021-09-27 RX ORDER — ACETAMINOPHEN 325 MG/1
650 TABLET ORAL
Status: CANCELLED | OUTPATIENT
Start: 2021-09-28

## 2021-10-14 ENCOUNTER — INFUSION (OUTPATIENT)
Dept: INFUSION THERAPY | Facility: HOSPITAL | Age: 61
End: 2021-10-14
Payer: MEDICARE

## 2021-10-14 VITALS
WEIGHT: 190.94 LBS | BODY MASS INDEX: 36.05 KG/M2 | HEIGHT: 61 IN | RESPIRATION RATE: 18 BRPM | SYSTOLIC BLOOD PRESSURE: 108 MMHG | HEART RATE: 86 BPM | DIASTOLIC BLOOD PRESSURE: 60 MMHG | TEMPERATURE: 98 F

## 2021-10-14 DIAGNOSIS — M05.79 RHEUMATOID ARTHRITIS INVOLVING MULTIPLE SITES WITH POSITIVE RHEUMATOID FACTOR: Primary | ICD-10-CM

## 2021-10-14 PROCEDURE — 96413 CHEMO IV INFUSION 1 HR: CPT

## 2021-10-14 PROCEDURE — 25000003 PHARM REV CODE 250: Performed by: INTERNAL MEDICINE

## 2021-10-14 PROCEDURE — 63600175 PHARM REV CODE 636 W HCPCS: Mod: JG | Performed by: INTERNAL MEDICINE

## 2021-10-14 PROCEDURE — 96415 CHEMO IV INFUSION ADDL HR: CPT

## 2021-10-14 RX ORDER — HEPARIN 100 UNIT/ML
500 SYRINGE INTRAVENOUS
Status: DISCONTINUED | OUTPATIENT
Start: 2021-10-14 | End: 2021-10-14 | Stop reason: HOSPADM

## 2021-10-14 RX ORDER — MEPERIDINE HYDROCHLORIDE 50 MG/ML
25 INJECTION INTRAMUSCULAR; INTRAVENOUS; SUBCUTANEOUS
Status: CANCELLED | OUTPATIENT
Start: 2021-10-28

## 2021-10-14 RX ORDER — HEPARIN 100 UNIT/ML
500 SYRINGE INTRAVENOUS
Status: CANCELLED | OUTPATIENT
Start: 2021-10-28

## 2021-10-14 RX ORDER — SODIUM CHLORIDE 0.9 % (FLUSH) 0.9 %
10 SYRINGE (ML) INJECTION
Status: CANCELLED | OUTPATIENT
Start: 2021-10-28

## 2021-10-14 RX ORDER — DIPHENHYDRAMINE HCL 25 MG
25 CAPSULE ORAL
Status: COMPLETED | OUTPATIENT
Start: 2021-10-14 | End: 2021-10-14

## 2021-10-14 RX ORDER — ACETAMINOPHEN 325 MG/1
650 TABLET ORAL
Status: CANCELLED | OUTPATIENT
Start: 2021-10-28

## 2021-10-14 RX ORDER — DIPHENHYDRAMINE HCL 25 MG
25 CAPSULE ORAL
Status: CANCELLED | OUTPATIENT
Start: 2021-10-28

## 2021-10-14 RX ORDER — ACETAMINOPHEN 325 MG/1
650 TABLET ORAL
Status: COMPLETED | OUTPATIENT
Start: 2021-10-14 | End: 2021-10-14

## 2021-10-14 RX ORDER — SODIUM CHLORIDE 0.9 % (FLUSH) 0.9 %
10 SYRINGE (ML) INJECTION
Status: DISCONTINUED | OUTPATIENT
Start: 2021-10-14 | End: 2021-10-14 | Stop reason: HOSPADM

## 2021-10-14 RX ORDER — MEPERIDINE HYDROCHLORIDE 50 MG/ML
25 INJECTION INTRAMUSCULAR; INTRAVENOUS; SUBCUTANEOUS
Status: DISCONTINUED | OUTPATIENT
Start: 2021-10-14 | End: 2021-10-14 | Stop reason: HOSPADM

## 2021-10-14 RX ADMIN — ACETAMINOPHEN 650 MG: 325 TABLET ORAL at 08:10

## 2021-10-14 RX ADMIN — DIPHENHYDRAMINE HYDROCHLORIDE 25 MG: 25 CAPSULE ORAL at 08:10

## 2021-10-14 RX ADMIN — RITUXIMAB 965 MG: 10 INJECTION, SOLUTION INTRAVENOUS at 09:10

## 2021-10-14 RX ADMIN — SODIUM CHLORIDE: 0.9 INJECTION, SOLUTION INTRAVENOUS at 08:10

## 2021-10-18 ENCOUNTER — TELEPHONE (OUTPATIENT)
Dept: RHEUMATOLOGY | Facility: CLINIC | Age: 61
End: 2021-10-18

## 2021-10-18 DIAGNOSIS — M05.79 RHEUMATOID ARTHRITIS INVOLVING MULTIPLE SITES WITH POSITIVE RHEUMATOID FACTOR: Primary | ICD-10-CM

## 2021-10-19 RX ORDER — METHYLPREDNISOLONE 4 MG/1
TABLET ORAL
Qty: 1 PACKAGE | Refills: 0 | Status: SHIPPED | OUTPATIENT
Start: 2021-10-19 | End: 2021-10-28

## 2021-10-27 ENCOUNTER — TELEPHONE (OUTPATIENT)
Dept: RHEUMATOLOGY | Facility: CLINIC | Age: 61
End: 2021-10-27
Payer: MEDICARE

## 2021-10-27 DIAGNOSIS — M05.79 RHEUMATOID ARTHRITIS INVOLVING MULTIPLE SITES WITH POSITIVE RHEUMATOID FACTOR: Primary | ICD-10-CM

## 2021-10-28 RX ORDER — PREDNISONE 5 MG/1
TABLET ORAL
Qty: 70 TABLET | Refills: 0 | Status: SHIPPED | OUTPATIENT
Start: 2021-10-28 | End: 2021-11-18

## 2021-11-06 NOTE — PLAN OF CARE
Problem: Patient Care Overview  Goal: Individualization & Mutuality  Outcome: Ongoing (interventions implemented as appropriate)  Pt educated on handwashing and infection control.  Pt verbalized understanding.        
Intermittent RUE tingling.

## 2021-11-26 ENCOUNTER — TELEPHONE (OUTPATIENT)
Dept: RHEUMATOLOGY | Facility: HOSPITAL | Age: 61
End: 2021-11-26
Payer: MEDICARE

## 2021-11-26 DIAGNOSIS — M05.79 RHEUMATOID ARTHRITIS INVOLVING MULTIPLE SITES WITH POSITIVE RHEUMATOID FACTOR: ICD-10-CM

## 2021-11-26 RX ORDER — TIZANIDINE 2 MG/1
TABLET ORAL
COMMUNITY
Start: 2021-11-08 | End: 2022-08-12

## 2021-11-26 RX ORDER — PREDNISONE 5 MG/1
2.5 TABLET ORAL DAILY
Qty: 70 TABLET | Refills: 0
Start: 2021-11-26 | End: 2021-12-06

## 2021-12-30 ENCOUNTER — TELEPHONE (OUTPATIENT)
Dept: RHEUMATOLOGY | Facility: CLINIC | Age: 61
End: 2021-12-30
Payer: MEDICARE

## 2021-12-30 DIAGNOSIS — M05.79 RHEUMATOID ARTHRITIS INVOLVING MULTIPLE SITES WITH POSITIVE RHEUMATOID FACTOR: ICD-10-CM

## 2021-12-30 RX ORDER — PREDNISONE 5 MG/1
5 TABLET ORAL DAILY
Qty: 90 TABLET | Refills: 0 | Status: SHIPPED | OUTPATIENT
Start: 2021-12-30 | End: 2022-01-28

## 2022-01-28 ENCOUNTER — OFFICE VISIT (OUTPATIENT)
Dept: RHEUMATOLOGY | Facility: CLINIC | Age: 62
End: 2022-01-28
Payer: MEDICARE

## 2022-01-28 ENCOUNTER — LAB VISIT (OUTPATIENT)
Dept: LAB | Facility: HOSPITAL | Age: 62
End: 2022-01-28
Attending: INTERNAL MEDICINE
Payer: MEDICARE

## 2022-01-28 VITALS
WEIGHT: 195.13 LBS | DIASTOLIC BLOOD PRESSURE: 61 MMHG | HEART RATE: 61 BPM | HEIGHT: 61 IN | BODY MASS INDEX: 36.84 KG/M2 | SYSTOLIC BLOOD PRESSURE: 120 MMHG

## 2022-01-28 DIAGNOSIS — Z79.899 HIGH RISK MEDICATION USE: ICD-10-CM

## 2022-01-28 DIAGNOSIS — Z79.899 HIGH RISK MEDICATION USE: Primary | ICD-10-CM

## 2022-01-28 DIAGNOSIS — M79.7 FIBROMYALGIA: ICD-10-CM

## 2022-01-28 DIAGNOSIS — N20.0 LEFT NEPHROLITHIASIS: Chronic | ICD-10-CM

## 2022-01-28 DIAGNOSIS — E66.01 SEVERE OBESITY (BMI 35.0-39.9) WITH COMORBIDITY: ICD-10-CM

## 2022-01-28 DIAGNOSIS — M05.79 RHEUMATOID ARTHRITIS INVOLVING MULTIPLE SITES WITH POSITIVE RHEUMATOID FACTOR: ICD-10-CM

## 2022-01-28 PROBLEM — M25.661 DECREASED RANGE OF MOTION (ROM) OF RIGHT KNEE: Status: RESOLVED | Noted: 2021-06-09 | Resolved: 2022-01-28

## 2022-01-28 LAB
ALBUMIN SERPL BCP-MCNC: 4 G/DL (ref 3.5–5.2)
ALP SERPL-CCNC: 92 U/L (ref 55–135)
ALT SERPL W/O P-5'-P-CCNC: 12 U/L (ref 10–44)
ANION GAP SERPL CALC-SCNC: 8 MMOL/L (ref 8–16)
AST SERPL-CCNC: 15 U/L (ref 10–40)
BASOPHILS # BLD AUTO: 0.05 K/UL (ref 0–0.2)
BASOPHILS NFR BLD: 0.6 % (ref 0–1.9)
BILIRUB SERPL-MCNC: 1.2 MG/DL (ref 0.1–1)
BUN SERPL-MCNC: 23 MG/DL (ref 8–23)
CALCIUM SERPL-MCNC: 11.3 MG/DL (ref 8.7–10.5)
CHLORIDE SERPL-SCNC: 102 MMOL/L (ref 95–110)
CO2 SERPL-SCNC: 30 MMOL/L (ref 23–29)
CREAT SERPL-MCNC: 0.8 MG/DL (ref 0.5–1.4)
CRP SERPL-MCNC: 2.7 MG/L (ref 0–8.2)
DIFFERENTIAL METHOD: ABNORMAL
EOSINOPHIL # BLD AUTO: 0.1 K/UL (ref 0–0.5)
EOSINOPHIL NFR BLD: 1.1 % (ref 0–8)
ERYTHROCYTE [DISTWIDTH] IN BLOOD BY AUTOMATED COUNT: 13.7 % (ref 11.5–14.5)
ERYTHROCYTE [SEDIMENTATION RATE] IN BLOOD BY WESTERGREN METHOD: 4 MM/HR (ref 0–36)
EST. GFR  (AFRICAN AMERICAN): >60 ML/MIN/1.73 M^2
EST. GFR  (NON AFRICAN AMERICAN): >60 ML/MIN/1.73 M^2
GLUCOSE SERPL-MCNC: 67 MG/DL (ref 70–110)
HCT VFR BLD AUTO: 47.4 % (ref 37–48.5)
HGB BLD-MCNC: 14.8 G/DL (ref 12–16)
IMM GRANULOCYTES # BLD AUTO: 0.05 K/UL (ref 0–0.04)
IMM GRANULOCYTES NFR BLD AUTO: 0.6 % (ref 0–0.5)
LYMPHOCYTES # BLD AUTO: 1.8 K/UL (ref 1–4.8)
LYMPHOCYTES NFR BLD: 19.8 % (ref 18–48)
MCH RBC QN AUTO: 30.8 PG (ref 27–31)
MCHC RBC AUTO-ENTMCNC: 31.2 G/DL (ref 32–36)
MCV RBC AUTO: 99 FL (ref 82–98)
MONOCYTES # BLD AUTO: 0.8 K/UL (ref 0.3–1)
MONOCYTES NFR BLD: 8.9 % (ref 4–15)
NEUTROPHILS # BLD AUTO: 6.3 K/UL (ref 1.8–7.7)
NEUTROPHILS NFR BLD: 69 % (ref 38–73)
NRBC BLD-RTO: 0 /100 WBC
PLATELET # BLD AUTO: 166 K/UL (ref 150–450)
PMV BLD AUTO: 10.7 FL (ref 9.2–12.9)
POTASSIUM SERPL-SCNC: 4.9 MMOL/L (ref 3.5–5.1)
PROT SERPL-MCNC: 7 G/DL (ref 6–8.4)
RBC # BLD AUTO: 4.81 M/UL (ref 4–5.4)
SODIUM SERPL-SCNC: 140 MMOL/L (ref 136–145)
WBC # BLD AUTO: 9.07 K/UL (ref 3.9–12.7)

## 2022-01-28 PROCEDURE — 3078F PR MOST RECENT DIASTOLIC BLOOD PRESSURE < 80 MM HG: ICD-10-PCS | Mod: CPTII,S$GLB,, | Performed by: INTERNAL MEDICINE

## 2022-01-28 PROCEDURE — 86803 HEPATITIS C AB TEST: CPT | Performed by: INTERNAL MEDICINE

## 2022-01-28 PROCEDURE — 99999 PR PBB SHADOW E&M-EST. PATIENT-LVL V: CPT | Mod: PBBFAC,,, | Performed by: INTERNAL MEDICINE

## 2022-01-28 PROCEDURE — 1159F PR MEDICATION LIST DOCUMENTED IN MEDICAL RECORD: ICD-10-PCS | Mod: CPTII,S$GLB,, | Performed by: INTERNAL MEDICINE

## 2022-01-28 PROCEDURE — 36415 COLL VENOUS BLD VENIPUNCTURE: CPT | Performed by: INTERNAL MEDICINE

## 2022-01-28 PROCEDURE — 85652 RBC SED RATE AUTOMATED: CPT | Performed by: INTERNAL MEDICINE

## 2022-01-28 PROCEDURE — 1159F MED LIST DOCD IN RCRD: CPT | Mod: CPTII,S$GLB,, | Performed by: INTERNAL MEDICINE

## 2022-01-28 PROCEDURE — 85025 COMPLETE CBC W/AUTO DIFF WBC: CPT | Performed by: INTERNAL MEDICINE

## 2022-01-28 PROCEDURE — 3008F PR BODY MASS INDEX (BMI) DOCUMENTED: ICD-10-PCS | Mod: CPTII,S$GLB,, | Performed by: INTERNAL MEDICINE

## 2022-01-28 PROCEDURE — 1160F PR REVIEW ALL MEDS BY PRESCRIBER/CLIN PHARMACIST DOCUMENTED: ICD-10-PCS | Mod: CPTII,S$GLB,, | Performed by: INTERNAL MEDICINE

## 2022-01-28 PROCEDURE — 1160F RVW MEDS BY RX/DR IN RCRD: CPT | Mod: CPTII,S$GLB,, | Performed by: INTERNAL MEDICINE

## 2022-01-28 PROCEDURE — 99214 OFFICE O/P EST MOD 30 MIN: CPT | Mod: S$GLB,,, | Performed by: INTERNAL MEDICINE

## 2022-01-28 PROCEDURE — 99214 PR OFFICE/OUTPT VISIT, EST, LEVL IV, 30-39 MIN: ICD-10-PCS | Mod: S$GLB,,, | Performed by: INTERNAL MEDICINE

## 2022-01-28 PROCEDURE — 3074F SYST BP LT 130 MM HG: CPT | Mod: CPTII,S$GLB,, | Performed by: INTERNAL MEDICINE

## 2022-01-28 PROCEDURE — 3008F BODY MASS INDEX DOCD: CPT | Mod: CPTII,S$GLB,, | Performed by: INTERNAL MEDICINE

## 2022-01-28 PROCEDURE — 3078F DIAST BP <80 MM HG: CPT | Mod: CPTII,S$GLB,, | Performed by: INTERNAL MEDICINE

## 2022-01-28 PROCEDURE — 3074F PR MOST RECENT SYSTOLIC BLOOD PRESSURE < 130 MM HG: ICD-10-PCS | Mod: CPTII,S$GLB,, | Performed by: INTERNAL MEDICINE

## 2022-01-28 PROCEDURE — 99999 PR PBB SHADOW E&M-EST. PATIENT-LVL V: ICD-10-PCS | Mod: PBBFAC,,, | Performed by: INTERNAL MEDICINE

## 2022-01-28 PROCEDURE — 86140 C-REACTIVE PROTEIN: CPT | Performed by: INTERNAL MEDICINE

## 2022-01-28 PROCEDURE — 86709 HEPATITIS A IGM ANTIBODY: CPT | Performed by: INTERNAL MEDICINE

## 2022-01-28 PROCEDURE — 86790 VIRUS ANTIBODY NOS: CPT | Performed by: INTERNAL MEDICINE

## 2022-01-28 PROCEDURE — 80053 COMPREHEN METABOLIC PANEL: CPT | Performed by: INTERNAL MEDICINE

## 2022-01-28 RX ORDER — PREDNISONE 1 MG/1
4 TABLET ORAL DAILY
Qty: 360 TABLET | Refills: 1 | Status: SHIPPED | OUTPATIENT
Start: 2022-01-28 | End: 2022-10-28

## 2022-01-28 ASSESSMENT — ROUTINE ASSESSMENT OF PATIENT INDEX DATA (RAPID3)
MDHAQ FUNCTION SCORE: 0.5
FATIGUE SCORE: 2.5
TOTAL RAPID3 SCORE: 3.06
PATIENT GLOBAL ASSESSMENT SCORE: 4
PSYCHOLOGICAL DISTRESS SCORE: 6.6
PAIN SCORE: 3.5

## 2022-01-28 NOTE — PROGRESS NOTES
Subjective:       Patient ID: Robert Smith is a 61 y.o. female.    Chief Complaint: Disease Management    HPI     RA and FMS that presents for follow-up.     Hx RA dx 2003; dx at [facility] ; started with hands and all over body  On prednisone 5 mg/d since 2003  Rx here 2007 Dr Max and Chung Rx MTX and Humira  MTX nausea with po and SQ  LFA tried 2007 by Dr Terry and she did not tolerate with nausea and hospitalization  2008 Dr Mann retried MTX; did not tolerate  2010-12 Dr Rosenbaum treated with Plaquenil(HCQ)  Humira quit working 2010 2010 Simponi tried - caused weak and shaky reaction  2011 Orencia tried - caused muscle aches, shakes and jaw pain  2011 Enbrel tried - did not work  Dr Paris [id number];  Had Rituxin 2016  Earlier 2017 took Xeljanz for a few months; had a lot of nausea and she felt arms and hands getting numb  SSZ July 2017 caused nausea and hepatitis   ENBREL retried  Aug 2017 - stopped Dec due to lack of effectiveness  Cimzia Jan 2018 caused urticarial rash  Actemra March 2018 - Oct 2019 with partial improvement  Nov 2019 started upadacitinib/Rinvoq; stopped April 2020 due to side effects  RTX  July/Aug 2020; Jan/Feb 2021 ; Oct 14, 2021     Meds:  Hydrocodone , Dr Carias;  says #60 on Jan 21, Dec 23, Nov 26  Hx Diazepam prn ; #60 Dec 3 on   Metoprolol   Eliquis  Protonix   Tizanidine 2 mg at night or methocarbamol  Albuterol inhaler  Has tried to eliminate meds    Still 5 mg/ day prednisone for RA; wants to wean further    Not c/o morning stiffness  Some knee and ankle/foot pain  Minimal joint swelling   Positive FMS pain back of neck and shoulders  No increase in deformity and hand pain much better    CBC 11/21 WBC, H/H, Plat 128    Review of Systems   Constitutional: Negative for fatigue, fever and unexpected weight change.   HENT: Negative for mouth sores and trouble swallowing.         Dry mouth   Eyes: Positive for redness.        Dry eyes   Respiratory: Negative for cough and  "shortness of breath.    Cardiovascular: Negative for chest pain.   Gastrointestinal: Negative for abdominal pain, constipation and diarrhea.   Genitourinary: Negative for dysuria and genital sores.   Skin: Negative for color change and rash.   Neurological: Negative for headaches.   Hematological: Negative for adenopathy. Does not bruise/bleed easily.   Psychiatric/Behavioral: Positive for sleep disturbance.         Objective:   /61   Pulse 61   Ht 5' 1" (1.549 m)   Wt 88.5 kg (195 lb 1.7 oz)   BMI 36.87 kg/m²      Physical Exam   Constitutional: She is well-developed, well-nourished, and in no distress.   Eyes: Conjunctivae are normal.   Cardiovascular: Normal rate, regular rhythm and normal heart sounds.   Pulmonary/Chest: She has no wheezes. She has no rales.   Musculoskeletal:         General: Deformity present.      Comments: Hand deformities but not tender  Full extension of elbows and good ROM shoulders  L knee enlarged/ OA change  Ankles, feet, tender but no swelling   Lymphadenopathy:     She has no cervical adenopathy.   Skin: No rash noted.         1/20/2021 3/19/2021 9/27/2021 1/28/2022   Tender (HERNANDEZ-28) 26 / 28 4 / 28 8 / 28 1 / 28    Swollen (HERNANDEZ-28) 16 / 28 4 / 28 7 / 28 0 / 28    Provider Global 80 mm 30 mm 80 mm 20 mm   Patient Global 80 mm 50 mm 80 mm 40 mm    mm/hr 31 mm/hr 21 mm/hr 4 mm/hr   CRP 55.3 mg/L 8.9 mg/L 6.6 mg/L 2.7 mg/L   HERNANDEZ-28 (ESR) 8.37 (High disease activity) 4.78 (Moderate disease activity) 5.58 (High disease activity) 2.09 (Remission)   HERNANDEZ-28 (CRP) 7.51 (High disease activity) 4.17 (Moderate disease activity) 5.13 (High disease activity) 2.55 (Remission)   CDAI Score 42  16  31  7      Lab Results   Component Value Date    SEDRATE 4 01/28/2022        Assessment:       1. High risk medication use    2. Rheumatoid arthritis involving multiple sites with positive rheumatoid factor    3. Fibromyalgia    4. Left nephrolithiasis    5. Severe obesity (BMI " 35.0-39.9) with comorbidity            Plan:       Problem List Items Addressed This Visit        Active Problems    Rheumatoid arthritis with positive rheumatoid factor     RA appears to have finally responded to RTX with significant decrease in synovotis and joint pain    She has also learned to distinguish FMS pain from Ra joint pain     Will check lab today and attempt to reduce RTX to 9 or 12 month interval if disease allows    She will start tapering prednisone 1 mg every 1-2 months    RTC me in 3-4 mo with lab         Relevant Medications    predniSONE (DELTASONE) 1 MG tablet    High risk medication use - Primary     She had an acute illness in November with GI c/o and elevated liver enzymes; sx resilved and f/u lab in Dec looked ok except for elevated Ca and PTH    No other interval infectious illnesses but she likely has susceptibility to viral infections in spite of vaccines as a result of rituximab. Discuss this and she is aware and takes precautions.         Relevant Orders    Hepatitis C Antibody    HEPATITIS A ANTIBODY, IGM    Hepatitis A antibody, IgG    Fibromyalgia     Managing at this time    I gave her anti-inflammatory diet handout         Severe obesity (BMI 35.0-39.9) with comorbidity     Diet handout given         Left nephrolithiasis (Chronic)

## 2022-01-28 NOTE — ASSESSMENT & PLAN NOTE
She had an acute illness in November with GI c/o and elevated liver enzymes; sx resilved and f/u lab in Dec looked ok except for elevated Ca and PTH    No other interval infectious illnesses but she likely has susceptibility to viral infections in spite of vaccines as a result of rituximab. Discuss this and she is aware and takes precautions.

## 2022-01-28 NOTE — ASSESSMENT & PLAN NOTE
RA appears to have finally responded to RTX with significant decrease in synovotis and joint pain    She has also learned to distinguish FMS pain from Ra joint pain     Will check lab today and attempt to reduce RTX to 9 or 12 month interval if disease allows    She will start tapering prednisone 1 mg every 1-2 months    RTC me in 3-4 mo with lab

## 2022-02-01 LAB
HAV IGG SER QL IA: NEGATIVE
HAV IGM SERPL QL IA: NEGATIVE
HCV AB SERPL QL IA: NEGATIVE

## 2022-02-03 NOTE — NURSING
1100 New orders from Dr. Rodriguez pause infusion until symptoms resolve and restart at half the rate.   1115 symptoms resolved vitals obtained and stable, restart rituxan pt in agreement. Will continue to monitor   Interpolation Flap Text: A decision was made to reconstruct the defect utilizing an interpolation axial flap and a staged reconstruction.  A telfa template was made of the defect.  This telfa template was then used to outline the interpolation flap.  The donor area for the pedicle flap was then injected with anesthesia.  The flap was excised through the skin and subcutaneous tissue down to the layer of the underlying musculature.  The interpolation flap was carefully excised within this deep plane to maintain its blood supply.  The edges of the donor site were undermined.   The donor site was closed in a primary fashion.  The pedicle was then rotated into position and sutured.  Once the tube was sutured into place, adequate blood supply was confirmed with blanching and refill.  The pedicle was then wrapped with xeroform gauze and dressed appropriately with a telfa and gauze bandage to ensure continued blood supply and protect the attached pedicle.

## 2022-02-17 ENCOUNTER — TELEPHONE (OUTPATIENT)
Dept: RHEUMATOLOGY | Facility: CLINIC | Age: 62
End: 2022-02-17
Payer: MEDICARE

## 2022-02-17 DIAGNOSIS — E83.52 HYPERCALCEMIA: Primary | ICD-10-CM

## 2022-02-17 NOTE — TELEPHONE ENCOUNTER
----- Message from Macrina Mata RN sent at 2/16/2022  6:44 PM CST -----    ----- Message -----  From: Rebecca Richardson  Sent: 2/16/2022   9:50 AM CST  To: Michael RODRIGUEZ Staff     Pt callign regarding lab work results ]    [phone] (home)

## 2022-02-22 NOTE — TELEPHONE ENCOUNTER
Ca 11.3  Takes mvt with vit D; 2000 IU  Can stop Vit D while evaluating Ca    Will order PTH with renal function panel    Will CC her PCP Dr Chan; may need endocrine referral

## 2022-02-24 ENCOUNTER — PATIENT MESSAGE (OUTPATIENT)
Dept: RHEUMATOLOGY | Facility: CLINIC | Age: 62
End: 2022-02-24
Payer: MEDICARE

## 2022-02-24 ENCOUNTER — LAB VISIT (OUTPATIENT)
Dept: LAB | Facility: HOSPITAL | Age: 62
End: 2022-02-24
Attending: INTERNAL MEDICINE
Payer: MEDICARE

## 2022-02-24 DIAGNOSIS — E21.3 HYPERPARATHYROIDISM: Primary | ICD-10-CM

## 2022-02-24 DIAGNOSIS — E83.52 HYPERCALCEMIA: ICD-10-CM

## 2022-02-24 LAB
ALBUMIN SERPL BCP-MCNC: 3.8 G/DL (ref 3.5–5.2)
ANION GAP SERPL CALC-SCNC: 12 MMOL/L (ref 8–16)
BUN SERPL-MCNC: 22 MG/DL (ref 8–23)
CALCIUM SERPL-MCNC: 10.8 MG/DL (ref 8.7–10.5)
CHLORIDE SERPL-SCNC: 104 MMOL/L (ref 95–110)
CO2 SERPL-SCNC: 27 MMOL/L (ref 23–29)
CREAT SERPL-MCNC: 0.8 MG/DL (ref 0.5–1.4)
EST. GFR  (AFRICAN AMERICAN): >60 ML/MIN/1.73 M^2
EST. GFR  (NON AFRICAN AMERICAN): >60 ML/MIN/1.73 M^2
GLUCOSE SERPL-MCNC: 69 MG/DL (ref 70–110)
PHOSPHATE SERPL-MCNC: 2.3 MG/DL (ref 2.7–4.5)
POTASSIUM SERPL-SCNC: 4.8 MMOL/L (ref 3.5–5.1)
PTH-INTACT SERPL-MCNC: 151.2 PG/ML (ref 9–77)
SODIUM SERPL-SCNC: 143 MMOL/L (ref 136–145)

## 2022-02-24 PROCEDURE — 83970 ASSAY OF PARATHORMONE: CPT | Performed by: INTERNAL MEDICINE

## 2022-02-24 PROCEDURE — 36415 COLL VENOUS BLD VENIPUNCTURE: CPT | Mod: PO | Performed by: INTERNAL MEDICINE

## 2022-02-24 PROCEDURE — 80069 RENAL FUNCTION PANEL: CPT | Performed by: INTERNAL MEDICINE

## 2022-03-02 ENCOUNTER — TELEPHONE (OUTPATIENT)
Dept: RHEUMATOLOGY | Facility: CLINIC | Age: 62
End: 2022-03-02
Payer: MEDICARE

## 2022-03-02 NOTE — TELEPHONE ENCOUNTER
Patient of Dr. Rodriguez who was on prednisone 5mg/day was told by Dr. Rodriguez to wean herself down.  Patient states she is now at 3mg/day and she has very bad pain.   Patient was told Dr. Rodriguez out of office until Monday-but this information would be given to the physician on service, Dr. Rosenbaum.

## 2022-03-03 ENCOUNTER — TELEPHONE (OUTPATIENT)
Dept: RHEUMATOLOGY | Facility: CLINIC | Age: 62
End: 2022-03-03
Payer: MEDICARE

## 2022-03-03 NOTE — TELEPHONE ENCOUNTER
Staff to inform patient to increase back to 4 mg of prednisone from 3 mg and send Dr. Rodriguez an update next week.

## 2022-03-03 NOTE — TELEPHONE ENCOUNTER
As per Dr. Rosenbaum, patient instructed to take 4mg/day of prednisone, and Dr. Rodriguez will return Monday for further management.  Patient verbalizes understanding.

## 2022-03-15 DIAGNOSIS — M25.561 PAIN IN BOTH KNEES, UNSPECIFIED CHRONICITY: Primary | ICD-10-CM

## 2022-03-15 DIAGNOSIS — M25.562 PAIN IN BOTH KNEES, UNSPECIFIED CHRONICITY: Primary | ICD-10-CM

## 2022-03-18 ENCOUNTER — TELEPHONE (OUTPATIENT)
Dept: ORTHOPEDICS | Facility: CLINIC | Age: 62
End: 2022-03-18
Payer: MEDICARE

## 2022-03-18 NOTE — TELEPHONE ENCOUNTER
Returned patient's call. Patient stated someone called her and told her she needed to reschedule her appointment with Dr. Patterson to another day. Informed patient I did not see anything in her chart regarding that and she should come in for her scheduled appointment on Monday. Patient verbalized understanding.  ----- Message from Tani Rubio sent at 3/18/2022  4:35 PM CDT -----  Patient called to speak w/ someone in regards to rescheduling her 3/21 appt. Offered next available appt for scheduling in [facility], patient declined. Requesting callback [phone]

## 2022-03-23 ENCOUNTER — OFFICE VISIT (OUTPATIENT)
Dept: ENDOCRINOLOGY | Facility: CLINIC | Age: 62
End: 2022-03-23
Payer: MEDICARE

## 2022-03-23 VITALS
BODY MASS INDEX: 36.98 KG/M2 | TEMPERATURE: 99 F | DIASTOLIC BLOOD PRESSURE: 85 MMHG | WEIGHT: 195.69 LBS | SYSTOLIC BLOOD PRESSURE: 134 MMHG | HEART RATE: 72 BPM

## 2022-03-23 DIAGNOSIS — N20.0 LEFT NEPHROLITHIASIS: Chronic | ICD-10-CM

## 2022-03-23 DIAGNOSIS — E83.52 HYPERCALCEMIA: ICD-10-CM

## 2022-03-23 DIAGNOSIS — M81.0 OSTEOPOROSIS, UNSPECIFIED OSTEOPOROSIS TYPE, UNSPECIFIED PATHOLOGICAL FRACTURE PRESENCE: ICD-10-CM

## 2022-03-23 DIAGNOSIS — E21.3 HYPERPARATHYROIDISM: ICD-10-CM

## 2022-03-23 DIAGNOSIS — M05.79 RHEUMATOID ARTHRITIS INVOLVING MULTIPLE SITES WITH POSITIVE RHEUMATOID FACTOR: Primary | ICD-10-CM

## 2022-03-23 DIAGNOSIS — K21.9 GASTROESOPHAGEAL REFLUX DISEASE, UNSPECIFIED WHETHER ESOPHAGITIS PRESENT: ICD-10-CM

## 2022-03-23 PROCEDURE — 3079F PR MOST RECENT DIASTOLIC BLOOD PRESSURE 80-89 MM HG: ICD-10-PCS | Mod: CPTII,S$GLB,, | Performed by: HOSPITALIST

## 2022-03-23 PROCEDURE — 99204 OFFICE O/P NEW MOD 45 MIN: CPT | Mod: S$GLB,,, | Performed by: HOSPITALIST

## 2022-03-23 PROCEDURE — 99999 PR PBB SHADOW E&M-EST. PATIENT-LVL V: CPT | Mod: PBBFAC,,, | Performed by: HOSPITALIST

## 2022-03-23 PROCEDURE — 1159F PR MEDICATION LIST DOCUMENTED IN MEDICAL RECORD: ICD-10-PCS | Mod: CPTII,S$GLB,, | Performed by: HOSPITALIST

## 2022-03-23 PROCEDURE — 1160F PR REVIEW ALL MEDS BY PRESCRIBER/CLIN PHARMACIST DOCUMENTED: ICD-10-PCS | Mod: CPTII,S$GLB,, | Performed by: HOSPITALIST

## 2022-03-23 PROCEDURE — 3075F PR MOST RECENT SYSTOLIC BLOOD PRESS GE 130-139MM HG: ICD-10-PCS | Mod: CPTII,S$GLB,, | Performed by: HOSPITALIST

## 2022-03-23 PROCEDURE — 99999 PR PBB SHADOW E&M-EST. PATIENT-LVL V: ICD-10-PCS | Mod: PBBFAC,,, | Performed by: HOSPITALIST

## 2022-03-23 PROCEDURE — 3075F SYST BP GE 130 - 139MM HG: CPT | Mod: CPTII,S$GLB,, | Performed by: HOSPITALIST

## 2022-03-23 PROCEDURE — 3008F PR BODY MASS INDEX (BMI) DOCUMENTED: ICD-10-PCS | Mod: CPTII,S$GLB,, | Performed by: HOSPITALIST

## 2022-03-23 PROCEDURE — 1159F MED LIST DOCD IN RCRD: CPT | Mod: CPTII,S$GLB,, | Performed by: HOSPITALIST

## 2022-03-23 PROCEDURE — 1160F RVW MEDS BY RX/DR IN RCRD: CPT | Mod: CPTII,S$GLB,, | Performed by: HOSPITALIST

## 2022-03-23 PROCEDURE — 99204 PR OFFICE/OUTPT VISIT, NEW, LEVL IV, 45-59 MIN: ICD-10-PCS | Mod: S$GLB,,, | Performed by: HOSPITALIST

## 2022-03-23 PROCEDURE — 3008F BODY MASS INDEX DOCD: CPT | Mod: CPTII,S$GLB,, | Performed by: HOSPITALIST

## 2022-03-23 PROCEDURE — 3079F DIAST BP 80-89 MM HG: CPT | Mod: CPTII,S$GLB,, | Performed by: HOSPITALIST

## 2022-03-23 NOTE — PROGRESS NOTES
Subjective:      Patient ID: Robert Smith is a 61 y.o. female presented to [facility] on 3/23/2022.  Chief Complaint:  Elevated Calcium      History of Present Illness: Robert Smith is a 61 y.o. female here for hypercalcemia, hyperparathyroidism    Other significant past medical history:  Rheumatoid arthritis (on systemic steroid and rituximab), fibromyalgia, obesity      1) With regards to the hypercalcemia, with noted hyperparathyroidism  First noted on blood work done 02/2022 , calcium 10.8 that time  Previously did have hypercalcemia with calcium 11.  Does have fluctuation of calcium as far back as 2009 with hypocalcemia and hypercalcemia    Daily intake of calcium is: no excessive intake  Taking vitamin D: on hold per pcp    Past medical history significant for:  Rheumatoid arthritis: Dx 2003, off and on steroid use in the past, currently on  prednisone use over the last 6 months, weaning per Rheumatology  Kidney transplant: no  Vitamin-D deficiency: yes  Osteoporosis: yes, not on therapy  Renal stones: no  Immobility/fractures or healing fractures: no  Gastric bypass/gastric surgery:  No    Family history of renal stones/hypercalcemia: no  Chronic kidney disease /ESRD:no  History of cancer/with metastasis: no  Prior radiation treatment, or unexplained elevations of alk phos on labs: no  Hyperthyroidism/Graves: no  Anemia: no     Tobacco use, including use in the past:  no   EtOH use? no (<2 drinks a day in female, <3 drinks a day for male)    Medication uses:    - No: Hydrochlorothiazide/Chlorthalidone  - Yes: Tums, on occasion  - No: Lithium    Symptoms hypercalcemia:  Does have mild fatigue  No myalgia, bone pain  No constipation    Lab Results   Component Value Date    .2 (H) 02/24/2022    [id number] 30 08/27/2012    [id number] 23 (L) 06/15/2011    CALCIUM 10.8 (H) 02/24/2022    CALCIUM 11.3 (H) 01/28/2022    CALCIUM 11.1 (H) 07/20/2021    PHOS 2.3 (L) 02/24/2022    PHOS 3.8  06/03/2016    PHOS 3.1 06/02/2016    ALKPHOS 92 01/28/2022    ALKPHOS 102 07/20/2021    ALKPHOS 120 01/13/2021    TSH 0.983 07/28/2017       2) In regards to Osteoporosis  Diagnosis on DXA in 2017  Unclear if started on therapy, patient denies taking Fosamax/Boniva  Chronic rheumatoid arthritis diagnosed in 2003, off and on steroid use in the past, currently on  prednisone use over the last 6 months, weaning per Rheumatology    Osteoporosis Risk Factor Assessment:  History of falls over the last 2 years: no  History of fractures to wrist, hip or spine: R ankle fracture 5 years ago  History of loss of height of more than 1 1/2 to 2 inches: no  Family history of osteoporosis: yes, mother    Poor balance    Age of menopause: 44 yo, no HRT  Tobacco use, including use in the past:  no   EtOH use? no    Weight bearing exercise?   No   Dental work planned? no    Recent DXA: 2017  A quantitative bone mineral density was obtained using the DEXA technique.    The bone mineral density measured from L1 through L4 is 0.852g/cm2.  This corresponds to a T score of -2.8 and a Z score of -2.8.  The bone mineral density within the left femoral neck measures 0.626 g/cm2.  This corresponds to a T score of -3.0 and a Z score of -2.5.  The bone mineral density within the right femoral neck measures 0.676 g/cm2.  This corresponds to a T score of -2.6 and a Z score of -2.2.     FRAX RESULTS:  10-year Probability of Fracture:  Major Osteoporotic Fracture 25.7%.  Hip Fracture 7.4%.    IMPRESSION: Osteoporosis.     Reviewed past surgical, medical, family, social history and updated as appropriate.    Review of Systems: see HPI above    Objective:   /85 (BP Location: Left arm)   Pulse 72   Temp 98.5 °F (36.9 °C) (Oral)   Wt 88.8 kg (195 lb 11.2 oz)   BMI 36.98 kg/m²     Body mass index is 36.98 kg/m².  Vital signs reviewed    Physical Exam  Vitals and nursing note reviewed.   Constitutional:       General: She is not in acute  distress.     Appearance: Normal appearance. She is well-developed. She is obese. She is not toxic-appearing.   Neck:      Thyroid: No thyromegaly.   Cardiovascular:      Heart sounds: Normal heart sounds.   Pulmonary:      Effort: Pulmonary effort is normal. No respiratory distress.   Abdominal:      Tenderness: There is no abdominal tenderness.   Musculoskeletal:         General: No deformity. Normal range of motion.      Cervical back: Normal range of motion.   Skin:     Findings: No bruising.   Neurological:      Mental Status: She is alert and oriented to person, place, and time.   Psychiatric:         Mood and Affect: Mood normal.         Lab Reviewed:   Lab Results   Component Value Date    HGBA1C 5.5 10/22/2012       Lab Results   Component Value Date    CHOL 196 09/10/2019    HDL 41 09/10/2019    LDLCALC 124.6 09/10/2019    TRIG 152 (H) 09/10/2019    CHOLHDL 20.9 09/10/2019       Lab Results   Component Value Date     02/24/2022    K 4.8 02/24/2022     02/24/2022    CO2 27 02/24/2022    GLU 69 (L) 02/24/2022    BUN 22 02/24/2022    CREATININE 0.8 02/24/2022    CALCIUM 10.8 (H) 02/24/2022    PROT 7.0 01/28/2022    ALBUMIN 3.8 02/24/2022    BILITOT 1.2 (H) 01/28/2022    ALKPHOS 92 01/28/2022    AST 15 01/28/2022    ALT 12 01/28/2022    ANIONGAP 12 02/24/2022    ESTGFRAFRICA >60.0 02/24/2022    EGFRNONAA >60.0 02/24/2022    TSH 0.983 07/28/2017        Lab Results   Component Value Date    .2 (H) 02/24/2022    [id number] 30 08/27/2012    [id number] 23 (L) 06/15/2011    CALCIUM 10.8 (H) 02/24/2022    CALCIUM 11.3 (H) 01/28/2022    CALCIUM 11.1 (H) 07/20/2021    PHOS 2.3 (L) 02/24/2022    PHOS 3.8 06/03/2016    PHOS 3.1 06/02/2016    ALKPHOS 92 01/28/2022    ALKPHOS 102 07/20/2021    ALKPHOS 120 01/13/2021    TSH 0.983 07/28/2017       Assessment     1. Rheumatoid arthritis involving multiple sites with positive rheumatoid factor  CBC Auto Differential   2. Gastroesophageal reflux disease,  unspecified whether esophagitis present     3. Left nephrolithiasis     4. Osteoporosis, unspecified osteoporosis type, unspecified pathological fracture presence  Vitamin D    TSH    PTH, Intact    Comprehensive Metabolic Panel    Magnesium    Phosphorus    CBC Auto Differential    Creatinine, urine, timed 24 Hours    Calcium, Timed Urine [facility]; 24 Hours    Protein Electrophoresis, Serum    DXA Bone Density Appendicular Skeleton   5. Hypercalcemia  TSH    PTH, Intact    Comprehensive Metabolic Panel    Creatinine, urine, timed 24 Hours    Calcium, Timed Urine [facility]; 24 Hours    Protein Electrophoresis, Serum    DXA Bone Density Appendicular Skeleton   6. Hyperparathyroidism  Vitamin D    TSH    PTH, Intact    Comprehensive Metabolic Panel    Magnesium    Phosphorus    CBC Auto Differential    Calcium, Timed Urine [facility]; 24 Hours    Protein Electrophoresis, Serum    DXA Bone Density Appendicular Skeleton        Plan     Hypercalcemia  - Incidental finding noted on recent lab work, with elevated hyperparathyroidism: suspect primary versus secondary  - Mechanism of hyperparathyroidism leading to hypercalcemia was explained to pt, all questions were answered  - remote history of hypercalcemia noted as far back as 2009  - No history of CKD stage IIIA, does have renal stone    Plan  - Checking TSH, Vit D, Renal function panel, PTH  - Checking 24 hour urine Ca and Cr to monitor for hypercalciuria/ or FHH  - Check bone DXA given history of osteoporosis  - Advised to avoid dehydration, excessive calcium supplementations and avoid medication like HCTZ/Chlorthalidone/Lithium that can worsen hypercalcemia.   - follow-up after blood work results, to discuss next treatment plan    Osteoporosis  - Patient with noted for osteoporosis along with hypercalcemia and hyperparathyroidism  - osteoporosis longstanding since 2017, most likely due to chronic systemic steroid use and postmenopausal  - Secondary work up: have not been  done:  See plan above  - repeat bone density  - advised patient did restart vitamin-D 2000 IU daily, okay with daily multivitamin  - Fall precautions/Exercise regimen: advised  - re-evaluate in clinic at the next office visit to discuss further treatment option    Gastroesophageal reflux disease  - longstanding history of GERD, will avoid oral bisphosphonate    Rheumatoid arthritis with positive rheumatoid factor  - diagnosed in 2003, currently on rituximab and prednisone       Advised patient to follow up with PCP for routine health maintenance care.   RTC in 2 months to go over lab work and discuss treatment      Luis Carlos Vidal M.D.  Endocrinology  [facility]  3/23/2022      Disclaimer: This note has been generated in part with the use of voice-recognition software. There may be typographical errors that have been missed during proof-reading.

## 2022-03-24 NOTE — ASSESSMENT & PLAN NOTE
- Incidental finding noted on recent lab work, with elevated hyperparathyroidism: suspect primary versus secondary  - Mechanism of hyperparathyroidism leading to hypercalcemia was explained to pt, all questions were answered  - remote history of hypercalcemia noted as far back as 2009  - No history of CKD stage IIIA, does have renal stone    Plan  - Checking TSH, Vit D, Renal function panel, PTH  - Checking 24 hour urine Ca and Cr to monitor for hypercalciuria/ or FHH  - Check bone DXA given history of osteoporosis  - Advised to avoid dehydration, excessive calcium supplementations and avoid medication like HCTZ/Chlorthalidone/Lithium that can worsen hypercalcemia.   - follow-up after blood work results, to discuss next treatment plan

## 2022-03-24 NOTE — ASSESSMENT & PLAN NOTE
- Patient with noted for osteoporosis along with hypercalcemia and hyperparathyroidism  - osteoporosis longstanding since 2017, most likely due to chronic systemic steroid use and postmenopausal  - Secondary work up: have not been done:  See plan above  - repeat bone density  - advised patient did restart vitamin-D 2000 IU daily, okay with daily multivitamin  - Fall precautions/Exercise regimen: advised  - re-evaluate in clinic at the next office visit to discuss further treatment option

## 2022-04-01 ENCOUNTER — HOSPITAL ENCOUNTER (OUTPATIENT)
Dept: RADIOLOGY | Facility: CLINIC | Age: 62
Discharge: HOME | End: 2022-04-01
Attending: HOSPITALIST
Payer: MEDICARE

## 2022-04-01 DIAGNOSIS — M81.0 OSTEOPOROSIS, UNSPECIFIED OSTEOPOROSIS TYPE, UNSPECIFIED PATHOLOGICAL FRACTURE PRESENCE: ICD-10-CM

## 2022-04-01 DIAGNOSIS — E83.52 HYPERCALCEMIA: ICD-10-CM

## 2022-04-01 DIAGNOSIS — E21.3 HYPERPARATHYROIDISM: ICD-10-CM

## 2022-04-01 PROCEDURE — 77080 DEXA BONE DENSITY SPINE HIP: ICD-10-PCS | Mod: 26,,, | Performed by: INTERNAL MEDICINE

## 2022-04-01 PROCEDURE — 77080 DXA BONE DENSITY AXIAL: CPT | Mod: TC,PO

## 2022-04-01 PROCEDURE — 77081 DXA BONE DENSITY APPENDICULR: CPT | Mod: 59,TC,PO

## 2022-04-01 PROCEDURE — 77080 DXA BONE DENSITY AXIAL: CPT | Mod: 26,,, | Performed by: INTERNAL MEDICINE

## 2022-04-25 ENCOUNTER — HOSPITAL ENCOUNTER (OUTPATIENT)
Dept: RADIOLOGY | Facility: HOSPITAL | Age: 62
Discharge: HOME | End: 2022-04-25
Attending: ORTHOPAEDIC SURGERY
Payer: MEDICARE

## 2022-04-25 ENCOUNTER — OFFICE VISIT (OUTPATIENT)
Dept: ORTHOPEDICS | Facility: CLINIC | Age: 62
End: 2022-04-25
Payer: MEDICARE

## 2022-04-25 VITALS — WEIGHT: 194.75 LBS | BODY MASS INDEX: 36.77 KG/M2 | HEIGHT: 61 IN

## 2022-04-25 DIAGNOSIS — M25.562 CHRONIC PAIN OF BOTH KNEES: Primary | ICD-10-CM

## 2022-04-25 DIAGNOSIS — M25.561 CHRONIC PAIN OF BOTH KNEES: Primary | ICD-10-CM

## 2022-04-25 DIAGNOSIS — G89.29 CHRONIC PAIN OF BOTH KNEES: Primary | ICD-10-CM

## 2022-04-25 DIAGNOSIS — Z96.653 S/P REVISION OF TOTAL KNEE, BILATERAL: ICD-10-CM

## 2022-04-25 DIAGNOSIS — M25.562 PAIN IN BOTH KNEES, UNSPECIFIED CHRONICITY: ICD-10-CM

## 2022-04-25 DIAGNOSIS — M25.561 PAIN IN BOTH KNEES, UNSPECIFIED CHRONICITY: ICD-10-CM

## 2022-04-25 PROCEDURE — 99999 PR PBB SHADOW E&M-EST. PATIENT-LVL IV: ICD-10-PCS | Mod: PBBFAC,,, | Performed by: ORTHOPAEDIC SURGERY

## 2022-04-25 PROCEDURE — 73562 X-RAY EXAM OF KNEE 3: CPT | Mod: TC,50

## 2022-04-25 PROCEDURE — 3008F BODY MASS INDEX DOCD: CPT | Mod: CPTII,S$GLB,, | Performed by: ORTHOPAEDIC SURGERY

## 2022-04-25 PROCEDURE — 99213 PR OFFICE/OUTPT VISIT, EST, LEVL III, 20-29 MIN: ICD-10-PCS | Mod: S$GLB,,, | Performed by: ORTHOPAEDIC SURGERY

## 2022-04-25 PROCEDURE — 99999 PR PBB SHADOW E&M-EST. PATIENT-LVL IV: CPT | Mod: PBBFAC,,, | Performed by: ORTHOPAEDIC SURGERY

## 2022-04-25 PROCEDURE — 1160F RVW MEDS BY RX/DR IN RCRD: CPT | Mod: CPTII,S$GLB,, | Performed by: ORTHOPAEDIC SURGERY

## 2022-04-25 PROCEDURE — 73562 X-RAY EXAM OF KNEE 3: CPT | Mod: 26,50,, | Performed by: RADIOLOGY

## 2022-04-25 PROCEDURE — 99213 OFFICE O/P EST LOW 20 MIN: CPT | Mod: S$GLB,,, | Performed by: ORTHOPAEDIC SURGERY

## 2022-04-25 PROCEDURE — 3008F PR BODY MASS INDEX (BMI) DOCUMENTED: ICD-10-PCS | Mod: CPTII,S$GLB,, | Performed by: ORTHOPAEDIC SURGERY

## 2022-04-25 PROCEDURE — 1159F MED LIST DOCD IN RCRD: CPT | Mod: CPTII,S$GLB,, | Performed by: ORTHOPAEDIC SURGERY

## 2022-04-25 PROCEDURE — 73562 XR KNEE ORTHO BILAT: ICD-10-PCS | Mod: 26,50,, | Performed by: RADIOLOGY

## 2022-04-25 PROCEDURE — 1159F PR MEDICATION LIST DOCUMENTED IN MEDICAL RECORD: ICD-10-PCS | Mod: CPTII,S$GLB,, | Performed by: ORTHOPAEDIC SURGERY

## 2022-04-25 PROCEDURE — 1160F PR REVIEW ALL MEDS BY PRESCRIBER/CLIN PHARMACIST DOCUMENTED: ICD-10-PCS | Mod: CPTII,S$GLB,, | Performed by: ORTHOPAEDIC SURGERY

## 2022-04-25 NOTE — PROGRESS NOTES
Subjective:      Patient ID: Robert Smith is a 61 y.o. female.    Chief Complaint: Pain of the Left Knee and Pain of the Right Knee    HPI  62 yo female s/p bilateral knee revisions, with bilateral knee pain.  Left is worse.  Received recall notification from [employer].  Both knees revised for infection.  Left in 2016 with a [facility]. Right 2012 with [employer]. Pain is anteriorly in both knees.  Worse with activity, and going from seated from standing.  Recent CRP 2.7/Recent ESR 4.  Past Medical History:   Diagnosis Date    Acid reflux     Allergy     Anxiety     Degenerative disc disease     Depression     DVT of leg (deep venous thrombosis) 2009    rt leg    Fibromyalgia     Fibromyalgia     Long-term current use of steroids 11/12/2012    NSTEMI (non-ST elevated myocardial infarction) 5/18/2019    Osteoporosis, postmenopausal     chronic steroid use    RA (rheumatoid arthritis)      Past Surgical History:   Procedure Laterality Date    ANKLE FRACTURE SURGERY      BACK SURGERY      CARPAL TUNNEL RELEASE      CHOLECYSTECTOMY      HARDWARE REMOVAL      JOINT REPLACEMENT Right     right knee    KNEE SURGERY      Bilateral    KNEE SURGERY Left     revision x 2    SPINE SURGERY      TONSILLECTOMY      TUBAL LIGATION       Family History   Problem Relation Age of Onset    COPD Mother     Heart attack Father     Emphysema Maternal Grandfather     Breast cancer Neg Hx     Colon cancer Neg Hx     Ovarian cancer Neg Hx      Social History     Socioeconomic History    Marital status:    Tobacco Use    Smoking status: Never Smoker    Smokeless tobacco: Never Used   Substance and Sexual Activity    Alcohol use: No     Alcohol/week: 0.0 standard drinks    Drug use: No    Sexual activity: Never     Partners: Male     Current Outpatient Medications on File Prior to Visit   Medication Sig Dispense Refill    albuterol sulfate 90 mcg/actuation AePB Inhale 1 puff into the lungs every 4 (four)  hours as needed. Rescue 1 each 1    ascorbic acid, vitamin C, (VITAMIN C) 1000 MG tablet Take 1,000 mg by mouth once daily.      cyanocobalamin 1,000 mcg/mL injection INJECT 1000 MCG (1ML) AS DIRECTED EVERY 28 DAYS      diazePAM (VALIUM) 5 MG tablet       dicyclomine (BENTYL) 10 MG capsule 20 mg.       ELIQUIS 5 mg Tab       fluticasone propionate (FLONASE) 50 mcg/actuation nasal spray       HYDROcodone-acetaminophen (NORCO)  mg per tablet       HYDROCORT/MIN OIL/PETROLAT,WHT (HYDROCORTISONE-MIN OIL-WHT PET) 1 % Oint       hydrOXYzine HCL (ATARAX) 25 MG tablet Take 25 mg by mouth every 6 (six) hours as needed.      levocetirizine (XYZAL) 5 MG tablet TAKE 1 TABLET DAILY AS NEEDED BY MOUTH FOR ALLERGIES      meclizine (ANTIVERT) 25 mg tablet TAKE 1 TABLET BY MOUTH 3 (THREE) TIMES DAILY AS NEEDED FOR DIZZINESS      methocarbamoL (ROBAXIN) 500 MG Tab Take 500 mg by mouth once daily.      montelukast (SINGULAIR) 10 mg tablet       multivitamin capsule Take 1 capsule by mouth once daily.      ondansetron (ZOFRAN) 8 MG tablet       pantoprazole (PROTONIX) 40 MG tablet       polyethylene glycol (GLYCOLAX) 17 gram/dose powder Take 17 g by mouth.      predniSONE (DELTASONE) 1 MG tablet Take 4 tablets (4 mg total) by mouth once daily. 360 tablet 1    riTUXimab (RITUXAN) 10 mg/mL injection Inject into the vein.      SODIUM CHLORIDE FOR INHALATION (SODIUM CHLORIDE 0.9%) 0.9 % nebulizer solution       tiZANidine (ZANAFLEX) 2 MG tablet       vitamin D (VITAMIN D3) 1000 units Tab Take 1,000 Units by mouth once daily.      metoprolol succinate (TOPROL-XL) 25 MG 24 hr tablet Take 12.5 mg by mouth.      [DISCONTINUED] ADVAIR DISKUS 250-50 mcg/dose diskus inhaler Inhale 1 puff into the lungs daily as needed.       [DISCONTINUED] lisinopril (PRINIVIL,ZESTRIL) 2.5 MG tablet       [DISCONTINUED] warfarin (COUMADIN) 1 MG tablet        No current facility-administered medications on file prior to visit.  "    Review of patient's allergies indicates:   Allergen Reactions    Sulfasalazine      HEPATITIS    Sulfa (sulfonamide antibiotics) Nausea And Vomiting    Clindamycin Diarrhea    Daypro [oxaprozin] Nausea Only    Orencia  [abatacept]      Other reaction(s): Muscle pain    Percocet  [oxycodone-acetaminophen]      Other reaction(s): Vomiting    Pregabalin     Simponi  [golimumab]      Other reaction(s): Unknown    Cimzia [certolizumab pegol] Rash    Ciprofloxacin Rash    Sulfamethoxazole-trimethoprim Rash       Review of Systems   Constitutional: Negative for chills, fever and night sweats.   HENT: Negative for hearing loss.    Eyes: Negative for blurred vision and double vision.   Cardiovascular: Negative for chest pain, claudication and leg swelling.   Respiratory: Negative for shortness of breath.    Endocrine: Negative for polydipsia, polyphagia and polyuria.   Hematologic/Lymphatic: Negative for adenopathy and bleeding problem. Does not bruise/bleed easily.   Skin: Negative for poor wound healing.   Gastrointestinal: Negative for diarrhea and heartburn.   Genitourinary: Negative for bladder incontinence.   Neurological: Negative for focal weakness, headaches, numbness, paresthesias and sensory change.   Psychiatric/Behavioral: The patient is not nervous/anxious.    Allergic/Immunologic: Negative for persistent infections.         Objective:      Body mass index is 37.41 kg/m².  Vitals:    04/25/22 0946   Weight: 88.3 kg (194 lb 12.4 oz)   Height: 5' 0.5" (1.537 m)         General    Constitutional: She is oriented to person, place, and time. She appears well-developed and well-nourished.   HENT:   Head: Normocephalic and atraumatic.   Eyes: EOM are normal.   Cardiovascular: Normal rate.    Pulmonary/Chest: Effort normal.   Neurological: She is alert and oriented to person, place, and time.   Psychiatric: She has a normal mood and affect. Her behavior is normal.     General Musculoskeletal Exam   Gait: " normal       Right Knee Exam     Inspection   Erythema: absent  Scars: absent  Swelling: absent  Effusion: absent  Deformity: absent  Bruising: absent    Tenderness   The patient is experiencing no tenderness.     Range of Motion   Extension: 0   Flexion: 120     Tests   Ligament Examination Lachman: normal (-1 to 2mm)   MCL - Valgus: normal (0 to 2mm)  LCL - Varus: normal  Patella   Passive Patellar Tilt: neutral    Other   Sensation: normal    Left Knee Exam     Inspection   Erythema: absent  Scars: absent  Swelling: absent  Effusion: absent  Deformity: absent  Bruising: absent    Tenderness   The patient tender to palpation of the lateral joint line.    Range of Motion   Extension: 0   Flexion: 120     Tests   Stability Lachman: normal (-1 to 2mm)   MCL - Valgus: normal (0 to 2mm)  LCL - Varus: normal (0 to 2mm)  Patella   Passive Patellar Tilt: neutral    Other   Sensation: normal    Muscle Strength   Right Lower Extremity   Hip Abduction: 5/5   Quadriceps:  5/5   Hamstrin/5   Left Lower Extremity   Hip Abduction: 5/5   Quadriceps:  5/5   Hamstrin/5     Reflexes     Left Side  Quadriceps:  2+    Right Side   Quadriceps:  2+    Vascular Exam     Right Pulses  Dorsalis Pedis:      2+          Left Pulses  Dorsalis Pedis:      2+          Edema  Right Lower Leg: absent  Left Lower Leg: absent      Radiographs taken today were reviewed by me.  There is a prosthetic replacement of the bilateral knee(s).  The prostheses are well positioned.  There is not evidence of bone loss, osteolysis, or loosening. There is not a fracture.    No changes  .            Assessment:       Encounter Diagnoses   Name Primary?    Chronic pain of both knees Yes    S/P revision of total knee, bilateral           Plan:       Robert was seen today for pain and pain.    Diagnoses and all orders for this visit:    Chronic pain of both knees    S/P revision of total knee, bilateral      I explained that the issue with bilsteral knee  infections was not related to the exactech recall.  The left knee (more painful) is not exactech.  The right knee is.    I would like to get the records from her pain management physician to see what was recently done. Possible RFA candidate    Options were discussed.  We will pursue a course of physical therapy.  F/U in 6 weeks.

## 2022-04-29 ENCOUNTER — OFFICE VISIT (OUTPATIENT)
Dept: URGENT CARE | Facility: CLINIC | Age: 62
End: 2022-04-29
Payer: MEDICARE

## 2022-04-29 VITALS
TEMPERATURE: 99 F | HEIGHT: 61 IN | HEART RATE: 64 BPM | DIASTOLIC BLOOD PRESSURE: 88 MMHG | WEIGHT: 194 LBS | SYSTOLIC BLOOD PRESSURE: 134 MMHG | BODY MASS INDEX: 36.63 KG/M2 | RESPIRATION RATE: 18 BRPM | OXYGEN SATURATION: 96 %

## 2022-04-29 DIAGNOSIS — N39.0 URINARY TRACT INFECTION WITH HEMATURIA, SITE UNSPECIFIED: Primary | ICD-10-CM

## 2022-04-29 DIAGNOSIS — R31.9 URINARY TRACT INFECTION WITH HEMATURIA, SITE UNSPECIFIED: Primary | ICD-10-CM

## 2022-04-29 DIAGNOSIS — N39.0 URINARY TRACT INFECTION WITHOUT HEMATURIA, SITE UNSPECIFIED: ICD-10-CM

## 2022-04-29 LAB
BILIRUB UR QL STRIP: NEGATIVE
GLUCOSE UR QL STRIP: NEGATIVE
KETONES UR QL STRIP: NEGATIVE
LEUKOCYTE ESTERASE UR QL STRIP: POSITIVE
PH, POC UA: 7 (ref 5–8)
POC BLOOD, URINE: POSITIVE
POC NITRATES, URINE: NEGATIVE
PROT UR QL STRIP: NEGATIVE
SP GR UR STRIP: 1.01 (ref 1–1.03)
UROBILINOGEN UR STRIP-ACNC: NORMAL (ref 0.1–1.1)

## 2022-04-29 PROCEDURE — 3079F PR MOST RECENT DIASTOLIC BLOOD PRESSURE 80-89 MM HG: ICD-10-PCS | Mod: CPTII,S$GLB,, | Performed by: NURSE PRACTITIONER

## 2022-04-29 PROCEDURE — 1160F RVW MEDS BY RX/DR IN RCRD: CPT | Mod: CPTII,S$GLB,, | Performed by: NURSE PRACTITIONER

## 2022-04-29 PROCEDURE — 3079F DIAST BP 80-89 MM HG: CPT | Mod: CPTII,S$GLB,, | Performed by: NURSE PRACTITIONER

## 2022-04-29 PROCEDURE — 87077 CULTURE AEROBIC IDENTIFY: CPT | Performed by: NURSE PRACTITIONER

## 2022-04-29 PROCEDURE — 87086 URINE CULTURE/COLONY COUNT: CPT | Performed by: NURSE PRACTITIONER

## 2022-04-29 PROCEDURE — 1159F PR MEDICATION LIST DOCUMENTED IN MEDICAL RECORD: ICD-10-PCS | Mod: CPTII,S$GLB,, | Performed by: NURSE PRACTITIONER

## 2022-04-29 PROCEDURE — 3008F PR BODY MASS INDEX (BMI) DOCUMENTED: ICD-10-PCS | Mod: CPTII,S$GLB,, | Performed by: NURSE PRACTITIONER

## 2022-04-29 PROCEDURE — 87186 SC STD MICRODIL/AGAR DIL: CPT | Performed by: NURSE PRACTITIONER

## 2022-04-29 PROCEDURE — 81003 URINALYSIS AUTO W/O SCOPE: CPT | Mod: QW,S$GLB,, | Performed by: NURSE PRACTITIONER

## 2022-04-29 PROCEDURE — 3075F SYST BP GE 130 - 139MM HG: CPT | Mod: CPTII,S$GLB,, | Performed by: NURSE PRACTITIONER

## 2022-04-29 PROCEDURE — 87088 URINE BACTERIA CULTURE: CPT | Performed by: NURSE PRACTITIONER

## 2022-04-29 PROCEDURE — 1159F MED LIST DOCD IN RCRD: CPT | Mod: CPTII,S$GLB,, | Performed by: NURSE PRACTITIONER

## 2022-04-29 PROCEDURE — 3075F PR MOST RECENT SYSTOLIC BLOOD PRESS GE 130-139MM HG: ICD-10-PCS | Mod: CPTII,S$GLB,, | Performed by: NURSE PRACTITIONER

## 2022-04-29 PROCEDURE — 81003 POCT URINALYSIS, DIPSTICK, AUTOMATED, W/O SCOPE: ICD-10-PCS | Mod: QW,S$GLB,, | Performed by: NURSE PRACTITIONER

## 2022-04-29 PROCEDURE — 3008F BODY MASS INDEX DOCD: CPT | Mod: CPTII,S$GLB,, | Performed by: NURSE PRACTITIONER

## 2022-04-29 PROCEDURE — 99203 PR OFFICE/OUTPT VISIT, NEW, LEVL III, 30-44 MIN: ICD-10-PCS | Mod: S$GLB,,, | Performed by: NURSE PRACTITIONER

## 2022-04-29 PROCEDURE — 99203 OFFICE O/P NEW LOW 30 MIN: CPT | Mod: S$GLB,,, | Performed by: NURSE PRACTITIONER

## 2022-04-29 PROCEDURE — 1160F PR REVIEW ALL MEDS BY PRESCRIBER/CLIN PHARMACIST DOCUMENTED: ICD-10-PCS | Mod: CPTII,S$GLB,, | Performed by: NURSE PRACTITIONER

## 2022-04-29 RX ORDER — AMOXICILLIN 875 MG/1
875 TABLET, FILM COATED ORAL EVERY 12 HOURS
Qty: 20 TABLET | Refills: 0 | Status: SHIPPED | OUTPATIENT
Start: 2022-04-29 | End: 2022-05-09

## 2022-04-29 NOTE — PROGRESS NOTES
"Subjective:       Patient ID: Robert Smith is a 61 y.o. female.    Vitals:  height is 5' 0.5" (1.537 m) and weight is 88 kg (194 lb). Her temperature is 98.7 °F (37.1 °C). Her blood pressure is 134/88 and her pulse is 64. Her respiration is 18 and oxygen saturation is 96%.     Chief Complaint: Urinary Tract Infection    Pt is coming in today with a possible UTI. Pt says she have been having some symptoms for the pass four days. Pt says she has some frequency, urgency, some vaginal pain , some abdominal pressure, and some burning when urinating. Pt did take some medication with no relief.     Provider note begins below:  61-year-old female here today with complaints of dysuria/frequency/urgency, suprapubic pain/pressure and hematuria for the past 2 days.  Patient reports that she feels as if she has a UTI.  Last UTI 1/2021.  Patient denies fever, chills, vaginal bleeding/discharge, abdominal pain or CVA tenderness.  Patient reports normal bowel movements for herself.      Urinary Tract Infection   This is a new problem. The current episode started in the past 7 days. The problem has been gradually worsening. The quality of the pain is described as burning. The pain is at a severity of 6/10. The pain is moderate. There has been no fever. She is not sexually active. There is no history of pyelonephritis. Associated symptoms include frequency and urgency. Pertinent negatives include no chills, discharge, flank pain, hematuria, nausea, possible pregnancy, vomiting, constipation, rash or withholding. She has tried NSAIDs for the symptoms. The treatment provided no relief. Her past medical history is significant for kidney stones. There is no history of recurrent UTIs.       Constitution: Negative. Negative for chills, sweating and fatigue.   HENT: Negative.  Negative for ear pain, facial swelling, congestion and sore throat.    Neck: Negative for painful lymph nodes.   Cardiovascular: Negative.  Negative for chest trauma, " chest pain and sob on exertion.   Eyes: Negative.  Negative for eye itching and eye pain.   Respiratory: Negative.  Negative for chest tightness, cough and asthma.    Gastrointestinal: Positive for abdominal pain. Negative for nausea, vomiting, constipation and diarrhea.   Endocrine: negative. cold intolerance and excessive thirst.   Genitourinary: Positive for frequency, urgency and vaginal pain. Negative for dysuria, flank pain, hematuria, vaginal discharge and vaginal bleeding.   Musculoskeletal: Negative for pain, trauma and joint pain.   Skin: Negative.  Negative for rash, wound and hives.   Allergic/Immunologic: Negative.  Negative for eczema, asthma, hives and itching.   Neurological: Negative.  Negative for disorientation and altered mental status.   Hematologic/Lymphatic: Negative.  Negative for swollen lymph nodes.   Psychiatric/Behavioral: Negative.  Negative for altered mental status, disorientation and confusion.       Objective:      Physical Exam   Constitutional: She is oriented to person, place, and time. She appears well-developed.   HENT:   Head: Normocephalic and atraumatic.   Ears:   Right Ear: External ear normal.   Left Ear: External ear normal.   Nose: Nose normal. No nasal deformity. No epistaxis.   Mouth/Throat: Oropharynx is clear and moist and mucous membranes are normal.   Eyes: Lids are normal.   Neck: Trachea normal and phonation normal. Neck supple. No neck rigidity present.   Cardiovascular: Normal pulses.   Pulmonary/Chest: Effort normal and breath sounds normal. No stridor. No respiratory distress. She has no wheezes. She has no rhonchi. She has no rales. She exhibits no tenderness.   Abdominal: Normal appearance and bowel sounds are normal. She exhibits no distension. Soft. flat abdomenThere is no abdominal tenderness. There is no rebound, no left CVA tenderness and no right CVA tenderness.   Genitourinary:    No vaginal discharge.     Neurological: no focal deficit. She is alert  and oriented to person, place, and time.   Skin: Skin is warm, dry and intact.   Psychiatric: Her speech is normal and behavior is normal. Mood normal.   Nursing note and vitals reviewed.    The following results have been reviewed with the patient:  LABS-  Results for orders placed or performed in visit on 04/29/22   POCT Urinalysis, Dipstick, Automated, W/O Scope   Result Value Ref Range    POC Blood, Urine Positive (A) Negative    POC Bilirubin, Urine Negative Negative    POC Urobilinogen, Urine normal 0.1 - 1.1    POC Ketones, Urine Negative Negative    POC Protein, Urine Negative Negative    POC Nitrates, Urine Negative Negative    POC Glucose, Urine Negative Negative    pH, UA 7.0 5 - 8    POC Specific Gravity, Urine 1.010 1.003 - 1.029    POC Leukocytes, Urine Positive (A) Negative     *Note: Due to a large number of results and/or encounters for the requested time period, some results have not been displayed. A complete set of results can be found in Results Review.            Assessment:       1. Urinary tract infection with hematuria, site unspecified    2. Urinary tract infection without hematuria, site unspecified          Plan:       FOLLOWUP  Follow up if symptoms worsen or fail to improve, for PLEASE CONTACT PCP OR CONTACT THE EMERGENCY ROOM..     PATIENT INSTRUCTIONS  Patient Instructions   You will be called in 2-3 days with your urine culture results.    INSTRUCTIONS:  - Rest.  - Drink plenty of fluids.  - Take Tylenol and/or Ibuprofen as directed as needed for fever/pain.  Do not take more than the recommended dose.  - follow up with your PCP within the next 1-2 weeks as needed.  - You must understand that you have received an Urgent Care treatment only and that you may be released before all of your medical problems are known or treated.   - You, the patient, will arrange for follow up care as instructed.   - If your condition worsens or fails to improve we recommend that you receive another  evaluation at the ER immediately or contact your PCP to discuss your concerns.   - You can call [phone] or [phone] to help schedule an appointment with the appropriate provider.             THANK YOU FOR ALLOWING ME TO PARTICIPATE IN YOUR HEALTHCARE,     Davis Rueda NP   Urinary tract infection with hematuria, site unspecified  -     amoxicillin (AMOXIL) 875 MG tablet; Take 1 tablet (875 mg total) by mouth every 12 (twelve) hours. for 10 days  Dispense: 20 tablet; Refill: 0  -     Culture, Urine    Urinary tract infection without hematuria, site unspecified  -     POCT Urinalysis, Dipstick, Automated, W/O Scope

## 2022-04-30 NOTE — PATIENT INSTRUCTIONS
You will be called in 2-3 days with your urine culture results.    INSTRUCTIONS:  - Rest.  - Drink plenty of fluids.  - Take Tylenol and/or Ibuprofen as directed as needed for fever/pain.  Do not take more than the recommended dose.  - follow up with your PCP within the next 1-2 weeks as needed.  - You must understand that you have received an Urgent Care treatment only and that you may be released before all of your medical problems are known or treated.   - You, the patient, will arrange for follow up care as instructed.   - If your condition worsens or fails to improve we recommend that you receive another evaluation at the ER immediately or contact your PCP to discuss your concerns.   - You can call [phone] or [phone] to help schedule an appointment with the appropriate provider.

## 2022-05-03 LAB — BACTERIA UR CULT: ABNORMAL

## 2022-05-11 ENCOUNTER — TELEPHONE (OUTPATIENT)
Dept: URGENT CARE | Facility: CLINIC | Age: 62
End: 2022-05-11
Payer: MEDICARE

## 2022-05-11 NOTE — TELEPHONE ENCOUNTER
Called and spoke to the patient.  She says she had difficulty taking the amoxicillin that was prescribed as it upset her stomach so she did not take it after a couple of days.  She went to her PCP yesterday who prescribed her Cipro which according to our urine culture is sensitive.

## 2022-05-26 ENCOUNTER — TELEPHONE (OUTPATIENT)
Dept: ORTHOPEDICS | Facility: CLINIC | Age: 62
End: 2022-05-26
Payer: MEDICARE

## 2022-05-26 NOTE — TELEPHONE ENCOUNTER
Spoke with pt, scheduled her an appt with an SHERI. She was pleased and had no further questions.    ----- Message from Vickie Rod sent at 5/26/2022  3:08 PM CDT -----  Contact: pt  Pt calling in regards to needing a appt on tomorrow if possibly , pt slipped on kitchen floor and landed on her knees     . Pt has appt tomorrow with Rheumatology tomorrow for 10 , would like something  after       Confirmed patient's contact info below:  Contact Name: Robert Smith  Phone Number: [phone]

## 2022-05-27 ENCOUNTER — OFFICE VISIT (OUTPATIENT)
Dept: RHEUMATOLOGY | Facility: CLINIC | Age: 62
End: 2022-05-27
Payer: MEDICARE

## 2022-05-27 ENCOUNTER — HOSPITAL ENCOUNTER (OUTPATIENT)
Dept: RADIOLOGY | Facility: HOSPITAL | Age: 62
Discharge: HOME | End: 2022-05-27
Attending: PHYSICIAN ASSISTANT
Payer: MEDICARE

## 2022-05-27 ENCOUNTER — OFFICE VISIT (OUTPATIENT)
Dept: ORTHOPEDICS | Facility: CLINIC | Age: 62
End: 2022-05-27
Payer: MEDICARE

## 2022-05-27 VITALS — HEIGHT: 61 IN | WEIGHT: 196 LBS | BODY MASS INDEX: 37 KG/M2

## 2022-05-27 VITALS
HEART RATE: 65 BPM | DIASTOLIC BLOOD PRESSURE: 86 MMHG | TEMPERATURE: 98 F | SYSTOLIC BLOOD PRESSURE: 156 MMHG | BODY MASS INDEX: 37.66 KG/M2 | WEIGHT: 199.5 LBS | HEIGHT: 61 IN

## 2022-05-27 DIAGNOSIS — M05.79 RHEUMATOID ARTHRITIS INVOLVING MULTIPLE SITES WITH POSITIVE RHEUMATOID FACTOR: ICD-10-CM

## 2022-05-27 DIAGNOSIS — M25.562 ACUTE PAIN OF LEFT KNEE: Primary | ICD-10-CM

## 2022-05-27 DIAGNOSIS — M79.7 FIBROMYALGIA: ICD-10-CM

## 2022-05-27 DIAGNOSIS — S80.02XA CONTUSION OF LEFT KNEE, INITIAL ENCOUNTER: ICD-10-CM

## 2022-05-27 DIAGNOSIS — Z79.899 HIGH RISK MEDICATION USE: ICD-10-CM

## 2022-05-27 DIAGNOSIS — M25.562 ACUTE PAIN OF LEFT KNEE: ICD-10-CM

## 2022-05-27 DIAGNOSIS — Z96.652 PRESENCE OF LEFT ARTIFICIAL KNEE JOINT: ICD-10-CM

## 2022-05-27 PROCEDURE — 1160F PR REVIEW ALL MEDS BY PRESCRIBER/CLIN PHARMACIST DOCUMENTED: ICD-10-PCS | Mod: CPTII,S$GLB,, | Performed by: INTERNAL MEDICINE

## 2022-05-27 PROCEDURE — 99999 PR PBB SHADOW E&M-EST. PATIENT-LVL V: ICD-10-PCS | Mod: PBBFAC,,, | Performed by: INTERNAL MEDICINE

## 2022-05-27 PROCEDURE — 73562 X-RAY EXAM OF KNEE 3: CPT | Mod: 26,LT,, | Performed by: RADIOLOGY

## 2022-05-27 PROCEDURE — 99213 PR OFFICE/OUTPT VISIT, EST, LEVL III, 20-29 MIN: ICD-10-PCS | Mod: S$GLB,,, | Performed by: PHYSICIAN ASSISTANT

## 2022-05-27 PROCEDURE — 3077F PR MOST RECENT SYSTOLIC BLOOD PRESSURE >= 140 MM HG: ICD-10-PCS | Mod: CPTII,S$GLB,, | Performed by: INTERNAL MEDICINE

## 2022-05-27 PROCEDURE — 3008F BODY MASS INDEX DOCD: CPT | Mod: CPTII,S$GLB,, | Performed by: PHYSICIAN ASSISTANT

## 2022-05-27 PROCEDURE — 73560 XR KNEE ORTHO LEFT: ICD-10-PCS | Mod: 26,RT,, | Performed by: RADIOLOGY

## 2022-05-27 PROCEDURE — 1159F MED LIST DOCD IN RCRD: CPT | Mod: CPTII,S$GLB,, | Performed by: INTERNAL MEDICINE

## 2022-05-27 PROCEDURE — 3008F PR BODY MASS INDEX (BMI) DOCUMENTED: ICD-10-PCS | Mod: CPTII,S$GLB,, | Performed by: INTERNAL MEDICINE

## 2022-05-27 PROCEDURE — 73560 X-RAY EXAM OF KNEE 1 OR 2: CPT | Mod: TC,RT

## 2022-05-27 PROCEDURE — 99214 PR OFFICE/OUTPT VISIT, EST, LEVL IV, 30-39 MIN: ICD-10-PCS | Mod: S$GLB,,, | Performed by: INTERNAL MEDICINE

## 2022-05-27 PROCEDURE — 3008F PR BODY MASS INDEX (BMI) DOCUMENTED: ICD-10-PCS | Mod: CPTII,S$GLB,, | Performed by: PHYSICIAN ASSISTANT

## 2022-05-27 PROCEDURE — 3008F BODY MASS INDEX DOCD: CPT | Mod: CPTII,S$GLB,, | Performed by: INTERNAL MEDICINE

## 2022-05-27 PROCEDURE — 73562 X-RAY EXAM OF KNEE 3: CPT | Mod: TC,LT

## 2022-05-27 PROCEDURE — 1160F RVW MEDS BY RX/DR IN RCRD: CPT | Mod: CPTII,S$GLB,, | Performed by: PHYSICIAN ASSISTANT

## 2022-05-27 PROCEDURE — 3077F SYST BP >= 140 MM HG: CPT | Mod: CPTII,S$GLB,, | Performed by: INTERNAL MEDICINE

## 2022-05-27 PROCEDURE — 73562 XR KNEE ORTHO LEFT: ICD-10-PCS | Mod: 26,LT,, | Performed by: RADIOLOGY

## 2022-05-27 PROCEDURE — 73560 X-RAY EXAM OF KNEE 1 OR 2: CPT | Mod: 26,RT,, | Performed by: RADIOLOGY

## 2022-05-27 PROCEDURE — 3079F DIAST BP 80-89 MM HG: CPT | Mod: CPTII,S$GLB,, | Performed by: INTERNAL MEDICINE

## 2022-05-27 PROCEDURE — 1159F PR MEDICATION LIST DOCUMENTED IN MEDICAL RECORD: ICD-10-PCS | Mod: CPTII,S$GLB,, | Performed by: INTERNAL MEDICINE

## 2022-05-27 PROCEDURE — 1159F MED LIST DOCD IN RCRD: CPT | Mod: CPTII,S$GLB,, | Performed by: PHYSICIAN ASSISTANT

## 2022-05-27 PROCEDURE — 1160F RVW MEDS BY RX/DR IN RCRD: CPT | Mod: CPTII,S$GLB,, | Performed by: INTERNAL MEDICINE

## 2022-05-27 PROCEDURE — 3079F PR MOST RECENT DIASTOLIC BLOOD PRESSURE 80-89 MM HG: ICD-10-PCS | Mod: CPTII,S$GLB,, | Performed by: INTERNAL MEDICINE

## 2022-05-27 PROCEDURE — 99213 OFFICE O/P EST LOW 20 MIN: CPT | Mod: S$GLB,,, | Performed by: PHYSICIAN ASSISTANT

## 2022-05-27 PROCEDURE — 99999 PR PBB SHADOW E&M-EST. PATIENT-LVL IV: ICD-10-PCS | Mod: PBBFAC,,, | Performed by: PHYSICIAN ASSISTANT

## 2022-05-27 PROCEDURE — 99999 PR PBB SHADOW E&M-EST. PATIENT-LVL IV: CPT | Mod: PBBFAC,,, | Performed by: PHYSICIAN ASSISTANT

## 2022-05-27 PROCEDURE — 99999 PR PBB SHADOW E&M-EST. PATIENT-LVL V: CPT | Mod: PBBFAC,,, | Performed by: INTERNAL MEDICINE

## 2022-05-27 PROCEDURE — 99214 OFFICE O/P EST MOD 30 MIN: CPT | Mod: S$GLB,,, | Performed by: INTERNAL MEDICINE

## 2022-05-27 PROCEDURE — 1159F PR MEDICATION LIST DOCUMENTED IN MEDICAL RECORD: ICD-10-PCS | Mod: CPTII,S$GLB,, | Performed by: PHYSICIAN ASSISTANT

## 2022-05-27 PROCEDURE — 1160F PR REVIEW ALL MEDS BY PRESCRIBER/CLIN PHARMACIST DOCUMENTED: ICD-10-PCS | Mod: CPTII,S$GLB,, | Performed by: PHYSICIAN ASSISTANT

## 2022-05-27 RX ORDER — FAMOTIDINE 10 MG/ML
20 INJECTION INTRAVENOUS
Status: CANCELLED | OUTPATIENT
Start: 2022-07-01

## 2022-05-27 RX ORDER — ACETAMINOPHEN 325 MG/1
650 TABLET ORAL
Status: CANCELLED | OUTPATIENT
Start: 2022-07-01

## 2022-05-27 RX ORDER — TRAZODONE HYDROCHLORIDE 50 MG/1
50 TABLET ORAL
COMMUNITY
Start: 2022-05-10 | End: 2023-02-06

## 2022-05-27 RX ORDER — SODIUM CHLORIDE 0.9 % (FLUSH) 0.9 %
10 SYRINGE (ML) INJECTION
Status: CANCELLED | OUTPATIENT
Start: 2022-07-01

## 2022-05-27 RX ORDER — ZOLPIDEM TARTRATE 10 MG/1
10 TABLET ORAL
COMMUNITY
Start: 2022-05-10 | End: 2022-06-09

## 2022-05-27 ASSESSMENT — ROUTINE ASSESSMENT OF PATIENT INDEX DATA (RAPID3)
PSYCHOLOGICAL DISTRESS SCORE: 7.7
TOTAL RAPID3 SCORE: 6.44
PATIENT GLOBAL ASSESSMENT SCORE: 7
AM STIFFNESS SCORE: 1, YES
MDHAQ FUNCTION SCORE: 0.7
FATIGUE SCORE: 9.5
PAIN SCORE: 10

## 2022-05-27 NOTE — PROGRESS NOTES
Subjective:       Patient ID: Robert Smith is a 61 y.o. female.    Chief Complaint: Disease Management    HPI        RA and FMS that presents for follow-up.     Hx RA dx 2003; dx at [facility] ; started with hands and all over body  On prednisone 5 mg/d since 2003  Rx here 2007 Dr Max and Chung Rx MTX and Humira  MTX nausea with po and SQ  LFA tried 2007 by Dr Terry and she did not tolerate with nausea and hospitalization  2008 Dr Mann retried MTX; did not tolerate  2010-12 Dr Rosenbaum treated with Plaquenil(HCQ)  Humira quit working 2010 2010 Simponi tried - caused weak and shaky reaction  2011 Orencia tried - caused muscle aches, shakes and jaw pain  2011 Enbrel tried - did not work  Dr Paris [id number];  Had Rituxin 2016  Earlier 2017 took Xeljanz for a few months; had a lot of nausea and she felt arms and hands getting numb  SSZ July 2017 caused nausea and hepatitis   ENBREL retried  Aug 2017 - stopped Dec due to lack of effectiveness  Cimzia Jan 2018 caused urticarial rash  Actemra March 2018 - Oct 2019 with partial improvement  Nov 2019 started upadacitinib/Rinvoq; stopped April 2020 due to side effects  RTX  July/Aug 2020; Jan/Feb 2021 ; Oct 14, 2021      Meds:  Hydrocodone , Dr Carias;  says #60 on Jan 21, Dec 23, Nov 26  Hx Diazepam prn ; #60 Dec 3 on   Metoprolol   Eliquis  Protonix   Tizanidine 2 mg at night or methocarbamol  Albuterol inhaler  Has tried to eliminate meds  Fell last week; LUE and L knee on slippery floor at home; ER Xray ok but still hurts and has ortho appt  Says prior to fall they were not this bad  Knees and feet have been most painful before fall  Swelling in feet a little    Reduced prednisone to 3 mg/d     has been very sick and in hospital ; stress and difficulty sleeping; PCP gave trazodone and ambien       Review of Systems   Constitutional: Negative for fatigue, fever and unexpected weight change.   HENT: Negative for mouth sores and trouble swallowing.          "Dry mouth   Eyes: Negative for redness.        Dry eyes   Respiratory: Negative for cough and shortness of breath.    Cardiovascular: Negative for chest pain.   Gastrointestinal: Negative for abdominal pain, constipation and diarrhea.   Genitourinary: Negative for dysuria and genital sores.   Skin: Negative for color change and rash.   Neurological: Negative for headaches.   Hematological: Negative for adenopathy. Does not bruise/bleed easily.   Psychiatric/Behavioral: Negative for sleep disturbance.         Objective:   BP (!) 156/86   Pulse 65   Temp 98.2 °F (36.8 °C)   Ht 5' 0.5" (1.537 m)   Wt 90.5 kg (199 lb 8.3 oz)   BMI 38.32 kg/m²      Physical Exam       3/19/2021 9/27/2021 1/28/2022 5/27/2022   Tender (HERNANDEZ-28) 4 / 28 8 / 28 1 / 28 4 / 28    Swollen (HERNANDEZ-28) 4 / 28 7 / 28 0 / 28 1 / 28    Provider Global 30 mm 80 mm 20 mm 20 mm   Patient Global 50 mm 80 mm 40 mm 70 mm   ESR 31 mm/hr 21 mm/hr 4 mm/hr --   CRP 8.9 mg/L 6.6 mg/L 2.7 mg/L --   HERNANDEZ-28 (ESR) 4.78 (Moderate disease activity) 5.58 (High disease activity) 2.09 (Remission) --   HERNANDEZ-28 (CRP) 4.17 (Moderate disease activity) 5.13 (High disease activity) 2.55 (Remission) --   CDAI Score 16  31  7  14       Assessment:       1. Rheumatoid arthritis involving multiple sites with positive rheumatoid factor    2. High risk medication use    3. Fibromyalgia            Plan:       Problem List Items Addressed This Visit        Active Problems    Rheumatoid arthritis with positive rheumatoid factor     RA appears stable with current complaints related to recent fall and injury of L shoulder and wrist and L knee with significant bruising    Will cont current med and plan next RTX in July or August (9 mo from previous)           High risk medication use     No treatment related adverse effects; will continue to monitor for drug toxicity    No interval infections             Fibromyalgia     Still chronic pain, fatigue, and depression                 "

## 2022-05-27 NOTE — ASSESSMENT & PLAN NOTE
No treatment related adverse effects; will continue to monitor for drug toxicity    No interval infections

## 2022-05-27 NOTE — PROGRESS NOTES
"Patient ID: Robert Smith is a 61 y.o. female.    Chief Complaint: Pain of the Right Knee and Pain of the Left Knee      HISTORY:  Robert Smith is a 61 y.o. female who returns to me today for evaluation of left knee pain s/p fall one week ago.  She is s/p bilateral knee revisions- left in 2016 and right in 2012.  She saw Dr. Patterson for follow up evaluation one month ago.  She states she slipped and fell directly onto her knee.  She does take Eliquis.  She was initially seen at [facility] ED and had x-rays.  She states they were negative for fracture.  She has significant pain with walking.  She is able to bear weight.  She has bruising and swelling, worse along posterior aspect.  She takes Norco for pain.      PMH/PSH/FamHx/SocHx:    Unchanged from prior visit.    ROS:  Constitution: Negative for chills, fever and weakness.   Respiratory: Negative for cough and shortness of breath.   Musculoskeletal: Positive for left knee  Psychiatric/Behavioral: The patient is not nervous/anxious.       PHYSICAL EXAM:   Ht 5' 0.5" (1.537 m)   Wt 88.9 kg (195 lb 15.8 oz)   BMI 37.65 kg/m²   Left knee  Skin intact  No abrasions  Diffuse ecchymosis, worse along posterior knee  Extensor mechanism intact  ROM 0-100  TTP hamstring, popliteal fossa  Stable to stress    IMAGING: X-rays of the left knee, personally reviewed by me, demonstrate stable prosthesis, no significant change compared to x-rays one month ago.  No fracture or dislocation.    ASSESSMENT/PLAN:    Robert was seen today for pain and pain.    Diagnoses and all orders for this visit:    Acute pain of left knee  -     X-ray Knee Ortho Left; Future    Contusion of left knee, initial encounter    Presence of left artificial knee joint    - Explanation and reassurance provided.  Recommend rest, ice  - She was given brace today  - Gentle ROM   - Follow up 2 weeks        "

## 2022-05-27 NOTE — ASSESSMENT & PLAN NOTE
RA appears stable with current complaints related to recent fall and injury of L shoulder and wrist and L knee with significant bruising    Will cont current med and plan next RTX in July or August (9 mo from previous)

## 2022-06-03 ENCOUNTER — LAB VISIT (OUTPATIENT)
Dept: LAB | Facility: HOSPITAL | Age: 62
End: 2022-06-03
Attending: INTERNAL MEDICINE
Payer: MEDICARE

## 2022-06-03 DIAGNOSIS — M05.79 RHEUMATOID ARTHRITIS INVOLVING MULTIPLE SITES WITH POSITIVE RHEUMATOID FACTOR: ICD-10-CM

## 2022-06-03 DIAGNOSIS — Z79.899 HIGH RISK MEDICATION USE: ICD-10-CM

## 2022-06-03 LAB
ALBUMIN SERPL BCP-MCNC: 3.7 G/DL (ref 3.5–5.2)
ALP SERPL-CCNC: 112 U/L (ref 55–135)
ALT SERPL W/O P-5'-P-CCNC: 18 U/L (ref 10–44)
ANION GAP SERPL CALC-SCNC: 9 MMOL/L (ref 8–16)
AST SERPL-CCNC: 17 U/L (ref 10–40)
BASOPHILS # BLD AUTO: 0.05 K/UL (ref 0–0.2)
BASOPHILS NFR BLD: 0.7 % (ref 0–1.9)
BILIRUB SERPL-MCNC: 1 MG/DL (ref 0.1–1)
BUN SERPL-MCNC: 14 MG/DL (ref 8–23)
CALCIUM SERPL-MCNC: 11.1 MG/DL (ref 8.7–10.5)
CHLORIDE SERPL-SCNC: 103 MMOL/L (ref 95–110)
CO2 SERPL-SCNC: 30 MMOL/L (ref 23–29)
CREAT SERPL-MCNC: 0.8 MG/DL (ref 0.5–1.4)
CRP SERPL-MCNC: 4.7 MG/L (ref 0–8.2)
CRP SERPL-MCNC: 4.7 MG/L (ref 0–8.2)
DIFFERENTIAL METHOD: ABNORMAL
EOSINOPHIL # BLD AUTO: 0.3 K/UL (ref 0–0.5)
EOSINOPHIL NFR BLD: 3.6 % (ref 0–8)
ERYTHROCYTE [DISTWIDTH] IN BLOOD BY AUTOMATED COUNT: 12.9 % (ref 11.5–14.5)
ERYTHROCYTE [SEDIMENTATION RATE] IN BLOOD BY WESTERGREN METHOD: 22 MM/HR (ref 0–36)
ERYTHROCYTE [SEDIMENTATION RATE] IN BLOOD BY WESTERGREN METHOD: 22 MM/HR (ref 0–36)
EST. GFR  (AFRICAN AMERICAN): >60 ML/MIN/1.73 M^2
EST. GFR  (NON AFRICAN AMERICAN): >60 ML/MIN/1.73 M^2
GLUCOSE SERPL-MCNC: 71 MG/DL (ref 70–110)
HCT VFR BLD AUTO: 43.7 % (ref 37–48.5)
HGB BLD-MCNC: 13.7 G/DL (ref 12–16)
IGA SERPL-MCNC: 194 MG/DL (ref 40–350)
IGG SERPL-MCNC: 727 MG/DL (ref 650–1600)
IGM SERPL-MCNC: 28 MG/DL (ref 50–300)
IMM GRANULOCYTES # BLD AUTO: 0.03 K/UL (ref 0–0.04)
IMM GRANULOCYTES NFR BLD AUTO: 0.4 % (ref 0–0.5)
LYMPHOCYTES # BLD AUTO: 1.7 K/UL (ref 1–4.8)
LYMPHOCYTES NFR BLD: 24.5 % (ref 18–48)
MCH RBC QN AUTO: 30.2 PG (ref 27–31)
MCHC RBC AUTO-ENTMCNC: 31.4 G/DL (ref 32–36)
MCV RBC AUTO: 97 FL (ref 82–98)
MONOCYTES # BLD AUTO: 0.5 K/UL (ref 0.3–1)
MONOCYTES NFR BLD: 7.6 % (ref 4–15)
NEUTROPHILS # BLD AUTO: 4.4 K/UL (ref 1.8–7.7)
NEUTROPHILS NFR BLD: 63.2 % (ref 38–73)
NRBC BLD-RTO: 0 /100 WBC
PLATELET # BLD AUTO: 194 K/UL (ref 150–450)
PMV BLD AUTO: 11.2 FL (ref 9.2–12.9)
POTASSIUM SERPL-SCNC: 5.3 MMOL/L (ref 3.5–5.1)
PROT SERPL-MCNC: 6.7 G/DL (ref 6–8.4)
RBC # BLD AUTO: 4.53 M/UL (ref 4–5.4)
SODIUM SERPL-SCNC: 142 MMOL/L (ref 136–145)
WBC # BLD AUTO: 6.99 K/UL (ref 3.9–12.7)

## 2022-06-03 PROCEDURE — 82784 ASSAY IGA/IGD/IGG/IGM EACH: CPT | Performed by: INTERNAL MEDICINE

## 2022-06-03 PROCEDURE — 80053 COMPREHEN METABOLIC PANEL: CPT | Performed by: INTERNAL MEDICINE

## 2022-06-03 PROCEDURE — 36415 COLL VENOUS BLD VENIPUNCTURE: CPT | Mod: PO | Performed by: INTERNAL MEDICINE

## 2022-06-03 PROCEDURE — 86140 C-REACTIVE PROTEIN: CPT | Performed by: INTERNAL MEDICINE

## 2022-06-03 PROCEDURE — 85652 RBC SED RATE AUTOMATED: CPT | Performed by: INTERNAL MEDICINE

## 2022-06-03 PROCEDURE — 82784 ASSAY IGA/IGD/IGG/IGM EACH: CPT | Mod: 59 | Performed by: INTERNAL MEDICINE

## 2022-06-03 PROCEDURE — 85025 COMPLETE CBC W/AUTO DIFF WBC: CPT | Performed by: INTERNAL MEDICINE

## 2022-06-06 ENCOUNTER — LAB VISIT (OUTPATIENT)
Dept: LAB | Facility: HOSPITAL | Age: 62
End: 2022-06-06
Attending: HOSPITALIST
Payer: MEDICARE

## 2022-06-06 ENCOUNTER — TELEPHONE (OUTPATIENT)
Dept: RHEUMATOLOGY | Facility: CLINIC | Age: 62
End: 2022-06-06
Payer: MEDICARE

## 2022-06-06 DIAGNOSIS — M05.79 RHEUMATOID ARTHRITIS INVOLVING MULTIPLE SITES WITH POSITIVE RHEUMATOID FACTOR: ICD-10-CM

## 2022-06-06 DIAGNOSIS — E21.3 HYPERPARATHYROIDISM: ICD-10-CM

## 2022-06-06 DIAGNOSIS — M81.0 OSTEOPOROSIS, UNSPECIFIED OSTEOPOROSIS TYPE, UNSPECIFIED PATHOLOGICAL FRACTURE PRESENCE: ICD-10-CM

## 2022-06-06 DIAGNOSIS — E83.52 HYPERCALCEMIA: ICD-10-CM

## 2022-06-06 LAB
ALBUMIN SERPL BCP-MCNC: 3.7 G/DL (ref 3.5–5.2)
ALP SERPL-CCNC: 118 U/L (ref 55–135)
ALT SERPL W/O P-5'-P-CCNC: 16 U/L (ref 10–44)
ANION GAP SERPL CALC-SCNC: 7 MMOL/L (ref 8–16)
AST SERPL-CCNC: 15 U/L (ref 10–40)
BASOPHILS # BLD AUTO: 0.03 K/UL (ref 0–0.2)
BASOPHILS NFR BLD: 0.5 % (ref 0–1.9)
BILIRUB SERPL-MCNC: 0.8 MG/DL (ref 0.1–1)
BUN SERPL-MCNC: 15 MG/DL (ref 8–23)
CALCIUM SERPL-MCNC: 10.5 MG/DL (ref 8.7–10.5)
CHLORIDE SERPL-SCNC: 105 MMOL/L (ref 95–110)
CO2 SERPL-SCNC: 30 MMOL/L (ref 23–29)
CREAT SERPL-MCNC: 0.8 MG/DL (ref 0.5–1.4)
DIFFERENTIAL METHOD: ABNORMAL
EOSINOPHIL # BLD AUTO: 0.2 K/UL (ref 0–0.5)
EOSINOPHIL NFR BLD: 3.4 % (ref 0–8)
ERYTHROCYTE [DISTWIDTH] IN BLOOD BY AUTOMATED COUNT: 12.6 % (ref 11.5–14.5)
EST. GFR  (AFRICAN AMERICAN): >60 ML/MIN/1.73 M^2
EST. GFR  (NON AFRICAN AMERICAN): >60 ML/MIN/1.73 M^2
GLUCOSE SERPL-MCNC: 91 MG/DL (ref 70–110)
HCT VFR BLD AUTO: 42.7 % (ref 37–48.5)
HGB BLD-MCNC: 13.4 G/DL (ref 12–16)
IMM GRANULOCYTES # BLD AUTO: 0.02 K/UL (ref 0–0.04)
IMM GRANULOCYTES NFR BLD AUTO: 0.3 % (ref 0–0.5)
LYMPHOCYTES # BLD AUTO: 1.5 K/UL (ref 1–4.8)
LYMPHOCYTES NFR BLD: 25.7 % (ref 18–48)
MAGNESIUM SERPL-MCNC: 2.1 MG/DL (ref 1.6–2.6)
MCH RBC QN AUTO: 30.7 PG (ref 27–31)
MCHC RBC AUTO-ENTMCNC: 31.4 G/DL (ref 32–36)
MCV RBC AUTO: 98 FL (ref 82–98)
MONOCYTES # BLD AUTO: 0.4 K/UL (ref 0.3–1)
MONOCYTES NFR BLD: 6.9 % (ref 4–15)
NEUTROPHILS # BLD AUTO: 3.8 K/UL (ref 1.8–7.7)
NEUTROPHILS NFR BLD: 63.2 % (ref 38–73)
NRBC BLD-RTO: 0 /100 WBC
PHOSPHATE SERPL-MCNC: 2.5 MG/DL (ref 2.7–4.5)
PLATELET # BLD AUTO: 162 K/UL (ref 150–450)
PMV BLD AUTO: 10.7 FL (ref 9.2–12.9)
POTASSIUM SERPL-SCNC: 4.3 MMOL/L (ref 3.5–5.1)
PROT SERPL-MCNC: 6.6 G/DL (ref 6–8.4)
PTH-INTACT SERPL-MCNC: 140.8 PG/ML (ref 9–77)
RBC # BLD AUTO: 4.36 M/UL (ref 4–5.4)
SODIUM SERPL-SCNC: 142 MMOL/L (ref 136–145)
TSH SERPL DL<=0.005 MIU/L-ACNC: 0.79 UIU/ML (ref 0.4–4)
WBC # BLD AUTO: 5.95 K/UL (ref 3.9–12.7)

## 2022-06-06 PROCEDURE — 84165 PROTEIN E-PHORESIS SERUM: CPT | Performed by: HOSPITALIST

## 2022-06-06 PROCEDURE — 80053 COMPREHEN METABOLIC PANEL: CPT | Performed by: HOSPITALIST

## 2022-06-06 PROCEDURE — 36415 COLL VENOUS BLD VENIPUNCTURE: CPT | Performed by: HOSPITALIST

## 2022-06-06 PROCEDURE — 84165 PATHOLOGIST INTERPRETATION SPE: ICD-10-PCS | Mod: 26,,, | Performed by: PATHOLOGY

## 2022-06-06 PROCEDURE — 83735 ASSAY OF MAGNESIUM: CPT | Performed by: HOSPITALIST

## 2022-06-06 PROCEDURE — 83970 ASSAY OF PARATHORMONE: CPT | Performed by: HOSPITALIST

## 2022-06-06 PROCEDURE — 84165 PROTEIN E-PHORESIS SERUM: CPT | Mod: 26,,, | Performed by: PATHOLOGY

## 2022-06-06 PROCEDURE — 82306 VITAMIN D 25 HYDROXY: CPT | Performed by: HOSPITALIST

## 2022-06-06 PROCEDURE — 85025 COMPLETE CBC W/AUTO DIFF WBC: CPT | Performed by: HOSPITALIST

## 2022-06-06 PROCEDURE — 84100 ASSAY OF PHOSPHORUS: CPT | Performed by: HOSPITALIST

## 2022-06-06 PROCEDURE — 84443 ASSAY THYROID STIM HORMONE: CPT | Performed by: HOSPITALIST

## 2022-06-06 NOTE — TELEPHONE ENCOUNTER
----- Message from Juma Hale sent at 6/6/2022 10:01 AM CDT -----  Contact: Patient  Patient calling in regards to labs results. Patient also stated that she's in a lot of pain and been this way for 4 days.      Patient @[phone] (home)

## 2022-06-07 ENCOUNTER — TELEPHONE (OUTPATIENT)
Dept: ENDOCRINOLOGY | Facility: CLINIC | Age: 62
End: 2022-06-07
Payer: MEDICARE

## 2022-06-07 LAB — 25(OH)D3+25(OH)D2 SERPL-MCNC: 37 NG/ML (ref 30–96)

## 2022-06-07 NOTE — TELEPHONE ENCOUNTER
Called patient about lab  She c/o increased pain  I advised her that labs were ordered by Dr Vidal and look like hypercalcemia, possibly hyper-PTH. She will have to get his input on lab results but hypercalcemia can cause increased bone pain.   Will forward message to Dr Vidal  WD

## 2022-06-07 NOTE — TELEPHONE ENCOUNTER
Pt called inquiring about her lab results, informed pt that provider most likely didn't get to them yet due to us still being in clinic. Pt stated she was just curios due to her being ion pain and wanted to see what's going on. Offered her an appt, pt declined and stated she will wait to see if she hears from the doctor. Understanding verbalized during call.

## 2022-06-07 NOTE — TELEPHONE ENCOUNTER
----- Message from Kamini Granado sent at 6/7/2022  9:59 AM CDT -----  Type:  Test Results    Who Called:  Self     Name of Test (Lab/Mammo/Etc): labs and Urine     Date of Test: 6/6    Ordering Provider:  Dr. Vidal     Where the test was performed:  [facility]    Would the patient rather a call back or a response via My [facility]? Call     Best Call Back Number: .[phone] (home)       For Clinical Team:Has the provider reviewed the results?

## 2022-06-08 ENCOUNTER — TELEPHONE (OUTPATIENT)
Dept: ENDOCRINOLOGY | Facility: CLINIC | Age: 62
End: 2022-06-08
Payer: MEDICARE

## 2022-06-08 LAB
ALBUMIN SERPL ELPH-MCNC: 3.91 G/DL (ref 3.35–5.55)
ALPHA1 GLOB SERPL ELPH-MCNC: 0.27 G/DL (ref 0.17–0.41)
ALPHA2 GLOB SERPL ELPH-MCNC: 0.77 G/DL (ref 0.43–0.99)
B-GLOBULIN SERPL ELPH-MCNC: 0.75 G/DL (ref 0.5–1.1)
GAMMA GLOB SERPL ELPH-MCNC: 0.6 G/DL (ref 0.67–1.58)
PROT SERPL-MCNC: 6.3 G/DL (ref 6–8.4)

## 2022-06-08 NOTE — TELEPHONE ENCOUNTER
Informed pt that provider would like for her to schedule an follow up to discuss recent labs. Pt agreed to 6/14 at 8:30 with Dr Vidal. Understanding verbalized.

## 2022-06-08 NOTE — TELEPHONE ENCOUNTER
----- Message from Birdie Zavala sent at 6/8/2022  9:13 AM CDT -----  Type:  Test Results    Who Called: pt    Name of Test (Lab/Mammo/Etc):labs  Date of Test:  6/6/22      Ordering Provider: feli    Where the test was performed:     Would the patient rather a call back or a response via My [facility]? call    Best Call Back Number: [phone] (home)       Additional Information:      For Clinical Team:Has the provider reviewed the results?

## 2022-06-09 LAB — PATHOLOGIST INTERPRETATION SPE: NORMAL

## 2022-06-14 ENCOUNTER — OFFICE VISIT (OUTPATIENT)
Dept: ENDOCRINOLOGY | Facility: CLINIC | Age: 62
End: 2022-06-14
Payer: MEDICARE

## 2022-06-14 VITALS
SYSTOLIC BLOOD PRESSURE: 154 MMHG | DIASTOLIC BLOOD PRESSURE: 89 MMHG | WEIGHT: 194.5 LBS | HEART RATE: 64 BPM | BODY MASS INDEX: 37.36 KG/M2 | TEMPERATURE: 98 F

## 2022-06-14 DIAGNOSIS — E83.52 HYPERCALCEMIA: Primary | ICD-10-CM

## 2022-06-14 DIAGNOSIS — M79.7 FIBROMYALGIA: ICD-10-CM

## 2022-06-14 DIAGNOSIS — E66.01 SEVERE OBESITY (BMI 35.0-39.9) WITH COMORBIDITY: ICD-10-CM

## 2022-06-14 DIAGNOSIS — E21.3 HYPERPARATHYROIDISM: ICD-10-CM

## 2022-06-14 DIAGNOSIS — M05.79 RHEUMATOID ARTHRITIS INVOLVING MULTIPLE SITES WITH POSITIVE RHEUMATOID FACTOR: ICD-10-CM

## 2022-06-14 DIAGNOSIS — M81.0 AGE-RELATED OSTEOPOROSIS WITHOUT CURRENT PATHOLOGICAL FRACTURE: ICD-10-CM

## 2022-06-14 PROCEDURE — 99999 PR PBB SHADOW E&M-EST. PATIENT-LVL III: ICD-10-PCS | Mod: PBBFAC,,, | Performed by: HOSPITALIST

## 2022-06-14 PROCEDURE — 3008F BODY MASS INDEX DOCD: CPT | Mod: CPTII,S$GLB,, | Performed by: HOSPITALIST

## 2022-06-14 PROCEDURE — 3077F PR MOST RECENT SYSTOLIC BLOOD PRESSURE >= 140 MM HG: ICD-10-PCS | Mod: CPTII,S$GLB,, | Performed by: HOSPITALIST

## 2022-06-14 PROCEDURE — 3077F SYST BP >= 140 MM HG: CPT | Mod: CPTII,S$GLB,, | Performed by: HOSPITALIST

## 2022-06-14 PROCEDURE — 1159F MED LIST DOCD IN RCRD: CPT | Mod: CPTII,S$GLB,, | Performed by: HOSPITALIST

## 2022-06-14 PROCEDURE — 99999 PR PBB SHADOW E&M-EST. PATIENT-LVL III: CPT | Mod: PBBFAC,,, | Performed by: HOSPITALIST

## 2022-06-14 PROCEDURE — 3008F PR BODY MASS INDEX (BMI) DOCUMENTED: ICD-10-PCS | Mod: CPTII,S$GLB,, | Performed by: HOSPITALIST

## 2022-06-14 PROCEDURE — 3079F DIAST BP 80-89 MM HG: CPT | Mod: CPTII,S$GLB,, | Performed by: HOSPITALIST

## 2022-06-14 PROCEDURE — 3079F PR MOST RECENT DIASTOLIC BLOOD PRESSURE 80-89 MM HG: ICD-10-PCS | Mod: CPTII,S$GLB,, | Performed by: HOSPITALIST

## 2022-06-14 PROCEDURE — 1159F PR MEDICATION LIST DOCUMENTED IN MEDICAL RECORD: ICD-10-PCS | Mod: CPTII,S$GLB,, | Performed by: HOSPITALIST

## 2022-06-14 PROCEDURE — 99215 OFFICE O/P EST HI 40 MIN: CPT | Mod: S$GLB,,, | Performed by: HOSPITALIST

## 2022-06-14 PROCEDURE — 99215 PR OFFICE/OUTPT VISIT, EST, LEVL V, 40-54 MIN: ICD-10-PCS | Mod: S$GLB,,, | Performed by: HOSPITALIST

## 2022-06-14 NOTE — PROGRESS NOTES
Subjective:      Patient ID: Robert Smith is a 61 y.o. female presented to [facility] on 6/14/2022.  Chief Complaint:  Results      History of Present Illness: Robert Smith is a 61 y.o. female here for hypercalcemia, hyperparathyroidism    Other significant past medical history:  DVT on  blood thinners, Rheumatoid arthritis (on systemic steroid and rituximab), fibromyalgia, obesity      Interval history:  Here for follow-up.  Patient reports multiple symptoms of muscle pain/ache.  Joint aches.  Recent fall hurting left side of her body.  Four weeks ago  Need to get major dental surgery >> possibly jaw surgery   Need teeth extraction as well  Worsening bone density noted, worsening osteoporosis      1) With regards to the hypercalcemia, with noted hyperparathyroidism  First noted on blood work done 02/2022 , calcium 10.8 that time  Previously did have hypercalcemia with calcium 11.  Does have fluctuation of calcium as far back as 2009 with hypocalcemia and hypercalcemia    Daily intake of calcium is: no excessive intake  Taking vitamin D: on hold per pcp    Past medical history significant for:  Rheumatoid arthritis: Dx 2003, off and on steroid use in the past, currently on  prednisone use over the last 6 months, weaning per Rheumatology  Kidney transplant: no  Vitamin-D deficiency: yes  Osteoporosis: yes, not on therapy  Renal stones: no  Immobility/fractures or healing fractures: no  Gastric bypass/gastric surgery:  No    Family history of renal stones/hypercalcemia: no  Chronic kidney disease /ESRD:no  History of cancer/with metastasis: no  Prior radiation treatment, or unexplained elevations of alk phos on labs: no  Hyperthyroidism/Graves: no  Anemia: no     Tobacco use, including use in the past:  no   EtOH use? no (<2 drinks a day in female, <3 drinks a day for male)    Medication uses:    - No: Hydrochlorothiazide/Chlorthalidone  - No: Tums, on occasion  - No: Lithium    Symptoms  hypercalcemia:  Does have mild fatigue  No myalgia, bone pain  No constipation    Lab Results   Component Value Date    .8 (H) 06/06/2022    .2 (H) 02/24/2022    PXSHWCDG44HZ 37 06/06/2022    PHMHVNDA78IL 30 08/27/2012    IUKAOZGQ83TE 23 (L) 06/15/2011    CALCIUM 10.5 06/06/2022    CALCIUM 11.1 (H) 06/03/2022    CALCIUM 10.8 (H) 02/24/2022    PHOS 2.5 (L) 06/06/2022    PHOS 2.3 (L) 02/24/2022    PHOS 3.8 06/03/2016    ALKPHOS 118 06/06/2022    ALKPHOS 112 06/03/2022    ALKPHOS 92 01/28/2022    TSH 0.791 06/06/2022    LABCALC 15.4 (H) 06/06/2022    VSCMLIG00MVC 8 06/06/2022       Urine Calcium/Creatine Clearance Ratio: 0.023  - [facility] unlikely  - Urine Ca 200   Latest Reference Range & Units 06/06/22 05:00   Calcium, Urine 0.0 - 15.0 mg/dL 15.4 (H)   Calcium, 24 Hr Urine 4 - 12 mg/Hr 8   Calcium, Urine (mg/spec) mg/Spec 200   Creatinine, Urinr (mg/spec) mg/Spec 664.3   Creatinine, Timed Urine 40.0 - 75.0 mg/Hr 27.7 (L)        2) In regards to Osteoporosis  Diagnosis on DXA in 2017  Unclear if started on therapy, patient denies taking Fosamax/Boniva  Chronic rheumatoid arthritis diagnosed in 2003, off and on steroid use in the past, currently on  prednisone use over the last 6 months, weaning per Rheumatology    Osteoporosis Risk Factor Assessment:  History of falls over the last 2 years: no  History of fractures to wrist, hip or spine: R ankle fracture 5 years ago  History of loss of height of more than 1 1/2 to 2 inches: no  Family history of osteoporosis: yes, mother  Poor balance    Age of menopause: 42 yo, no HRT  Tobacco use, including use in the past:  no   EtOH use? no    Weight bearing exercise?   No   Dental work planned? Yes>> reportedly needing major jaw surgery, she is holding off, does need few teeth extraction soon      Recent DXA: 4/2022  Lumbar spine (L1-L4):  BMD is 0.739 g/cm2, T-score is -2.8, and Z-score is -1.2.  Total hip: BMD is 0.601 g/cm2, T-score is -2.8, and Z-score is  -1.7.  Femoral neck:  BMD is 0.473 g/cm2, T-score is -3.1, and Z-score is -2.0.  Distal 1/3 radius:  BMD is 0.435 g/cm2, T-score is -4.3, and Z-score is -2.8.     FRAX:  48% risk of a major osteoporotic fracture in the next 10 years.  20% risk of hip fracture in the next 10 years.     Impression:  *Osteoporosis based on T-score below -2.5    2017  A quantitative bone mineral density was obtained using the DEXA technique.    The bone mineral density measured from L1 through L4 is 0.852g/cm2.  This corresponds to a T score of -2.8 and a Z score of -2.8.  The bone mineral density within the left femoral neck measures 0.626 g/cm2.  This corresponds to a T score of -3.0 and a Z score of -2.5.  The bone mineral density within the right femoral neck measures 0.676 g/cm2.  This corresponds to a T score of -2.6 and a Z score of -2.2.    IMPRESSION: Osteoporosis.     Reviewed past surgical, medical, family, social history and updated as appropriate.    Review of Systems: see HPI above    Objective:   BP (!) 154/89 (BP Location: Left arm)   Pulse 64   Temp 98.1 °F (36.7 °C) (Oral)   Wt 88.2 kg (194 lb 8 oz)   BMI 37.36 kg/m²     Body mass index is 37.36 kg/m².  Vital signs reviewed    Physical Exam  Vitals and nursing note reviewed.   Constitutional:       General: She is not in acute distress.     Appearance: Normal appearance. She is well-developed. She is obese. She is not toxic-appearing.   Neck:      Thyroid: No thyromegaly.   Cardiovascular:      Heart sounds: Normal heart sounds.   Pulmonary:      Effort: Pulmonary effort is normal. No respiratory distress.   Abdominal:      Tenderness: There is no abdominal tenderness.   Musculoskeletal:         General: No deformity. Normal range of motion.      Cervical back: Normal range of motion.   Skin:     Findings: No bruising.   Neurological:      Mental Status: She is alert and oriented to person, place, and time.   Psychiatric:         Mood and Affect: Mood normal.          Lab Reviewed:   Lab Results   Component Value Date    HGBA1C 5.5 10/22/2012       Lab Results   Component Value Date    CHOL 196 09/10/2019    HDL 41 09/10/2019    LDLCALC 124.6 09/10/2019    TRIG 152 (H) 09/10/2019    CHOLHDL 20.9 09/10/2019       Lab Results   Component Value Date     06/06/2022    K 4.3 06/06/2022     06/06/2022    CO2 30 (H) 06/06/2022    GLU 91 06/06/2022    BUN 15 06/06/2022    CREATININE 0.8 06/06/2022    CALCIUM 10.5 06/06/2022    PROT 6.6 06/06/2022    ALBUMIN 3.7 06/06/2022    BILITOT 0.8 06/06/2022    ALKPHOS 118 06/06/2022    AST 15 06/06/2022    ALT 16 06/06/2022    ANIONGAP 7 (L) 06/06/2022    ESTGFRAFRICA >60 06/06/2022    EGFRNONAA >60 06/06/2022    TSH 0.791 06/06/2022        Lab Results   Component Value Date    .8 (H) 06/06/2022    .2 (H) 02/24/2022    IVVGLBJJ12SH 37 06/06/2022    ITYKEKGH94MO 30 08/27/2012    MDFDOZKN85SE 23 (L) 06/15/2011    CALCIUM 10.5 06/06/2022    CALCIUM 11.1 (H) 06/03/2022    CALCIUM 10.8 (H) 02/24/2022    PHOS 2.5 (L) 06/06/2022    PHOS 2.3 (L) 02/24/2022    PHOS 3.8 06/03/2016    ALKPHOS 118 06/06/2022    ALKPHOS 112 06/03/2022    ALKPHOS 92 01/28/2022    TSH 0.791 06/06/2022    LABCALC 15.4 (H) 06/06/2022    NVWAQCT42HUE 8 06/06/2022       Assessment     1. Hypercalcemia  NM Parathyroid Scan    Comprehensive Metabolic Panel    PTH, Intact   2. Age-related osteoporosis without current pathological fracture  Comprehensive Metabolic Panel    PTH, Intact   3. Hyperparathyroidism  NM Parathyroid Scan    Comprehensive Metabolic Panel    PTH, Intact   4. Fibromyalgia     5. Severe obesity (BMI 35.0-39.9) with comorbidity  Hemoglobin A1C   6. Rheumatoid arthritis involving multiple sites with positive rheumatoid factor          Plan     Hypercalcemia  - Incidental finding noted on recent lab work, with elevated hyperparathyroidism: suspect primary hyperparathyroidism  - remote history of hypercalcemia noted as far back as  2009  - No history of CKD stage IIIA, does have renal stone  - 24 hour urine Ca and Cr completed, rules out hypercalciuria/ or FHH  - normal vitamin-D, reassuring  - bone density worsening osteoporosis  - Advised to avoid dehydration, excessive calcium supplementations and avoid medication like HCTZ/Chlorthalidone/Lithium that can worsen hypercalcemia.     Plan  - Checking sestamibi scan for parathyroid adenoma  - calcium improving with recent lab work, can consider or Sensipar versus surgical evaluation  - follow-up scan to discuss next treatment plan  - F/U every 3 month    Osteoporosis  - Patient with noted for osteoporosis along with hypercalcemia and hyperparathyroidism  - osteoporosis longstanding since 2017, most likely due to chronic systemic steroid use and postmenopausal  - Secondary work up: have been done:  See plan above  - bone density 5/22 show worsening osteoporosis in  femoral neck  - continue vitamin-D supplements, multivitamin  - Fall precautions/Exercise regimen: advised  - high fall risk given history of recent fall, no fractures  - discuss treatment option:  Recommend Prolia every 6 months injection, cost is prohibitive, patient does not want Forteo or Evenity  - patient needs few teeth extraction soon, also has planned for jaw surgery in the future for which she is holding off>> advised that she completed teeth extraction prior to starting Prolia    Fibromyalgia  - Still chronic pain, fatigue  - continue management by Rheumatology    Severe obesity (BMI 35.0-39.9) with comorbidity  - Body mass index is 37.36 kg/m².  - limits ability to exercise      Hyperparathyroidism  - see plan workup above    Rheumatoid arthritis with positive rheumatoid factor  - diagnosed in 2003, currently on rituximab and prednisone       Advised patient to follow up with PCP for routine health maintenance care.   RTC in 3 months       Total Time for E/M Service preformed was greater than 40 minutes:  Including review  of medical record, direct face-to-face time with patient with discussion multiple endocrine issues and active medical decision-making.    Luis Carlos Vidal M.D.  Endocrinology  [facility]  6/14/2022      Disclaimer: This note has been generated in part with the use of voice-recognition software. There may be typographical errors that have been missed during proof-reading.

## 2022-06-14 NOTE — ASSESSMENT & PLAN NOTE
- Incidental finding noted on recent lab work, with elevated hyperparathyroidism: suspect primary hyperparathyroidism  - remote history of hypercalcemia noted as far back as 2009  - No history of CKD stage IIIA, does have renal stone  - 24 hour urine Ca and Cr completed, rules out hypercalciuria/ or FHH  - normal vitamin-D, reassuring  - bone density worsening osteoporosis  - Advised to avoid dehydration, excessive calcium supplementations and avoid medication like HCTZ/Chlorthalidone/Lithium that can worsen hypercalcemia.     Plan  - Checking sestamibi scan for parathyroid adenoma  - calcium improving with recent lab work, can consider or Sensipar versus surgical evaluation  - follow-up scan to discuss next treatment plan  - F/U every 3 month

## 2022-06-14 NOTE — ASSESSMENT & PLAN NOTE
- Patient with noted for osteoporosis along with hypercalcemia and hyperparathyroidism  - osteoporosis longstanding since 2017, most likely due to chronic systemic steroid use and postmenopausal  - Secondary work up: have been done:  See plan above  - bone density 5/22 show worsening osteoporosis in  femoral neck  - continue vitamin-D supplements, multivitamin  - Fall precautions/Exercise regimen: advised  - high fall risk given history of recent fall, no fractures  - discuss treatment option:  Recommend Prolia every 6 months injection, cost is prohibitive, patient does not want Forteo or Evenity  - patient needs few teeth extraction soon, also has planned for jaw surgery in the future for which she is holding off>> advised that she completed teeth extraction prior to starting Prolia

## 2022-06-15 ENCOUNTER — OFFICE VISIT (OUTPATIENT)
Dept: ORTHOPEDICS | Facility: CLINIC | Age: 62
End: 2022-06-15
Payer: MEDICARE

## 2022-06-15 VITALS
RESPIRATION RATE: 18 BRPM | BODY MASS INDEX: 37.44 KG/M2 | WEIGHT: 194.88 LBS | SYSTOLIC BLOOD PRESSURE: 142 MMHG | DIASTOLIC BLOOD PRESSURE: 82 MMHG | HEART RATE: 43 BPM | OXYGEN SATURATION: 92 %

## 2022-06-15 DIAGNOSIS — Z96.652 PRESENCE OF LEFT ARTIFICIAL KNEE JOINT: ICD-10-CM

## 2022-06-15 DIAGNOSIS — S80.02XA CONTUSION OF LEFT KNEE, INITIAL ENCOUNTER: Primary | ICD-10-CM

## 2022-06-15 PROCEDURE — 3077F PR MOST RECENT SYSTOLIC BLOOD PRESSURE >= 140 MM HG: ICD-10-PCS | Mod: CPTII,S$GLB,, | Performed by: PHYSICIAN ASSISTANT

## 2022-06-15 PROCEDURE — 3008F BODY MASS INDEX DOCD: CPT | Mod: CPTII,S$GLB,, | Performed by: PHYSICIAN ASSISTANT

## 2022-06-15 PROCEDURE — 99213 OFFICE O/P EST LOW 20 MIN: CPT | Mod: S$GLB,,, | Performed by: PHYSICIAN ASSISTANT

## 2022-06-15 PROCEDURE — 3008F PR BODY MASS INDEX (BMI) DOCUMENTED: ICD-10-PCS | Mod: CPTII,S$GLB,, | Performed by: PHYSICIAN ASSISTANT

## 2022-06-15 PROCEDURE — 3077F SYST BP >= 140 MM HG: CPT | Mod: CPTII,S$GLB,, | Performed by: PHYSICIAN ASSISTANT

## 2022-06-15 PROCEDURE — 99213 PR OFFICE/OUTPT VISIT, EST, LEVL III, 20-29 MIN: ICD-10-PCS | Mod: S$GLB,,, | Performed by: PHYSICIAN ASSISTANT

## 2022-06-15 PROCEDURE — 3079F DIAST BP 80-89 MM HG: CPT | Mod: CPTII,S$GLB,, | Performed by: PHYSICIAN ASSISTANT

## 2022-06-15 PROCEDURE — 3079F PR MOST RECENT DIASTOLIC BLOOD PRESSURE 80-89 MM HG: ICD-10-PCS | Mod: CPTII,S$GLB,, | Performed by: PHYSICIAN ASSISTANT

## 2022-06-15 PROCEDURE — 99999 PR PBB SHADOW E&M-EST. PATIENT-LVL III: CPT | Mod: PBBFAC,,, | Performed by: PHYSICIAN ASSISTANT

## 2022-06-15 PROCEDURE — 1160F RVW MEDS BY RX/DR IN RCRD: CPT | Mod: CPTII,S$GLB,, | Performed by: PHYSICIAN ASSISTANT

## 2022-06-15 PROCEDURE — 1159F PR MEDICATION LIST DOCUMENTED IN MEDICAL RECORD: ICD-10-PCS | Mod: CPTII,S$GLB,, | Performed by: PHYSICIAN ASSISTANT

## 2022-06-15 PROCEDURE — 99999 PR PBB SHADOW E&M-EST. PATIENT-LVL III: ICD-10-PCS | Mod: PBBFAC,,, | Performed by: PHYSICIAN ASSISTANT

## 2022-06-15 PROCEDURE — 1160F PR REVIEW ALL MEDS BY PRESCRIBER/CLIN PHARMACIST DOCUMENTED: ICD-10-PCS | Mod: CPTII,S$GLB,, | Performed by: PHYSICIAN ASSISTANT

## 2022-06-15 PROCEDURE — 1159F MED LIST DOCD IN RCRD: CPT | Mod: CPTII,S$GLB,, | Performed by: PHYSICIAN ASSISTANT

## 2022-06-15 NOTE — PROGRESS NOTES
Patient ID: Robert Smith is a 61 y.o. female.    Chief Complaint: Pain of the Right Knee and Pain of the Left Knee      HISTORY:  Robert Smith is a 61 y.o. female who returns to me today for follow up of left knee pain.  She had a fall 3 weeks ago onto her knee.  The bruising has resolved but she still has significant pain- no improvement since the injury.  She states pain is much worse since she was last seen by Dr. Patterson in April.  At that time is was noted that she was having pain in her knee at baseline.  She is able to bear weight and she denies give away weakness.      PMH/PSH/FamHx/SocHx:    Unchanged from prior visit.    ROS:  Constitution: Negative for chills, fever and weakness.   Respiratory: Negative for cough and shortness of breath.   Musculoskeletal: Positive for Left knee  Psychiatric/Behavioral: The patient is not nervous/anxious.       PHYSICAL EXAM:   BP (!) 142/82 (BP Location: Left arm, Patient Position: Sitting, BP Method: Large (Manual))   Pulse (!) 43   Resp 18   Wt 88.4 kg (194 lb 14.4 oz)   SpO2 (!) 92%   BMI 37.44 kg/m²   Left knee  Skin intact  No warmth or erythema  AROM 0-110, extensor mechanism intact  TTP along posterior, medial knee  Stable to varus/valgus stress    IMAGING: Previous xrays showed no fracture or dislocation.  Stable prosthetic alignment.    ASSESSMENT/PLAN:    Robert was seen today for pain and pain.    Diagnoses and all orders for this visit:    Contusion of left knee, initial encounter    Presence of left artificial knee joint    - Recent CRP 4.7  - Will order MARS MRI left knee  - Follow up with Dr. Patterson

## 2022-06-29 ENCOUNTER — TELEPHONE (OUTPATIENT)
Dept: ENDOCRINOLOGY | Facility: CLINIC | Age: 62
End: 2022-06-29
Payer: MEDICARE

## 2022-06-29 ENCOUNTER — HOSPITAL ENCOUNTER (OUTPATIENT)
Dept: RADIOLOGY | Facility: HOSPITAL | Age: 62
Discharge: HOME | End: 2022-06-29
Attending: HOSPITALIST
Payer: MEDICARE

## 2022-06-29 DIAGNOSIS — E21.3 HYPERPARATHYROIDISM: ICD-10-CM

## 2022-06-29 DIAGNOSIS — E83.52 HYPERCALCEMIA: ICD-10-CM

## 2022-06-29 PROCEDURE — 78071 PARATHYRD PLANAR W/WO SUBTRJ: CPT | Mod: 26,,, | Performed by: RADIOLOGY

## 2022-06-29 PROCEDURE — 78071 NM PARATHYROID SCAN WITH SPECT ROUTINE: ICD-10-PCS | Mod: 26,,, | Performed by: RADIOLOGY

## 2022-06-29 PROCEDURE — A9500 TC99M SESTAMIBI: HCPCS

## 2022-06-29 NOTE — TELEPHONE ENCOUNTER
Pt advised that once provider reviews the scan either provider or staff will reach out to the pt with further instructions. Pt verbalized understanding.

## 2022-06-29 NOTE — TELEPHONE ENCOUNTER
----- Message from Lalitha Abington sent at 6/29/2022  3:13 PM CDT -----  Regarding: self  .Type:  Test Results    Who Called:  self     Name of Test (Lab/Mammo/Etc):  scans of thyroid     Date of Test:  today     Ordering Provider: Dr Vidal     Where the test was performed: [facility]    Would the patient rather a call back or a response via [facility]?  Call     Best Call Back Number: [phone]      Additional Information:      For Clinical Team:Has the provider reviewed the results?

## 2022-07-06 ENCOUNTER — TELEPHONE (OUTPATIENT)
Dept: ENDOCRINOLOGY | Facility: CLINIC | Age: 62
End: 2022-07-06
Payer: MEDICARE

## 2022-07-06 DIAGNOSIS — E21.3 HYPERPARATHYROIDISM: ICD-10-CM

## 2022-07-06 DIAGNOSIS — E83.52 HYPERCALCEMIA: Primary | ICD-10-CM

## 2022-07-06 NOTE — TELEPHONE ENCOUNTER
----- Message from Carley De La Rosa sent at 7/6/2022  8:17 AM CDT -----  Type:  Test Results    Who Called:  self    Name of Test (Lab/Mammo/Etc):  Nuclear tests     Date of Test:  6/29/2022    Ordering Provider:  Dr Vidal    Where the test was performed:  [facility]    Would the patient rather a call back or a response via My [facility]?  call    Best Call Back Number: [phone] (home)          For Clinical Team:Has the provider reviewed the results?

## 2022-07-08 NOTE — TELEPHONE ENCOUNTER
Called patient on on 7/8/2022 3:14 PM to review recent result.   I discussed the recent results of  Positive NM scan:   left inferior parathyroid adenoma  We will pursue CT4D CT scan, I will also refer patient to ENT to discuss parathyroidectomy    Patient also had her tooth recently.  Will wait for gums to heal prior to the initiation of osteoporosis therapy      Luis Carlos Vidal MD  Endocrinology  7/8/2022

## 2022-07-27 ENCOUNTER — HOSPITAL ENCOUNTER (OUTPATIENT)
Dept: RADIOLOGY | Facility: HOSPITAL | Age: 62
Discharge: HOME | End: 2022-07-27
Attending: PHYSICIAN ASSISTANT
Payer: MEDICARE

## 2022-07-27 ENCOUNTER — HOSPITAL ENCOUNTER (EMERGENCY)
Facility: HOSPITAL | Age: 62
Discharge: HOME | End: 2022-07-27
Attending: EMERGENCY MEDICINE
Payer: MEDICARE

## 2022-07-27 ENCOUNTER — TELEPHONE (OUTPATIENT)
Dept: ORTHOPEDICS | Facility: CLINIC | Age: 62
End: 2022-07-27
Payer: MEDICARE

## 2022-07-27 VITALS
RESPIRATION RATE: 16 BRPM | SYSTOLIC BLOOD PRESSURE: 129 MMHG | HEIGHT: 61 IN | BODY MASS INDEX: 36.82 KG/M2 | TEMPERATURE: 98 F | WEIGHT: 195 LBS | HEART RATE: 64 BPM | DIASTOLIC BLOOD PRESSURE: 74 MMHG | OXYGEN SATURATION: 96 %

## 2022-07-27 DIAGNOSIS — Z96.652 PRESENCE OF LEFT ARTIFICIAL KNEE JOINT: ICD-10-CM

## 2022-07-27 DIAGNOSIS — U07.1 COVID-19: Primary | ICD-10-CM

## 2022-07-27 DIAGNOSIS — R51.9 NONINTRACTABLE HEADACHE, UNSPECIFIED CHRONICITY PATTERN, UNSPECIFIED HEADACHE TYPE: ICD-10-CM

## 2022-07-27 DIAGNOSIS — M25.562 ACUTE PAIN OF LEFT KNEE: Primary | ICD-10-CM

## 2022-07-27 DIAGNOSIS — S80.02XA CONTUSION OF LEFT KNEE, INITIAL ENCOUNTER: ICD-10-CM

## 2022-07-27 DIAGNOSIS — R06.02 SHORTNESS OF BREATH: ICD-10-CM

## 2022-07-27 LAB
ALBUMIN SERPL-MCNC: 3.9 G/DL (ref 3.3–5.5)
ALP SERPL-CCNC: 85 U/L (ref 42–141)
BASOPHILS # BLD AUTO: 0.02 K/UL (ref 0–0.2)
BASOPHILS NFR BLD: 0.5 % (ref 0–1.9)
BILIRUB SERPL-MCNC: 0.9 MG/DL (ref 0.2–1.6)
BILIRUBIN, POC UA: NEGATIVE
BLOOD, POC UA: NEGATIVE
BUN SERPL-MCNC: 12 MG/DL (ref 7–22)
CALCIUM SERPL-MCNC: 10.5 MG/DL (ref 8–10.3)
CHLORIDE SERPL-SCNC: 103 MMOL/L (ref 98–108)
CLARITY, POC UA: CLEAR
COLOR, POC UA: YELLOW
CREAT SERPL-MCNC: 0.7 MG/DL (ref 0.6–1.2)
CTP QC/QA: YES
DIFFERENTIAL METHOD: ABNORMAL
EOSINOPHIL # BLD AUTO: 0 K/UL (ref 0–0.5)
EOSINOPHIL NFR BLD: 0.5 % (ref 0–8)
ERYTHROCYTE [DISTWIDTH] IN BLOOD BY AUTOMATED COUNT: 12.2 % (ref 11.5–14.5)
GLUCOSE SERPL-MCNC: 88 MG/DL (ref 73–118)
GLUCOSE, POC UA: NEGATIVE
HCT VFR BLD AUTO: 43.6 % (ref 37–48.5)
HGB BLD-MCNC: 13.9 G/DL (ref 12–16)
IMM GRANULOCYTES # BLD AUTO: 0.01 K/UL (ref 0–0.04)
IMM GRANULOCYTES NFR BLD AUTO: 0.2 % (ref 0–0.5)
INFLUENZA A ANTIGEN, POC: NEGATIVE
INFLUENZA B ANTIGEN, POC: NEGATIVE
KETONES, POC UA: ABNORMAL
LEUKOCYTE EST, POC UA: ABNORMAL
LYMPHOCYTES # BLD AUTO: 0.4 K/UL (ref 1–4.8)
LYMPHOCYTES NFR BLD: 8.9 % (ref 18–48)
MCH RBC QN AUTO: 30.2 PG (ref 27–31)
MCHC RBC AUTO-ENTMCNC: 31.9 G/DL (ref 32–36)
MCV RBC AUTO: 95 FL (ref 82–98)
MONOCYTES # BLD AUTO: 0.5 K/UL (ref 0.3–1)
MONOCYTES NFR BLD: 11.9 % (ref 4–15)
NEUTROPHILS # BLD AUTO: 3.4 K/UL (ref 1.8–7.7)
NEUTROPHILS NFR BLD: 78 % (ref 38–73)
NITRITE, POC UA: NEGATIVE
NRBC BLD-RTO: 0 /100 WBC
PH UR STRIP: 6 [PH]
PLATELET # BLD AUTO: 103 K/UL (ref 150–450)
PMV BLD AUTO: 10.9 FL (ref 9.2–12.9)
POC ALT (SGPT): 20 U/L (ref 10–47)
POC AST (SGOT): 25 U/L (ref 11–38)
POC TCO2: 31 MMOL/L (ref 18–33)
POTASSIUM BLD-SCNC: 4.7 MMOL/L (ref 3.6–5.1)
PROTEIN, POC UA: ABNORMAL
PROTEIN, POC: 6.6 G/DL (ref 6.4–8.1)
RBC # BLD AUTO: 4.61 M/UL (ref 4–5.4)
SARS-COV-2 RDRP RESP QL NAA+PROBE: POSITIVE
SODIUM BLD-SCNC: 141 MMOL/L (ref 128–145)
SPECIFIC GRAVITY, POC UA: 1.02
UROBILINOGEN, POC UA: 0.2 E.U./DL
WBC # BLD AUTO: 4.37 K/UL (ref 3.9–12.7)

## 2022-07-27 PROCEDURE — 85025 COMPLETE CBC W/AUTO DIFF WBC: CPT | Performed by: EMERGENCY MEDICINE

## 2022-07-27 PROCEDURE — 85025 COMPLETE CBC W/AUTO DIFF WBC: CPT | Mod: ER

## 2022-07-27 PROCEDURE — 93010 ELECTROCARDIOGRAM REPORT: CPT | Mod: ,,, | Performed by: INTERNAL MEDICINE

## 2022-07-27 PROCEDURE — 80053 COMPREHEN METABOLIC PANEL: CPT | Mod: ER

## 2022-07-27 PROCEDURE — 96361 HYDRATE IV INFUSION ADD-ON: CPT | Mod: ER

## 2022-07-27 PROCEDURE — 93005 ELECTROCARDIOGRAM TRACING: CPT | Mod: ER

## 2022-07-27 PROCEDURE — 96375 TX/PRO/DX INJ NEW DRUG ADDON: CPT | Mod: ER

## 2022-07-27 PROCEDURE — 73721 MRI JNT OF LWR EXTRE W/O DYE: CPT | Mod: TC,LT

## 2022-07-27 PROCEDURE — 73721 MRI KNEE WITHOUT CONTRAST LEFT: ICD-10-PCS | Mod: 26,LT,, | Performed by: RADIOLOGY

## 2022-07-27 PROCEDURE — 93010 EKG 12-LEAD: ICD-10-PCS | Mod: ,,, | Performed by: INTERNAL MEDICINE

## 2022-07-27 PROCEDURE — 73721 MRI JNT OF LWR EXTRE W/O DYE: CPT | Mod: 26,LT,, | Performed by: RADIOLOGY

## 2022-07-27 PROCEDURE — 25000003 PHARM REV CODE 250: Mod: ER | Performed by: EMERGENCY MEDICINE

## 2022-07-27 PROCEDURE — U0002 COVID-19 LAB TEST NON-CDC: HCPCS | Mod: ER | Performed by: PHYSICIAN ASSISTANT

## 2022-07-27 PROCEDURE — 96372 THER/PROPH/DIAG INJ SC/IM: CPT | Mod: 59 | Performed by: EMERGENCY MEDICINE

## 2022-07-27 PROCEDURE — 96374 THER/PROPH/DIAG INJ IV PUSH: CPT | Mod: ER

## 2022-07-27 PROCEDURE — 63600175 PHARM REV CODE 636 W HCPCS: Mod: ER | Performed by: EMERGENCY MEDICINE

## 2022-07-27 PROCEDURE — 99285 EMERGENCY DEPT VISIT HI MDM: CPT | Mod: 25,ER

## 2022-07-27 PROCEDURE — 81003 URINALYSIS AUTO W/O SCOPE: CPT | Mod: ER

## 2022-07-27 RX ORDER — DIPHENHYDRAMINE HYDROCHLORIDE 50 MG/ML
12.5 INJECTION INTRAMUSCULAR; INTRAVENOUS
Status: COMPLETED | OUTPATIENT
Start: 2022-07-27 | End: 2022-07-27

## 2022-07-27 RX ORDER — METOCLOPRAMIDE HYDROCHLORIDE 5 MG/ML
10 INJECTION INTRAMUSCULAR; INTRAVENOUS
Status: COMPLETED | OUTPATIENT
Start: 2022-07-27 | End: 2022-07-27

## 2022-07-27 RX ORDER — KETOROLAC TROMETHAMINE 30 MG/ML
15 INJECTION, SOLUTION INTRAMUSCULAR; INTRAVENOUS
Status: COMPLETED | OUTPATIENT
Start: 2022-07-27 | End: 2022-07-27

## 2022-07-27 RX ORDER — HYDROMORPHONE HYDROCHLORIDE 2 MG/ML
1 INJECTION, SOLUTION INTRAMUSCULAR; INTRAVENOUS; SUBCUTANEOUS
Status: COMPLETED | OUTPATIENT
Start: 2022-07-27 | End: 2022-07-27

## 2022-07-27 RX ORDER — SODIUM CHLORIDE 9 MG/ML
1000 INJECTION, SOLUTION INTRAVENOUS
Status: COMPLETED | OUTPATIENT
Start: 2022-07-27 | End: 2022-07-27

## 2022-07-27 RX ADMIN — METOCLOPRAMIDE 10 MG: 5 INJECTION, SOLUTION INTRAMUSCULAR; INTRAVENOUS at 06:07

## 2022-07-27 RX ADMIN — KETOROLAC TROMETHAMINE 15 MG: 30 INJECTION, SOLUTION INTRAMUSCULAR; INTRAVENOUS at 06:07

## 2022-07-27 RX ADMIN — DIPHENHYDRAMINE HYDROCHLORIDE 12.5 MG: 50 INJECTION INTRAMUSCULAR; INTRAVENOUS at 06:07

## 2022-07-27 RX ADMIN — SODIUM CHLORIDE 1000 ML: 0.9 INJECTION, SOLUTION INTRAVENOUS at 06:07

## 2022-07-27 RX ADMIN — HYDROMORPHONE HYDROCHLORIDE 1 MG: 2 INJECTION, SOLUTION INTRAMUSCULAR; INTRAVENOUS; SUBCUTANEOUS at 07:07

## 2022-07-27 NOTE — TELEPHONE ENCOUNTER
Called patient to discuss MRI - radiologist suspected possible nondisplaced femur fracture and recommends repeat CT and x-ray.  Patient currently not feeling well and in the ED.  Recommend NWB until we can get further imaging done. Will call tomorrow pending currently medical work up

## 2022-07-27 NOTE — FIRST PROVIDER EVALUATION
Emergency Department TeleTriage Encounter Note      CHIEF COMPLAINT    Chief Complaint   Patient presents with    Headache     Complains of frontal headache x2 days. Denies any other symptoms. Had 2 negative at home COVID tests.       VITAL SIGNS   Initial Vitals [07/27/22 1349]   BP Pulse Resp Temp SpO2   121/61 76 18 99.2 °F (37.3 °C) 96 %      MAP       --            ALLERGIES    Review of patient's allergies indicates:   Allergen Reactions    Sulfasalazine      HEPATITIS    Sulfa (sulfonamide antibiotics) Nausea And Vomiting    Clindamycin Diarrhea    Daypro [oxaprozin] Nausea Only    Orencia  [abatacept]      Other reaction(s): Muscle pain    Percocet  [oxycodone-acetaminophen]      Other reaction(s): Vomiting    Pregabalin     Simponi  [golimumab]      Other reaction(s): Unknown    Cimzia [certolizumab pegol] Rash    Ciprofloxacin Rash    Sulfamethoxazole-trimethoprim Rash       PROVIDER TRIAGE NOTE  Patient is a 61-year-old female with multiple complaints including fatigue, shortness of breath, body aches and urinary frequency.  Denies fever.  She states she took a home COVID test which was negative.  She denies chest pain.    Initial orders will be placed and care will be transferred to an alternate provider when patient is roomed for a full evaluation. Any additional orders and the final disposition will be determined by that provider.        ORDERS  Labs Reviewed - No data to display    ED Orders (720h ago, onward)    None            Virtual Visit Note: The provider triage portion of this emergency department evaluation and documentation was performed via [employer], a HIPAA-compliant telemedicine application, in concert with a tele-presenter in the room. A face to face patient evaluation with one of my colleagues will occur once the patient is placed in an emergency department room.      DISCLAIMER: This note was prepared with [employer] voice recognition transcription software. Bhaskar  syntax, mangled pronouns, and other bizarre constructions may be attributed to that software system.

## 2022-07-27 NOTE — ED PROVIDER NOTES
Encounter Date: 7/27/2022    SCRIBE #1 NOTE: I, Rachele Alfredo, am scribing for, and in the presence of,  Cezar Dior MD. I have scribed the following portions of the note - Other sections scribed: HPI, ROS, PE.       History     Chief Complaint   Patient presents with    Headache     Complains of frontal headache x2 days. Denies any other symptoms. Had 2 negative at home COVID tests.     61 year old female with Rheumatoid Arthritis and Fibromyalgia presents to the ED with complaints of worsening headache beginning one week ago. Patient reports associated symptoms of muscle aches in the arms and legs described as 'tightness', photophobia, dizziness, confusion, abdominal pain and nausea. Denies fever, cough, congestion or visual disturbance. Patient notes no alleviating or exacerbating factors. Patient has an infusion every 6 months and reports taking Tylenol, Ibuprofen and muscle relaxer's for the symptoms with no immediate relief. Patient states she also takes medication for her conditions that reduce the ability of her immune system. Denies history of headaches, or anyone in her household with a headache. Additionally, patient notes two recent negative at-home COVID-19 tests. Patient also notes she is seeing an endocrinologist and reports a problem with her parathyroid and high calcium. Allergic to Sulfa, Clindamycin,  Daypro, Orencia, Percocet, Pregablin, Simponi, Cimzia and Ciprofloxacin.     The history is provided by the patient. No  was used.     Review of patient's allergies indicates:   Allergen Reactions    Sulfasalazine      HEPATITIS    Sulfa (sulfonamide antibiotics) Nausea And Vomiting    Clindamycin Diarrhea    Daypro [oxaprozin] Nausea Only    Orencia  [abatacept]      Other reaction(s): Muscle pain    Percocet  [oxycodone-acetaminophen]      Other reaction(s): Vomiting    Pregabalin     Simponi  [golimumab]      Other reaction(s): Unknown    Cimzia  [certolizumab pegol] Rash    Ciprofloxacin Rash    Sulfamethoxazole-trimethoprim Rash     Past Medical History:   Diagnosis Date    Acid reflux     Allergy     Anxiety     Degenerative disc disease     Depression     DVT of leg (deep venous thrombosis) 2009    rt leg    Fibromyalgia     Fibromyalgia     Long-term current use of steroids 11/12/2012    NSTEMI (non-ST elevated myocardial infarction) 5/18/2019    Osteoporosis, postmenopausal     chronic steroid use    RA (rheumatoid arthritis)      Past Surgical History:   Procedure Laterality Date    ANKLE FRACTURE SURGERY      BACK SURGERY      CARPAL TUNNEL RELEASE      CHOLECYSTECTOMY      HARDWARE REMOVAL      JOINT REPLACEMENT Right     right knee    KNEE SURGERY      Bilateral    KNEE SURGERY Left     revision x 2    SPINE SURGERY      TONSILLECTOMY      TUBAL LIGATION       Family History   Problem Relation Age of Onset    COPD Mother     Heart attack Father     Emphysema Maternal Grandfather     Breast cancer Neg Hx     Colon cancer Neg Hx     Ovarian cancer Neg Hx      Social History     Tobacco Use    Smoking status: Never Smoker    Smokeless tobacco: Never Used   Substance Use Topics    Alcohol use: No     Alcohol/week: 0.0 standard drinks    Drug use: No     Review of Systems   Constitutional: Negative for fever.   HENT: Negative for congestion and sore throat.    Eyes: Positive for photophobia. Negative for visual disturbance.   Respiratory: Negative for cough and shortness of breath.    Cardiovascular: Negative for chest pain.   Gastrointestinal: Positive for abdominal pain and nausea.   Genitourinary: Negative for difficulty urinating.   Musculoskeletal: Positive for myalgias (Arms and legs). Negative for back pain.   Skin: Negative for rash.   Neurological: Positive for dizziness and headaches.   Psychiatric/Behavioral: Positive for confusion.       Physical Exam     Initial Vitals [07/27/22 1349]   BP Pulse Resp Temp  SpO2   121/61 76 18 99.2 °F (37.3 °C) 96 %      MAP       --         Physical Exam    Nursing note and vitals reviewed.  Constitutional: She appears well-developed and well-nourished.   Eyes: EOM are normal. Pupils are equal, round, and reactive to light.   Neck: Neck supple. No thyromegaly present. No JVD present.   Normal range of motion.  Cardiovascular: Normal rate, regular rhythm, normal heart sounds and intact distal pulses. Exam reveals no gallop and no friction rub.    No murmur heard.  Pulmonary/Chest: Breath sounds normal. No respiratory distress.   Abdominal: Abdomen is soft. Bowel sounds are normal.   Musculoskeletal:         General: No tenderness or edema. Normal range of motion.      Cervical back: Normal range of motion and neck supple.      Comments: Advanced Rheumatoid Arthritis. Change in bilateral hands without acute inflammation.      Neurological: She is alert and oriented to person, place, and time. She has normal strength.   Skin: Skin is warm and dry.         ED Course   Procedures  Labs Reviewed   SARS-COV-2 RDRP GENE - Abnormal; Notable for the following components:       Result Value    POC Rapid COVID Positive (*)     All other components within normal limits    Narrative:     This test utilizes isothermal nucleic acid amplification   technology to detect the SARS-CoV-2 RdRp nucleic acid segment.   The analytical sensitivity (limit of detection) is 125 genome   equivalents/mL.   A POSITIVE result implies infection with the SARS-CoV-2 virus;   the patient is presumed to be contagious.     A NEGATIVE result means that SARS-CoV-2 nucleic acids are not   present above the limit of detection. A NEGATIVE result should be   treated as presumptive. It does not rule out the possibility of   COVID-19 and should not be the sole basis for treatment decisions.   If COVID-19 is strongly suspected based on clinical and exposure   history, re-testing using an alternate molecular assay should be    considered.   This test is only for use under the Food and Drug   Administration s Emergency Use Authorization (EUA).   Commercial kits are provided by [employer].   Performance characteristics of the EUA have been independently   verified by [facility] Department of   Pathology and Laboratory Medicine.   _________________________________________________________________   The authorized Fact Sheet for Healthcare Providers and the authorized Fact   Sheet for Patients of the ID NOW COVID-19 are available on the FDA   website:     https://www.fda.gov/media/517835/download  https://www.fda.gov/media/803671/download          POCT URINALYSIS W/O SCOPE - Abnormal; Notable for the following components:    Ketones, UA 1+ (*)     Protein, UA Trace (*)     Leukocytes, UA Trace (*)     All other components within normal limits   POCT CMP - Abnormal; Notable for the following components:    Calcium, POC 10.5 (*)     All other components within normal limits   CBC W/ AUTO DIFFERENTIAL   POCT CBC   POCT URINALYSIS W/O SCOPE   POCT INFLUENZA A/B MOLECULAR   POCT CMP   POCT RAPID INFLUENZA A/B     EKG Readings: (Independently Interpreted)   Initial Reading: No STEMI. Rhythm: Normal Sinus Rhythm. Heart Rate: 65.   RBBB, LVH       Imaging Results          X-Ray Chest AP Portable (Final result)  Result time 07/27/22 18:00:34    Final result by Kaity Bar MD (07/27/22 18:00:34)                 Impression:      No acute cardiopulmonary process identified.      Electronically signed by: Kaity Bar MD  Date:    07/27/2022  Time:    18:00             Narrative:    EXAMINATION:  XR CHEST AP PORTABLE    CLINICAL HISTORY:  shortness of breath;    TECHNIQUE:  Single frontal view of the chest was performed.    COMPARISON:  December 2020.    FINDINGS:  Cardiac silhouette is normal in size.  Lungs are symmetrically expanded.  Mild chronic coarse interstitial lung changes are seen.  No evidence of focal  consolidative process, pneumothorax, or significant pleural effusion.  No acute osseous abnormality identified.                                 Medications   HYDROmorphone (PF) injection 1 mg (has no administration in time range)   0.9%  NaCl infusion (1,000 mLs Intravenous New Bag 7/27/22 1826)   ketorolac injection 15 mg (15 mg Intravenous Given 7/27/22 1827)   metoclopramide HCl injection 10 mg (10 mg Intravenous Given 7/27/22 1827)   diphenhydrAMINE injection 12.5 mg (12.5 mg Intravenous Given 7/27/22 1826)   PT is covid positive. Unvaccinated on biologic, multiple medication problems for paxlovid. Will refer to Antibody infusion.            Scribe Attestation:   Scribe #1: I performed the above scribed service and the documentation accurately describes the services I performed. I attest to the accuracy of the note.                 Clinical Impression:   Final diagnoses:  [R06.02] Shortness of breath  [U07.1] COVID-19 (Primary)  [R51.9] Nonintractable headache, unspecified chronicity pattern, unspecified headache type          ED Disposition Condition    Discharge Stable        ED Prescriptions     None        Follow-up Information     Follow up With Specialties Details Why Contact Info    Tani Chan MD Family Medicine   [address]  [place] [address] [phone]      [facility] ED Emergency Medicine  As needed [address] [address] [phone]           Cezar Dior MD  07/27/22 4033

## 2022-07-27 NOTE — Clinical Note
"Robert"Marco Smith was seen and treated in our emergency department on 7/27/2022.     COVID-19 is present in our communities across the state. There is limited testing for COVID at this time, so not all patients can be tested. In this situation, your employee meets the following criteria:    Robert Smith has met the criteria for COVID-19 testing and has a POSITIVE result. She can return to work once they are asymptomatic for 24 hours without the use of fever reducing medications AND at least five days from the first positive result. A mask is recommended for 5 days post quarantine.     If you have any questions or concerns, or if I can be of further assistance, please do not hesitate to contact me.    Sincerely,             Cezar Dior MD"

## 2022-07-28 ENCOUNTER — INFUSION (OUTPATIENT)
Dept: INFECTIOUS DISEASES | Facility: HOSPITAL | Age: 62
End: 2022-07-28
Attending: EMERGENCY MEDICINE
Payer: MEDICARE

## 2022-07-28 ENCOUNTER — TELEPHONE (OUTPATIENT)
Dept: ORTHOPEDICS | Facility: CLINIC | Age: 62
End: 2022-07-28
Payer: MEDICARE

## 2022-07-28 ENCOUNTER — TELEPHONE (OUTPATIENT)
Dept: INFECTIOUS DISEASES | Facility: HOSPITAL | Age: 62
End: 2022-07-28
Payer: MEDICARE

## 2022-07-28 VITALS
WEIGHT: 195 LBS | TEMPERATURE: 99 F | BODY MASS INDEX: 36.82 KG/M2 | OXYGEN SATURATION: 95 % | HEART RATE: 67 BPM | HEIGHT: 61 IN | DIASTOLIC BLOOD PRESSURE: 62 MMHG | SYSTOLIC BLOOD PRESSURE: 139 MMHG | RESPIRATION RATE: 18 BRPM

## 2022-07-28 DIAGNOSIS — U07.1 COVID-19: Primary | ICD-10-CM

## 2022-07-28 PROCEDURE — M0222 HC IV INJECTION, BEBTELOVIMAB, INCL POST ADMIN MONIT: HCPCS | Performed by: INTERNAL MEDICINE

## 2022-07-28 PROCEDURE — 63600175 PHARM REV CODE 636 W HCPCS: Performed by: INTERNAL MEDICINE

## 2022-07-28 RX ORDER — BEBTELOVIMAB 87.5 MG/ML
175 INJECTION, SOLUTION INTRAVENOUS
Status: COMPLETED | OUTPATIENT
Start: 2022-07-28 | End: 2022-07-28

## 2022-07-28 RX ORDER — ONDANSETRON 4 MG/1
4 TABLET, ORALLY DISINTEGRATING ORAL
Status: ACTIVE | OUTPATIENT
Start: 2022-07-28 | End: 2022-07-29

## 2022-07-28 RX ORDER — EPINEPHRINE 0.3 MG/.3ML
0.3 INJECTION SUBCUTANEOUS
Status: ACTIVE | OUTPATIENT
Start: 2022-07-28 | End: 2022-07-31

## 2022-07-28 RX ORDER — ACETAMINOPHEN 325 MG/1
650 TABLET ORAL
Status: ACTIVE | OUTPATIENT
Start: 2022-07-28 | End: 2022-07-29

## 2022-07-28 RX ORDER — ALBUTEROL SULFATE 90 UG/1
2 AEROSOL, METERED RESPIRATORY (INHALATION)
Status: ACTIVE | OUTPATIENT
Start: 2022-07-28 | End: 2022-07-31

## 2022-07-28 RX ORDER — DIPHENHYDRAMINE HYDROCHLORIDE 50 MG/ML
25 INJECTION INTRAMUSCULAR; INTRAVENOUS
Status: ACTIVE | OUTPATIENT
Start: 2022-07-28 | End: 2022-07-29

## 2022-07-28 RX ADMIN — BEBTELOVIMAB 175 MG: 87.5 INJECTION, SOLUTION INTRAVENOUS at 09:07

## 2022-07-28 NOTE — TELEPHONE ENCOUNTER
Contacted patient for EUA infusion.    Symptom onset 07/24/22     Positive test 07/27/22     [facility] Eliquis      Patient will call back when finding a ride

## 2022-07-28 NOTE — PROGRESS NOTES
Patient arrives for Bebtelovimab IV Injection. Ambulatory. Pt AAox3. No distress noted. RR even and unlabored.     Symptoms and onset date:  07/25/22 HA. Body aches stomach pain, nausea     Tested COVID + on 07/27/22

## 2022-07-28 NOTE — ED NOTES
NAD AT THIS TIME. AAOX4.  RESPIRATIONS EVEN AND UNLABORED. PT STATES THAT HEADACHE PAIN IS RESOLVING. PT VERBALIZED UNDERSTANDING TO FOLLOW CDC GUIDELINES FOR POSITIVE COVID. RX AND D/C INFOMATION GIVEN TO REVIEWED WITH PT. PT DENIES ANY FURTHER NEEDS OR QUESTIONS. AMB OUT TO POV WITH STEADY GAIT.

## 2022-07-28 NOTE — PROGRESS NOTES
Patient remains with no signs of complications noted. Patient received Bebtelovimab IV Injection according to FDA recommendations and [facility] SOP without complications noted and left with mask in place. Drug fact sheet provided. Pt discharged home. Ambulatory. Remains AAox3. No distress noted. RR even and unlabored.

## 2022-07-28 NOTE — TELEPHONE ENCOUNTER
Called patient today to discuss recent MRI results.  Imaging reviewed with Dr. Coe  Recommend patient use walker, NWB   CT of left knee  Patient diagnosed with covid yesterday and not feeling well.  She is quarantining at home currently  Will also reach out to endocrinology to discuss osteoporosis and plans for treatment  Will plan to have her follow up in clinic with Dr. Patterson

## 2022-08-01 ENCOUNTER — TELEPHONE (OUTPATIENT)
Dept: ENDOCRINOLOGY | Facility: CLINIC | Age: 62
End: 2022-08-01
Payer: MEDICARE

## 2022-08-01 NOTE — TELEPHONE ENCOUNTER
----- Message from Marisel Fuentes PA-C sent at 8/1/2022  8:04 AM CDT -----  Ok thanks- she stated that she had this done so I will encourage her to follow up with you to get this started.    Thanks  ----- Message -----  From: Luis Carlos Vidal MD  Sent: 8/1/2022   7:46 AM CDT  To: Marisel Fuentes PA-C    My note, patient needs few teeth extraction soon>> advised that she completed teeth extraction prior to starting Prolia         ----- Message -----  From: Marisel Fuentes PA-C  Sent: 7/28/2022   2:51 PM CDT  To: Luis Carlos Vidal MD    Hi Dr. Vidal,  I just wanted to clarify the plan for treatment of her osteoporosis.  Sounds like from your note you are wanting to start her on Prolia once she has some teeth extracted.  I am currently treating her for a margie-prosthetic femur fracture from a fall a couple of months ago.  I noticed her recent DEXA results and I saw that you were managing it.  The patient seems confused about when she would be starting treatment if/when it was indicated.  Thanks for your help!  Fannie

## 2022-08-02 ENCOUNTER — TELEPHONE (OUTPATIENT)
Dept: OTOLARYNGOLOGY | Facility: CLINIC | Age: 62
End: 2022-08-02
Payer: MEDICARE

## 2022-08-02 ENCOUNTER — TELEPHONE (OUTPATIENT)
Dept: ORTHOPEDICS | Facility: CLINIC | Age: 62
End: 2022-08-02
Payer: MEDICARE

## 2022-08-02 NOTE — TELEPHONE ENCOUNTER
LVM in regards to upcoming appointment. Patient can keep her appointment if she is not having any symptoms (fever, , excessive coughing, or diarrhea)patient must wear a mask.

## 2022-08-02 NOTE — TELEPHONE ENCOUNTER
----- Message from Teodora Delgado sent at 8/2/2022  1:55 PM CDT -----  Regarding: questions about upcoming appt/ patient is covid positive  Type: Patient Call Back    Who called:Robert     What is the request in detail: the patient is requesting a call back from the staff with an appt that she has on 8/4 with Dr. Lorenzo. The patient recently tested positive for covid on 7/27. She wanted to know if she should still come into the appt. Patient was offered Dr. Lorenzo next appt on 9/29 but she declined, stating that it is too far out and she has been waiting on this 1 already. Please return call at earliest convenience.    Can the clinic reply by [facility]?no     Would the patient rather a call back or a response via [facility]? Call back     Best call back number:[phone]

## 2022-08-12 ENCOUNTER — TELEPHONE (OUTPATIENT)
Dept: RHEUMATOLOGY | Facility: CLINIC | Age: 62
End: 2022-08-12
Payer: MEDICARE

## 2022-08-12 DIAGNOSIS — Z79.899 HIGH RISK MEDICATION USE: Primary | ICD-10-CM

## 2022-08-12 RX ORDER — TIZANIDINE 4 MG/1
TABLET ORAL
COMMUNITY
Start: 2022-07-26 | End: 2022-09-15

## 2022-08-12 RX ORDER — IBUPROFEN 800 MG/1
TABLET ORAL
COMMUNITY
Start: 2022-07-01 | End: 2022-09-12 | Stop reason: CLARIF

## 2022-08-12 RX ORDER — PREDNISOLONE ACETATE 10 MG/ML
1 SUSPENSION/ DROPS OPHTHALMIC 2 TIMES DAILY
COMMUNITY
Start: 2022-07-07 | End: 2023-03-01

## 2022-08-12 NOTE — TELEPHONE ENCOUNTER
Spoke with the patient and cancelled her infusion due to still having residual covid symptoms. Will call back in a week to reschedule

## 2022-08-15 ENCOUNTER — HOSPITAL ENCOUNTER (OUTPATIENT)
Dept: RADIOLOGY | Facility: HOSPITAL | Age: 62
Discharge: HOME | End: 2022-08-15
Attending: PHYSICIAN ASSISTANT
Payer: MEDICARE

## 2022-08-15 DIAGNOSIS — Z96.652 PRESENCE OF LEFT ARTIFICIAL KNEE JOINT: ICD-10-CM

## 2022-08-15 DIAGNOSIS — M25.562 ACUTE PAIN OF LEFT KNEE: ICD-10-CM

## 2022-08-15 PROCEDURE — 73562 X-RAY EXAM OF KNEE 3: CPT | Mod: 26,LT,, | Performed by: INTERNAL MEDICINE

## 2022-08-15 PROCEDURE — 73560 X-RAY EXAM OF KNEE 1 OR 2: CPT | Mod: 26,RT,, | Performed by: INTERNAL MEDICINE

## 2022-08-15 PROCEDURE — 73560 XR KNEE ORTHO LEFT: ICD-10-PCS | Mod: 26,RT,, | Performed by: INTERNAL MEDICINE

## 2022-08-15 PROCEDURE — 73700 CT KNEE WITHOUT CONTRAST LEFT: ICD-10-PCS | Mod: 26,LT,, | Performed by: INTERNAL MEDICINE

## 2022-08-15 PROCEDURE — 73700 CT LOWER EXTREMITY W/O DYE: CPT | Mod: TC,LT

## 2022-08-15 PROCEDURE — 73700 CT LOWER EXTREMITY W/O DYE: CPT | Mod: 26,LT,, | Performed by: INTERNAL MEDICINE

## 2022-08-15 PROCEDURE — 73560 X-RAY EXAM OF KNEE 1 OR 2: CPT | Mod: TC,FY,RT

## 2022-08-15 PROCEDURE — 73562 XR KNEE ORTHO LEFT: ICD-10-PCS | Mod: 26,LT,, | Performed by: INTERNAL MEDICINE

## 2022-08-25 ENCOUNTER — DOCUMENTATION ONLY (OUTPATIENT)
Dept: OTOLARYNGOLOGY | Facility: CLINIC | Age: 62
End: 2022-08-25

## 2022-08-25 ENCOUNTER — OFFICE VISIT (OUTPATIENT)
Dept: OTOLARYNGOLOGY | Facility: CLINIC | Age: 62
End: 2022-08-25
Payer: MEDICARE

## 2022-08-25 ENCOUNTER — ANESTHESIA EVENT (OUTPATIENT)
Dept: SURGERY | Facility: HOSPITAL | Age: 62
End: 2022-08-25
Payer: MEDICARE

## 2022-08-25 ENCOUNTER — LAB VISIT (OUTPATIENT)
Dept: LAB | Facility: HOSPITAL | Age: 62
End: 2022-08-25
Attending: OTOLARYNGOLOGY
Payer: MEDICARE

## 2022-08-25 VITALS
WEIGHT: 197.31 LBS | DIASTOLIC BLOOD PRESSURE: 89 MMHG | HEART RATE: 72 BPM | BODY MASS INDEX: 37.28 KG/M2 | SYSTOLIC BLOOD PRESSURE: 141 MMHG

## 2022-08-25 DIAGNOSIS — J30.2 SEASONAL ALLERGIC RHINITIS, UNSPECIFIED TRIGGER: ICD-10-CM

## 2022-08-25 DIAGNOSIS — E21.3 HYPERPARATHYROIDISM: ICD-10-CM

## 2022-08-25 DIAGNOSIS — K21.9 LARYNGOPHARYNGEAL REFLUX (LPR): ICD-10-CM

## 2022-08-25 DIAGNOSIS — E83.52 HYPERCALCEMIA: ICD-10-CM

## 2022-08-25 DIAGNOSIS — R49.0 DYSPHONIA: ICD-10-CM

## 2022-08-25 DIAGNOSIS — E21.3 HYPERPARATHYROIDISM: Primary | ICD-10-CM

## 2022-08-25 DIAGNOSIS — R09.82 POSTNASAL DRIP: ICD-10-CM

## 2022-08-25 LAB
CREAT SERPL-MCNC: 0.8 MG/DL (ref 0.5–1.4)
EST. GFR  (NO RACE VARIABLE): >60 ML/MIN/1.73 M^2

## 2022-08-25 PROCEDURE — 1159F MED LIST DOCD IN RCRD: CPT | Mod: CPTII,S$GLB,, | Performed by: OTOLARYNGOLOGY

## 2022-08-25 PROCEDURE — 31575 DIAGNOSTIC LARYNGOSCOPY: CPT | Mod: S$GLB,,, | Performed by: OTOLARYNGOLOGY

## 2022-08-25 PROCEDURE — 82565 ASSAY OF CREATININE: CPT | Performed by: OTOLARYNGOLOGY

## 2022-08-25 PROCEDURE — 3008F BODY MASS INDEX DOCD: CPT | Mod: CPTII,S$GLB,, | Performed by: OTOLARYNGOLOGY

## 2022-08-25 PROCEDURE — 3079F PR MOST RECENT DIASTOLIC BLOOD PRESSURE 80-89 MM HG: ICD-10-PCS | Mod: CPTII,S$GLB,, | Performed by: OTOLARYNGOLOGY

## 2022-08-25 PROCEDURE — 3077F PR MOST RECENT SYSTOLIC BLOOD PRESSURE >= 140 MM HG: ICD-10-PCS | Mod: CPTII,S$GLB,, | Performed by: OTOLARYNGOLOGY

## 2022-08-25 PROCEDURE — 36415 COLL VENOUS BLD VENIPUNCTURE: CPT | Performed by: OTOLARYNGOLOGY

## 2022-08-25 PROCEDURE — 99204 PR OFFICE/OUTPT VISIT, NEW, LEVL IV, 45-59 MIN: ICD-10-PCS | Mod: 25,S$GLB,, | Performed by: OTOLARYNGOLOGY

## 2022-08-25 PROCEDURE — 3077F SYST BP >= 140 MM HG: CPT | Mod: CPTII,S$GLB,, | Performed by: OTOLARYNGOLOGY

## 2022-08-25 PROCEDURE — 1159F PR MEDICATION LIST DOCUMENTED IN MEDICAL RECORD: ICD-10-PCS | Mod: CPTII,S$GLB,, | Performed by: OTOLARYNGOLOGY

## 2022-08-25 PROCEDURE — 3079F DIAST BP 80-89 MM HG: CPT | Mod: CPTII,S$GLB,, | Performed by: OTOLARYNGOLOGY

## 2022-08-25 PROCEDURE — 31575 PR LARYNGOSCOPY, FLEXIBLE; DIAGNOSTIC: ICD-10-PCS | Mod: S$GLB,,, | Performed by: OTOLARYNGOLOGY

## 2022-08-25 PROCEDURE — 99204 OFFICE O/P NEW MOD 45 MIN: CPT | Mod: 25,S$GLB,, | Performed by: OTOLARYNGOLOGY

## 2022-08-25 PROCEDURE — 3008F PR BODY MASS INDEX (BMI) DOCUMENTED: ICD-10-PCS | Mod: CPTII,S$GLB,, | Performed by: OTOLARYNGOLOGY

## 2022-08-25 RX ORDER — SODIUM CHLORIDE 0.9 % (FLUSH) 0.9 %
10 SYRINGE (ML) INJECTION
Status: DISCONTINUED | OUTPATIENT
Start: 2022-08-25 | End: 2022-09-12 | Stop reason: CLARIF

## 2022-08-25 RX ORDER — AZELASTINE 1 MG/ML
1 SPRAY, METERED NASAL 2 TIMES DAILY
Qty: 30 ML | Refills: 3 | Status: SHIPPED | OUTPATIENT
Start: 2022-08-25 | End: 2022-10-28 | Stop reason: SDUPTHER

## 2022-08-25 RX ORDER — LIDOCAINE HYDROCHLORIDE 10 MG/ML
1 INJECTION, SOLUTION EPIDURAL; INFILTRATION; INTRACAUDAL; PERINEURAL ONCE
Status: CANCELLED | OUTPATIENT
Start: 2022-08-25 | End: 2022-08-25

## 2022-08-25 NOTE — PROGRESS NOTES
Fax clearance form to Rene Fuller NP at [phone] office [phone]. Patient needs clearance form vascular at [place].

## 2022-08-25 NOTE — PROGRESS NOTES
OTOLARYNGOLOGY CLINIC NOTE  Date:  08/25/2022     Chief complaint:  Chief Complaint   Patient presents with    Other     Discuss Parathyroidectomy       History of Present Illness  Robert Smith is a 61 y.o. female  presenting today for a new evaluation and treatment of hyperparathyroid disease. Referred by endocrinologist Dr. Vidal. Notes have been reviewed.per his note and chart review, patient has had fluctuations of calcium levels, at times hypercalcemic. Has worsening bone density/osteoporosis and also needs to get jaw/dental surgery. No excess intake of Ca. Elevated PTH first noted this year. Urine ca/cr ratio on .02 but [facility] felt unlikely per notes from dr. Vidal.     Sometimes gets a little raspy voice in the am  Sisters x 2 with thyroid cancer  + heartburn  Postnasal drip   Takes allegra; postnasal drip is worse with allergy flare ups    Uses flonase; sometimes bid  Drinks a lot of water     Past Medical History  Past Medical History:   Diagnosis Date    Acid reflux     Allergy     Anxiety     Degenerative disc disease     Depression     DVT of leg (deep venous thrombosis) 2009    rt leg    Fibromyalgia     Fibromyalgia     Long-term current use of steroids 11/12/2012    NSTEMI (non-ST elevated myocardial infarction) 5/18/2019    Osteoporosis, postmenopausal     chronic steroid use    RA (rheumatoid arthritis)         Past Surgical History  Past Surgical History:   Procedure Laterality Date    ANKLE FRACTURE SURGERY      BACK SURGERY      CARPAL TUNNEL RELEASE      CHOLECYSTECTOMY      HARDWARE REMOVAL      JOINT REPLACEMENT Right     right knee    KNEE SURGERY      Bilateral    KNEE SURGERY Left     revision x 2    SPINE SURGERY      TONSILLECTOMY      TUBAL LIGATION          Medications  Current Outpatient Medications on File Prior to Visit   Medication Sig Dispense Refill    albuterol sulfate 90 mcg/actuation AePB Inhale 1 puff into the lungs every 4 (four) hours as needed. Rescue 1 each 1    ascorbic acid,  vitamin C, (VITAMIN C) 1000 MG tablet Take 1,000 mg by mouth once daily.      cyanocobalamin 1,000 mcg/mL injection INJECT 1000 MCG (1ML) AS DIRECTED EVERY 28 DAYS      diazePAM (VALIUM) 5 MG tablet       dicyclomine (BENTYL) 10 MG capsule 20 mg.       ELIQUIS 5 mg Tab       fluticasone propionate (FLONASE) 50 mcg/actuation nasal spray       HYDROcodone-acetaminophen (NORCO)  mg per tablet       HYDROCORT/MIN OIL/PETROLAT,WHT (HYDROCORTISONE-MIN OIL-WHT PET) 1 % Oint       hydrOXYzine HCL (ATARAX) 25 MG tablet Take 25 mg by mouth every 6 (six) hours as needed.      ibuprofen (ADVIL,MOTRIN) 800 MG tablet TAKE 1 TABLET BY MOUTH EVERY 6-8 HOURS AS NEEDED FOR PAIN AND INFLAMMATION      levocetirizine (XYZAL) 5 MG tablet TAKE 1 TABLET DAILY AS NEEDED BY MOUTH FOR ALLERGIES      meclizine (ANTIVERT) 25 mg tablet TAKE 1 TABLET BY MOUTH 3 (THREE) TIMES DAILY AS NEEDED FOR DIZZINESS      methocarbamoL (ROBAXIN) 500 MG Tab Take 500 mg by mouth once daily.      montelukast (SINGULAIR) 10 mg tablet       multivitamin capsule Take 1 capsule by mouth once daily.      ondansetron (ZOFRAN) 8 MG tablet       pantoprazole (PROTONIX) 40 MG tablet       polyethylene glycol (GLYCOLAX) 17 gram/dose powder Take 17 g by mouth.      prednisoLONE acetate (PRED FORTE) 1 % DrpS Place 1 drop into both eyes 2 (two) times daily.      predniSONE (DELTASONE) 1 MG tablet Take 4 tablets (4 mg total) by mouth once daily. 360 tablet 1    predniSONE (DELTASONE) 1 MG tablet Take 4 tablets (4 mg total) by mouth once daily. 120 tablet 2    riTUXimab (RITUXAN) 10 mg/mL injection Inject into the vein.      SODIUM CHLORIDE FOR INHALATION (SODIUM CHLORIDE 0.9%) 0.9 % nebulizer solution       tiZANidine (ZANAFLEX) 4 MG tablet       vitamin D (VITAMIN D3) 1000 units Tab Take 1,000 Units by mouth once daily.      metoprolol succinate (TOPROL-XL) 25 MG 24 hr tablet Take 12.5 mg by mouth.      traZODone (DESYREL) 50 MG tablet Take 50 mg by mouth.       [DISCONTINUED] ADVAIR DISKUS 250-50 mcg/dose diskus inhaler Inhale 1 puff into the lungs daily as needed.       [DISCONTINUED] lisinopril (PRINIVIL,ZESTRIL) 2.5 MG tablet       [DISCONTINUED] warfarin (COUMADIN) 1 MG tablet        No current facility-administered medications on file prior to visit.       Review of Systems  Review of Systems   Constitutional: Negative.    HENT:  Positive for ear pain.    Eyes: Negative.    Cardiovascular: Negative.    Gastrointestinal:  Positive for constipation.   Skin: Negative.    Endo/Heme/Allergies:  Bruises/bleeds easily.   Psychiatric/Behavioral:  Positive for depression. The patient is nervous/anxious.       Social History   reports that she has never smoked. She has never used smokeless tobacco. She reports that she does not drink alcohol and does not use drugs.     Family History  Family History   Problem Relation Age of Onset    COPD Mother     Heart attack Father     Emphysema Maternal Grandfather     Breast cancer Neg Hx     Colon cancer Neg Hx     Ovarian cancer Neg Hx         Physical Exam   Vitals:    08/25/22 1342   BP: (!) 141/89   Pulse: 72    Body mass index is 37.28 kg/m².  Weight: 89.5 kg (197 lb 5 oz)         GENERAL: no acute distress.  HEAD: normocephalic.   EYES: lids and lashes normal. No scleral icterus  EARS: external ear without lesion, normal pinna shape and position.  External auditory canal with normal cerumen, tympanic membrane fully visible, no perforation , no retraction. No middle ear effusion. Ossicles intact.   NOSE: external nose without significant bony abnormality  ORAL CAVITY/OROPHARYNX: tongue midline and mobile. Symmetric palate rise. Uvula midline.   NECK: trachea midline.   LYMPH NODES:No cervical lymphadenopathy.  RESPIRATORY: no stridor, no stertor. Voice normal. Respirations nonlabored.  NEURO: alert, responds to questions appropriately.   Cranial nerve exam as indicated in above sections and additionally showed facial movement  symmetric with good eye closure and symmetric smile.   PSYCH:mood appropriate    PROCEDURE NOTE  NAME OF PROCEDURE: Flexible Laryngoscopy, diagnostic  INDICATIONS: gag reflex precludes mirror exam, intermittent dysphonia , postnasal drip   FINDINGS: lth, cobblestoning no pseudocsulcus, findings consistent with possible LPR; turbinate hypertrophy on right, partially resected turbinate on left      Consent: After procedure was explained in detail and all questions answered, verbal consent was obtained for performing flexible laryngoscopy.  Anesthesia: topical 4% lidocaine and neosynephrine  Procedure: With patient in seated position, the scope was inserted into the bilateral nasal passageway and advanced atraumatically into the nasopharynx to examine the following structures:    Nasopharynx: no mass or lesion noted in nasopharynx.   Oropharynx: base of tongue without  mass or ulceration. Lingual tonsils with mild symmetric hypertrophy (LTH)  Hypopharynx: posterior pharyngeal wall with cobblestoning. No pooling of secretions. Pyriform sinuses visible without mass or lesion  Larynx: epiglottis normal without lesion. False vocal folds without edema/erythema/lesion. True vocal folds mobile and without lesion. Mild interarytenoid edema no erythema . Postcricoid region with mild edema no lesion   Subglottis: visualized portion of subglottis normal in appearance    After examination performed, the scope was removed atraumatically . The patient tolerated the procedure well. Photodocumentation obtained with representative images below, all images and/or videos uploaded in media section of epic.                        Imaging:  The patient does have any pertinent and/or recent imaging of the head and neck. Possible small left inferior parathyroid adenoma    Labs:  CBC  Recent Labs   Lab 06/03/22  1058 06/06/22  0748 07/27/22  1902   WBC 6.99 5.95 4.37   Hemoglobin 13.7 13.4 13.9   Hematocrit 43.7 42.7 43.6   MCV 97 98 95    Platelets 194 162 103 L     BMP  Recent Labs   Lab 02/24/22  0949 06/03/22  1058 06/06/22  0748   Glucose 69 L 71 91   Sodium 143 142 142   Potassium 4.8 5.3 H 4.3   Chloride 104 103 105   CO2 27 30 H 30 H   BUN 22 14 15   Creatinine 0.8 0.8 0.8   Calcium 10.8 H 11.1 H 10.5   Phosphorus 2.3 L  --  2.5 L   Magnesium  --   --  2.1     COAGS      ENDOCRINE  PTH elevated 140.8 (6-6-22); 151.2 (2-24-22)  Vitamin D normal at 37 (6-6-22)  Assessment  1. Hypercalcemia  - Ambulatory referral/consult to ENT    2. Hyperparathyroidism  - Ambulatory referral/consult to ENT  - CT Neck Parathyroid (4D); Future  - CREATININE, SERUM; Future  - Vital signs; Standing  - Diet NPO; Standing  - Place sequential compression device; Standing  - Case Request Operating Room: PARATHYROIDECTOMY, left possible bilateral, recurrent laryngeal nerve monitoring and pth lab draws and frozen section  - Full code; Standing  - Place in Outpatient; Standing  - X-Ray Chest PA And Lateral; Standing  - CBC auto differential; Standing  - Basic metabolic panel; Standing  - Diet NPO  - Full code  - CBC auto differential  - Basic metabolic panel    3. Seasonal allergic rhinitis, unspecified trigger    4. Dysphonia    5. Postnasal drip    6. Laryngopharyngeal reflux (LPR)       Plan:  Discussed plan of care with patient in detail and all questions answered. Patient reported understanding of plan of care.     Hyperparathyroidism:recommend surgical management. We discussed differential diagnosis for hypercalcemia including hyperparathyroidism. We discussed purpose of surgery and expected outcomes of surgical management - typically patients have reduced and/or resolution of GERD, improved bone/joint pain, improvement in mental well being and reduction in kidney stones if these symptoms are present preoperatively. As these symptoms can have multifactorial etiology, complete resolution may not occur. Lab work and testing thus far is indicate of primary  "hyperparathyroidism due to parathyroid adenoma. Based on the available imaging,  I think the adenoma is located on the  left side in inferior location. Will check 4d parathyroid CT for further characterization.      Discussed plan for potential exploration of bilateral neck if needed. There are rare occasions where more than one gland is overactive. Intraoperative PTH will be used as well as frozen section to confirm adenoma and appropriate decrease in PTH level once adenoma is removed. We discussed that if PTH does not fall to appropriate level intraop , would need to do a 4 gland exploration to evaluate for any other potential disease pathology . Discussed risk of damage to recurrent laryngeal nerve ( in the setting of both unilateral and bilateral neck exploration including risk of airway issue with bilateral RLN weakness) . Recurrent laryngeal nerve monitoring will be used during the procedure and the patient was informed of this. Discussed risks of postoperative hypocalcemia and "hungry bone syndrome".      Discussed planned postoperative course - typically if only unilateral neck surgery, patient can go home the same day. Also discussed with the patient that in very rare circumstances, adenoma can recur and/or develop in additional parathyroid gland.    On eliquis for blood clots- significant risk ; needs vasc surgery clearance to go off of this for surgery. Discussed even with cessation, at risk for complications related to excess bleeding such as hematoma and risk for temporary and/or permanent nerve damage. Also at risk for vascular issues such as clotting when comes off of this med- will have her discuss with vasc surgery extent of this risk    The patient would like to proceed with surgery and surgery was tentatively scheduled for 9-20-22.    Dysphonia, postnasal drip, seasonal allergic rhinitis: intermittent dysphonia; on PPI, has had turb reduction surgery  will try adding saline and astelin to flonase " regimen to see if related to allergic rhinits; difficult to treat gerd can result form hyperpara but as noted above there are many things that can lead to medically refractory gerd.       Please be aware that this note has been generated with the assistance of MModal voice-to-text.  Please excuse any spelling or grammatical errors.

## 2022-08-25 NOTE — H&P (VIEW-ONLY)
OTOLARYNGOLOGY CLINIC NOTE  Date:  08/25/2022     Chief complaint:  Chief Complaint   Patient presents with    Other     Discuss Parathyroidectomy       History of Present Illness  Robert Smith is a 61 y.o. female  presenting today for a new evaluation and treatment of hyperparathyroid disease. Referred by endocrinologist Dr. Vidal. Notes have been reviewed.per his note and chart review, patient has had fluctuations of calcium levels, at times hypercalcemic. Has worsening bone density/osteoporosis and also needs to get jaw/dental surgery. No excess intake of Ca. Elevated PTH first noted this year. Urine ca/cr ratio on .02 but [facility] felt unlikely per notes from dr. Vidal.     Sometimes gets a little raspy voice in the am  Sisters x 2 with thyroid cancer  + heartburn  Postnasal drip   Takes allegra; postnasal drip is worse with allergy flare ups    Uses flonase; sometimes bid  Drinks a lot of water     Past Medical History  Past Medical History:   Diagnosis Date    Acid reflux     Allergy     Anxiety     Degenerative disc disease     Depression     DVT of leg (deep venous thrombosis) 2009    rt leg    Fibromyalgia     Fibromyalgia     Long-term current use of steroids 11/12/2012    NSTEMI (non-ST elevated myocardial infarction) 5/18/2019    Osteoporosis, postmenopausal     chronic steroid use    RA (rheumatoid arthritis)         Past Surgical History  Past Surgical History:   Procedure Laterality Date    ANKLE FRACTURE SURGERY      BACK SURGERY      CARPAL TUNNEL RELEASE      CHOLECYSTECTOMY      HARDWARE REMOVAL      JOINT REPLACEMENT Right     right knee    KNEE SURGERY      Bilateral    KNEE SURGERY Left     revision x 2    SPINE SURGERY      TONSILLECTOMY      TUBAL LIGATION          Medications  Current Outpatient Medications on File Prior to Visit   Medication Sig Dispense Refill    albuterol sulfate 90 mcg/actuation AePB Inhale 1 puff into the lungs every 4 (four) hours as needed. Rescue 1 each 1    ascorbic acid,  vitamin C, (VITAMIN C) 1000 MG tablet Take 1,000 mg by mouth once daily.      cyanocobalamin 1,000 mcg/mL injection INJECT 1000 MCG (1ML) AS DIRECTED EVERY 28 DAYS      diazePAM (VALIUM) 5 MG tablet       dicyclomine (BENTYL) 10 MG capsule 20 mg.       ELIQUIS 5 mg Tab       fluticasone propionate (FLONASE) 50 mcg/actuation nasal spray       HYDROcodone-acetaminophen (NORCO)  mg per tablet       HYDROCORT/MIN OIL/PETROLAT,WHT (HYDROCORTISONE-MIN OIL-WHT PET) 1 % Oint       hydrOXYzine HCL (ATARAX) 25 MG tablet Take 25 mg by mouth every 6 (six) hours as needed.      ibuprofen (ADVIL,MOTRIN) 800 MG tablet TAKE 1 TABLET BY MOUTH EVERY 6-8 HOURS AS NEEDED FOR PAIN AND INFLAMMATION      levocetirizine (XYZAL) 5 MG tablet TAKE 1 TABLET DAILY AS NEEDED BY MOUTH FOR ALLERGIES      meclizine (ANTIVERT) 25 mg tablet TAKE 1 TABLET BY MOUTH 3 (THREE) TIMES DAILY AS NEEDED FOR DIZZINESS      methocarbamoL (ROBAXIN) 500 MG Tab Take 500 mg by mouth once daily.      montelukast (SINGULAIR) 10 mg tablet       multivitamin capsule Take 1 capsule by mouth once daily.      ondansetron (ZOFRAN) 8 MG tablet       pantoprazole (PROTONIX) 40 MG tablet       polyethylene glycol (GLYCOLAX) 17 gram/dose powder Take 17 g by mouth.      prednisoLONE acetate (PRED FORTE) 1 % DrpS Place 1 drop into both eyes 2 (two) times daily.      predniSONE (DELTASONE) 1 MG tablet Take 4 tablets (4 mg total) by mouth once daily. 360 tablet 1    predniSONE (DELTASONE) 1 MG tablet Take 4 tablets (4 mg total) by mouth once daily. 120 tablet 2    riTUXimab (RITUXAN) 10 mg/mL injection Inject into the vein.      SODIUM CHLORIDE FOR INHALATION (SODIUM CHLORIDE 0.9%) 0.9 % nebulizer solution       tiZANidine (ZANAFLEX) 4 MG tablet       vitamin D (VITAMIN D3) 1000 units Tab Take 1,000 Units by mouth once daily.      metoprolol succinate (TOPROL-XL) 25 MG 24 hr tablet Take 12.5 mg by mouth.      traZODone (DESYREL) 50 MG tablet Take 50 mg by mouth.       [DISCONTINUED] ADVAIR DISKUS 250-50 mcg/dose diskus inhaler Inhale 1 puff into the lungs daily as needed.       [DISCONTINUED] lisinopril (PRINIVIL,ZESTRIL) 2.5 MG tablet       [DISCONTINUED] warfarin (COUMADIN) 1 MG tablet        No current facility-administered medications on file prior to visit.       Review of Systems  Review of Systems   Constitutional: Negative.    HENT:  Positive for ear pain.    Eyes: Negative.    Cardiovascular: Negative.    Gastrointestinal:  Positive for constipation.   Skin: Negative.    Endo/Heme/Allergies:  Bruises/bleeds easily.   Psychiatric/Behavioral:  Positive for depression. The patient is nervous/anxious.       Social History   reports that she has never smoked. She has never used smokeless tobacco. She reports that she does not drink alcohol and does not use drugs.     Family History  Family History   Problem Relation Age of Onset    COPD Mother     Heart attack Father     Emphysema Maternal Grandfather     Breast cancer Neg Hx     Colon cancer Neg Hx     Ovarian cancer Neg Hx         Physical Exam   Vitals:    08/25/22 1342   BP: (!) 141/89   Pulse: 72    Body mass index is 37.28 kg/m².  Weight: 89.5 kg (197 lb 5 oz)         GENERAL: no acute distress.  HEAD: normocephalic.   EYES: lids and lashes normal. No scleral icterus  EARS: external ear without lesion, normal pinna shape and position.  External auditory canal with normal cerumen, tympanic membrane fully visible, no perforation , no retraction. No middle ear effusion. Ossicles intact.   NOSE: external nose without significant bony abnormality  ORAL CAVITY/OROPHARYNX: tongue midline and mobile. Symmetric palate rise. Uvula midline.   NECK: trachea midline.   LYMPH NODES:No cervical lymphadenopathy.  RESPIRATORY: no stridor, no stertor. Voice normal. Respirations nonlabored.  NEURO: alert, responds to questions appropriately.   Cranial nerve exam as indicated in above sections and additionally showed facial movement  symmetric with good eye closure and symmetric smile.   PSYCH:mood appropriate    PROCEDURE NOTE  NAME OF PROCEDURE: Flexible Laryngoscopy, diagnostic  INDICATIONS: gag reflex precludes mirror exam, intermittent dysphonia , postnasal drip   FINDINGS: lth, cobblestoning no pseudocsulcus, findings consistent with possible LPR; turbinate hypertrophy on right, partially resected turbinate on left      Consent: After procedure was explained in detail and all questions answered, verbal consent was obtained for performing flexible laryngoscopy.  Anesthesia: topical 4% lidocaine and neosynephrine  Procedure: With patient in seated position, the scope was inserted into the bilateral nasal passageway and advanced atraumatically into the nasopharynx to examine the following structures:    Nasopharynx: no mass or lesion noted in nasopharynx.   Oropharynx: base of tongue without  mass or ulceration. Lingual tonsils with mild symmetric hypertrophy (LTH)  Hypopharynx: posterior pharyngeal wall with cobblestoning. No pooling of secretions. Pyriform sinuses visible without mass or lesion  Larynx: epiglottis normal without lesion. False vocal folds without edema/erythema/lesion. True vocal folds mobile and without lesion. Mild interarytenoid edema no erythema . Postcricoid region with mild edema no lesion   Subglottis: visualized portion of subglottis normal in appearance    After examination performed, the scope was removed atraumatically . The patient tolerated the procedure well. Photodocumentation obtained with representative images below, all images and/or videos uploaded in media section of epic.                        Imaging:  The patient does have any pertinent and/or recent imaging of the head and neck. Possible small left inferior parathyroid adenoma    Labs:  CBC  Recent Labs   Lab 06/03/22  1058 06/06/22  0748 07/27/22  1902   WBC 6.99 5.95 4.37   Hemoglobin 13.7 13.4 13.9   Hematocrit 43.7 42.7 43.6   MCV 97 98 95    Platelets 194 162 103 L     BMP  Recent Labs   Lab 02/24/22  0949 06/03/22  1058 06/06/22  0748   Glucose 69 L 71 91   Sodium 143 142 142   Potassium 4.8 5.3 H 4.3   Chloride 104 103 105   CO2 27 30 H 30 H   BUN 22 14 15   Creatinine 0.8 0.8 0.8   Calcium 10.8 H 11.1 H 10.5   Phosphorus 2.3 L  --  2.5 L   Magnesium  --   --  2.1     COAGS      ENDOCRINE  PTH elevated 140.8 (6-6-22); 151.2 (2-24-22)  Vitamin D normal at 37 (6-6-22)  Assessment  1. Hypercalcemia  - Ambulatory referral/consult to ENT    2. Hyperparathyroidism  - Ambulatory referral/consult to ENT  - CT Neck Parathyroid (4D); Future  - CREATININE, SERUM; Future  - Vital signs; Standing  - Diet NPO; Standing  - Place sequential compression device; Standing  - Case Request Operating Room: PARATHYROIDECTOMY, left possible bilateral, recurrent laryngeal nerve monitoring and pth lab draws and frozen section  - Full code; Standing  - Place in Outpatient; Standing  - X-Ray Chest PA And Lateral; Standing  - CBC auto differential; Standing  - Basic metabolic panel; Standing  - Diet NPO  - Full code  - CBC auto differential  - Basic metabolic panel    3. Seasonal allergic rhinitis, unspecified trigger    4. Dysphonia    5. Postnasal drip    6. Laryngopharyngeal reflux (LPR)       Plan:  Discussed plan of care with patient in detail and all questions answered. Patient reported understanding of plan of care.     Hyperparathyroidism:recommend surgical management. We discussed differential diagnosis for hypercalcemia including hyperparathyroidism. We discussed purpose of surgery and expected outcomes of surgical management - typically patients have reduced and/or resolution of GERD, improved bone/joint pain, improvement in mental well being and reduction in kidney stones if these symptoms are present preoperatively. As these symptoms can have multifactorial etiology, complete resolution may not occur. Lab work and testing thus far is indicate of primary  "hyperparathyroidism due to parathyroid adenoma. Based on the available imaging,  I think the adenoma is located on the  left side in inferior location. Will check 4d parathyroid CT for further characterization.      Discussed plan for potential exploration of bilateral neck if needed. There are rare occasions where more than one gland is overactive. Intraoperative PTH will be used as well as frozen section to confirm adenoma and appropriate decrease in PTH level once adenoma is removed. We discussed that if PTH does not fall to appropriate level intraop , would need to do a 4 gland exploration to evaluate for any other potential disease pathology . Discussed risk of damage to recurrent laryngeal nerve ( in the setting of both unilateral and bilateral neck exploration including risk of airway issue with bilateral RLN weakness) . Recurrent laryngeal nerve monitoring will be used during the procedure and the patient was informed of this. Discussed risks of postoperative hypocalcemia and "hungry bone syndrome".      Discussed planned postoperative course - typically if only unilateral neck surgery, patient can go home the same day. Also discussed with the patient that in very rare circumstances, adenoma can recur and/or develop in additional parathyroid gland.    On eliquis for blood clots- significant risk ; needs vasc surgery clearance to go off of this for surgery. Discussed even with cessation, at risk for complications related to excess bleeding such as hematoma and risk for temporary and/or permanent nerve damage. Also at risk for vascular issues such as clotting when comes off of this med- will have her discuss with vasc surgery extent of this risk    The patient would like to proceed with surgery and surgery was tentatively scheduled for 9-20-22.    Dysphonia, postnasal drip, seasonal allergic rhinitis: intermittent dysphonia; on PPI, has had turb reduction surgery  will try adding saline and astelin to flonase " regimen to see if related to allergic rhinits; difficult to treat gerd can result form hyperpara but as noted above there are many things that can lead to medically refractory gerd.       Please be aware that this note has been generated with the assistance of MModal voice-to-text.  Please excuse any spelling or grammatical errors.

## 2022-08-25 NOTE — PATIENT INSTRUCTIONS
"Information and instructions from your visit with me today:    Start using the following medication nasal sprays:   Fluticasone spray:    This medication is a steroid spray. It stays within the nose and does not have absorption into the body that leads to side effects that one has with oral steroid medication. Fluticasone nasal spray is the same as the Flonase brand nasal spray. Discuss with your pharmacist if the price is lower over the counter or with a prescription ( this varies depending on insurance). The medication that is over the counter is the same as the prescription medication. Use this medication as instructed on the prescription, 1-2 sprays on each side of your nose twice daily.     Azelastine  spray:  This medication is an antihistamine used to treat nasal symptoms of allergy, which works specifically in the nose unlike antihistamine pills which have more of an effect on the whole body. Use this medication as instructed on the prescription, 1 spray on each side of your nose twice daily.     Additional instructions for medication sprays  Place the tip of the medication bottle in your nose and aim slightly up and out on each side to get medication high and deep into your nose and sinuses, and not have it all deposit in the very front of your nose. Aim the tip of the nozzle towards the outer corner of your eye . You can imagine aiming towards the back of your eyeball on each side for this, as opposed to straight back to the center of your nose and head.     You need to use this medication every day regardless of symptoms, as it takes time ( a few weeks) to work and get the benefits. It does not work on an "as needed" basis like taking a decongestant. If your symptoms only occur in a particular season, then the medication can be used seasonally instead of year long. For seasonal symptoms, you should start using the spray twice daily a month before when you normally have symptoms ( for example, if symptoms " start in August, should start at the end of June).     Start nasal irrigations with saline solution- you can either use a rinse or a mist spray:        NASAL SALINE SPRAY ( simply saline and arm and hammer are examples) There are several different brands found in the cold and flu aisle of the pharmacy. You can use any brand of saline spray - this will deliver the saline by a gentle mist ( if you have difficulty or discomfort with nasal rinse/ a lot of fluid in the nose, this will be more comfortable).       Always rinse your nose with saline prior to using medication sprays and wait a couple of hours before using again. You can use the saline throughout the day to help with stuffy nose or dry nose.    Do not use nasal decongestant sprays such as Afrin or similar products long term ( over 3 days) .  This can cause long term physical nasal addiction. Afrin should only be used if having nose bleeds, severe nasal congestion , or severe ear pain/fullness and should not be used for more than 2-3 days in a row . It is a not a medication that should be used for a long period of time.     It was nice meeting you today, and I look forward to helping you feel better soon. Please don't hesitate to call if you have any other questions or concerns, or if I can be of any assistance in the meantime.      Carly Lorenzo MD    [facility]     Phone  [phone]    Fax      [phone]        Carly Lorenzo MD  Otorhinolaryngology

## 2022-08-26 ENCOUNTER — TELEPHONE (OUTPATIENT)
Dept: OTOLARYNGOLOGY | Facility: CLINIC | Age: 62
End: 2022-08-26
Payer: MEDICARE

## 2022-08-26 ENCOUNTER — DOCUMENTATION ONLY (OUTPATIENT)
Dept: OTOLARYNGOLOGY | Facility: CLINIC | Age: 62
End: 2022-08-26
Payer: MEDICARE

## 2022-08-26 NOTE — TELEPHONE ENCOUNTER
SPOKE W ITH PATIENT IN REGARDS TO RECEIVED CLEARANCE FORM BACK FORM MELISSA GONZALES'S OFFICE. SHE STATES SHE DOES NOT GIVE CLEARANCE. ADVISE THE PATIENT TO FOLLOW UP WITH dR CHOWDHURY. SEE ATTACHED.PATIENT WILL CALL TO GET SCHEDULED.

## 2022-08-26 NOTE — TELEPHONE ENCOUNTER
Spoke with patient in regards to clearance for surgery. Patient has an appointment 9/12 with Dr Sevilla.Will refax form on 9/13.

## 2022-08-26 NOTE — TELEPHONE ENCOUNTER
----- Message from Starr Rodgers MA sent at 8/26/2022  2:55 PM CDT -----  Type: Patient Call Back    Who called: Self    What is the request in detail: pt. Is asking for someone to give her a call ..     Can the clinic reply by [facility]?no    Would the patient rather a call back or a response via [facility]? yes    Best call back number:[phone] (home)

## 2022-08-26 NOTE — PROGRESS NOTES
RECEIVED CLEARANCE FORM BACK FORM MELISSA GONZALES'S OFFICE. SHE STATES SHE DOES NOT GIVE CLEARANCE. ADVISE THE PATIENT TO FOLLOW UP WITH dR CHOWDHURY. SEE ATTACHED.

## 2022-08-30 ENCOUNTER — TELEPHONE (OUTPATIENT)
Dept: RHEUMATOLOGY | Facility: CLINIC | Age: 62
End: 2022-08-30
Payer: MEDICARE

## 2022-08-30 NOTE — TELEPHONE ENCOUNTER
----- Message from Linda Mata LPN sent at 8/29/2022  3:06 PM CDT -----  Contact: [phone]    ----- Message -----  From: Carly Jones  Sent: 8/29/2022   2:18 PM CDT  To: Michael Johnosn III Staff    Pt is calling [facility] she has surgery on 9/20. Pt has bone treatment on 9/12    Pt is due for infusion for RA at [facility] and is wondering if she should have it done before or after surgery on 9/20     [phone]

## 2022-08-30 NOTE — TELEPHONE ENCOUNTER
Parathyroidectomy on Sept 20 and bone treatment 12 of Sept.  Had COVID and had to postpone RTX and now   Also fell and fractured knee and still healing  Also mid back pain since her fall and no X-rays of l spine    I advised to postpone RTX infusion until after parathyroid surgery

## 2022-08-31 ENCOUNTER — DOCUMENTATION ONLY (OUTPATIENT)
Dept: OTOLARYNGOLOGY | Facility: CLINIC | Age: 62
End: 2022-08-31
Payer: MEDICARE

## 2022-08-31 NOTE — PROGRESS NOTES
Received clearance from Dr Sevilla's office patient maybe off of her Eliquis for 5 days for her upcoming surgery with Dr Lorenzo. See attached response.

## 2022-08-31 NOTE — PROGRESS NOTES
Fax clearance form to Dr Sevilla at [phone] office [phone]. Patient needs clearance form vascular at [place].See attached.

## 2022-09-01 ENCOUNTER — INFUSION (OUTPATIENT)
Dept: INFUSION THERAPY | Facility: HOSPITAL | Age: 62
End: 2022-09-01
Attending: HOSPITALIST
Payer: MEDICARE

## 2022-09-01 VITALS
SYSTOLIC BLOOD PRESSURE: 131 MMHG | OXYGEN SATURATION: 96 % | HEART RATE: 68 BPM | TEMPERATURE: 98 F | RESPIRATION RATE: 16 BRPM | DIASTOLIC BLOOD PRESSURE: 80 MMHG

## 2022-09-01 DIAGNOSIS — M81.0 AGE-RELATED OSTEOPOROSIS WITHOUT CURRENT PATHOLOGICAL FRACTURE: Primary | ICD-10-CM

## 2022-09-01 DIAGNOSIS — M05.79 RHEUMATOID ARTHRITIS INVOLVING MULTIPLE SITES WITH POSITIVE RHEUMATOID FACTOR: ICD-10-CM

## 2022-09-01 PROCEDURE — 96372 THER/PROPH/DIAG INJ SC/IM: CPT

## 2022-09-01 PROCEDURE — 63600175 PHARM REV CODE 636 W HCPCS: Mod: JG | Performed by: HOSPITALIST

## 2022-09-01 RX ADMIN — DENOSUMAB 60 MG: 60 INJECTION SUBCUTANEOUS at 03:09

## 2022-09-01 NOTE — PLAN OF CARE
Pt arrived to unit. Use and side effects of prolia explained to pt. Verbalized understanding. Tolerated injection and monitored, no reactions noted. Pt given next appts. Pt ambulated off unit. No distress noted.

## 2022-09-02 ENCOUNTER — HOSPITAL ENCOUNTER (OUTPATIENT)
Dept: RADIOLOGY | Facility: HOSPITAL | Age: 62
Discharge: HOME | End: 2022-09-02
Attending: OTOLARYNGOLOGY
Payer: MEDICARE

## 2022-09-02 DIAGNOSIS — E21.3 HYPERPARATHYROIDISM: ICD-10-CM

## 2022-09-02 PROCEDURE — 70492 CT SFT TSUE NCK W/O & W/DYE: CPT | Mod: TC

## 2022-09-02 PROCEDURE — 25500020 PHARM REV CODE 255: Performed by: OTOLARYNGOLOGY

## 2022-09-02 PROCEDURE — 70492 CT SFT TSUE NCK W/O & W/DYE: CPT | Mod: 26,,, | Performed by: RADIOLOGY

## 2022-09-02 PROCEDURE — 70492 CT NECK PARATHYROID (4D): ICD-10-PCS | Mod: 26,,, | Performed by: RADIOLOGY

## 2022-09-02 RX ADMIN — IOHEXOL 75 ML: 350 INJECTION, SOLUTION INTRAVENOUS at 09:09

## 2022-09-07 ENCOUNTER — HOSPITAL ENCOUNTER (EMERGENCY)
Facility: HOSPITAL | Age: 62
Discharge: HOME | End: 2022-09-07
Attending: EMERGENCY MEDICINE
Payer: MEDICARE

## 2022-09-07 ENCOUNTER — TELEPHONE (OUTPATIENT)
Dept: RHEUMATOLOGY | Facility: CLINIC | Age: 62
End: 2022-09-07
Payer: MEDICARE

## 2022-09-07 VITALS
BODY MASS INDEX: 36.82 KG/M2 | TEMPERATURE: 98 F | RESPIRATION RATE: 18 BRPM | DIASTOLIC BLOOD PRESSURE: 76 MMHG | HEART RATE: 63 BPM | OXYGEN SATURATION: 97 % | HEIGHT: 61 IN | WEIGHT: 195 LBS | SYSTOLIC BLOOD PRESSURE: 137 MMHG

## 2022-09-07 DIAGNOSIS — N30.00 ACUTE CYSTITIS WITHOUT HEMATURIA: ICD-10-CM

## 2022-09-07 DIAGNOSIS — T88.7XXA NON-DOSE-RELATED ADVERSE REACTION TO MEDICATION, INITIAL ENCOUNTER: Primary | ICD-10-CM

## 2022-09-07 LAB
ALBUMIN SERPL-MCNC: 3.7 G/DL (ref 3.3–5.5)
ALP SERPL-CCNC: 99 U/L (ref 42–141)
BILIRUB SERPL-MCNC: 0.7 MG/DL (ref 0.2–1.6)
BILIRUBIN, POC UA: NEGATIVE
BLOOD, POC UA: ABNORMAL
BUN SERPL-MCNC: 13 MG/DL (ref 7–22)
CALCIUM SERPL-MCNC: 11.5 MG/DL (ref 8–10.3)
CHLORIDE SERPL-SCNC: 106 MMOL/L (ref 98–108)
CLARITY, POC UA: CLEAR
COLOR, POC UA: YELLOW
CREAT SERPL-MCNC: 0.8 MG/DL (ref 0.6–1.2)
GLUCOSE SERPL-MCNC: 98 MG/DL (ref 73–118)
GLUCOSE, POC UA: NEGATIVE
HCT, POC: NORMAL
HGB, POC: NORMAL (ref 14–18)
KETONES, POC UA: NEGATIVE
LEUKOCYTE EST, POC UA: ABNORMAL
MCH, POC: NORMAL
MCHC, POC: NORMAL
MCV, POC: NORMAL
MPV, POC: NORMAL
NITRITE, POC UA: NEGATIVE
PH UR STRIP: 6 [PH]
POC ALT (SGPT): 16 U/L (ref 10–47)
POC AST (SGOT): 25 U/L (ref 11–38)
POC PLATELET COUNT: NORMAL
POC TCO2: 31 MMOL/L (ref 18–33)
POTASSIUM BLD-SCNC: 5.2 MMOL/L (ref 3.6–5.1)
PROTEIN, POC UA: NEGATIVE
PROTEIN, POC: 7.1 G/DL (ref 6.4–8.1)
RBC, POC: NORMAL
RDW, POC: NORMAL
SODIUM BLD-SCNC: 144 MMOL/L (ref 128–145)
SPECIFIC GRAVITY, POC UA: 1.02
UROBILINOGEN, POC UA: 0.2 E.U./DL
WBC, POC: NORMAL

## 2022-09-07 PROCEDURE — 85025 COMPLETE CBC W/AUTO DIFF WBC: CPT | Mod: ER

## 2022-09-07 PROCEDURE — 81003 URINALYSIS AUTO W/O SCOPE: CPT | Mod: ER

## 2022-09-07 PROCEDURE — 63600175 PHARM REV CODE 636 W HCPCS: Mod: ER | Performed by: EMERGENCY MEDICINE

## 2022-09-07 PROCEDURE — 99284 EMERGENCY DEPT VISIT MOD MDM: CPT | Mod: 25,ER

## 2022-09-07 PROCEDURE — 96374 THER/PROPH/DIAG INJ IV PUSH: CPT | Mod: ER

## 2022-09-07 PROCEDURE — 96375 TX/PRO/DX INJ NEW DRUG ADDON: CPT | Mod: ER

## 2022-09-07 PROCEDURE — 80053 COMPREHEN METABOLIC PANEL: CPT | Mod: ER

## 2022-09-07 RX ORDER — CEPHALEXIN 500 MG/1
500 CAPSULE ORAL 4 TIMES DAILY
Qty: 20 CAPSULE | Refills: 0 | Status: SHIPPED | OUTPATIENT
Start: 2022-09-07 | End: 2022-09-12

## 2022-09-07 RX ORDER — HYDROMORPHONE HYDROCHLORIDE 2 MG/ML
1 INJECTION, SOLUTION INTRAMUSCULAR; INTRAVENOUS; SUBCUTANEOUS
Status: COMPLETED | OUTPATIENT
Start: 2022-09-07 | End: 2022-09-07

## 2022-09-07 RX ORDER — KETOROLAC TROMETHAMINE 30 MG/ML
10 INJECTION, SOLUTION INTRAMUSCULAR; INTRAVENOUS
Status: COMPLETED | OUTPATIENT
Start: 2022-09-07 | End: 2022-09-07

## 2022-09-07 RX ORDER — ONDANSETRON 2 MG/ML
8 INJECTION INTRAMUSCULAR; INTRAVENOUS
Status: COMPLETED | OUTPATIENT
Start: 2022-09-07 | End: 2022-09-07

## 2022-09-07 RX ADMIN — KETOROLAC TROMETHAMINE 10 MG: 30 INJECTION, SOLUTION INTRAMUSCULAR; INTRAVENOUS at 06:09

## 2022-09-07 RX ADMIN — ONDANSETRON 8 MG: 2 INJECTION INTRAMUSCULAR; INTRAVENOUS at 06:09

## 2022-09-07 RX ADMIN — HYDROMORPHONE HYDROCHLORIDE 1 MG: 2 INJECTION, SOLUTION INTRAMUSCULAR; INTRAVENOUS; SUBCUTANEOUS at 06:09

## 2022-09-07 NOTE — FIRST PROVIDER EVALUATION
Emergency Department TeleTriage Encounter Note      CHIEF COMPLAINT    Chief Complaint   Patient presents with    Pain     Pt has pain through out her entire body. She states that the left side hurts worse. She has a hx of RA       VITAL SIGNS   Initial Vitals [09/07/22 1749]   BP Pulse Resp Temp SpO2   (!) 170/88 66 18 98.1 °F (36.7 °C) 98 %      MAP       --            ALLERGIES    Review of patient's allergies indicates:   Allergen Reactions    Sulfasalazine      HEPATITIS    Sulfa (sulfonamide antibiotics) Nausea And Vomiting    Clindamycin Diarrhea    Daypro [oxaprozin] Nausea Only    Orencia  [abatacept]      Other reaction(s): Muscle pain    Percocet  [oxycodone-acetaminophen]      Other reaction(s): Vomiting    Pregabalin     Simponi  [golimumab]      Other reaction(s): Unknown    Cimzia [certolizumab pegol] Rash    Ciprofloxacin Rash    Sulfamethoxazole-trimethoprim Rash       PROVIDER TRIAGE NOTE  This is a teletriage evaluation of a 61 y.o. female presenting to the ED complaining of body aches. Patient reports generalized pain since then. She also reports nausea. Patient has been taking pain medications without relief.     Initial orders will be placed and care will be transferred to an alternate provider when patient is roomed for a full evaluation. Any additional orders and the final disposition will be determined by that provider.         ORDERS  Labs Reviewed   POCT CBC   POCT URINALYSIS W/O SCOPE   POCT CMP       ED Orders (720h ago, onward)      Start Ordered     Status Ordering Provider    09/07/22 1802 09/07/22 1801  POCT CBC  Once         Ordered CANDY CAPPS.    09/07/22 1802 09/07/22 1801  POCT CMP  Once         Ordered GÉNESISCANDY G.    09/07/22 1802 09/07/22 1801  Insert Saline lock IV  Once         Ordered GÉNESIS, CANDY G.    09/07/22 1802 09/07/22 1801  POCT URINALYSIS W/O SCOPE  Once         Ordered CANDY CAPPS.              Virtual Visit Note: The provider triage portion of this  emergency department evaluation and documentation was performed via [employer], a HIPAA-compliant telemedicine application, in concert with a tele-presenter in the room. A face to face patient evaluation with one of my colleagues will occur once the patient is placed in an emergency department room.      DISCLAIMER: This note was prepared with [employer] voice recognition transcription software. Garbled syntax, mangled pronouns, and other bizarre constructions may be attributed to that software system.

## 2022-09-07 NOTE — TELEPHONE ENCOUNTER
----- Message from Carley De La Rosa sent at 9/7/2022  9:58 AM CDT -----  Type: Patient Call Back    Who called: self     What is the request in detail: patient would like to have Dr Vidal contact her pain management doctor to  let they know that she receiving Prolia treatment. Please call    Can the clinic reply by [facility]? no    Would the patient rather a call back or a response via [facility]? call    Best call back number: .[phone] (home)

## 2022-09-07 NOTE — ED PROVIDER NOTES
Encounter Date: 9/7/2022    SCRIBE #1 NOTE: I, Nahed Duque, am scribing for, and in the presence of,  Cezar Dior MD. I have scribed the following portions of the note - Other sections scribed: HPI, ROS, PE.     History     Chief Complaint   Patient presents with    Pain     Pt has pain through out her entire body. She states that the left side hurts worse. She has a hx of RA     61 y.o. female with Rheumatoid arthritis, Fibromyalgia, and others who presents to the ED with chief complaint of nausea and myalgias since receiving new injection 5 days ago. Patient endorses receiving Prolia injection in her left arm on 9/2. On 9/3 she began experiencing symptoms unalleviated by her usual pain medication (hydrocodone). She localizes most pain to left arm and back. She also has applied an OTC pain patch with no alleviation of symptoms. Patient additionally endorses dysuria and suprapubic pressure. Denies vomiting, fever, sore throat, rhinorrhea, abdominal pain, or redness/swelling at the injection site.     The history is provided by the patient. No  was used.   Review of patient's allergies indicates:   Allergen Reactions    Sulfasalazine      HEPATITIS    Sulfa (sulfonamide antibiotics) Nausea And Vomiting    Clindamycin Diarrhea    Daypro [oxaprozin] Nausea Only    Orencia  [abatacept]      Other reaction(s): Muscle pain    Percocet  [oxycodone-acetaminophen]      Other reaction(s): Vomiting    Pregabalin     Simponi  [golimumab]      Other reaction(s): Unknown    Cimzia [certolizumab pegol] Rash    Ciprofloxacin Rash    Sulfamethoxazole-trimethoprim Rash     Past Medical History:   Diagnosis Date    Acid reflux     Allergy     Anxiety     Degenerative disc disease     Depression     DVT of leg (deep venous thrombosis) 2009    rt leg    Fibromyalgia     Fibromyalgia     Long-term current use of steroids 11/12/2012    NSTEMI (non-ST elevated myocardial infarction) 5/18/2019    Osteoporosis,  postmenopausal     chronic steroid use    RA (rheumatoid arthritis)      Past Surgical History:   Procedure Laterality Date    ANKLE FRACTURE SURGERY      BACK SURGERY      CARPAL TUNNEL RELEASE      CHOLECYSTECTOMY      HARDWARE REMOVAL      JOINT REPLACEMENT Right     right knee    KNEE SURGERY      Bilateral    KNEE SURGERY Left     revision x 2    SPINE SURGERY      TONSILLECTOMY      TUBAL LIGATION       Family History   Problem Relation Age of Onset    COPD Mother     Heart attack Father     Emphysema Maternal Grandfather     Breast cancer Neg Hx     Colon cancer Neg Hx     Ovarian cancer Neg Hx      Social History     Tobacco Use    Smoking status: Never    Smokeless tobacco: Never   Substance Use Topics    Alcohol use: No     Alcohol/week: 0.0 standard drinks    Drug use: No     Review of Systems   Constitutional:  Negative for fever.   HENT:  Negative for rhinorrhea and sore throat.    Eyes:  Negative for visual disturbance.   Respiratory:  Negative for shortness of breath.    Cardiovascular:  Negative for chest pain.   Gastrointestinal:  Positive for abdominal pain (suprapubic pressure) and nausea. Negative for vomiting.   Genitourinary:  Positive for dysuria. Negative for difficulty urinating.   Musculoskeletal:  Positive for myalgias. Negative for back pain.   Skin:  Negative for color change and rash.        (-) Swelling.   Neurological:  Negative for headaches.     Physical Exam     Initial Vitals [09/07/22 1749]   BP Pulse Resp Temp SpO2   (!) 170/88 66 18 98.1 °F (36.7 °C) 98 %      MAP       --         Physical Exam    Constitutional: She appears well-developed and well-nourished.   HENT:   Head: Normocephalic and atraumatic.   Eyes: EOM are normal. Pupils are equal, round, and reactive to light.   Neck: Neck supple. No thyromegaly present. No JVD present.   Normal range of motion.  Cardiovascular:  Normal rate and regular rhythm.     Exam reveals no gallop and no friction rub.       No murmur  heard.  Musculoskeletal:         General: Edema present.      Cervical back: Normal range of motion and neck supple.      Comments: Trace edema to ankles. Rheumatoid arthritic changes to hands.      Neurological: She is alert and oriented to person, place, and time. She has normal strength. GCS eye subscore is 4. GCS verbal subscore is 5. GCS motor subscore is 6.   Skin: Skin is warm and dry. Capillary refill takes less than 2 seconds.       ED Course   Procedures  Labs Reviewed   POCT URINALYSIS W/O SCOPE - Abnormal; Notable for the following components:       Result Value    Blood, UA Trace-intact (*)     Leukocytes, UA 1+ (*)     All other components within normal limits   POCT CMP - Abnormal; Notable for the following components:    Calcium, POC 11.5 (*)     POC Potassium 5.2 (*)     All other components within normal limits   POCT CBC   POCT URINALYSIS W/O SCOPE   POCT CMP          Imaging Results    None          Medications   HYDROmorphone (PF) injection 1 mg (1 mg Intravenous Given 9/7/22 1859)   ketorolac injection 9.999 mg (9.999 mg Intravenous Given 9/7/22 1858)   ondansetron injection 8 mg (8 mg Intravenous Given 9/7/22 1858)     Medical Decision Making:   History:   Old Medical Records: I decided to obtain old medical records.  Clinical Tests:   Lab Tests: Ordered and Reviewed        Scribe Attestation:   Scribe #1: I performed the above scribed service and the documentation accurately describes the services I performed. I attest to the accuracy of the note.             I, Cezar Dior, personally performed the services described in this documentation. All medical record entries made by the scribe were at my direction and in my presence.  I have reviewed the chart and agree that the record reflects my personal performance and is accurate and complete    Clinical Impression:   Final diagnoses:  [T88.7XXA] Non-dose-related adverse reaction to medication, initial encounter (Primary)  [N30.00] Acute  cystitis without hematuria      ED Disposition Condition    Discharge Stable          ED Prescriptions       Medication Sig Dispense Start Date End Date Auth. Provider    cephALEXin (KEFLEX) 500 MG capsule Take 1 capsule (500 mg total) by mouth 4 (four) times daily. for 5 days 20 capsule 9/7/2022 9/12/2022 Davis Barrett MD          Follow-up Information       Follow up With Specialties Details Why Contact Info    Tani Chan MD Family Medicine Schedule an appointment as soon as possible for a visit in 3 days  [address] [facility] [id number] [phone]               Davis Barrett MD  09/08/22 8228

## 2022-09-08 ENCOUNTER — TELEPHONE (OUTPATIENT)
Dept: ENDOCRINOLOGY | Facility: CLINIC | Age: 62
End: 2022-09-08
Payer: MEDICARE

## 2022-09-08 NOTE — TELEPHONE ENCOUNTER
Stated she got a phone call from the office. Didn't see anything in the chart of a call, pt stated it must've been a reminder since she comes on 9/14 for her appt

## 2022-09-08 NOTE — TELEPHONE ENCOUNTER
----- Message from Carley De La Rosa sent at 9/8/2022  9:37 AM CDT -----  Type:  Patient Returning Call    Who Called: self    Who Left Message for Patient: unknown    Does the patient know what this is regarding?:no    Would the patient rather a call back or a response via [facility]? call    Best Call Back Number:.[phone] (home)

## 2022-09-12 ENCOUNTER — HOSPITAL ENCOUNTER (OUTPATIENT)
Dept: PREADMISSION TESTING | Facility: HOSPITAL | Age: 62
Discharge: HOME | End: 2022-09-12
Attending: OTOLARYNGOLOGY
Payer: MEDICARE

## 2022-09-12 VITALS
HEIGHT: 61 IN | DIASTOLIC BLOOD PRESSURE: 90 MMHG | SYSTOLIC BLOOD PRESSURE: 157 MMHG | BODY MASS INDEX: 37.66 KG/M2 | WEIGHT: 199.5 LBS | HEART RATE: 69 BPM | TEMPERATURE: 97 F | RESPIRATION RATE: 18 BRPM | OXYGEN SATURATION: 99 %

## 2022-09-12 DIAGNOSIS — Z01.818 PREOPERATIVE TESTING: Primary | ICD-10-CM

## 2022-09-12 NOTE — DISCHARGE INSTRUCTIONS
Before 7 AM, enter through the Emergency Entrance..   After 7 AM enter through the Main Entrance.      Your procedure  is scheduled for _9/20/2022_________.    Call [phone] between 2pm and 5pm on __9/19/2022____to find out your arrival time for the day of surgery.    You may use the main entrance to the hospital on the [facility] side, or the entrance that is next to the [facility].    You may have two visitors.  Visiting hours for non-COVID-19 patients expanded to 24/7 (still restricted to one visitor)  Youth visitation changed from age 18 to age 12.      You will be going to the Same Day Surgery Unit on the 2nd floor of the hospital.    Important instructions:  Do not eat anything after midnight.  You may have plain water, non carbonated.  You may also have Gatorade or Powerade after midnight.    Stop all fluids 2 hours before your surgery.    It is okay to brush your teeth.  Do not have gum, candy or mints.    SEE MEDICATION SHEET.   TAKE MEDICATIONS AS DIRECTED WITH SIPS OF WATER.    STOP taking Aspirin, Ibuprofen,  Advil, Motrin, Mobic(meloxicam), Aleve (naproxen), Fish oil, and Vitamin E for at least 7 days before your surgery.     You may take Tylenol if needed which is not a blood thinner.    Please shower the night before and the morning of your surgery.      Use Hibiclens soap as instructed by your pre op nurse.   Please place clean linens on your bed the night before surgery. Please wear fresh clean clothing after each shower.    No shaving of procedural area at least 4-5 days before surgery due to increased risk of skin irritation and/or possible infection.    Contact lenses and removable denture work may not be worn during your procedure.    You may wear deodorant only. If you are having breast surgery, do not wear deodorant on the operative side.    Do not wear powder, body lotion, perfume/cologne or make-up.    Do not wear any jewelry or have any metal on your body.    You will be  asked to remove any dentures or partials for the procedure.    If you are going home on the same day of surgery, you must arrange for a family member or a friend to drive you home.  Public transportation is prohibited.  You will not be able to drive home if you were given anesthesia or sedation.    Patients who want to have their Post-op prescriptions filled from our in-house [facility], bring a Credit/Debit Card or cash with you. A co-pay may be required.  The pharmacy closes at 5:30 pm.      Wear loose fitting clothes allowing for bandages.    Please leave money and valuables home.      You may bring your cell phone.    Call the doctor if fever or illness should occur before your surgery.    Call [phone] to contact us here if needed.

## 2022-09-12 NOTE — ANESTHESIA PREPROCEDURE EVALUATION
09/12/2022  Robert Smith is a 61 y.o., female   To undergo Procedure(s) (LRB):  PARATHYROIDECTOMY, left possible bilateral, recurrent laryngeal nerve monitoring and pth lab draws and frozen section (Bilateral)     Denies CP/SOB/CVA/URI symptoms.  Endorses GERD with certain foods.  Endorses MI in past.  METS > 4  NPO > 8    Past Medical History:  Past Medical History:   Diagnosis Date    Acid reflux     Allergy     Anxiety     Degenerative disc disease     Depression     DVT of leg (deep venous thrombosis) 2009    rt leg    Fibromyalgia     Fibromyalgia     Long-term current use of steroids 11/12/2012    NSTEMI (non-ST elevated myocardial infarction) 5/18/2019    Osteoporosis, postmenopausal     chronic steroid use    RA (rheumatoid arthritis)        Past Surgical History:  Past Surgical History:   Procedure Laterality Date    ANKLE FRACTURE SURGERY      BACK SURGERY      CARPAL TUNNEL RELEASE      CHOLECYSTECTOMY      HARDWARE REMOVAL      JOINT REPLACEMENT Right     right knee    KNEE SURGERY      Bilateral    KNEE SURGERY Left     revision x 2    SPINE SURGERY      TONSILLECTOMY      TUBAL LIGATION         Social History:  Social History     Socioeconomic History    Marital status:    Tobacco Use    Smoking status: Never    Smokeless tobacco: Never   Substance and Sexual Activity    Alcohol use: No     Alcohol/week: 0.0 standard drinks    Drug use: No    Sexual activity: Never     Partners: Male       Medications:  No current facility-administered medications on file prior to encounter.     Current Outpatient Medications on File Prior to Encounter   Medication Sig Dispense Refill    albuterol sulfate 90 mcg/actuation AePB Inhale 1 puff into the lungs every 4 (four) hours as needed. Rescue 1 each 1    ascorbic acid, vitamin C, (VITAMIN C) 1000 MG tablet Take 1,000 mg by  mouth once daily.      azelastine (ASTELIN) 137 mcg (0.1 %) nasal spray 1 spray (137 mcg total) by Nasal route 2 (two) times daily. 30 mL 3    cyanocobalamin 1,000 mcg/mL injection INJECT 1000 MCG (1ML) AS DIRECTED EVERY 28 DAYS      dicyclomine (BENTYL) 10 MG capsule Take 20 mg by mouth daily as needed.      ELIQUIS 5 mg Tab Take 5 mg by mouth 2 (two) times daily.      fluticasone propionate (FLONASE) 50 mcg/actuation nasal spray 1 spray by Each Nostril route once daily.      HYDROcodone-acetaminophen (NORCO)  mg per tablet Take 1 tablet by mouth every 8 (eight) hours as needed.      hydrOXYzine HCL (ATARAX) 25 MG tablet Take 25 mg by mouth every 6 (six) hours as needed.      meclizine (ANTIVERT) 25 mg tablet TAKE 1 TABLET BY MOUTH 3 (THREE) TIMES DAILY AS NEEDED FOR DIZZINESS      methocarbamoL (ROBAXIN) 500 MG Tab Take 500 mg by mouth once daily.      metoprolol succinate (TOPROL-XL) 25 MG 24 hr tablet Take 12.5 mg by mouth.      montelukast (SINGULAIR) 10 mg tablet       multivitamin capsule Take 1 capsule by mouth once daily.      ondansetron (ZOFRAN) 8 MG tablet       pantoprazole (PROTONIX) 40 MG tablet       polyethylene glycol (GLYCOLAX) 17 gram/dose powder Take 17 g by mouth.      prednisoLONE acetate (PRED FORTE) 1 % DrpS Place 1 drop into both eyes 2 (two) times daily.      predniSONE (DELTASONE) 1 MG tablet Take 4 tablets (4 mg total) by mouth once daily. 360 tablet 1    predniSONE (DELTASONE) 1 MG tablet Take 4 tablets (4 mg total) by mouth once daily. 120 tablet 2    riTUXimab (RITUXAN) 10 mg/mL injection Inject into the vein.      SODIUM CHLORIDE FOR INHALATION (SODIUM CHLORIDE 0.9%) 0.9 % nebulizer solution       traZODone (DESYREL) 50 MG tablet Take 50 mg by mouth.      vitamin D (VITAMIN D3) 1000 units Tab Take 1,000 Units by mouth once daily.      [DISCONTINUED] ADVAIR DISKUS 250-50 mcg/dose diskus inhaler Inhale 1 puff into the lungs daily as needed.        [DISCONTINUED] lisinopril (PRINIVIL,ZESTRIL) 2.5 MG tablet       [DISCONTINUED] warfarin (COUMADIN) 1 MG tablet          Allergies:  Review of patient's allergies indicates:   Allergen Reactions    Sulfasalazine      HEPATITIS    Sulfa (sulfonamide antibiotics) Nausea And Vomiting    Clindamycin Diarrhea    Daypro [oxaprozin] Nausea Only    Orencia  [abatacept]      Other reaction(s): Muscle pain    Percocet  [oxycodone-acetaminophen]      Other reaction(s): Vomiting    Pregabalin     Simponi  [golimumab]      Other reaction(s): Unknown    Cimzia [certolizumab pegol] Rash    Ciprofloxacin Rash    Sulfamethoxazole-trimethoprim Rash       Active Problems:  Patient Active Problem List   Diagnosis    Infected prosthetic knee joint    Rheumatoid arthritis with positive rheumatoid factor    Fibula fracture    High risk medication use    Prosthetic joint infection    s/p LEFT knee resection arthroplasty    Low back pain    Fibromyalgia    Other idiopathic scoliosis, thoracic region    Boutonniere deformity    Deep vein thrombosis (DVT) of proximal vein of both lower extremities    Gastroesophageal reflux disease    Hyperlipidemia    NSTEMI (non-ST elevated myocardial infarction)    Closed nondisplaced fracture of middle phalanx of left ring finger with routine healing    LONNIE on CPAP    Severe obesity (BMI 35.0-39.9) with comorbidity    Asthma    Deep venous thrombosis    Difficulty walking    Left nephrolithiasis    Hypercalcemia    Osteoporosis    Hyperparathyroidism    COVID       Diagnostic Studies:   Latest Reference Range & Units 07/27/22 19:02   WBC 3.90 - 12.70 K/uL 4.37   RBC 4.00 - 5.40 M/uL 4.61   Hemoglobin 12.0 - 16.0 g/dL 13.9   Hematocrit 37.0 - 48.5 % 43.6   MCV 82 - 98 fL 95   MCH 27.0 - 31.0 pg 30.2   MCHC 32.0 - 36.0 g/dL 31.9 (L)   RDW 11.5 - 14.5 % 12.2   Platelets 150 - 450 K/uL 103 (L)   MPV 9.2 - 12.9 fL 10.9   Gran % 38.0 - 73.0 % 78.0 (H)   Lymph % 18.0 - 48.0 %  8.9 (L)   Mono % 4.0 - 15.0 % 11.9   Eosinophil % 0.0 - 8.0 % 0.5   Basophil % 0.0 - 1.9 % 0.5   Immature Granulocytes 0.0 - 0.5 % 0.2   Gran # (ANC) 1.8 - 7.7 K/uL 3.4   Lymph # 1.0 - 4.8 K/uL 0.4 (L)   Mono # 0.3 - 1.0 K/uL 0.5   Eos # 0.0 - 0.5 K/uL 0.0   Baso # 0.00 - 0.20 K/uL 0.02   Immature Grans (Abs) 0.00 - 0.04 K/uL 0.01   nRBC 0 /100 WBC 0   Differential Method  Automated      Latest Reference Range & Units 09/07/22 08:15   Sodium 136 - 145 mmol/L 141   Potassium 3.5 - 5.1 mmol/L 5.0   Chloride 95 - 110 mmol/L 107   CO2 23 - 29 mmol/L 29   Anion Gap 8 - 16 mmol/L 5 (L)   BUN 8 - 23 mg/dL 15   Creatinine 0.5 - 1.4 mg/dL 0.7   eGFR >60 mL/min/1.73 m^2 >60.0   Glucose 70 - 110 mg/dL 80   Calcium 8.7 - 10.5 mg/dL 10.4     EKG (7/27/22):  Normal sinus rhythm   Right bundle branch block   Minimal voltage criteria for LVH, may be normal variant ( R in aVL )    24 Hour Vitals:      See Nursing Charting For Additional Vitals    Pre-op Assessment    I have reviewed the Patient Summary Reports.     I have reviewed the Nursing Notes. I have reviewed the NPO Status.   I have reviewed the Medications.     Review of Systems  Anesthesia Hx:  No problems with previous Anesthesia  Denies Family Hx of Anesthesia complications.   Denies Personal Hx of Anesthesia complications.   Social:  Non-Smoker, No Alcohol Use    Hematology/Oncology:     Oncology Normal     EENT/Dental:EENT/Dental Normal   Cardiovascular:   Exercise tolerance: good Past MI  hyperlipidemia ECG has been reviewed. Eliquis   Pulmonary:   Asthma Sleep Apnea, CPAP    Renal/:   renal calculi    Hepatic/GI:   GERD, well controlled    Musculoskeletal:   Arthritis  Fibromyalgia    Rheumatoid arthritis   Neurological:   Neuromuscular Disease,    Endocrine:  Endocrine Normal  Obesity / BMI > 30  Psych:   anxiety depression          Physical Exam  General: Well nourished    Airway:  Mallampati: III   Mouth Opening: Normal  TM Distance:  "Normal      Dental:  Intact    Chest/Lungs:  Clear to auscultation, Normal Respiratory Rate    Heart:  Rate: Normal  Rhythm: Regular Rhythm        Anesthesia Plan  Type of Anesthesia, risks & benefits discussed:    Anesthesia Type: Gen ETT  Intra-op Monitoring Plan: Standard ASA Monitors and Art Line  Post Op Pain Control Plan: multimodal analgesia and IV/PO Opioids PRN  Induction:  IV  Airway Plan: Direct and Video, Post-Induction  Informed Consent: Informed consent signed with the Patient and all parties understand the risks and agree with anesthesia plan.  All questions answered. Patient consented to blood products? Yes  ASA Score: 3  Anesthesia Plan Notes: PMHx significant for Anxiety, asthma, DVT, Fibromyalgia, HTN, MI, LONNIE on CPAP, RA  Followed by [facility] "cleared at mild risk given the fact that she has had this atypical chest pain for a long time and we have been worked up with a heart cath she had normal coronary anatomy August of 2019." (media)    GA with OETT  Standard ASA monitors, A-Line  Recovery in PACU  PONV: 3      Ready For Surgery From Anesthesia Perspective.     .      "

## 2022-09-14 ENCOUNTER — TELEPHONE (OUTPATIENT)
Dept: OTOLARYNGOLOGY | Facility: CLINIC | Age: 62
End: 2022-09-14
Payer: MEDICARE

## 2022-09-14 NOTE — TELEPHONE ENCOUNTER
Spoke with patient in regards to stopping her Eliquis. Received clearance from Dr Sevilla's office for patient to be able to hold Eliquis for  days. Patient is advised to hold medication starting 9/15/2022.

## 2022-09-14 NOTE — TELEPHONE ENCOUNTER
----- Message from Starr Rodgers MA sent at 9/14/2022  8:31 AM CDT -----  Type: Patient Call Back    Who called:Self    What is the request in detail: pt. Is asking for a nurse to call in regards to what she needs for her surgery..     Can the clinic reply by [facility]?No    Would the patient rather a call back or a response via [facility]? Yes    Best call back number: [phone] (home)

## 2022-09-15 ENCOUNTER — TELEPHONE (OUTPATIENT)
Dept: RHEUMATOLOGY | Facility: CLINIC | Age: 62
End: 2022-09-15
Payer: MEDICARE

## 2022-09-15 RX ORDER — TIZANIDINE 2 MG/1
2 TABLET ORAL NIGHTLY PRN
COMMUNITY
Start: 2022-09-08

## 2022-09-15 NOTE — TELEPHONE ENCOUNTER
In a lot of pain  Went to [facility] who gave hydrocodone 10 and she says it is not helping  She has parathyroidectomy scheduled next week  She is tapering prednisone to discontinue in Oct  She will get RTX 2 weeks after surgery if no wound healing issues    I told her I do not have anything else to offer, but that correction of hypercalcemia may help bone pain and eventual RTX for RA should help RA pain

## 2022-09-15 NOTE — TELEPHONE ENCOUNTER
----- Message from Macrina Mata RN sent at 9/14/2022 12:05 PM CDT -----  Regarding: FW: Infusion scheduling  Contact: [phone]    ----- Message -----  From: Yaquelin Harris  Sent: 9/14/2022   8:15 AM CDT  To: [facility]ODRIGUE[facility] Staff  Subject: Infusion scheduling                              Calling in regards to having infusion scheduled prior to surgery on 9/20. Patient requesting pain medication to be called in to pharmacy for extreme pain. Please call and discuss.    [id number] [place] - [address] AT [facility] & [place] [id number] [place] [address]  Phone: [phone] Fax: [phone]

## 2022-09-20 ENCOUNTER — HOSPITAL ENCOUNTER (OUTPATIENT)
Facility: HOSPITAL | Age: 62
Discharge: HOME | End: 2022-09-20
Attending: OTOLARYNGOLOGY | Admitting: OTOLARYNGOLOGY
Payer: MEDICARE

## 2022-09-20 ENCOUNTER — ANESTHESIA (OUTPATIENT)
Dept: SURGERY | Facility: HOSPITAL | Age: 62
End: 2022-09-20
Payer: MEDICARE

## 2022-09-20 DIAGNOSIS — E83.52 HYPERCALCEMIA: ICD-10-CM

## 2022-09-20 DIAGNOSIS — D35.1 PARATHYROID ADENOMA: Primary | ICD-10-CM

## 2022-09-20 DIAGNOSIS — E21.3 HYPERPARATHYROIDISM: ICD-10-CM

## 2022-09-20 LAB
PTH-INTACT SERPL-MCNC: 16.3 PG/ML (ref 9–77)
PTH-INTACT SERPL-MCNC: 174.4 PG/ML (ref 9–77)
PTH-INTACT SERPL-MCNC: 257.2 PG/ML (ref 9–77)
PTH-INTACT SERPL-MCNC: 34.7 PG/ML (ref 9–77)

## 2022-09-20 PROCEDURE — 83970 ASSAY OF PARATHORMONE: CPT | Performed by: OTOLARYNGOLOGY

## 2022-09-20 PROCEDURE — 71000015 HC POSTOP RECOV 1ST HR: Performed by: OTOLARYNGOLOGY

## 2022-09-20 PROCEDURE — 36415 COLL VENOUS BLD VENIPUNCTURE: CPT | Performed by: OTOLARYNGOLOGY

## 2022-09-20 PROCEDURE — 36500 INSERTION OF CATHETER VEIN: CPT | Mod: 51,,, | Performed by: OTOLARYNGOLOGY

## 2022-09-20 PROCEDURE — D9220A PRA ANESTHESIA: ICD-10-PCS | Mod: CRNA,,, | Performed by: NURSE ANESTHETIST, CERTIFIED REGISTERED

## 2022-09-20 PROCEDURE — 36620 INSERTION CATHETER ARTERY: CPT | Performed by: ANESTHESIOLOGY

## 2022-09-20 PROCEDURE — 37000008 HC ANESTHESIA 1ST 15 MINUTES: Performed by: OTOLARYNGOLOGY

## 2022-09-20 PROCEDURE — 88305 TISSUE EXAM BY PATHOLOGIST: CPT | Performed by: PATHOLOGY

## 2022-09-20 PROCEDURE — 25000003 PHARM REV CODE 250: Performed by: NURSE ANESTHETIST, CERTIFIED REGISTERED

## 2022-09-20 PROCEDURE — D9220A PRA ANESTHESIA: Mod: ANES,,, | Performed by: ANESTHESIOLOGY

## 2022-09-20 PROCEDURE — 37000009 HC ANESTHESIA EA ADD 15 MINS: Performed by: OTOLARYNGOLOGY

## 2022-09-20 PROCEDURE — 76937 US GUIDE VASCULAR ACCESS: CPT | Mod: 26,,, | Performed by: ANESTHESIOLOGY

## 2022-09-20 PROCEDURE — 60500 EXPLORE PARATHYROID GLANDS: CPT | Mod: ,,, | Performed by: OTOLARYNGOLOGY

## 2022-09-20 PROCEDURE — 63600175 PHARM REV CODE 636 W HCPCS: Performed by: ANESTHESIOLOGY

## 2022-09-20 PROCEDURE — C1751 CATH, INF, PER/CENT/MIDLINE: HCPCS | Performed by: ANESTHESIOLOGY

## 2022-09-20 PROCEDURE — 71000016 HC POSTOP RECOV ADDL HR: Performed by: OTOLARYNGOLOGY

## 2022-09-20 PROCEDURE — 88331 PATH CONSLTJ SURG 1 BLK 1SPC: CPT | Mod: 26,,, | Performed by: PATHOLOGY

## 2022-09-20 PROCEDURE — 36500 PR VENOUS SAMPLING BY CATHETER, W/ORGAN BLOOD SAMPLE: ICD-10-PCS | Mod: 51,,, | Performed by: OTOLARYNGOLOGY

## 2022-09-20 PROCEDURE — 71000039 HC RECOVERY, EACH ADD'L HOUR: Performed by: OTOLARYNGOLOGY

## 2022-09-20 PROCEDURE — 71000033 HC RECOVERY, INTIAL HOUR: Performed by: OTOLARYNGOLOGY

## 2022-09-20 PROCEDURE — 36000707: Performed by: OTOLARYNGOLOGY

## 2022-09-20 PROCEDURE — 88331 PR  PATH CONSULT IN SURG,W FRZ SEC: ICD-10-PCS | Mod: 26,,, | Performed by: PATHOLOGY

## 2022-09-20 PROCEDURE — 88305 TISSUE EXAM BY PATHOLOGIST: ICD-10-PCS | Mod: 26,,, | Performed by: PATHOLOGY

## 2022-09-20 PROCEDURE — 27201423 OPTIME MED/SURG SUP & DEVICES STERILE SUPPLY: Performed by: OTOLARYNGOLOGY

## 2022-09-20 PROCEDURE — D9220A PRA ANESTHESIA: ICD-10-PCS | Mod: ANES,,, | Performed by: ANESTHESIOLOGY

## 2022-09-20 PROCEDURE — 83970 ASSAY OF PARATHORMONE: CPT | Mod: 91 | Performed by: OTOLARYNGOLOGY

## 2022-09-20 PROCEDURE — 63600175 PHARM REV CODE 636 W HCPCS: Performed by: NURSE ANESTHETIST, CERTIFIED REGISTERED

## 2022-09-20 PROCEDURE — D9220A PRA ANESTHESIA: Mod: CRNA,,, | Performed by: NURSE ANESTHETIST, CERTIFIED REGISTERED

## 2022-09-20 PROCEDURE — 76937 PR  US GUIDE, VASCULAR ACCESS: ICD-10-PCS | Mod: 26,,, | Performed by: ANESTHESIOLOGY

## 2022-09-20 PROCEDURE — 88331 PATH CONSLTJ SURG 1 BLK 1SPC: CPT | Performed by: PATHOLOGY

## 2022-09-20 PROCEDURE — 36620 PR INSERT CATH,ART,PERCUT,SHORTTERM: ICD-10-PCS | Mod: 59,,, | Performed by: ANESTHESIOLOGY

## 2022-09-20 PROCEDURE — 36000706: Performed by: OTOLARYNGOLOGY

## 2022-09-20 PROCEDURE — 60500 PR EXPLORE PARATHYROID GLANDS: ICD-10-PCS | Mod: ,,, | Performed by: OTOLARYNGOLOGY

## 2022-09-20 PROCEDURE — 88305 TISSUE EXAM BY PATHOLOGIST: CPT | Mod: 26,,, | Performed by: PATHOLOGY

## 2022-09-20 PROCEDURE — 36620 INSERTION CATHETER ARTERY: CPT | Mod: 59,,, | Performed by: ANESTHESIOLOGY

## 2022-09-20 PROCEDURE — 63600175 PHARM REV CODE 636 W HCPCS: Performed by: OTOLARYNGOLOGY

## 2022-09-20 PROCEDURE — 25000003 PHARM REV CODE 250: Performed by: OTOLARYNGOLOGY

## 2022-09-20 PROCEDURE — 76937 US GUIDE VASCULAR ACCESS: CPT | Performed by: ANESTHESIOLOGY

## 2022-09-20 RX ORDER — ONDANSETRON 4 MG/1
4 TABLET, ORALLY DISINTEGRATING ORAL EVERY 6 HOURS PRN
Qty: 10 TABLET | Refills: 0 | OUTPATIENT
Start: 2022-09-20 | End: 2023-03-23

## 2022-09-20 RX ORDER — ONDANSETRON 2 MG/ML
4 INJECTION INTRAMUSCULAR; INTRAVENOUS DAILY PRN
Status: DISCONTINUED | OUTPATIENT
Start: 2022-09-20 | End: 2022-09-20 | Stop reason: HOSPADM

## 2022-09-20 RX ORDER — MIDAZOLAM HYDROCHLORIDE 1 MG/ML
INJECTION, SOLUTION INTRAMUSCULAR; INTRAVENOUS
Status: DISCONTINUED | OUTPATIENT
Start: 2022-09-20 | End: 2022-09-20

## 2022-09-20 RX ORDER — PROPOFOL 10 MG/ML
VIAL (ML) INTRAVENOUS
Status: DISCONTINUED | OUTPATIENT
Start: 2022-09-20 | End: 2022-09-20

## 2022-09-20 RX ORDER — SCOLOPAMINE TRANSDERMAL SYSTEM 1 MG/1
PATCH, EXTENDED RELEASE TRANSDERMAL
Status: DISCONTINUED | OUTPATIENT
Start: 2022-09-20 | End: 2022-09-20

## 2022-09-20 RX ORDER — FENTANYL CITRATE 50 UG/ML
25 INJECTION, SOLUTION INTRAMUSCULAR; INTRAVENOUS EVERY 5 MIN PRN
Status: COMPLETED | OUTPATIENT
Start: 2022-09-20 | End: 2022-09-20

## 2022-09-20 RX ORDER — EPHEDRINE SULFATE 50 MG/ML
INJECTION, SOLUTION INTRAVENOUS
Status: DISCONTINUED | OUTPATIENT
Start: 2022-09-20 | End: 2022-09-20

## 2022-09-20 RX ORDER — OXYCODONE AND ACETAMINOPHEN 10; 325 MG/1; MG/1
1 TABLET ORAL EVERY 6 HOURS PRN
Qty: 20 TABLET | Refills: 0 | Status: SHIPPED | OUTPATIENT
Start: 2022-09-20 | End: 2023-02-06

## 2022-09-20 RX ORDER — HALOPERIDOL 5 MG/ML
0.5 INJECTION INTRAMUSCULAR EVERY 10 MIN PRN
Status: DISCONTINUED | OUTPATIENT
Start: 2022-09-20 | End: 2022-09-20 | Stop reason: HOSPADM

## 2022-09-20 RX ORDER — FERROUS SULFATE, DRIED 160(50) MG
TABLET, EXTENDED RELEASE ORAL
Qty: 60 TABLET | Refills: 0 | Status: SHIPPED | OUTPATIENT
Start: 2022-09-20 | End: 2022-09-28 | Stop reason: SDUPTHER

## 2022-09-20 RX ORDER — SUCCINYLCHOLINE CHLORIDE 20 MG/ML
INJECTION INTRAMUSCULAR; INTRAVENOUS
Status: DISCONTINUED | OUTPATIENT
Start: 2022-09-20 | End: 2022-09-20

## 2022-09-20 RX ORDER — SODIUM CHLORIDE 0.9 % (FLUSH) 0.9 %
10 SYRINGE (ML) INJECTION
Status: DISCONTINUED | OUTPATIENT
Start: 2022-09-20 | End: 2022-09-20 | Stop reason: HOSPADM

## 2022-09-20 RX ORDER — HYDROMORPHONE HYDROCHLORIDE 2 MG/ML
0.2 INJECTION, SOLUTION INTRAMUSCULAR; INTRAVENOUS; SUBCUTANEOUS EVERY 5 MIN PRN
Status: DISCONTINUED | OUTPATIENT
Start: 2022-09-20 | End: 2022-09-20 | Stop reason: HOSPADM

## 2022-09-20 RX ORDER — FENTANYL CITRATE 50 UG/ML
INJECTION, SOLUTION INTRAMUSCULAR; INTRAVENOUS
Status: DISCONTINUED | OUTPATIENT
Start: 2022-09-20 | End: 2022-09-20

## 2022-09-20 RX ORDER — ACETAMINOPHEN 10 MG/ML
1000 INJECTION, SOLUTION INTRAVENOUS ONCE
Status: COMPLETED | OUTPATIENT
Start: 2022-09-20 | End: 2022-09-20

## 2022-09-20 RX ORDER — OXYCODONE AND ACETAMINOPHEN 5; 325 MG/1; MG/1
2 TABLET ORAL EVERY 4 HOURS PRN
Status: DISCONTINUED | OUTPATIENT
Start: 2022-09-20 | End: 2022-09-20 | Stop reason: HOSPADM

## 2022-09-20 RX ORDER — LIDOCAINE HYDROCHLORIDE 20 MG/ML
INJECTION INTRAVENOUS
Status: DISCONTINUED | OUTPATIENT
Start: 2022-09-20 | End: 2022-09-20

## 2022-09-20 RX ORDER — PHENYLEPHRINE HYDROCHLORIDE 10 MG/ML
INJECTION INTRAVENOUS
Status: DISCONTINUED | OUTPATIENT
Start: 2022-09-20 | End: 2022-09-20

## 2022-09-20 RX ORDER — LIDOCAINE HYDROCHLORIDE 10 MG/ML
1 INJECTION, SOLUTION EPIDURAL; INFILTRATION; INTRACAUDAL; PERINEURAL ONCE
Status: DISCONTINUED | OUTPATIENT
Start: 2022-09-20 | End: 2022-09-20 | Stop reason: HOSPADM

## 2022-09-20 RX ORDER — CEFAZOLIN SODIUM 1 G/3ML
INJECTION, POWDER, FOR SOLUTION INTRAMUSCULAR; INTRAVENOUS
Status: DISCONTINUED | OUTPATIENT
Start: 2022-09-20 | End: 2022-09-20

## 2022-09-20 RX ORDER — SODIUM CHLORIDE, SODIUM LACTATE, POTASSIUM CHLORIDE, CALCIUM CHLORIDE 600; 310; 30; 20 MG/100ML; MG/100ML; MG/100ML; MG/100ML
INJECTION, SOLUTION INTRAVENOUS CONTINUOUS
Status: DISCONTINUED | OUTPATIENT
Start: 2022-09-20 | End: 2022-09-20 | Stop reason: HOSPADM

## 2022-09-20 RX ADMIN — ONDANSETRON 4 MG: 2 INJECTION INTRAMUSCULAR; INTRAVENOUS at 03:09

## 2022-09-20 RX ADMIN — HYDROMORPHONE HYDROCHLORIDE 0.2 MG: 2 INJECTION INTRAMUSCULAR; INTRAVENOUS; SUBCUTANEOUS at 03:09

## 2022-09-20 RX ADMIN — PHENYLEPHRINE HYDROCHLORIDE 100 MCG: 10 INJECTION INTRAVENOUS at 12:09

## 2022-09-20 RX ADMIN — PROPOFOL 30 MG: 10 INJECTION, EMULSION INTRAVENOUS at 12:09

## 2022-09-20 RX ADMIN — EPHEDRINE SULFATE 10 MG: 50 INJECTION INTRAVENOUS at 02:09

## 2022-09-20 RX ADMIN — SODIUM CHLORIDE, SODIUM LACTATE, POTASSIUM CHLORIDE, AND CALCIUM CHLORIDE: .6; .31; .03; .02 INJECTION, SOLUTION INTRAVENOUS at 10:09

## 2022-09-20 RX ADMIN — FENTANYL CITRATE 25 MCG: 50 INJECTION, SOLUTION INTRAMUSCULAR; INTRAVENOUS at 01:09

## 2022-09-20 RX ADMIN — EPHEDRINE SULFATE 10 MG: 50 INJECTION INTRAVENOUS at 01:09

## 2022-09-20 RX ADMIN — SODIUM CHLORIDE, SODIUM LACTATE, POTASSIUM CHLORIDE, AND CALCIUM CHLORIDE: .6; .31; .03; .02 INJECTION, SOLUTION INTRAVENOUS at 02:09

## 2022-09-20 RX ADMIN — CEFAZOLIN SODIUM 2 G: 1 POWDER, FOR SOLUTION INTRAMUSCULAR; INTRAVENOUS at 12:09

## 2022-09-20 RX ADMIN — PROPOFOL 50 MG: 10 INJECTION, EMULSION INTRAVENOUS at 02:09

## 2022-09-20 RX ADMIN — FENTANYL CITRATE 25 MCG: 50 INJECTION, SOLUTION INTRAMUSCULAR; INTRAVENOUS at 02:09

## 2022-09-20 RX ADMIN — GLYCOPYRROLATE 0.2 MG: 0.2 INJECTION, SOLUTION INTRAMUSCULAR; INTRAVITREAL at 12:09

## 2022-09-20 RX ADMIN — EPHEDRINE SULFATE 5 MG: 50 INJECTION INTRAVENOUS at 01:09

## 2022-09-20 RX ADMIN — FENTANYL CITRATE 50 MCG: 50 INJECTION, SOLUTION INTRAMUSCULAR; INTRAVENOUS at 12:09

## 2022-09-20 RX ADMIN — MIDAZOLAM HYDROCHLORIDE 2 MG: 1 INJECTION, SOLUTION INTRAMUSCULAR; INTRAVENOUS at 12:09

## 2022-09-20 RX ADMIN — SCOPOLAMINE 1 PATCH: 1 PATCH, EXTENDED RELEASE TRANSDERMAL at 12:09

## 2022-09-20 RX ADMIN — FENTANYL CITRATE 25 MCG: 50 INJECTION, SOLUTION INTRAMUSCULAR; INTRAVENOUS at 03:09

## 2022-09-20 RX ADMIN — SUCCINYLCHOLINE CHLORIDE 120 MG: 20 INJECTION, SOLUTION INTRAMUSCULAR; INTRAVENOUS at 12:09

## 2022-09-20 RX ADMIN — FENTANYL CITRATE 50 MCG: 50 INJECTION, SOLUTION INTRAMUSCULAR; INTRAVENOUS at 02:09

## 2022-09-20 RX ADMIN — PHENYLEPHRINE HYDROCHLORIDE 20 MCG/MIN: 10 INJECTION INTRAVENOUS at 12:09

## 2022-09-20 RX ADMIN — ACETAMINOPHEN 1000 MG: 10 INJECTION INTRAVENOUS at 03:09

## 2022-09-20 RX ADMIN — LIDOCAINE HYDROCHLORIDE 100 MG: 20 INJECTION, SOLUTION INTRAVENOUS at 12:09

## 2022-09-20 RX ADMIN — HYDROCORTISONE SODIUM SUCCINATE 100 MG: 100 INJECTION, POWDER, FOR SOLUTION INTRAMUSCULAR; INTRAVENOUS at 12:09

## 2022-09-20 RX ADMIN — HYDROMORPHONE HYDROCHLORIDE 0.2 MG: 2 INJECTION INTRAMUSCULAR; INTRAVENOUS; SUBCUTANEOUS at 04:09

## 2022-09-20 RX ADMIN — OXYCODONE AND ACETAMINOPHEN 2 TABLET: 5; 325 TABLET ORAL at 04:09

## 2022-09-20 RX ADMIN — SODIUM CHLORIDE, SODIUM LACTATE, POTASSIUM CHLORIDE, AND CALCIUM CHLORIDE: .6; .31; .03; .02 INJECTION, SOLUTION INTRAVENOUS at 12:09

## 2022-09-20 RX ADMIN — PROPOFOL 160 MG: 10 INJECTION, EMULSION INTRAVENOUS at 12:09

## 2022-09-20 NOTE — INTERVAL H&P NOTE
The patient has been examined and the H&P has been reviewed:    I concur with the findings and changes have been noted since the H&P was written: ct 4d parathyroid images and report reviewed and discussed with patient    Surgery risks, benefits and alternative options discussed and understood by patient/family.          There are no hospital problems to display for this patient.

## 2022-09-20 NOTE — OP NOTE
[facility] - Surgery  Surgery Department  Operative Note    SUMMARY     Date of Procedure: 9/20/2022     Procedure: Procedure(s) (LRB):  PARATHYROIDECTOMY, left with  recurrent laryngeal nerve monitoring     Surgeon(s) and Role:     * Carly Lorenzo MD - Primary    Assisting Surgeon: None    Pre-Operative Diagnosis: Hyperparathyroidism [E21.3]  Parathyroid adenoma    Post-Operative Diagnosis: Post-Op Diagnosis Codes:     * Hyperparathyroidism [E21.3]  Parathyroid adenoma    Anesthesia: General    Operative Findings (including complications, if any):  left inferior parathyroid adenoma, parathyroid hyperplasia on frozen section. Intraop PTH dropped from baseline draw ( pre skin incision) of 257.2 to 34.7 ( 15 minutes post excision of adenoma) . No additional abnormal glands on left side. Right side not explored given appropriate drop and confirmation of adenoma in suspected location based on preoperative imaging.     Operative Report in Detail:   After identifying and marking the patient, the patient was brought to the operating room, placed supine on the operating room table. General anesthesia was induced using oral intubation and a glidescope with neuromonitoring tube in appropriate position. Nim system was set up per neuromonitoring personnel.     The patient was prepped and draped for surgery in the usual fashion.  Additional confirmation that the nerve integrity monitoring electrodes were working appropriately was done by external tracheal palpation. A 4 cm incision on the anterior midline lower neck was measured and marked.  A time out was performed and the correct patient and procedure were identified. Incision was made using Bovie electrocautery. A skin flap was raised in a subplatysmal plane. The midline raphe of the strap muscles was identified and . The strap muscles were bluntly dissected off of the underlying tissue to identify the thyroid gland.  The inferior border of the left thyroid gland  was rolled medially with a kittner . An enlarged parathyroid gland that appeared consistent with an adenoma was identified in fatty tissue deep and inferior to the inferior border of the thyroid. This was carefully dissected and a parathyroid level intraop blood draw was performed pre-excision. The tissue suspected to be the adenoma was then completely dissected with sharp and blunt techniques and removed from the neck. Tissue was sent to pathologist for frozen section. A blood draw was then performed at 15 minutes post excision.      Frozen section confirmed hyperplastic parathyroid tissue and parathyroid hormone level dropped appropriately at 15 minute post excision compared to baseline intraop PTH level.      There was no evidence of hyperplastic parathyroid nor additional enlarged parathyroid tissue superiorly on the gland or elsewhere in the ipsilateral neck. Bilateral exploration was not  indicated. There was minimal bleeding , any hemostasis that was needed was performed carefully with bipolar cautery. The wound bed was irrigated copiously with normal saline. The strap muscles were reapproximated in the midline loosely with 3-0 vicryl suture. The platysma was closed using 3-0 vicryl suture. The skin was closed in multiple layers using 4-0 vicryl suture for the deep layer and 5-0 monocryl for subcuticular closure. Dermabond was placed on the incision line.  Anesthesia was reversed and the patient was extubated in stable condition.      Significant Surgical Tasks Conducted by the Assistant(s), if Applicable: none    Estimated Blood Loss (EBL): 25 cc           Implants: none    Specimens:   Specimen (24h ago, onward)       Start     Ordered    09/20/22 2184  Specimen to Pathology, Surgery ENT  Once        Comments: Pre-op Diagnosis: Hyperparathyroidism [E21.3]Procedure(s):PARATHYROIDECTOMY, left possible bilateral, recurrent laryngeal nerve monitoring and pth lab draws and frozen section Number of specimens:  1Name of specimens: Left inferior parathyroid (FROZEN)     References:    Click here for ordering Quick Tip   Question Answer Comment   Procedure Type: ENT    Which provider would you like to cc? KIKE SALINAS    Release to patient Immediate        09/20/22 3038                            Condition: Good    Disposition: PACU - hemodynamically stable.    Attestation: I was present and scrubbed for the entire procedure.

## 2022-09-20 NOTE — PROGRESS NOTES
09/20/22 1620   Goal: Minimized Risk/Safety Maintenance   Elevated Risk Identified none   Problem Identified none   Outcome Minimized Risk and Safety met   Goal: Physiologic Homeostasis   Elevated Risk Identified postoperative nausea and vomiting   Nausea/Vomiting Interventions slow deep breathing encouraged;nausea triggers minimized   Outcome Physiologic Homeostasis met   Interventions   VTE Required Core Measure (SCDs) Sequential compression device initiated/maintained   VTE Prevention/Management intravenous hydration   Trust Relationship/Rapport care explained;questions answered;questions encouraged   Goal: Optimal Comfort and Wellbeing   Elevated Risk Identified pain;situational anxiety   Problem Identified situational anxiety;pain   Outcome Optimal Comfort and Wellbeing met   Goal: Anesthesia/Sedation Recovery   Outcome Anesthesia/Sedation Recovery criteria met for transfer   Outcome Summary   Plan of Care Reviewed With patient

## 2022-09-20 NOTE — BRIEF OP NOTE
[facility] - Surgery  Brief Operative Note    Surgery Date: 9/20/2022     Surgeon(s) and Role:     * Carly Salinas MD - Primary    Assisting Surgeon: None    Pre-op Diagnosis:  Hyperparathyroidism [E21.3]    Post-op Diagnosis:  Post-Op Diagnosis Codes:     * Hyperparathyroidism [E21.3]  Parathyroid adenoma    Procedure(s) (LRB):  PARATHYROIDECTOMY, left     Anesthesia: General    Operative Findings: left inferior parathyroid adenoma, parathyroid hyperplasia on frozen section. Intraop PTH dropped from baseline draw ( pre skin incision) of 257.2 to 34.7 ( 15 minutes post excision of adenoma) . No additional abnormal glands on left side. Right side not explored given appropriate drop and confirmation of adenoma in suspected location based on preoperative imaging.     Estimated Blood Loss: 25 cc         Specimens:   Specimen (24h ago, onward)       Start     Ordered    09/20/22 1334  Specimen to Pathology, Surgery ENT  Once        Comments: Pre-op Diagnosis: Hyperparathyroidism [E21.3]Procedure(s):PARATHYROIDECTOMY, left possible bilateral, recurrent laryngeal nerve monitoring and pth lab draws and frozen section Number of specimens: 1Name of specimens: Left inferior parathyroid (FROZEN)     References:    Click here for ordering Quick Tip   Question Answer Comment   Procedure Type: ENT    Which provider would you like to cc? CARLY SALINAS    Release to patient Immediate        09/20/22 1335                      Discharge Note    OUTCOME: Patient tolerated treatment/procedure well without complication and will be discharged when deemed stable.    DISPOSITION: Home or Self Care    FINAL DIAGNOSIS: hyperparathyroid from parathyroid adenoma    FOLLOWUP: In clinic    DISCHARGE INSTRUCTIONS:  No discharge procedures on file.

## 2022-09-20 NOTE — PATIENT INSTRUCTIONS
If you have any problems or questions please call the following numbers :  Please call IMMMEDIATELY if you have:  Temperature of 102° F or greater  Swelling of neck or foul smelling drainage from incision  Numbness/tingling in fingers  Excessive muscle cramping  Difficulty breathing  Pain not relieved by your prescribed pain medication    Office hours:  Weekdays 8:00 am to 5:00 pm.  Please call [phone] and ask to speak with  my medical assistant, Roseann, or my nurse Domonique, at the [facility].    After-hours & weekends:  Please call [phone] and ask to speak with the ENT resident doctor.      MEDICATIONS:  Most people need pain medication for after surgery. The best pain control comes from a combination of ACETAMINOPHEN (Tylenol) and IBUPROFEN (Motrin).  You can use any over-the-counter versions of acetaminophen and ibuprofen.  You should take 400 milligrams of ibuprofen every 6 hours and 650 milligrams of tylenol every 6 hours. These can be alternated so that you are taking something about every 3 hours while awake. If you need the narcotic pain medication, substitute this out for the acetaminophen. You should try to only use the narcotic medication if you are having trouble sleeping. This medication has a high addiction potential and we do not have a way to know who is at risk for getting addicted to narcotic pain medication.     You will be given calcium supplementation to help prevent low calcium after surgery. Please notify me immediately if you get Excessive muscle cramping, Numbness/tingling in fingers, or chest pain as this can be a sign of low calcium     Some people have problems with bowel movements after surgery. If you have NOT had a bowel movement 3 days after surgery, go to your local pharmacy and purchase an over the counter stool softener such as COLACE. You can also ask the pharmacist for his or her recommendation. If you still do not have a bowel movement after  starting the softener, please call the office.     ACTIVITY:  Ok to shower  Do not stand with incision directly under shower head but allow soapy water to run over incision line to keep wound clean    It is important to walk around often while at home to keep your blood circulating and prevent blood clots.      RESTRICTED ACTIVITIES AFTER SURGERY:  AVOID all heavy lifting, straining or bending for 2 weeks.   AVOID semi-contact sports or vigorous exercising for 3-4 weeks.   Do NOT operate a motor vehicle or any type of heavy machinery within 24 hours of taking pain medication.  DO NOT smoke or be around smokers.

## 2022-09-20 NOTE — TRANSFER OF CARE
Anesthesia Transfer of Care Note    Patient: Robert Smith    Procedure(s) Performed: Procedure(s) (LRB):  PARATHYROIDECTOMY, left possible bilateral, recurrent laryngeal nerve monitoring and pth lab draws and frozen section (Left)    Patient location: PACU    Anesthesia Type: general    Transport from OR: Transported from OR on 6-10 L/min O2 by face mask with adequate spontaneous ventilation    Post pain: adequate analgesia    Post assessment: no apparent anesthetic complications and tolerated procedure well    Post vital signs: stable    Level of consciousness: lethargic and responds to stimulation    Nausea/Vomiting: no nausea/vomiting    Complications: none    Transfer of care protocol was followed      Last vitals:   Visit Vitals  /72 (BP Location: Right arm, Patient Position: Lying)   Pulse 96   Temp 37.1 °C (98.7 °F) (Temporal)   Resp 15   Wt 90.5 kg (199 lb 8 oz)   SpO2 99%   Breastfeeding No   BMI 37.70 kg/m²

## 2022-09-20 NOTE — ANESTHESIA PROCEDURE NOTES
Arterial    Diagnosis: Hyperparathyroidism    Patient location during procedure: pre-op  Procedure start time: 9/20/2022 12:11 PM  Timeout: 9/20/2022 12:10 PM  Procedure end time: 9/20/2022 12:15 PM    Staffing  Authorizing Provider: Esteban Zepeda MD  Performing Provider: Esteban Zepeda MD    Anesthesiologist was present at the time of the procedure.    Preanesthetic Checklist  Completed: patient identified, IV checked, site marked, risks and benefits discussed, surgical consent, monitors and equipment checked, pre-op evaluation, timeout performed and anesthesia consent givenArterial  Skin Prep: chlorhexidine gluconate  Local Infiltration: lidocaine  Orientation: left  Location: radial    Catheter Size: 20 G  Catheter placement by Ultrasound guidance. Heme positive aspiration all ports.   Vessel Caliber: medium, patent, compressibility normal  Vascular Doppler:  not done  Needle advanced into vessel with real time Ultrasound guidance.  Guidewire confirmed in vessel.  Sterile sheath used.  Image recorded and saved.Insertion Attempts: 1  Assessment  Dressing: secured with tape and tegaderm

## 2022-09-20 NOTE — DISCHARGE INSTRUCTIONS
DIET: You may resume your home diet. If nausea is present, increase your diet gradually with fluids and bland foods    ACTIVITY LEVEL: You have received sedation or an anesthetic, you may feel sleepy for several hours. Rest until you are more awake. Gradually resume your normal activities    Medications: Pain medication should be taken only if needed and as directed. If antibiotics are prescribed, the medication should be taken until completed.     No driving, alcoholic beverages or signing legal documents for next 24 hours or while taking pain medication.       CALL THE DOCTOR:   For any obvious bleeding (some dried blood over the incision is normal).     Redness, swelling, foul smell around incision or fever over 101.  Shortness of breath, Coughing up Bloody sputum, Pains or Swelling in your Calves .  Persistent pain or nausea not relieved by medication.    Dermabond / Prineotape    or a material like it was used on your incision.   It is like a liquid glue.   Do not peel or try to remove it it will start to fall off in 7-10 days on its' own.   It is OK to shower, pat dry, do not apply any creams or lotions.    No tub baths, swimming pools, hot tubs or submersion of the incision until your surgeon says it's ok.      If any unusual problems or difficulties occur contact your doctor. If you cannot contact your doctor but feel your signs and symptoms warrant a physicians attention return to the emergency room.

## 2022-09-21 VITALS
RESPIRATION RATE: 18 BRPM | TEMPERATURE: 98 F | SYSTOLIC BLOOD PRESSURE: 107 MMHG | HEART RATE: 83 BPM | BODY MASS INDEX: 37.7 KG/M2 | OXYGEN SATURATION: 97 % | DIASTOLIC BLOOD PRESSURE: 64 MMHG | WEIGHT: 199.5 LBS

## 2022-09-21 NOTE — ANESTHESIA POSTPROCEDURE EVALUATION
Anesthesia Post Evaluation    Patient: Robert Smith    Procedure(s) Performed: Procedure(s) (LRB):  PARATHYROIDECTOMY, left possible bilateral, recurrent laryngeal nerve monitoring and pth lab draws and frozen section (Left)    Final Anesthesia Type: general      Patient location during evaluation: PACU  Patient participation: Yes- Able to Participate  Level of consciousness: awake and alert and oriented  Post-procedure vital signs: reviewed and stable  Pain management: adequate  Airway patency: patent    PONV status at discharge: No PONV  Anesthetic complications: no      Cardiovascular status: hemodynamically stable and blood pressure returned to baseline  Respiratory status: spontaneous ventilation, room air and unassisted  Hydration status: euvolemic  Follow-up not needed.          Vitals Value Taken Time   /64 09/20/22 1744   Temp -7.8 °C (18 °F) 09/20/22 1744   Pulse 83 09/20/22 1744   Resp 18 09/20/22 1744   SpO2 97 % 09/20/22 1744         Event Time   Out of Recovery 16:24:32         Pain/Arik Score: Pain Rating Prior to Med Admin: 6 (9/20/2022  4:40 PM)  Pain Rating Post Med Admin: 6 (9/20/2022  4:20 PM)  Arik Score: 10 (9/20/2022  5:45 PM)  Modified Arik Score: 20 (9/20/2022  5:45 PM)

## 2022-09-22 LAB
FINAL PATHOLOGIC DIAGNOSIS: NORMAL
FROZEN SECTION DIAGNOSIS: NORMAL
FROZEN SECTION FOOTNOTE: NORMAL
GROSS: NORMAL
Lab: NORMAL

## 2022-09-23 ENCOUNTER — TELEPHONE (OUTPATIENT)
Dept: OTOLARYNGOLOGY | Facility: CLINIC | Age: 62
End: 2022-09-23
Payer: MEDICARE

## 2022-09-23 NOTE — TELEPHONE ENCOUNTER
Spoke with patient, she stated that she started with tingling in her hands, feet and knees yesterday morning. Verified that she is taking her calcium medication that was prescribed to her, patient is.     Dose increased as per Dr. Lorenzo to the:     Calcium-vitamin D3 (calcium 500mg + D) 500mg-5mcg (200 unit) per tablet to:  2 tablets three times a day for 10 days then;  2 tablets two times a day for 10 days then;   2 tablets once a day for 10 days    Patient also advised to call the office back with any other symptoms or of those symptoms do not improve.

## 2022-09-23 NOTE — TELEPHONE ENCOUNTER
----- Message from Debby Rice sent at 9/23/2022  1:23 PM CDT -----  Regarding: self [phone]  Type: Patient Call Back    Who called: self    What is the request in detail: pt had surgery and was told to call in if he is experiencing  numbness/ tingling which she is.     Can the clinic reply by [facility]? no    Would the patient rather a call back or a response via [facility]? Call back    Best call back number [phone]

## 2022-09-28 ENCOUNTER — LAB VISIT (OUTPATIENT)
Dept: LAB | Facility: HOSPITAL | Age: 62
End: 2022-09-28
Attending: OTOLARYNGOLOGY
Payer: MEDICARE

## 2022-09-28 ENCOUNTER — OFFICE VISIT (OUTPATIENT)
Dept: OTOLARYNGOLOGY | Facility: CLINIC | Age: 62
End: 2022-09-28
Payer: MEDICARE

## 2022-09-28 VITALS
BODY MASS INDEX: 37.94 KG/M2 | HEART RATE: 66 BPM | SYSTOLIC BLOOD PRESSURE: 151 MMHG | TEMPERATURE: 98 F | WEIGHT: 200.94 LBS | HEIGHT: 61 IN | DIASTOLIC BLOOD PRESSURE: 90 MMHG

## 2022-09-28 DIAGNOSIS — E21.3 HYPERPARATHYROIDISM: ICD-10-CM

## 2022-09-28 DIAGNOSIS — E21.3 HYPERPARATHYROIDISM: Primary | ICD-10-CM

## 2022-09-28 LAB
CA-I BLDV-SCNC: 1.18 MMOL/L (ref 1.06–1.42)
PTH-INTACT SERPL-MCNC: 105.2 PG/ML (ref 9–77)

## 2022-09-28 PROCEDURE — 3044F HG A1C LEVEL LT 7.0%: CPT | Mod: CPTII,S$GLB,, | Performed by: OTOLARYNGOLOGY

## 2022-09-28 PROCEDURE — 99024 POSTOP FOLLOW-UP VISIT: CPT | Mod: S$GLB,,, | Performed by: OTOLARYNGOLOGY

## 2022-09-28 PROCEDURE — 3080F DIAST BP >= 90 MM HG: CPT | Mod: CPTII,S$GLB,, | Performed by: OTOLARYNGOLOGY

## 2022-09-28 PROCEDURE — 3008F PR BODY MASS INDEX (BMI) DOCUMENTED: ICD-10-PCS | Mod: CPTII,S$GLB,, | Performed by: OTOLARYNGOLOGY

## 2022-09-28 PROCEDURE — 3008F BODY MASS INDEX DOCD: CPT | Mod: CPTII,S$GLB,, | Performed by: OTOLARYNGOLOGY

## 2022-09-28 PROCEDURE — 3080F PR MOST RECENT DIASTOLIC BLOOD PRESSURE >= 90 MM HG: ICD-10-PCS | Mod: CPTII,S$GLB,, | Performed by: OTOLARYNGOLOGY

## 2022-09-28 PROCEDURE — 99024 PR POST-OP FOLLOW-UP VISIT: ICD-10-PCS | Mod: S$GLB,,, | Performed by: OTOLARYNGOLOGY

## 2022-09-28 PROCEDURE — 1159F MED LIST DOCD IN RCRD: CPT | Mod: CPTII,S$GLB,, | Performed by: OTOLARYNGOLOGY

## 2022-09-28 PROCEDURE — 1159F PR MEDICATION LIST DOCUMENTED IN MEDICAL RECORD: ICD-10-PCS | Mod: CPTII,S$GLB,, | Performed by: OTOLARYNGOLOGY

## 2022-09-28 PROCEDURE — 3044F PR MOST RECENT HEMOGLOBIN A1C LEVEL <7.0%: ICD-10-PCS | Mod: CPTII,S$GLB,, | Performed by: OTOLARYNGOLOGY

## 2022-09-28 PROCEDURE — 36415 COLL VENOUS BLD VENIPUNCTURE: CPT | Performed by: OTOLARYNGOLOGY

## 2022-09-28 PROCEDURE — 83970 ASSAY OF PARATHORMONE: CPT | Performed by: OTOLARYNGOLOGY

## 2022-09-28 PROCEDURE — 82330 ASSAY OF CALCIUM: CPT | Performed by: OTOLARYNGOLOGY

## 2022-09-28 PROCEDURE — 3077F SYST BP >= 140 MM HG: CPT | Mod: CPTII,S$GLB,, | Performed by: OTOLARYNGOLOGY

## 2022-09-28 PROCEDURE — 3077F PR MOST RECENT SYSTOLIC BLOOD PRESSURE >= 140 MM HG: ICD-10-PCS | Mod: CPTII,S$GLB,, | Performed by: OTOLARYNGOLOGY

## 2022-09-28 RX ORDER — FERROUS SULFATE, DRIED 160(50) MG
2 TABLET, EXTENDED RELEASE ORAL
Qty: 84 TABLET | Refills: 0 | Status: SHIPPED | OUTPATIENT
Start: 2022-09-28 | End: 2022-10-28

## 2022-09-28 RX ORDER — AZELASTINE 1 MG/ML
1 SPRAY, METERED NASAL 2 TIMES DAILY
Qty: 30 ML | Refills: 3 | Status: SHIPPED | OUTPATIENT
Start: 2022-09-28

## 2022-09-28 RX ORDER — FLUTICASONE PROPIONATE 50 MCG
2 SPRAY, SUSPENSION (ML) NASAL 2 TIMES DAILY
Qty: 18.2 ML | Refills: 3 | Status: SHIPPED | OUTPATIENT
Start: 2022-09-28

## 2022-09-28 NOTE — PROGRESS NOTES
ENT POSTOP CLINIC NOTE    SUBJECTIVE:   pt here for postop visit s/p left parathyroidectomy for parathyroid adenoma on 9- 20-22.    Has been having muscle cramps waking her up at night  Also having rheumatoid flare up whichshe was having preop  No issue with voice nor swallow  Has been having allergy flare up;Has been getting some heartburn  OBJECTIVE:  General: no acute distress  Head: normocephalic  Nose: turbinate hypertrophy  Neck:incision healing well   Respiratory: nonlabored , no stridor or stertor    IMPRESSION:  s/p left parathyroidectomy for parathyroid adenoma on 9- 20-22.      PLAN:  Recheck calcium and pth - if calcium low could be causing muscle cramps  Seasonal allergies- saline, flonase and astelin bid

## 2022-10-03 ENCOUNTER — TELEPHONE (OUTPATIENT)
Dept: OTOLARYNGOLOGY | Facility: CLINIC | Age: 62
End: 2022-10-03
Payer: MEDICARE

## 2022-10-03 NOTE — TELEPHONE ENCOUNTER
----- Message from Elda Johnson sent at 10/3/2022 12:53 PM CDT -----  Type: Patient Call Back    Who called: Self     What is the request in detail: PT have concerns about a Medication she was told may change     Can the clinic reply by [facility]? No     Would the patient rather a call back or a response via [facility]? Call     Best call back number:[phone] (home)

## 2022-10-03 NOTE — TELEPHONE ENCOUNTER
Spoke with patient, notified her that dr grey is recommending that she does not need another prescription for Calcium Vit d. The calcium labs are normal and PTH will balance itself out after a couple weeks.   Advise that dr grey thinks her arm cramping and legs cramping is from the rheumatoid arthritis. Advise to reach out to that provider for a work up.  Dr grey did send her a portal message on these results but patient stated she doesn't get good Internet service to check messages.

## 2022-10-12 ENCOUNTER — CLINICAL SUPPORT (OUTPATIENT)
Dept: RHEUMATOLOGY | Facility: CLINIC | Age: 62
End: 2022-10-12
Payer: MEDICARE

## 2022-10-12 DIAGNOSIS — M05.79 RHEUMATOID ARTHRITIS INVOLVING MULTIPLE SITES WITH POSITIVE RHEUMATOID FACTOR: Primary | ICD-10-CM

## 2022-10-12 PROCEDURE — 96372 PR INJECTION,THERAP/PROPH/DIAG2ST, IM OR SUBCUT: ICD-10-PCS | Mod: S$GLB,,, | Performed by: INTERNAL MEDICINE

## 2022-10-12 PROCEDURE — 96372 THER/PROPH/DIAG INJ SC/IM: CPT | Mod: S$GLB,,, | Performed by: INTERNAL MEDICINE

## 2022-10-12 RX ORDER — TRIAMCINOLONE ACETONIDE 40 MG/ML
60 INJECTION, SUSPENSION INTRA-ARTICULAR; INTRAMUSCULAR ONCE
Status: COMPLETED | OUTPATIENT
Start: 2022-10-12 | End: 2022-10-12

## 2022-10-12 RX ORDER — TRIAMCINOLONE ACETONIDE 40 MG/ML
60 INJECTION, SUSPENSION INTRA-ARTICULAR; INTRAMUSCULAR
Status: COMPLETED | OUTPATIENT
Start: 2022-10-12 | End: 2022-10-12

## 2022-10-12 RX ADMIN — TRIAMCINOLONE ACETONIDE 60 MG: 40 INJECTION, SUSPENSION INTRA-ARTICULAR; INTRAMUSCULAR at 01:10

## 2022-10-20 ENCOUNTER — INFUSION (OUTPATIENT)
Dept: INFUSION THERAPY | Facility: HOSPITAL | Age: 62
End: 2022-10-20
Attending: HOSPITALIST
Payer: MEDICARE

## 2022-10-20 VITALS
RESPIRATION RATE: 17 BRPM | HEART RATE: 67 BPM | OXYGEN SATURATION: 98 % | DIASTOLIC BLOOD PRESSURE: 60 MMHG | TEMPERATURE: 98 F | SYSTOLIC BLOOD PRESSURE: 127 MMHG

## 2022-10-20 DIAGNOSIS — M05.79 RHEUMATOID ARTHRITIS INVOLVING MULTIPLE SITES WITH POSITIVE RHEUMATOID FACTOR: Primary | ICD-10-CM

## 2022-10-20 PROCEDURE — 96367 TX/PROPH/DG ADDL SEQ IV INF: CPT

## 2022-10-20 PROCEDURE — 96413 CHEMO IV INFUSION 1 HR: CPT

## 2022-10-20 PROCEDURE — 63600175 PHARM REV CODE 636 W HCPCS: Performed by: INTERNAL MEDICINE

## 2022-10-20 PROCEDURE — 25000003 PHARM REV CODE 250: Performed by: INTERNAL MEDICINE

## 2022-10-20 PROCEDURE — 96375 TX/PRO/DX INJ NEW DRUG ADDON: CPT

## 2022-10-20 PROCEDURE — 96415 CHEMO IV INFUSION ADDL HR: CPT

## 2022-10-20 RX ORDER — FAMOTIDINE 10 MG/ML
20 INJECTION INTRAVENOUS
Status: CANCELLED | OUTPATIENT
Start: 2022-11-03

## 2022-10-20 RX ORDER — ACETAMINOPHEN 325 MG/1
650 TABLET ORAL
Status: CANCELLED | OUTPATIENT
Start: 2022-11-03

## 2022-10-20 RX ORDER — SODIUM CHLORIDE 0.9 % (FLUSH) 0.9 %
10 SYRINGE (ML) INJECTION
Status: DISCONTINUED | OUTPATIENT
Start: 2022-10-20 | End: 2022-10-20 | Stop reason: HOSPADM

## 2022-10-20 RX ORDER — ACETAMINOPHEN 325 MG/1
650 TABLET ORAL
Status: COMPLETED | OUTPATIENT
Start: 2022-10-20 | End: 2022-10-20

## 2022-10-20 RX ORDER — SODIUM CHLORIDE 0.9 % (FLUSH) 0.9 %
10 SYRINGE (ML) INJECTION
Status: CANCELLED | OUTPATIENT
Start: 2022-11-03

## 2022-10-20 RX ORDER — ONDANSETRON 2 MG/ML
8 INJECTION INTRAMUSCULAR; INTRAVENOUS
Status: COMPLETED | OUTPATIENT
Start: 2022-10-20 | End: 2022-10-20

## 2022-10-20 RX ORDER — FAMOTIDINE 10 MG/ML
20 INJECTION INTRAVENOUS
Status: COMPLETED | OUTPATIENT
Start: 2022-10-20 | End: 2022-10-20

## 2022-10-20 RX ADMIN — DEXTROSE 100 MG: 50 INJECTION, SOLUTION INTRAVENOUS at 11:10

## 2022-10-20 RX ADMIN — FAMOTIDINE 20 MG: 10 INJECTION, SOLUTION INTRAVENOUS at 11:10

## 2022-10-20 RX ADMIN — ACETAMINOPHEN 650 MG: 325 TABLET ORAL at 10:10

## 2022-10-20 RX ADMIN — ONDANSETRON 8 MG: 2 INJECTION INTRAMUSCULAR; INTRAVENOUS at 11:10

## 2022-10-20 RX ADMIN — RITUXIMAB 500 MG: 10 INJECTION, SOLUTION INTRAVENOUS at 11:10

## 2022-10-20 RX ADMIN — DIPHENHYDRAMINE HYDROCHLORIDE 25 MG: 50 INJECTION, SOLUTION INTRAMUSCULAR; INTRAVENOUS at 10:10

## 2022-10-20 NOTE — PLAN OF CARE
Patient arrived to unit for Rituxan 500 mg single dose infusion. Pt c/o moderate to severe generalized joint pain over the last month. Pt taking pain meds at home.Reviewed signs and symptoms of allergic reaction, pt verbalized understanding. Pt has had Rituxan infusions in 2020 and 2021.  Plan of care reviewed, patient agreeable to plan.Premeds of Tylenol, Benadryl, Pepcid, Decadron, and Zofran administered prior to Titration of Rituxan. Started pt at 50 ml/ hr and increased rate by 50 ml every thirty minutes until max rate of 400 ml/hr reached. VSS with every rate change. Patient tolerated infusion well. VSS. Discharge instructions reviewed, AVS given.Pt ambulated off unit unassisted . Patient in NAD at time of discharge.

## 2022-10-22 NOTE — ED NOTES
EKG, BP, O2 monitors applied as per protocol.    Patient on cardiac monitor, automatic blood pressure cuff and pulse oximeter. Pt resting in bed, REU, and NAD noted. Bed locked in lowest position. Bed rails up x2. Call light in reach of pt. Will continue to monitor.

## 2022-10-24 ENCOUNTER — LAB VISIT (OUTPATIENT)
Dept: LAB | Facility: HOSPITAL | Age: 62
End: 2022-10-24
Attending: INTERNAL MEDICINE
Payer: MEDICARE

## 2022-10-24 DIAGNOSIS — Z79.899 HIGH RISK MEDICATION USE: ICD-10-CM

## 2022-10-24 LAB
HBV CORE AB SERPL QL IA: NORMAL
HBV SURFACE AG SERPL QL IA: NORMAL

## 2022-10-24 PROCEDURE — 86704 HEP B CORE ANTIBODY TOTAL: CPT | Performed by: INTERNAL MEDICINE

## 2022-10-24 PROCEDURE — 87340 HEPATITIS B SURFACE AG IA: CPT | Performed by: INTERNAL MEDICINE

## 2022-10-24 PROCEDURE — 36415 COLL VENOUS BLD VENIPUNCTURE: CPT | Mod: PO | Performed by: INTERNAL MEDICINE

## 2022-10-28 ENCOUNTER — OFFICE VISIT (OUTPATIENT)
Dept: RHEUMATOLOGY | Facility: CLINIC | Age: 62
End: 2022-10-28
Payer: MEDICARE

## 2022-10-28 ENCOUNTER — OFFICE VISIT (OUTPATIENT)
Dept: PAIN MEDICINE | Facility: CLINIC | Age: 62
End: 2022-10-28
Payer: MEDICARE

## 2022-10-28 VITALS
WEIGHT: 194 LBS | DIASTOLIC BLOOD PRESSURE: 79 MMHG | BODY MASS INDEX: 36.63 KG/M2 | HEART RATE: 63 BPM | TEMPERATURE: 97 F | SYSTOLIC BLOOD PRESSURE: 119 MMHG | HEIGHT: 61 IN | RESPIRATION RATE: 18 BRPM

## 2022-10-28 VITALS
WEIGHT: 195.31 LBS | HEIGHT: 61 IN | TEMPERATURE: 98 F | BODY MASS INDEX: 36.87 KG/M2 | DIASTOLIC BLOOD PRESSURE: 77 MMHG | HEART RATE: 61 BPM | SYSTOLIC BLOOD PRESSURE: 120 MMHG

## 2022-10-28 DIAGNOSIS — M79.7 FIBROMYALGIA: ICD-10-CM

## 2022-10-28 DIAGNOSIS — Z79.899 HIGH RISK MEDICATION USE: ICD-10-CM

## 2022-10-28 DIAGNOSIS — M05.79 RHEUMATOID ARTHRITIS INVOLVING MULTIPLE SITES WITH POSITIVE RHEUMATOID FACTOR: Primary | ICD-10-CM

## 2022-10-28 DIAGNOSIS — E66.01 SEVERE OBESITY (BMI 35.0-39.9) WITH COMORBIDITY: ICD-10-CM

## 2022-10-28 PROCEDURE — 3044F HG A1C LEVEL LT 7.0%: CPT | Mod: CPTII,S$GLB,, | Performed by: INTERNAL MEDICINE

## 2022-10-28 PROCEDURE — 1160F RVW MEDS BY RX/DR IN RCRD: CPT | Mod: CPTII,S$GLB,, | Performed by: ANESTHESIOLOGY

## 2022-10-28 PROCEDURE — 3078F PR MOST RECENT DIASTOLIC BLOOD PRESSURE < 80 MM HG: ICD-10-PCS | Mod: CPTII,S$GLB,, | Performed by: ANESTHESIOLOGY

## 2022-10-28 PROCEDURE — 99214 OFFICE O/P EST MOD 30 MIN: CPT | Mod: S$GLB,,, | Performed by: INTERNAL MEDICINE

## 2022-10-28 PROCEDURE — 99999 PR PBB SHADOW E&M-EST. PATIENT-LVL V: CPT | Mod: PBBFAC,,, | Performed by: ANESTHESIOLOGY

## 2022-10-28 PROCEDURE — 3078F DIAST BP <80 MM HG: CPT | Mod: CPTII,S$GLB,, | Performed by: INTERNAL MEDICINE

## 2022-10-28 PROCEDURE — 3074F SYST BP LT 130 MM HG: CPT | Mod: CPTII,S$GLB,, | Performed by: INTERNAL MEDICINE

## 2022-10-28 PROCEDURE — 99214 PR OFFICE/OUTPT VISIT, EST, LEVL IV, 30-39 MIN: ICD-10-PCS | Mod: S$GLB,,, | Performed by: INTERNAL MEDICINE

## 2022-10-28 PROCEDURE — 3044F PR MOST RECENT HEMOGLOBIN A1C LEVEL <7.0%: ICD-10-PCS | Mod: CPTII,S$GLB,, | Performed by: ANESTHESIOLOGY

## 2022-10-28 PROCEDURE — 1159F MED LIST DOCD IN RCRD: CPT | Mod: CPTII,S$GLB,, | Performed by: INTERNAL MEDICINE

## 2022-10-28 PROCEDURE — 1160F PR REVIEW ALL MEDS BY PRESCRIBER/CLIN PHARMACIST DOCUMENTED: ICD-10-PCS | Mod: CPTII,S$GLB,, | Performed by: INTERNAL MEDICINE

## 2022-10-28 PROCEDURE — 3044F HG A1C LEVEL LT 7.0%: CPT | Mod: CPTII,S$GLB,, | Performed by: ANESTHESIOLOGY

## 2022-10-28 PROCEDURE — 99999 PR PBB SHADOW E&M-EST. PATIENT-LVL V: ICD-10-PCS | Mod: PBBFAC,,, | Performed by: ANESTHESIOLOGY

## 2022-10-28 PROCEDURE — 3078F PR MOST RECENT DIASTOLIC BLOOD PRESSURE < 80 MM HG: ICD-10-PCS | Mod: CPTII,S$GLB,, | Performed by: INTERNAL MEDICINE

## 2022-10-28 PROCEDURE — 3074F PR MOST RECENT SYSTOLIC BLOOD PRESSURE < 130 MM HG: ICD-10-PCS | Mod: CPTII,S$GLB,, | Performed by: ANESTHESIOLOGY

## 2022-10-28 PROCEDURE — 1159F PR MEDICATION LIST DOCUMENTED IN MEDICAL RECORD: ICD-10-PCS | Mod: CPTII,S$GLB,, | Performed by: INTERNAL MEDICINE

## 2022-10-28 PROCEDURE — 1160F RVW MEDS BY RX/DR IN RCRD: CPT | Mod: CPTII,S$GLB,, | Performed by: INTERNAL MEDICINE

## 2022-10-28 PROCEDURE — 99204 PR OFFICE/OUTPT VISIT, NEW, LEVL IV, 45-59 MIN: ICD-10-PCS | Mod: S$GLB,,, | Performed by: ANESTHESIOLOGY

## 2022-10-28 PROCEDURE — 3044F PR MOST RECENT HEMOGLOBIN A1C LEVEL <7.0%: ICD-10-PCS | Mod: CPTII,S$GLB,, | Performed by: INTERNAL MEDICINE

## 2022-10-28 PROCEDURE — 3074F SYST BP LT 130 MM HG: CPT | Mod: CPTII,S$GLB,, | Performed by: ANESTHESIOLOGY

## 2022-10-28 PROCEDURE — 3078F DIAST BP <80 MM HG: CPT | Mod: CPTII,S$GLB,, | Performed by: ANESTHESIOLOGY

## 2022-10-28 PROCEDURE — 1160F PR REVIEW ALL MEDS BY PRESCRIBER/CLIN PHARMACIST DOCUMENTED: ICD-10-PCS | Mod: CPTII,S$GLB,, | Performed by: ANESTHESIOLOGY

## 2022-10-28 PROCEDURE — 99999 PR PBB SHADOW E&M-EST. PATIENT-LVL III: CPT | Mod: PBBFAC,,, | Performed by: INTERNAL MEDICINE

## 2022-10-28 PROCEDURE — 99204 OFFICE O/P NEW MOD 45 MIN: CPT | Mod: S$GLB,,, | Performed by: ANESTHESIOLOGY

## 2022-10-28 PROCEDURE — 99999 PR PBB SHADOW E&M-EST. PATIENT-LVL III: ICD-10-PCS | Mod: PBBFAC,,, | Performed by: INTERNAL MEDICINE

## 2022-10-28 PROCEDURE — 1159F PR MEDICATION LIST DOCUMENTED IN MEDICAL RECORD: ICD-10-PCS | Mod: CPTII,S$GLB,, | Performed by: ANESTHESIOLOGY

## 2022-10-28 PROCEDURE — 1159F MED LIST DOCD IN RCRD: CPT | Mod: CPTII,S$GLB,, | Performed by: ANESTHESIOLOGY

## 2022-10-28 PROCEDURE — 3074F PR MOST RECENT SYSTOLIC BLOOD PRESSURE < 130 MM HG: ICD-10-PCS | Mod: CPTII,S$GLB,, | Performed by: INTERNAL MEDICINE

## 2022-10-28 RX ORDER — LEVOCETIRIZINE DIHYDROCHLORIDE 5 MG/1
TABLET, FILM COATED ORAL
COMMUNITY
Start: 2022-10-05 | End: 2023-03-01

## 2022-10-28 RX ORDER — PREDNISONE 5 MG/1
TABLET ORAL
Qty: 126 TABLET | Refills: 0 | Status: SHIPPED | OUTPATIENT
Start: 2022-10-28 | End: 2022-12-27

## 2022-10-28 ASSESSMENT — ROUTINE ASSESSMENT OF PATIENT INDEX DATA (RAPID3)
AM STIFFNESS SCORE: 1, YES
MDHAQ FUNCTION SCORE: 1.6
TOTAL RAPID3 SCORE: 8.44
PATIENT GLOBAL ASSESSMENT SCORE: 10
WHEN YOU AWAKENED IN THE MORNING OVER THE LAST WEEK, PLEASE INDICATE THE AMOUNT OF TIME IT TAKES UNTIL YOU ARE AS LIMBER AS YOU WILL BE FOR THE DAY: 2 HOURS
FATIGUE SCORE: 10
PSYCHOLOGICAL DISTRESS SCORE: 9.9
PAIN SCORE: 10

## 2022-10-28 NOTE — PROGRESS NOTES
"Subjective:       Patient ID: Robert Smith is a 62 y.o. female.    Chief Complaint: Disease Management    HPI    RA and FMS    Pain is severe  Also swollen     Not on prednisone   Dr West gave her drops of medical cannabis that have not helped  Hydrocodone from Dr Carias    Sept 20 had parathyroid surgery; she does not feel different    Her friend with RA gets rituximab 2 infusions every 6 mo and goes to pain clinic where they give her ketamine infusions that help her    Review of Systems   Constitutional:  Positive for fatigue. Negative for fever and unexpected weight change.   HENT:  Negative for mouth sores and trouble swallowing.         Dry mouth   Eyes:  Negative for redness.        Dry eyes   Respiratory:  Negative for cough and shortness of breath.    Cardiovascular:  Negative for chest pain.   Gastrointestinal:  Positive for constipation. Negative for abdominal pain and diarrhea.   Genitourinary:  Positive for dysuria.   Skin:  Negative for rash.   Neurological:  Negative for headaches.   Hematological:  Negative for adenopathy. Does not bruise/bleed easily.   Psychiatric/Behavioral:  Positive for sleep disturbance.        Objective:   /77   Pulse 61   Temp 98.3 °F (36.8 °C)   Ht 5' 1" (1.549 m)   Wt 88.6 kg (195 lb 5.2 oz)   BMI 36.91 kg/m²      Physical Exam      3/19/2021 9/27/2021 1/28/2022 5/27/2022   Tender (HERNANDEZ-28) 4 / 28 8 / 28 1 / 28 4 / 28    Swollen (HERNANDEZ-28) 4 / 28 7 / 28 0 / 28 1 / 28    Provider Global 30 mm 80 mm 20 mm 20 mm   Patient Global 50 mm 80 mm 40 mm 70 mm   ESR 31 mm/hr 21 mm/hr 4 mm/hr 22 mm/hr   CRP 8.9 mg/L 6.6 mg/L 2.7 mg/L 4.7 mg/L   HERNANDEZ-28 (ESR) 4.78 (Moderate disease activity) 5.58 (High disease activity) 2.09 (Remission) 4.54 (Moderate disease activity)   HERNANDEZ-28 (CRP) 4.17 (Moderate disease activity) 5.13 (High disease activity) 2.55 (Remission) 3.97 (Moderate disease activity)   CDAI Score 16  31  7  14      Lab Results   Component Value Date    SEDRATE 22 " 06/03/2022    SEDRATE 22 06/03/2022          Assessment:       1. Rheumatoid arthritis involving multiple sites with positive rheumatoid factor    2. Severe obesity (BMI 35.0-39.9) with comorbidity    3. Fibromyalgia            Plan:       Problem List Items Addressed This Visit          Active Problems    Severe obesity (BMI 35.0-39.9) with comorbidity    Rheumatoid arthritis with positive rheumatoid factor - Primary     RA now s/p rituximab with ongoing pain consistent with active RA but only received rituximab 2 weeks ago    Will restart prednisone taper as bridge    Will refer to pain clinic         Relevant Medications    predniSONE (DELTASONE) 5 MG tablet    Other Relevant Orders    Ambulatory referral/consult to Pain Clinic    Fibromyalgia     Severe fibromyalgia for which no treatment has helped    She would like to try ketamine infusion that helped her friend; will refer to pain clinic to investigate

## 2022-10-28 NOTE — ASSESSMENT & PLAN NOTE
RA now s/p rituximab with ongoing pain consistent with active RA but only received rituximab 2 weeks ago    Will restart prednisone taper as bridge    Will refer to pain clinic

## 2022-10-28 NOTE — ASSESSMENT & PLAN NOTE
Severe fibromyalgia for which no treatment has helped    She would like to try ketamine infusion that helped her friend; will refer to pain clinic to investigate

## 2022-10-28 NOTE — PROGRESS NOTES
"PCP: Tani Chan MD    REFERRING PHYSICIAN: Ten Rodriguez, *    CHIEF COMPLAINT: "Everywhere" - primarily BLE    Original HISTORY OF PRESENT ILLNESS: Robert Smith h/o RA and fibromyalgia presents to the clinic for the evaluation of the above pain. The pain started 4-5 months ago    Original Pain Description:  The pain is located in bilateral thighs and radiates throughout the legs to the plantar surface. The pain is in the upper legs is described as "tightness" and throbbing and in the feet as numbing, throbbing, and tingling. Exacerbating factors: Standing and laying down and Walking. Mitigating factors nothing. Symptoms interfere with daily activity and sleeping. The patient feels like symptoms have been worsening. Had taken percocet post-operatively which she said helped more than the Norco that she is currently prescribed. Has tried gabapentin in the past which she says did not help at all. Taking robaxin now for a month which is not helping with the muscle spasms. Patient denies night fever/night sweats, urinary incontinence, and bowel incontinence.     Original PAIN SCORES:  Best: Pain is 7  Worst: Pain is 9  Usually: Pain is 8  Current: Pain is 7    INTERVAL HISTORY:     6 weeks of Conservative therapy (PT/Chiro/Home Exercises with Dates)  PT in the past several years ago    Treatments / Medications: (Ice/Heat/NSAIDS/APAP/etc):  Norco 10 mg bid  Robaxin 500 mg bid  Trazodone 50 mg qhs     Report:      Interventional Pain Procedures: (Previous injections)  none    Past Medical History:   Diagnosis Date    Acid reflux     Allergy     Anxiety     Degenerative disc disease     Depression     DVT of leg (deep venous thrombosis) 2009    rt leg    Fibromyalgia     Fibromyalgia     Long-term current use of steroids 11/12/2012    NSTEMI (non-ST elevated myocardial infarction) 5/18/2019    Osteoporosis, postmenopausal     chronic steroid use    RA (rheumatoid arthritis)      Past Surgical History: "   Procedure Laterality Date    ANKLE FRACTURE SURGERY      BACK SURGERY      CARPAL TUNNEL RELEASE      CHOLECYSTECTOMY      HARDWARE REMOVAL      JOINT REPLACEMENT Right     right knee    KNEE SURGERY      Bilateral    KNEE SURGERY Left     revision x 2    PARATHYROIDECTOMY Left 9/20/2022    Procedure: PARATHYROIDECTOMY, left possible bilateral, recurrent laryngeal nerve monitoring and pth lab draws and frozen section;  Surgeon: Carly Lorenzo MD;  Location: [facility];  Service: ENT;  Laterality: Left;  NEUROMONITORING SACHA GUEVARA [phone]  RN PREOP 9/12/2022   POSITIVE COVID ON 7/27/2022----HAS CARDS CLEARANCE    SPINE SURGERY      TONSILLECTOMY      TUBAL LIGATION       Social History     Socioeconomic History    Marital status:    Tobacco Use    Smoking status: Never    Smokeless tobacco: Never   Substance and Sexual Activity    Alcohol use: No     Alcohol/week: 0.0 standard drinks    Drug use: No    Sexual activity: Never     Partners: Male     Family History   Problem Relation Age of Onset    COPD Mother     Heart attack Father     Emphysema Maternal Grandfather     Breast cancer Neg Hx     Colon cancer Neg Hx     Ovarian cancer Neg Hx        Review of patient's allergies indicates:   Allergen Reactions    Sulfasalazine      HEPATITIS    Sulfa (sulfonamide antibiotics) Nausea And Vomiting    Clindamycin Diarrhea    Daypro [oxaprozin] Nausea Only    Orencia  [abatacept]      Other reaction(s): Muscle pain    Percocet  [oxycodone-acetaminophen]      Other reaction(s): Vomiting    Pregabalin     Simponi  [golimumab]      Other reaction(s): Unknown    Cimzia [certolizumab pegol] Rash    Ciprofloxacin Rash    Sulfamethoxazole-trimethoprim Rash       Current Outpatient Medications   Medication Sig    albuterol sulfate 90 mcg/actuation AePB Inhale 1 puff into the lungs every 4 (four) hours as needed. Rescue    ascorbic acid, vitamin C, (VITAMIN C) 1000 MG tablet Take 1,000 mg by mouth once daily.     azelastine (ASTELIN) 137 mcg (0.1 %) nasal spray 1 spray (137 mcg total) by Nasal route 2 (two) times daily.    cyanocobalamin 1,000 mcg/mL injection INJECT 1000 MCG (1ML) AS DIRECTED EVERY 28 DAYS    dicyclomine (BENTYL) 10 MG capsule Take 20 mg by mouth daily as needed.    ELIQUIS 5 mg Tab Take 5 mg by mouth 2 (two) times daily.    fluticasone propionate (FLONASE) 50 mcg/actuation nasal spray 1 spray by Each Nostril route once daily.    fluticasone propionate (FLONASE) 50 mcg/actuation nasal spray 2 sprays (100 mcg total) by Each Nostril route 2 (two) times daily.    HYDROcodone-acetaminophen (NORCO)  mg per tablet Take 1 tablet by mouth every 8 (eight) hours as needed.    hydrOXYzine HCL (ATARAX) 25 MG tablet Take 25 mg by mouth every 6 (six) hours as needed.    levocetirizine (XYZAL) 5 MG tablet TAKE 1 TABLET DAILY AS NEEDED BY MOUTH FOR ALLERGIES    meclizine (ANTIVERT) 25 mg tablet TAKE 1 TABLET BY MOUTH 3 (THREE) TIMES DAILY AS NEEDED FOR DIZZINESS    methocarbamoL (ROBAXIN) 500 MG Tab Take 500 mg by mouth once daily.    metoprolol succinate (TOPROL-XL) 25 MG 24 hr tablet Take 12.5 mg by mouth.    montelukast (SINGULAIR) 10 mg tablet     multivitamin capsule Take 1 capsule by mouth once daily.    ondansetron (ZOFRAN) 8 MG tablet     ondansetron (ZOFRAN-ODT) 4 MG TbDL Dissolve 1 tablet (4 mg total) by mouth every 6 (six) hours as needed (nausea). (Patient not taking: Reported on 10/28/2022)    oxyCODONE-acetaminophen (PERCOCET)  mg per tablet Take 1 tablet by mouth every 6 (six) hours as needed for Pain (severe pain not controlled ; do not take if taking hydrocodone). (Patient not taking: Reported on 10/28/2022)    pantoprazole (PROTONIX) 40 MG tablet     polyethylene glycol (GLYCOLAX) 17 gram/dose powder Take 17 g by mouth.    prednisoLONE acetate (PRED FORTE) 1 % DrpS Place 1 drop into both eyes 2 (two) times daily.    predniSONE (DELTASONE) 5 MG tablet Take 3 tablets (15 mg total) by mouth once  "daily for 21 days, THEN 2 tablets (10 mg total) once daily for 21 days, THEN 1 tablet (5 mg total) once daily for 21 days.    riTUXimab (RITUXAN) 10 mg/mL injection Inject into the vein.    SODIUM CHLORIDE FOR INHALATION (SODIUM CHLORIDE 0.9%) 0.9 % nebulizer solution     tiZANidine (ZANAFLEX) 2 MG tablet     traZODone (DESYREL) 50 MG tablet Take 50 mg by mouth.    UNABLE TO FIND medication name: Wellcana 300mg THC   300mg CBD  drops   nightly    vitamin D (VITAMIN D3) 1000 units Tab Take 1,000 Units by mouth once daily.     No current facility-administered medications for this visit.       ROS:  GENERAL: No fever. No chills. No fatigue. Denies weight loss. Denies weight gain.  HEENT: Denies headaches. Denies vision change. Denies eye pain. Denies double vision. Denies ear pain.   CV: Denies chest pain.   PULM: Denies of shortness of breath.  GI: Denies constipation. No diarrhea. No abdominal pain. Denies nausea. Denies vomiting. No blood in stool.  HEME: Denies bleeding problems.  : Denies urgency. No painful urination. No blood in urine.  MS: Denies joint stiffness. Denies joint swelling.  Denies back pain.  SKIN: Denies rash.   NEURO: Denies seizures. No weakness.  PSYCH:  Denies difficulty sleeping. No anxiety. Denies depression. No suicidal thoughts.       VITALS:   Vitals:    10/28/22 1309   Resp: 18   Weight: 88 kg (194 lb 0.1 oz)   Height: 5' 1" (1.549 m)   PainSc:   9   PainLoc: Shoulder         PHYSICAL EXAM:   GENERAL: Well appearing, in no acute distress, alert and oriented x3.  PSYCH:  Mood and affect appropriate.  SKIN: Skin color, texture, turgor normal, no rashes or lesions.  HEENT:  Normocephalic, atraumatic. Cranial nerves grossly intact.  NECK: No pain to palpation over the cervical paraspinous muscles. No pain to palpation over facets. No pain with neck flexion, extension, or lateral flexion.   PULM: No evidence of respiratory difficulty, symmetric chest rise.  GI:  Non-distended  BACK: Normal " range of motion. No pain to palpation over the spinous processes. No pain to palpation over facet joints. There is no pain with palpation over the sacroiliac joints bilaterally. Straight leg raising in the supine position is negative to radicular pain.   EXTREMITIES: No deformities, edema, or skin discoloration.   MUSCULOSKELETAL: Shoulder, hip, and knee provocative maneuvers are negative. Bilateral upper and lower extremity strength is normal and symmetric. No atrophy is noted.  NEURO: Sensation is equal and appropriate bilaterally. Bilateral upper and lower extremity coordination and muscle stretch reflexes are physiologic and symmetric. Plantar response are downgoing.   GAIT: normal.      LABS:      IMAGING:        ASSESSMENT: 62 y.o. year old female with pain, consistent with fibromyalgia     DISCUSSION: Ms. Smith is a very nice woman with RA who comes to us from her rheumatologist, Dr. Rodriguez. She was diagnosed with RA 20 years ago and then developed fibromyalgia 5 years ago. She has failed all approved medications for fibromyalgia and continues to struggle despite opioid management from an outside pain physician, Dr. Elaine.     PLAN:  Advised patient to discuss opioid management with Dr. Elaine  Referred to [facility]  Follow up as needed   Would be willing to continue/adjust opioid management if necessary      I would like to thank Ten Rodriguez, * for the opportunity to assist in the care of this patient. We had a very nice visit and I look forward to continuing her care. Please let me know if I can be of further assistance.     Esteban Sol  10/28/2022

## 2022-10-31 ENCOUNTER — TELEPHONE (OUTPATIENT)
Dept: ADMINISTRATIVE | Facility: OTHER | Age: 62
End: 2022-10-31
Payer: MEDICARE

## 2022-10-31 NOTE — TELEPHONE ENCOUNTER
Patient declined scheduling [facility] appointment as she states this would be to [facility] for her to travel back and forth. Declined at this time per patient.

## 2022-11-15 ENCOUNTER — PATIENT MESSAGE (OUTPATIENT)
Dept: RHEUMATOLOGY | Facility: CLINIC | Age: 62
End: 2022-11-15
Payer: MEDICARE

## 2022-12-01 ENCOUNTER — TELEPHONE (OUTPATIENT)
Dept: ENDOCRINOLOGY | Facility: CLINIC | Age: 62
End: 2022-12-01
Payer: MEDICARE

## 2022-12-01 NOTE — TELEPHONE ENCOUNTER
----- Message from Virginia Hernadez sent at 12/1/2022  1:00 PM CST -----  .Type: Patient Call Back    Who called: Self    What is the request in detail: parathyroid level is high    Can the clinic reply by [facility]? no    Would the patient rather a call back or a response via [facility]? Call    Best call back number: .[phone] (home)     Additional Information:

## 2022-12-01 NOTE — TELEPHONE ENCOUNTER
Pt notified that MD sent her a portal message regarding the PTH and that he said they would discuss at her next office visit

## 2022-12-05 ENCOUNTER — TELEPHONE (OUTPATIENT)
Dept: ENDOCRINOLOGY | Facility: CLINIC | Age: 62
End: 2022-12-05
Payer: MEDICARE

## 2022-12-05 NOTE — TELEPHONE ENCOUNTER
----- Message from Debby Rice sent at 12/5/2022  1:14 PM CST -----  Regarding: self [phone]  Type:  Sooner Appointment Request    Patient is requesting a sooner appointment.  Patient declined first available appointment listed as well as another facility and provider .  Patient will not accept being placed on the waitlist and is requesting a message be sent to doctor.    Name of Caller:  self     When is the first available appointment? 12/30    Symptoms:  pt is leaving 12/26 needs appt before    Would the patient rather a call back or a response via [facility]?  Call back    Best Call Back Number:  [phone]

## 2022-12-09 ENCOUNTER — OFFICE VISIT (OUTPATIENT)
Dept: ENDOCRINOLOGY | Facility: CLINIC | Age: 62
End: 2022-12-09
Payer: MEDICARE

## 2022-12-09 ENCOUNTER — LAB VISIT (OUTPATIENT)
Dept: LAB | Facility: HOSPITAL | Age: 62
End: 2022-12-09
Attending: HOSPITALIST
Payer: MEDICARE

## 2022-12-09 VITALS
SYSTOLIC BLOOD PRESSURE: 123 MMHG | WEIGHT: 200.81 LBS | HEART RATE: 71 BPM | DIASTOLIC BLOOD PRESSURE: 71 MMHG | BODY MASS INDEX: 37.94 KG/M2 | TEMPERATURE: 98 F

## 2022-12-09 DIAGNOSIS — M81.0 AGE-RELATED OSTEOPOROSIS WITHOUT CURRENT PATHOLOGICAL FRACTURE: ICD-10-CM

## 2022-12-09 DIAGNOSIS — E21.3 HYPERPARATHYROIDISM: Primary | ICD-10-CM

## 2022-12-09 DIAGNOSIS — M79.7 FIBROMYALGIA: ICD-10-CM

## 2022-12-09 DIAGNOSIS — E21.3 HYPERPARATHYROIDISM: ICD-10-CM

## 2022-12-09 DIAGNOSIS — E66.01 SEVERE OBESITY (BMI 35.0-39.9) WITH COMORBIDITY: ICD-10-CM

## 2022-12-09 DIAGNOSIS — K21.9 GASTROESOPHAGEAL REFLUX DISEASE, UNSPECIFIED WHETHER ESOPHAGITIS PRESENT: ICD-10-CM

## 2022-12-09 LAB
ALBUMIN SERPL BCP-MCNC: 3.8 G/DL (ref 3.5–5.2)
ALP SERPL-CCNC: 68 U/L (ref 55–135)
ALT SERPL W/O P-5'-P-CCNC: 26 U/L (ref 10–44)
ANION GAP SERPL CALC-SCNC: 8 MMOL/L (ref 8–16)
AST SERPL-CCNC: 24 U/L (ref 10–40)
BILIRUB SERPL-MCNC: 1 MG/DL (ref 0.1–1)
BUN SERPL-MCNC: 26 MG/DL (ref 8–23)
CALCIUM SERPL-MCNC: 9.5 MG/DL (ref 8.7–10.5)
CHLORIDE SERPL-SCNC: 102 MMOL/L (ref 95–110)
CO2 SERPL-SCNC: 32 MMOL/L (ref 23–29)
CREAT SERPL-MCNC: 0.9 MG/DL (ref 0.5–1.4)
EST. GFR  (NO RACE VARIABLE): >60 ML/MIN/1.73 M^2
GLUCOSE SERPL-MCNC: 165 MG/DL (ref 70–110)
MAGNESIUM SERPL-MCNC: 2.1 MG/DL (ref 1.6–2.6)
PHOSPHATE SERPL-MCNC: 2.9 MG/DL (ref 2.7–4.5)
POTASSIUM SERPL-SCNC: 5 MMOL/L (ref 3.5–5.1)
PROT SERPL-MCNC: 7.3 G/DL (ref 6–8.4)
PTH-INTACT SERPL-MCNC: 92 PG/ML (ref 9–77)
SODIUM SERPL-SCNC: 142 MMOL/L (ref 136–145)

## 2022-12-09 PROCEDURE — 99999 PR PBB SHADOW E&M-EST. PATIENT-LVL V: ICD-10-PCS | Mod: PBBFAC,,, | Performed by: HOSPITALIST

## 2022-12-09 PROCEDURE — 3074F PR MOST RECENT SYSTOLIC BLOOD PRESSURE < 130 MM HG: ICD-10-PCS | Mod: CPTII,S$GLB,, | Performed by: HOSPITALIST

## 2022-12-09 PROCEDURE — 83735 ASSAY OF MAGNESIUM: CPT | Performed by: HOSPITALIST

## 2022-12-09 PROCEDURE — 1160F PR REVIEW ALL MEDS BY PRESCRIBER/CLIN PHARMACIST DOCUMENTED: ICD-10-PCS | Mod: CPTII,S$GLB,, | Performed by: HOSPITALIST

## 2022-12-09 PROCEDURE — 3008F BODY MASS INDEX DOCD: CPT | Mod: CPTII,S$GLB,, | Performed by: HOSPITALIST

## 2022-12-09 PROCEDURE — 36415 COLL VENOUS BLD VENIPUNCTURE: CPT | Performed by: HOSPITALIST

## 2022-12-09 PROCEDURE — 1159F PR MEDICATION LIST DOCUMENTED IN MEDICAL RECORD: ICD-10-PCS | Mod: CPTII,S$GLB,, | Performed by: HOSPITALIST

## 2022-12-09 PROCEDURE — 99214 PR OFFICE/OUTPT VISIT, EST, LEVL IV, 30-39 MIN: ICD-10-PCS | Mod: S$GLB,,, | Performed by: HOSPITALIST

## 2022-12-09 PROCEDURE — 99214 OFFICE O/P EST MOD 30 MIN: CPT | Mod: S$GLB,,, | Performed by: HOSPITALIST

## 2022-12-09 PROCEDURE — 80053 COMPREHEN METABOLIC PANEL: CPT | Performed by: HOSPITALIST

## 2022-12-09 PROCEDURE — 82306 VITAMIN D 25 HYDROXY: CPT | Performed by: HOSPITALIST

## 2022-12-09 PROCEDURE — 3008F PR BODY MASS INDEX (BMI) DOCUMENTED: ICD-10-PCS | Mod: CPTII,S$GLB,, | Performed by: HOSPITALIST

## 2022-12-09 PROCEDURE — 3078F DIAST BP <80 MM HG: CPT | Mod: CPTII,S$GLB,, | Performed by: HOSPITALIST

## 2022-12-09 PROCEDURE — 3044F PR MOST RECENT HEMOGLOBIN A1C LEVEL <7.0%: ICD-10-PCS | Mod: CPTII,S$GLB,, | Performed by: HOSPITALIST

## 2022-12-09 PROCEDURE — 99999 PR PBB SHADOW E&M-EST. PATIENT-LVL V: CPT | Mod: PBBFAC,,, | Performed by: HOSPITALIST

## 2022-12-09 PROCEDURE — 1160F RVW MEDS BY RX/DR IN RCRD: CPT | Mod: CPTII,S$GLB,, | Performed by: HOSPITALIST

## 2022-12-09 PROCEDURE — 3078F PR MOST RECENT DIASTOLIC BLOOD PRESSURE < 80 MM HG: ICD-10-PCS | Mod: CPTII,S$GLB,, | Performed by: HOSPITALIST

## 2022-12-09 PROCEDURE — 83970 ASSAY OF PARATHORMONE: CPT | Performed by: HOSPITALIST

## 2022-12-09 PROCEDURE — 3044F HG A1C LEVEL LT 7.0%: CPT | Mod: CPTII,S$GLB,, | Performed by: HOSPITALIST

## 2022-12-09 PROCEDURE — 1159F MED LIST DOCD IN RCRD: CPT | Mod: CPTII,S$GLB,, | Performed by: HOSPITALIST

## 2022-12-09 PROCEDURE — 84100 ASSAY OF PHOSPHORUS: CPT | Performed by: HOSPITALIST

## 2022-12-09 PROCEDURE — 3074F SYST BP LT 130 MM HG: CPT | Mod: CPTII,S$GLB,, | Performed by: HOSPITALIST

## 2022-12-09 NOTE — ASSESSMENT & PLAN NOTE
- Patient with noted for osteoporosis along with hypercalcemia and hyperparathyroidism  - osteoporosis longstanding since 2017, most likely due to chronic systemic steroid use and postmenopausal  - Secondary work up: have been done:  See plan above  - bone density 5/22 show worsening osteoporosis in  femoral neck  - started Prolia 9/1/2022>> next injection 3/2/2023  - advised patient to continue, continue vitamin-D supplement  - Fall precautions/Exercise regimen: advised  - high fall risk given history of recent fall, no fractures

## 2022-12-09 NOTE — ASSESSMENT & PLAN NOTE
- history of hyperparathyroidism and hypercalcemia for many years  - remote history of hypercalcemia noted as far back as 2009  - No history of CKD stage IIIA, does have renal stone  - 24 hour urine Ca and Cr completed, rules out hypercalciuria/ or FHH  - normal vitamin-D, reassuring  - Saw ENT, sestamibi scan 6/2022; Question small/subtle left inferior parathyroid adenoma  - CT 4D 9/2022, 5 mm nodule at the very inferior tip of the left lobe of the thyroid gland small parathyroid adenoma.  - Status post left parathyroidectomy for parathyroid adenoma on 9/2022.    - patient is concerned about continue elevation PTH, we will repeat lab work today to evaluate, monitor PTH and calcium level, it both elevation, will discuss with ENT Dr. Lorenzo for further workup  - otherwise routine follow-up with me 5 months

## 2022-12-09 NOTE — PROGRESS NOTES
Subjective:      Patient ID: Robert Smith is a 62 y.o. female presented to [facility] on 12/9/2022.  Chief Complaint:  Hypercalcemia      History of Present Illness: Robert Smith is a 62 y.o. female here for hypercalcemia, hyperparathyroidism    Other significant past medical history:  DVT on blood thinners, Rheumatoid arthritis (on systemic steroid and rituximab), fibromyalgia, obesity      Interval history:  Here for follow-up. Status post left parathyroidectomy for parathyroid adenoma on 9/2022.    Patient is concerned that her PTH is elevated.  Here for evaluation  Doing well postoperatively.  No issue with surgery.    Continues to have off and on pain due to fibromyalgia and rheumatoid arthritis  Patient received Prolia without any issue.      1) With Hyperparathyroidism  - With history of  hypercalcemia  - First noted on blood work done 02/2022 , calcium 10.8 that time, hypercalcemia chronic  - Does have fluctuation of calcium as far back as 2009 with hypocalcemia and hypercalcemia  - Saw ENT, sestamibi scan 6/2022; Question small/subtle left inferior parathyroid adenoma  - CT 4D 9/2022, 5 mm nodule at the very inferior tip of the left lobe of the thyroid gland small parathyroid adenoma.  - Status post left parathyroidectomy for parathyroid adenoma on 9/2022.    - Pathology: Left inferior parathyroid, excision: Hypercellular parathyroid tissue, compatible with hyperplasia    Past medical history significant for:  - Rheumatoid arthritis: Dx 2003, off and on steroid use in the past, currently on  prednisone use over the last 6 months, weaning per Rheumatology  - Kidney transplant: no  - Osteoporosis: yes  - Renal stones: no  - Immobility/fractures or healing fractures: no  - Gastric bypass/gastric surgery:  No  - Family history of renal stones/hypercalcemia: no  - Chronic kidney disease /ESRD:no  - History of cancer/with metastasis: no  - Prior radiation treatment, or unexplained  elevations of alk phos on labs: no  - Hyperthyroidism/Graves: no  - Anemia: no     Tobacco use, including use in the past:  no   EtOH use? no (<2 drinks a day in female, <3 drinks a day for male)    Lab Results   Component Value Date    .2 (H) 09/28/2022    PTH 16.3 09/20/2022    .6 (H) 09/07/2022    FUHYMKRX96NI 37 06/06/2022    WTDZPYLZ11QH 30 08/27/2012    CWDSCGGV82US 23 (L) 06/15/2011    CALCIUM 10.4 09/07/2022    CALCIUM 10.5 06/06/2022    CALCIUM 11.1 (H) 06/03/2022    PHOS 2.5 (L) 06/06/2022    PHOS 2.3 (L) 02/24/2022    PHOS 3.8 06/03/2016    ALKPHOS 93 09/07/2022    ALKPHOS 118 06/06/2022    ALKPHOS 112 06/03/2022    TSH 0.791 06/06/2022    LABCALC 15.4 (H) 06/06/2022    VOMJCCB81GCR 8 06/06/2022     Urine Calcium/Creatine Clearance Ratio: 0.023  - [facility] unlikely  - Urine Ca 200   Latest Reference Range & Units 06/06/22 05:00   Calcium, Urine 0.0 - 15.0 mg/dL 15.4 (H)   Calcium, 24 Hr Urine 4 - 12 mg/Hr 8   Calcium, Urine (mg/spec) mg/Spec 200   Creatinine, Urinr (mg/spec) mg/Spec 664.3   Creatinine, Timed Urine 40.0 - 75.0 mg/Hr 27.7 (L)        2) In regards to Osteoporosis  - Diagnosis on DXA in 2017  - Unclear if started on therapy, patient denies taking Fosamax/Boniva  - Chronic rheumatoid arthritis diagnosed in 2003, off and on steroid use in the past, currently on  prednisone use over the last 6 months, weaning per Rheumatology  - started Prolia 9/1/2022>> next injection 3/2/2023    Osteoporosis Risk Factor Assessment:  History of falls over the last 2 years: no  History of fractures to wrist, hip or spine: R ankle fracture 5 years ago  History of loss of height of more than 1 1/2 to 2 inches: no  Family history of osteoporosis: yes, mother  Poor balance    Age of menopause: 44 yo, no HRT  Tobacco use, including use in the past:  no   EtOH use? no    Weight bearing exercise?   No   Dental work planned? Yes>> reportedly needing major jaw surgery, she is holding off, does need few teeth  extraction soon    Recent DXA: 4/2022  Lumbar spine (L1-L4):  BMD is 0.739 g/cm2, T-score is -2.8, and Z-score is -1.2.  Total hip: BMD is 0.601 g/cm2, T-score is -2.8, and Z-score is -1.7.  Femoral neck:  BMD is 0.473 g/cm2, T-score is -3.1, and Z-score is -2.0.  Distal 1/3 radius:  BMD is 0.435 g/cm2, T-score is -4.3, and Z-score is -2.8.     FRAX:  48% risk of a major osteoporotic fracture in the next 10 years.  20% risk of hip fracture in the next 10 years.     Impression:  *Osteoporosis based on T-score below -2.5        Reviewed past surgical, medical, family, social history and updated as appropriate.  Review of Systems: see HPI above    Objective:   /71 (BP Location: Right arm)   Pulse 71   Temp 98.2 °F (36.8 °C) (Oral)   Wt 91.1 kg (200 lb 12.8 oz)   BMI 37.94 kg/m²     Body mass index is 37.94 kg/m².  Vital signs reviewed    Physical Exam  Vitals and nursing note reviewed.   Constitutional:       General: She is not in acute distress.     Appearance: Normal appearance. She is well-developed. She is obese. She is not toxic-appearing.   Neck:      Thyroid: No thyromegaly.   Cardiovascular:      Heart sounds: Normal heart sounds.   Pulmonary:      Effort: Pulmonary effort is normal. No respiratory distress.   Abdominal:      Tenderness: There is no abdominal tenderness.   Musculoskeletal:         General: No deformity. Normal range of motion.      Cervical back: Normal range of motion.   Skin:     Findings: No bruising.   Neurological:      Mental Status: She is alert and oriented to person, place, and time.   Psychiatric:         Mood and Affect: Mood normal.       Lab Reviewed:   Lab Results   Component Value Date    HGBA1C 5.3 09/07/2022     Lab Results   Component Value Date    CHOL 196 09/10/2019    HDL 41 09/10/2019    LDLCALC 124.6 09/10/2019    TRIG 152 (H) 09/10/2019    CHOLHDL 20.9 09/10/2019     Lab Results   Component Value Date     09/07/2022    K 5.0 09/07/2022      09/07/2022    CO2 29 09/07/2022    GLU 80 09/07/2022    BUN 15 09/07/2022    CREATININE 0.7 09/07/2022    CALCIUM 10.4 09/07/2022    PROT 6.6 09/07/2022    ALBUMIN 3.6 09/07/2022    BILITOT 0.6 09/07/2022    ALKPHOS 93 09/07/2022    AST 16 09/07/2022    ALT 12 09/07/2022    ANIONGAP 5 (L) 09/07/2022    ESTGFRAFRICA >60 06/06/2022    EGFRNONAA >60 06/06/2022    TSH 0.791 06/06/2022       Lab Results   Component Value Date    .2 (H) 09/28/2022    PTH 16.3 09/20/2022    .6 (H) 09/07/2022    IOFLYDOS07OW 37 06/06/2022    HRATEJKI89EQ 30 08/27/2012    ZEHMGJUE19GI 23 (L) 06/15/2011    CALCIUM 10.4 09/07/2022    CALCIUM 10.5 06/06/2022    CALCIUM 11.1 (H) 06/03/2022    PHOS 2.5 (L) 06/06/2022    PHOS 2.3 (L) 02/24/2022    PHOS 3.8 06/03/2016    ALKPHOS 93 09/07/2022    ALKPHOS 118 06/06/2022    ALKPHOS 112 06/03/2022    TSH 0.791 06/06/2022    LABCALC 15.4 (H) 06/06/2022    OSRJBST78FQM 8 06/06/2022     Assessment     1. Hyperparathyroidism  Vitamin D    PTH, Intact    Comprehensive Metabolic Panel    Magnesium    Phosphorus      2. Age-related osteoporosis without current pathological fracture        3. Fibromyalgia        4. Severe obesity (BMI 35.0-39.9) with comorbidity        5. Gastroesophageal reflux disease, unspecified whether esophagitis present            Plan     Hyperparathyroidism  - history of hyperparathyroidism and hypercalcemia for many years  - remote history of hypercalcemia noted as far back as 2009  - No history of CKD stage IIIA, does have renal stone  - 24 hour urine Ca and Cr completed, rules out hypercalciuria/ or FHH  - normal vitamin-D, reassuring  - Saw ENT, sestamibi scan 6/2022; Question small/subtle left inferior parathyroid adenoma  - CT 4D 9/2022, 5 mm nodule at the very inferior tip of the left lobe of the thyroid gland small parathyroid adenoma.  - Status post left parathyroidectomy for parathyroid adenoma on 9/2022.    - patient is concerned about continue elevation PTH,  we will repeat lab work today to evaluate, monitor PTH and calcium level, it both elevation, will discuss with ENT Dr. Lorenzo for further workup  - otherwise routine follow-up with me 5 months      Osteoporosis  - Patient with noted for osteoporosis along with hypercalcemia and hyperparathyroidism  - osteoporosis longstanding since 2017, most likely due to chronic systemic steroid use and postmenopausal  - Secondary work up: have been done:  See plan above  - bone density 5/22 show worsening osteoporosis in  femoral neck  - started Prolia 9/1/2022>> next injection 3/2/2023  - advised patient to continue, continue vitamin-D supplement  - Fall precautions/Exercise regimen: advised  - high fall risk given history of recent fall, no fractures    Gastroesophageal reflux disease  - longstanding history of GERD, will avoid oral bisphosphonate    Fibromyalgia  - Still chronic pain, fatigue  - continue management by Rheumatology       Advised patient to follow up with PCP for routine health maintenance care.   RTC in 5 months       Luis Carlos Vidal M.D.  Endocrinology  [facility]  12/9/2022      Disclaimer: This note has been generated in part with the use of voice-recognition software. There may be typographical errors that have been missed during proof-reading.

## 2022-12-10 LAB — 25(OH)D3+25(OH)D2 SERPL-MCNC: 49 NG/ML (ref 30–96)

## 2022-12-29 ENCOUNTER — TELEPHONE (OUTPATIENT)
Dept: ENDOCRINOLOGY | Facility: CLINIC | Age: 62
End: 2022-12-29
Payer: MEDICARE

## 2022-12-29 NOTE — TELEPHONE ENCOUNTER
----- Message from Luis Carlos Vidal MD sent at 12/29/2022  2:16 PM CST -----  Please call the patient let her know that her parathyroid level did decrease to 92, was 105.  Normal vitamin-D.  Blood calcium 9.5, normal.  Overall stable finding    Luis Carlos

## 2023-01-30 ENCOUNTER — LAB VISIT (OUTPATIENT)
Dept: LAB | Facility: HOSPITAL | Age: 63
End: 2023-01-30
Attending: OTOLARYNGOLOGY
Payer: MEDICARE

## 2023-01-30 ENCOUNTER — TELEPHONE (OUTPATIENT)
Dept: PULMONOLOGY | Facility: CLINIC | Age: 63
End: 2023-01-30
Payer: MEDICARE

## 2023-01-30 ENCOUNTER — OFFICE VISIT (OUTPATIENT)
Dept: OTOLARYNGOLOGY | Facility: CLINIC | Age: 63
End: 2023-01-30
Payer: MEDICARE

## 2023-01-30 VITALS — HEIGHT: 61 IN | BODY MASS INDEX: 37.76 KG/M2 | WEIGHT: 200 LBS

## 2023-01-30 DIAGNOSIS — Z90.89 H/O PARATHYROIDECTOMY: ICD-10-CM

## 2023-01-30 DIAGNOSIS — G47.33 OSA (OBSTRUCTIVE SLEEP APNEA): ICD-10-CM

## 2023-01-30 DIAGNOSIS — Z98.890 H/O PARATHYROIDECTOMY: ICD-10-CM

## 2023-01-30 DIAGNOSIS — R53.83 FATIGUE, UNSPECIFIED TYPE: ICD-10-CM

## 2023-01-30 DIAGNOSIS — R53.83 FATIGUE, UNSPECIFIED TYPE: Primary | ICD-10-CM

## 2023-01-30 LAB — TSH SERPL DL<=0.005 MIU/L-ACNC: 0.77 UIU/ML (ref 0.4–4)

## 2023-01-30 PROCEDURE — 3008F PR BODY MASS INDEX (BMI) DOCUMENTED: ICD-10-PCS | Mod: CPTII,S$GLB,, | Performed by: OTOLARYNGOLOGY

## 2023-01-30 PROCEDURE — 36415 COLL VENOUS BLD VENIPUNCTURE: CPT | Performed by: OTOLARYNGOLOGY

## 2023-01-30 PROCEDURE — 99213 OFFICE O/P EST LOW 20 MIN: CPT | Mod: S$GLB,,, | Performed by: OTOLARYNGOLOGY

## 2023-01-30 PROCEDURE — 99213 PR OFFICE/OUTPT VISIT, EST, LEVL III, 20-29 MIN: ICD-10-PCS | Mod: S$GLB,,, | Performed by: OTOLARYNGOLOGY

## 2023-01-30 PROCEDURE — 84443 ASSAY THYROID STIM HORMONE: CPT | Performed by: OTOLARYNGOLOGY

## 2023-01-30 PROCEDURE — 3008F BODY MASS INDEX DOCD: CPT | Mod: CPTII,S$GLB,, | Performed by: OTOLARYNGOLOGY

## 2023-01-30 NOTE — PROGRESS NOTES
OTOLARYNGOLOGY CLINIC NOTE  Date:  01/30/2023     Chief complaint:  Chief Complaint   Patient presents with    Follow-up     4 month follow up for after parathyroidectomy. Just seems to not have no energy, feeling down all the time. Little light headedness       History of Present Illness  Robert Smith is a 62 y.o. female  presenting today for a followup.   I performed  left parathyroidectomy for parathyroid adenoma on 9- 20-22.    Had sleep apnea in the past , did not like cpap not using anything currently    Had some weight gain with the steroids she has been on recently    Has had issues with going to sleep  +fatigue. Has been having lack of energy and feeling depressed as well.   No issue with voice or swallowing      Past Medical History  Past Medical History:   Diagnosis Date    Acid reflux     Allergy     Anxiety     Degenerative disc disease     Depression     DVT of leg (deep venous thrombosis) 2009    rt leg    Fibromyalgia     Fibromyalgia     Long-term current use of steroids 11/12/2012    NSTEMI (non-ST elevated myocardial infarction) 5/18/2019    Osteoporosis, postmenopausal     chronic steroid use    RA (rheumatoid arthritis)         Past Surgical History  Past Surgical History:   Procedure Laterality Date    ANKLE FRACTURE SURGERY      BACK SURGERY      CARPAL TUNNEL RELEASE      CHOLECYSTECTOMY      HARDWARE REMOVAL      JOINT REPLACEMENT Right     right knee    KNEE SURGERY      Bilateral    KNEE SURGERY Left     revision x 2    PARATHYROIDECTOMY Left 9/20/2022    Procedure: PARATHYROIDECTOMY, left possible bilateral, recurrent laryngeal nerve monitoring and pth lab draws and frozen section;  Surgeon: Carly Lorenzo MD;  Location: [facility];  Service: ENT;  Laterality: Left;  NEUROMONITORING SACHA GUEVARA [phone]  RN PREOP 9/12/2022   POSITIVE COVID ON 7/27/2022----HAS CARDS CLEARANCE    SPINE SURGERY      TONSILLECTOMY      TUBAL LIGATION          Medications  Current Outpatient  Medications on File Prior to Visit   Medication Sig Dispense Refill    albuterol sulfate 90 mcg/actuation AePB Inhale 1 puff into the lungs every 4 (four) hours as needed. Rescue 1 each 1    ascorbic acid, vitamin C, (VITAMIN C) 1000 MG tablet Take 1,000 mg by mouth once daily.      azelastine (ASTELIN) 137 mcg (0.1 %) nasal spray 1 spray (137 mcg total) by Nasal route 2 (two) times daily. 30 mL 3    cyanocobalamin 1,000 mcg/mL injection INJECT 1000 MCG (1ML) AS DIRECTED EVERY 28 DAYS      dicyclomine (BENTYL) 10 MG capsule Take 20 mg by mouth daily as needed.      ELIQUIS 5 mg Tab Take 5 mg by mouth 2 (two) times daily.      fluticasone propionate (FLONASE) 50 mcg/actuation nasal spray 1 spray by Each Nostril route once daily.      fluticasone propionate (FLONASE) 50 mcg/actuation nasal spray 2 sprays (100 mcg total) by Each Nostril route 2 (two) times daily. 18.2 mL 3    HYDROcodone-acetaminophen (NORCO)  mg per tablet Take 1 tablet by mouth every 8 (eight) hours as needed.      hydrOXYzine HCL (ATARAX) 25 MG tablet Take 25 mg by mouth every 6 (six) hours as needed.      levocetirizine (XYZAL) 5 MG tablet TAKE 1 TABLET DAILY AS NEEDED BY MOUTH FOR ALLERGIES      methocarbamoL (ROBAXIN) 500 MG Tab Take 500 mg by mouth once daily.      montelukast (SINGULAIR) 10 mg tablet       multivitamin capsule Take 1 capsule by mouth once daily.      ondansetron (ZOFRAN) 8 MG tablet       ondansetron (ZOFRAN-ODT) 4 MG TbDL Dissolve 1 tablet (4 mg total) by mouth every 6 (six) hours as needed (nausea). 10 tablet 0    oxyCODONE-acetaminophen (PERCOCET)  mg per tablet Take 1 tablet by mouth every 6 (six) hours as needed for Pain (severe pain not controlled ; do not take if taking hydrocodone). 20 tablet 0    pantoprazole (PROTONIX) 40 MG tablet       polyethylene glycol (GLYCOLAX) 17 gram/dose powder Take 17 g by mouth.      prednisoLONE acetate (PRED FORTE) 1 % DrpS Place 1 drop into both eyes 2 (two) times daily.       predniSONE (DELTASONE) 5 MG tablet TAKE 3 TABS BY MOUTH DAILY FOR 21 DAYS, THEN 2 TABS DAILY FOR 21 DAYS, THEN 1 TAB DAILY FOR 21 DAYS. 126 tablet 0    riTUXimab (RITUXAN) 10 mg/mL injection Inject into the vein.      SODIUM CHLORIDE FOR INHALATION (SODIUM CHLORIDE 0.9%) 0.9 % nebulizer solution       tiZANidine (ZANAFLEX) 2 MG tablet       UNABLE TO FIND medication name: Wellcana 300mg THC   300mg CBD  drops   nightly      vitamin D (VITAMIN D3) 1000 units Tab Take 1,000 Units by mouth once daily.      meclizine (ANTIVERT) 25 mg tablet TAKE 1 TABLET BY MOUTH 3 (THREE) TIMES DAILY AS NEEDED FOR DIZZINESS      metoprolol succinate (TOPROL-XL) 25 MG 24 hr tablet Take 12.5 mg by mouth.      traZODone (DESYREL) 50 MG tablet Take 50 mg by mouth.      [DISCONTINUED] ADVAIR DISKUS 250-50 mcg/dose diskus inhaler Inhale 1 puff into the lungs daily as needed.       [DISCONTINUED] lisinopril (PRINIVIL,ZESTRIL) 2.5 MG tablet       [DISCONTINUED] warfarin (COUMADIN) 1 MG tablet        No current facility-administered medications on file prior to visit.       Review of Systems  Review of Systems   Constitutional:  Positive for malaise/fatigue.   Eyes: Negative.    Cardiovascular: Negative.    Gastrointestinal:  Positive for abdominal pain and constipation.   Genitourinary: Negative.    Skin: Negative.    Psychiatric/Behavioral: Negative.      Answers submitted by the patient for this visit:  Review of Symptoms Questionnaire  (Submitted on 1/30/2023)  postnasal drip: Yes  Sleep Apnea?: Yes  Muscle aches / pain?: Yes  Seasonal Allergies?: Yes  Cold all of the time? : Yes  Light-headedness: Yes  Social History   reports that she has never smoked. She has never used smokeless tobacco. She reports that she does not drink alcohol and does not use drugs.     Family History  Family History   Problem Relation Age of Onset    COPD Mother     Heart attack Father     Emphysema Maternal Grandfather     Breast cancer Neg Hx     Colon cancer  "Neg Hx     Ovarian cancer Neg Hx         Physical Exam   There were no vitals filed for this visit. Body mass index is 37.79 kg/m².  Weight: 90.7 kg (200 lb)   Height: 5' 1" (154.9 cm)     GENERAL: no acute distress.  HEAD: normocephalic.   EYES: No scleral icterus  EARS: external ear without lesion, normal pinna shape and position.   NOSE: external nose without significant bony abnormality; septum deviated right  ORAL CAVITY/OROPHARYNX: tongue mobile.   NECK: trachea midline. Incision well healed  RESPIRATORY: no stridor, no stertor. Voice normal. Respirations nonlabored.  NEURO: alert, responds to questions appropriately.    PSYCH:mood appropriate      Imaging:  The patient does not have any new imaging of the head and neck since last visit.     Labs:  CBC  Recent Labs   Lab 06/03/22  1058 06/06/22  0748 07/27/22  1902   WBC 6.99 5.95 4.37   Hemoglobin 13.7 13.4 13.9   Hematocrit 43.7 42.7 43.6   MCV 97 98 95   Platelets 194 162 103 L     BMP  Recent Labs   Lab 02/24/22  0949 06/03/22  1058 06/06/22  0748 08/25/22  1453 09/07/22  0815 12/09/22  1430   Glucose 69 L   < > 91  --  80 165 H   Sodium 143   < > 142  --  141 142   Potassium 4.8   < > 4.3  --  5.0 5.0   Chloride 104   < > 105  --  107 102   CO2 27   < > 30 H  --  29 32 H   BUN 22   < > 15  --  15 26 H   Creatinine 0.8   < > 0.8 0.8 0.7 0.9   Calcium 10.8 H   < > 10.5  --  10.4 9.5   Phosphorus 2.3 L  --  2.5 L  --   --  2.9   Magnesium  --   --  2.1  --   --  2.1    < > = values in this interval not displayed.     COAGS  Recent Labs   Lab 12/04/20  1513   INR 1.1       Assessment  1. Fatigue, unspecified type  - TSH; Future    2. LONNIE (obstructive sleep apnea)  - Home Sleep Study; Future  - Ambulatory referral/consult to Sleep Disorders; Future    3. H/O parathyroidectomy       Plan:  Discussed plan of care with patient in detail and all questions answered. Patient reported understanding of plan of care.   Will check TSH regarding fatigue   Sleep med " referral and sleep study ordered- discussed difference between hst and psg and about alex and different mgmt options  Will message with results of labs  F/u prn   I spent a total of 20 minutes on the day of the visit.  This includes face to face time and non-face to face time preparing to see the patient (eg, review of tests), obtaining and/or reviewing separately obtained history, documenting clinical information in the electronic or other health record, independently interpreting results and communicating results to the patient/family/caregiver, or care coordinator.   Please be aware that this note has been generated with the assistance of MModal voice-to-text.  Please excuse any spelling or grammatical errors.

## 2023-02-06 ENCOUNTER — OFFICE VISIT (OUTPATIENT)
Dept: RHEUMATOLOGY | Facility: CLINIC | Age: 63
End: 2023-02-06
Payer: MEDICARE

## 2023-02-06 VITALS
WEIGHT: 209.88 LBS | BODY MASS INDEX: 39.63 KG/M2 | HEART RATE: 68 BPM | DIASTOLIC BLOOD PRESSURE: 72 MMHG | SYSTOLIC BLOOD PRESSURE: 117 MMHG | HEIGHT: 61 IN

## 2023-02-06 DIAGNOSIS — M05.79 RHEUMATOID ARTHRITIS INVOLVING MULTIPLE SITES WITH POSITIVE RHEUMATOID FACTOR: Primary | ICD-10-CM

## 2023-02-06 DIAGNOSIS — E66.01 SEVERE OBESITY (BMI 35.0-39.9) WITH COMORBIDITY: ICD-10-CM

## 2023-02-06 DIAGNOSIS — M79.7 FIBROMYALGIA: ICD-10-CM

## 2023-02-06 DIAGNOSIS — Z79.899 HIGH RISK MEDICATION USE: ICD-10-CM

## 2023-02-06 DIAGNOSIS — M20.031 SWAN-NECK DEFORMITY OF FINGER OF BOTH HANDS: ICD-10-CM

## 2023-02-06 DIAGNOSIS — M20.032 SWAN-NECK DEFORMITY OF FINGER OF BOTH HANDS: ICD-10-CM

## 2023-02-06 PROCEDURE — 3078F PR MOST RECENT DIASTOLIC BLOOD PRESSURE < 80 MM HG: ICD-10-PCS | Mod: CPTII,S$GLB,, | Performed by: INTERNAL MEDICINE

## 2023-02-06 PROCEDURE — 99999 PR PBB SHADOW E&M-EST. PATIENT-LVL IV: ICD-10-PCS | Mod: PBBFAC,,, | Performed by: INTERNAL MEDICINE

## 2023-02-06 PROCEDURE — 1159F MED LIST DOCD IN RCRD: CPT | Mod: CPTII,S$GLB,, | Performed by: INTERNAL MEDICINE

## 2023-02-06 PROCEDURE — 3074F PR MOST RECENT SYSTOLIC BLOOD PRESSURE < 130 MM HG: ICD-10-PCS | Mod: CPTII,S$GLB,, | Performed by: INTERNAL MEDICINE

## 2023-02-06 PROCEDURE — 3008F BODY MASS INDEX DOCD: CPT | Mod: CPTII,S$GLB,, | Performed by: INTERNAL MEDICINE

## 2023-02-06 PROCEDURE — 99999 PR PBB SHADOW E&M-EST. PATIENT-LVL IV: CPT | Mod: PBBFAC,,, | Performed by: INTERNAL MEDICINE

## 2023-02-06 PROCEDURE — 1159F PR MEDICATION LIST DOCUMENTED IN MEDICAL RECORD: ICD-10-PCS | Mod: CPTII,S$GLB,, | Performed by: INTERNAL MEDICINE

## 2023-02-06 PROCEDURE — 99214 OFFICE O/P EST MOD 30 MIN: CPT | Mod: S$GLB,,, | Performed by: INTERNAL MEDICINE

## 2023-02-06 PROCEDURE — 3078F DIAST BP <80 MM HG: CPT | Mod: CPTII,S$GLB,, | Performed by: INTERNAL MEDICINE

## 2023-02-06 PROCEDURE — 3074F SYST BP LT 130 MM HG: CPT | Mod: CPTII,S$GLB,, | Performed by: INTERNAL MEDICINE

## 2023-02-06 PROCEDURE — 99214 PR OFFICE/OUTPT VISIT, EST, LEVL IV, 30-39 MIN: ICD-10-PCS | Mod: S$GLB,,, | Performed by: INTERNAL MEDICINE

## 2023-02-06 PROCEDURE — 3008F PR BODY MASS INDEX (BMI) DOCUMENTED: ICD-10-PCS | Mod: CPTII,S$GLB,, | Performed by: INTERNAL MEDICINE

## 2023-02-06 RX ORDER — OXYCODONE AND ACETAMINOPHEN 7.5; 325 MG/1; MG/1
1 TABLET ORAL 2 TIMES DAILY PRN
COMMUNITY
Start: 2023-02-02 | End: 2023-03-23

## 2023-02-06 RX ORDER — AMITRIPTYLINE HYDROCHLORIDE 50 MG/1
50 TABLET, FILM COATED ORAL NIGHTLY
Qty: 30 TABLET | Refills: 11 | Status: SHIPPED | OUTPATIENT
Start: 2023-02-06 | End: 2023-05-29 | Stop reason: ALTCHOICE

## 2023-02-06 RX ORDER — FAMOTIDINE 40 MG/1
40 TABLET, FILM COATED ORAL
COMMUNITY
Start: 2022-12-01 | End: 2023-12-01

## 2023-02-06 NOTE — ASSESSMENT & PLAN NOTE
Ongoing chronic pain and sleep difficulty    She asks about amitriptyline so I will prescribe 50 mg hs which can increase to 100 or 150 if needed to help sleep and pain

## 2023-02-06 NOTE — PROGRESS NOTES
Rapid3 Question Responses and Scores 10/13/2020   MDHAQ Score 1.2   Psychologic Score 7.7   Pain Score 8   When you awakened in the morning OVER THE LAST WEEK, did you feel stiff? Yes   If Yes, please indicate the number of hours until you are as limber as you will be for the day 1   Fatigue Score 10   Global Health Score 8.5   RAPID3 Score 6.83

## 2023-02-06 NOTE — ASSESSMENT & PLAN NOTE
No treatment related adverse effects except possibly hypogammaglobulinemia; will continue to monitor for drug toxicity  Including repeat Ig levels and rituximab sensitivity assay before next infusion

## 2023-02-06 NOTE — ASSESSMENT & PLAN NOTE
Moderate disease activity on rituximab 500 mg every 6 months, but almost no objective synovotis and suspect that most pain is due to fibromyalgia and chronic pain and possibly some neuropathy as well    Will plan lab and repeat rituximab in April (6 mo)    RTC me Nicole

## 2023-02-06 NOTE — PROGRESS NOTES
Subjective:       Patient ID: Robert Smith is a 62 y.o. female.    Chief Complaint: Disease Management    HPI     RA and FMS that presents for follow-up.     Hx RA dx 2003; dx at [facility] ; started with hands and all over body  On prednisone 5 mg/d since 2003  Rx here 2007 Dr Dickens Rx MTX and Humira  MTX nausea with po and SQ  LFA tried 2007 by Dr Terry and she did not tolerate with nausea and hospitalization  2008 Dr Mann retried MTX; did not tolerate  2010-12 Dr Rosenbaum treated with Plaquenil(HCQ)  Humira quit working 2010 2010 Simponi tried - caused weak and shaky reaction  2011 Orencia tried - caused muscle aches, shakes and jaw pain  2011 Enbrel tried - did not work  Dr Paris [phone];  Had Rituxin 2016  Earlier 2017 took Xeljanz for a few months; had a lot of nausea and she felt arms and hands getting numb  SSZ July 2017 caused nausea and hepatitis   ENBREL retried  Aug 2017 - stopped Dec due to lack of effectiveness  Cimzia Jan 2018 caused urticarial rash  Actemra March 2018 - Oct 2019 with partial improvement  Nov 2019 started upadacitinib/Rinvoq; stopped April 2020 due to side effects  RTX  July/Aug 2020; Jan/Feb 2021 ; Oct 14, 2021 , Oct 20, 2022     Also Prolia 9/1/22    Pain management Dr Elaine and Jenna  Pain med switch from norco to percocet 7.5 not helping   Just got shots in trapezius  Medical cannabis may help sleep but not pain  Sleep has been bad  In bed a lot  Cannot tolerate standing; back and leg pain    Joint swelling not bad  Hand pain bad some days  [place] neck deformities are unchanged  Foot pain; maybe neuropathic pain    Review of Systems   Constitutional:  Positive for fatigue. Negative for fever and unexpected weight change.   HENT:  Negative for mouth sores and trouble swallowing.         No Dry mouth   Eyes:  Negative for redness.        No Dry eyes   Respiratory:  Negative for cough and shortness of breath.    Cardiovascular:  Negative for chest pain.   Gastrointestinal:   "Negative for abdominal pain, constipation and diarrhea.   Genitourinary:  Negative for dysuria and genital sores.   Skin:  Negative for rash.   Neurological:  Negative for headaches.   Hematological:  Negative for adenopathy. Does not bruise/bleed easily.   Psychiatric/Behavioral:  Positive for sleep disturbance.         Quit taking Belsomra and trazodone as did not help sleep       Objective:   /72   Pulse 68   Ht 5' 1" (1.549 m)   Wt 95.2 kg (209 lb 14.1 oz)   BMI 39.66 kg/m²      Physical Exam   Musculoskeletal:      Comments: Persistent swan neck deformities; no synovitis except for 2nd mcp       9/27/2021 1/28/2022 5/27/2022 2/6/2023   Tender (HERNANDEZ-28) 8 / 28 1 / 28 4 / 28 2 / 28    Swollen (HERNANDEZ-28) 7 / 28 0 / 28 1 / 28 1 / 28    Provider Global 80 mm 20 mm 20 mm 10 mm   Patient Global 80 mm 40 mm 70 mm 80 mm   ESR 21 mm/hr 4 mm/hr 22 mm/hr 22 mm/hr   CRP 6.6 mg/L 2.7 mg/L 4.7 mg/L 4.7 mg/L   HERNANDEZ-28 (ESR) 5.58 (High disease activity) 2.09 (Remission) 4.54 (Moderate disease activity) 4.36 (Moderate disease activity)   HERNANDEZ-28 (CRP) 5.13 (High disease activity) 2.55 (Remission) 3.97 (Moderate disease activity) 3.78 (Moderate disease activity)   CDAI Score 31  7  14  12      Lab Results   Component Value Date    SEDRATE 22 06/03/2022    SEDRATE 22 06/03/2022          Assessment:       1. Rheumatoid arthritis involving multiple sites with positive rheumatoid factor    2. Fibromyalgia    3. High risk medication use    4. Severe obesity (BMI 35.0-39.9) with comorbidity    5. [place]-neck deformity of finger of both hands            Plan:       Problem List Items Addressed This Visit          Active Problems    Rheumatoid arthritis with positive rheumatoid factor - Primary     Moderate disease activity on rituximab 500 mg every 6 months, but almost no objective synovotis and suspect that most pain is due to fibromyalgia and chronic pain and possibly some neuropathy as well    Will plan lab and repeat " rituximab in April (6 mo)    RTC me June         High risk medication use     No treatment related adverse effects except possibly hypogammaglobulinemia; will continue to monitor for drug toxicity  Including repeat Ig levels and rituximab sensitivity assay before next infusion         Relevant Orders    Rituxan Sensitivity    Fibromyalgia     Ongoing chronic pain and sleep difficulty    She asks about amitriptyline so I will prescribe 50 mg hs which can increase to 100 or 150 if needed to help sleep and pain         Relevant Medications    amitriptyline (ELAVIL) 50 MG tablet    [place]-neck deformity of finger of both hands    Severe obesity (BMI 35.0-39.9) with comorbidity     Gained weight with recent steroid taper

## 2023-02-07 ENCOUNTER — TELEPHONE (OUTPATIENT)
Dept: SLEEP MEDICINE | Facility: HOSPITAL | Age: 63
End: 2023-02-07
Payer: MEDICARE

## 2023-03-01 ENCOUNTER — HOSPITAL ENCOUNTER (EMERGENCY)
Facility: HOSPITAL | Age: 63
Discharge: HOME | End: 2023-03-02
Attending: EMERGENCY MEDICINE
Payer: MEDICARE

## 2023-03-01 DIAGNOSIS — R10.32 LEFT LOWER QUADRANT ABDOMINAL PAIN: Primary | ICD-10-CM

## 2023-03-01 DIAGNOSIS — K59.00 CONSTIPATION, UNSPECIFIED CONSTIPATION TYPE: ICD-10-CM

## 2023-03-01 LAB
ALBUMIN SERPL-MCNC: 3.8 G/DL (ref 3.3–5.5)
ALP SERPL-CCNC: 76 U/L (ref 42–141)
BILIRUB SERPL-MCNC: 0.8 MG/DL (ref 0.2–1.6)
BILIRUBIN, POC UA: NEGATIVE
BLOOD, POC UA: NEGATIVE
BUN SERPL-MCNC: 20 MG/DL (ref 7–22)
CALCIUM SERPL-MCNC: 9.8 MG/DL (ref 8–10.3)
CHLORIDE SERPL-SCNC: 104 MMOL/L (ref 98–108)
CLARITY, POC UA: ABNORMAL
COLOR, POC UA: YELLOW
CREAT SERPL-MCNC: 0.8 MG/DL (ref 0.6–1.2)
GLUCOSE SERPL-MCNC: 95 MG/DL (ref 73–118)
GLUCOSE, POC UA: NEGATIVE
KETONES, POC UA: NEGATIVE
LEUKOCYTE EST, POC UA: ABNORMAL
NITRITE, POC UA: NEGATIVE
PH UR STRIP: 6 [PH]
POC ALT (SGPT): 28 U/L (ref 10–47)
POC AST (SGOT): 30 U/L (ref 11–38)
POC TCO2: 31 MMOL/L (ref 18–33)
POTASSIUM BLD-SCNC: 4.6 MMOL/L (ref 3.6–5.1)
PROTEIN, POC UA: NEGATIVE
PROTEIN, POC: 6.6 G/DL (ref 6.4–8.1)
SODIUM BLD-SCNC: 144 MMOL/L (ref 128–145)
SPECIFIC GRAVITY, POC UA: 1.02
UROBILINOGEN, POC UA: 2 E.U./DL

## 2023-03-01 PROCEDURE — 80053 COMPREHEN METABOLIC PANEL: CPT | Mod: ER

## 2023-03-01 PROCEDURE — 25500020 PHARM REV CODE 255: Mod: ER | Performed by: NURSE PRACTITIONER

## 2023-03-01 PROCEDURE — 63600175 PHARM REV CODE 636 W HCPCS: Mod: ER | Performed by: NURSE PRACTITIONER

## 2023-03-01 PROCEDURE — 85025 COMPLETE CBC W/AUTO DIFF WBC: CPT | Mod: ER

## 2023-03-01 PROCEDURE — 99285 EMERGENCY DEPT VISIT HI MDM: CPT | Mod: 25,ER

## 2023-03-01 PROCEDURE — 96374 THER/PROPH/DIAG INJ IV PUSH: CPT | Mod: ER

## 2023-03-01 PROCEDURE — 96376 TX/PRO/DX INJ SAME DRUG ADON: CPT | Mod: ER

## 2023-03-01 PROCEDURE — 25000003 PHARM REV CODE 250: Mod: ER | Performed by: NURSE PRACTITIONER

## 2023-03-01 PROCEDURE — 96375 TX/PRO/DX INJ NEW DRUG ADDON: CPT | Mod: ER

## 2023-03-01 PROCEDURE — 96361 HYDRATE IV INFUSION ADD-ON: CPT | Mod: ER

## 2023-03-01 PROCEDURE — 81003 URINALYSIS AUTO W/O SCOPE: CPT | Mod: ER

## 2023-03-01 PROCEDURE — 82803 BLOOD GASES ANY COMBINATION: CPT | Mod: ER

## 2023-03-01 RX ORDER — MORPHINE SULFATE 4 MG/ML
4 INJECTION, SOLUTION INTRAMUSCULAR; INTRAVENOUS
Status: COMPLETED | OUTPATIENT
Start: 2023-03-01 | End: 2023-03-01

## 2023-03-01 RX ORDER — ONDANSETRON 2 MG/ML
4 INJECTION INTRAMUSCULAR; INTRAVENOUS
Status: COMPLETED | OUTPATIENT
Start: 2023-03-01 | End: 2023-03-01

## 2023-03-01 RX ORDER — DIPHENHYDRAMINE HYDROCHLORIDE 50 MG/ML
25 INJECTION INTRAMUSCULAR; INTRAVENOUS
Status: COMPLETED | OUTPATIENT
Start: 2023-03-01 | End: 2023-03-01

## 2023-03-01 RX ADMIN — SODIUM CHLORIDE 1000 ML: 9 INJECTION, SOLUTION INTRAVENOUS at 06:03

## 2023-03-01 RX ADMIN — IOHEXOL 100 ML: 350 INJECTION, SOLUTION INTRAVENOUS at 07:03

## 2023-03-01 RX ADMIN — MORPHINE SULFATE 4 MG: 4 INJECTION, SOLUTION INTRAMUSCULAR; INTRAVENOUS at 10:03

## 2023-03-01 RX ADMIN — DIPHENHYDRAMINE HYDROCHLORIDE 25 MG: 50 INJECTION, SOLUTION INTRAMUSCULAR; INTRAVENOUS at 09:03

## 2023-03-01 RX ADMIN — MORPHINE SULFATE 4 MG: 4 INJECTION, SOLUTION INTRAMUSCULAR; INTRAVENOUS at 07:03

## 2023-03-01 RX ADMIN — ONDANSETRON 4 MG: 2 INJECTION INTRAMUSCULAR; INTRAVENOUS at 07:03

## 2023-03-02 VITALS
DIASTOLIC BLOOD PRESSURE: 78 MMHG | HEART RATE: 84 BPM | OXYGEN SATURATION: 98 % | SYSTOLIC BLOOD PRESSURE: 141 MMHG | HEIGHT: 61 IN | TEMPERATURE: 98 F | RESPIRATION RATE: 18 BRPM | WEIGHT: 197 LBS | BODY MASS INDEX: 37.19 KG/M2

## 2023-03-02 PROCEDURE — 25000003 PHARM REV CODE 250: Mod: ER | Performed by: NURSE PRACTITIONER

## 2023-03-02 RX ORDER — LACTULOSE 10 G/15ML
20 SOLUTION ORAL
Status: COMPLETED | OUTPATIENT
Start: 2023-03-02 | End: 2023-03-02

## 2023-03-02 RX ORDER — LACTULOSE 10 G/15ML
20 SOLUTION ORAL DAILY
Qty: 90 ML | Refills: 0 | Status: SHIPPED | OUTPATIENT
Start: 2023-03-02 | End: 2023-03-05

## 2023-03-02 RX ADMIN — LACTULOSE 20 G: 10 SOLUTION ORAL at 12:03

## 2023-03-02 NOTE — DISCHARGE INSTRUCTIONS
Use lactulose as prescribed.  If ineffective may use miralax 17gm in water every 20min until bowel movements are clear.      Use percocet already in your possession (oxycodone) for pain control.  You have been given a medication that causes drowsiness.  Do not operate motor vehicles, drink alcohol, or operate heavy machinery while taking this medication.     Return to the Emergency Department for any worsening, change in condition, or any emergent concerns.

## 2023-03-02 NOTE — ED PROVIDER NOTES
Encounter Date: 3/1/2023    SCRIBE #1 NOTE: I, Taqueria Fisher, am scribing for, and in the presence of,  Rito Andujar DNP. I have scribed the following portions of the note - Other sections scribed: HPI, ROS, PE.     History     Chief Complaint   Patient presents with    Abdominal Pain     Pt complains of LLQ for a about 2 hours. Denies n/v/diarrhea. Describes pain as sharp and constant. Hx of RA abd fibromyalgia. Pt is on pain medicine. PT mentioned she had small bowel movement yesterday.      62 year old female with Rheumatoid Arthritis, NSTEMI, DVT, Hyperlipidemia, and Fibromyalgia presents to the ER for sharp, constant LLQ abdominal pain that radiates to the groin region for 2 hours. Associated symptoms are appetite changes, nausea, and constipation. Pain is exacerbated with movement and walking, and she now rates it a 10/10. She states she had the same pain 2 days ago that resolved all of a sudden. Patient reports a small meal on today interrupted with nausea. Last BM on yesterday.Patient also reports a back surgery with entry into the abdomen. Denies removal of ovaries. Patient is in compliance with pain medications. Denies fever, vaginal discharge, dysuria, rash, or other symptoms. No further complaints at present time.    The history is provided by the patient.   Review of patient's allergies indicates:   Allergen Reactions    Sulfasalazine      HEPATITIS    Sulfa (sulfonamide antibiotics) Nausea And Vomiting    Clindamycin Diarrhea    Daypro [oxaprozin] Nausea Only    Orencia  [abatacept]      Other reaction(s): Muscle pain    Percocet  [oxycodone-acetaminophen]      Other reaction(s): Vomiting    Pregabalin     Simponi  [golimumab]      Other reaction(s): Unknown    Cimzia [certolizumab pegol] Rash    Ciprofloxacin Rash    Sulfamethoxazole-trimethoprim Rash     Past Medical History:   Diagnosis Date    Acid reflux     Allergy     Anxiety     Degenerative disc disease     Depression     DVT of leg  (deep venous thrombosis) 2009    rt leg    Fibromyalgia     Fibromyalgia     Long-term current use of steroids 11/12/2012    NSTEMI (non-ST elevated myocardial infarction) 5/18/2019    Osteoporosis, postmenopausal     chronic steroid use    RA (rheumatoid arthritis)      Past Surgical History:   Procedure Laterality Date    ANKLE FRACTURE SURGERY      BACK SURGERY      CARPAL TUNNEL RELEASE      CHOLECYSTECTOMY      HARDWARE REMOVAL      JOINT REPLACEMENT Right     right knee    KNEE SURGERY      Bilateral    KNEE SURGERY Left     revision x 2    PARATHYROIDECTOMY Left 9/20/2022    Procedure: PARATHYROIDECTOMY, left possible bilateral, recurrent laryngeal nerve monitoring and pth lab draws and frozen section;  Surgeon: Carly Lorenzo MD;  Location: [facility];  Service: ENT;  Laterality: Left;  NEUROMONITORING SACHA WAGNERUTREAUX [phone]  RN PREOP 9/12/2022   POSITIVE COVID ON 7/27/2022----HAS CARDS CLEARANCE    SPINE SURGERY      TONSILLECTOMY      TUBAL LIGATION       Family History   Problem Relation Age of Onset    COPD Mother     Heart attack Father     Emphysema Maternal Grandfather     Breast cancer Neg Hx     Colon cancer Neg Hx     Ovarian cancer Neg Hx      Social History     Tobacco Use    Smoking status: Never    Smokeless tobacco: Never   Substance Use Topics    Alcohol use: No     Alcohol/week: 0.0 standard drinks    Drug use: No     Review of Systems   Constitutional:  Negative for chills, fatigue and fever.   HENT:  Negative for congestion, ear discharge, ear pain, postnasal drip, rhinorrhea, sinus pressure, sneezing, sore throat and voice change.    Eyes:  Negative for discharge and itching.   Respiratory:  Negative for cough, shortness of breath and wheezing.    Cardiovascular:  Negative for chest pain, palpitations and leg swelling.   Gastrointestinal:  Positive for abdominal pain, constipation and nausea. Negative for diarrhea and vomiting.   Endocrine: Negative for polydipsia, polyphagia and  polyuria.   Genitourinary:  Negative for dysuria, frequency, hematuria, urgency, vaginal bleeding, vaginal discharge and vaginal pain.   Musculoskeletal:  Negative for arthralgias and myalgias.   Skin:  Negative for rash and wound.   Neurological:  Negative for dizziness, seizures, syncope, weakness and numbness.   Hematological:  Negative for adenopathy. Does not bruise/bleed easily.   Psychiatric/Behavioral:  Negative for self-injury and suicidal ideas. The patient is not nervous/anxious.      Physical Exam     Initial Vitals [03/01/23 1719]   BP Pulse Resp Temp SpO2   (!) 157/100 71 18 98.1 °F (36.7 °C) 100 %      MAP       --         Physical Exam    Nursing note and vitals reviewed.  Constitutional: She appears well-developed and well-nourished. She appears distressed (Secondary to discomfort.).   HENT:   Head: Normocephalic and atraumatic.   Right Ear: External ear normal.   Left Ear: External ear normal.   Nose: Nose normal.   Eyes: Conjunctivae and EOM are normal. Pupils are equal, round, and reactive to light. Right eye exhibits no discharge. Left eye exhibits no discharge.   Neck:   Normal range of motion.  Abdominal: Abdomen is soft. Bowel sounds are normal. She exhibits no distension. There is abdominal tenderness (Exquisite) in the left lower quadrant.   Musculoskeletal:         General: Normal range of motion.      Cervical back: Normal range of motion.     Neurological: She is alert and oriented to person, place, and time.   Skin: Skin is dry. Capillary refill takes less than 2 seconds.       ED Course   Procedures  Labs Reviewed   POCT URINALYSIS W/O SCOPE - Abnormal; Notable for the following components:       Result Value    Urobilinogen, UA 2.0 (*)     Leukocytes, UA 1+ (*)     All other components within normal limits   POCT URINALYSIS W/O SCOPE   POCT CBC   POCT URINALYSIS W/O SCOPE   POCT CMP   POCT CMP          Imaging Results              US Pelvis Comp with Transvag NON-OB (xpd) (Final  result)  Result time 03/01/23 23:23:14   Procedure changed from US Pelvis Complete Non OB     Final result by Kelly Crowell MD (03/01/23 23:23:14)                   Impression:      Thin endometrial stripe, which may indicate endometrial atrophy.    No uterine masses.  Nonvisualization of the ovaries.    Echogenic foci in the cervix and uterus, which may be benign calcifications.  Consider interval follow-up if persistent symptoms.      Electronically signed by: Kelly Crowell  Date:    03/01/2023  Time:    23:23               Narrative:    EXAMINATION:  PELVIC ULTRASOUND    CLINICAL HISTORY:  left pelvic pain, not sexually active, no vag d/c or bleeding, no urinary changes, ct equivocal;    TECHNIQUE:  Real-time ultrasound of the pelvis was performed transabdominally and transvaginally.    COMPARISON:  CT abdomen pelvis 03/01/2023    FINDINGS:  The uterus measures 4.5 x 1.9 x 3.6 cm.  The endometrial stripe measures 1.8 mm.  There are no uterine masses.  There is an echogenic focus within the endometrium measuring 2 x 1 x 3 mm, which may be a calcification.  There is an echogenic focus within the cervix measuring 2 x 1 x 2 mm, which may also represent a calcification.    The ovaries are not visualized.    There is no free fluid in the pelvis.                                       CT Abdomen Pelvis With Contrast (Final result)  Result time 03/01/23 21:00:29      Final result by Kelly Crowell MD (03/01/23 21:00:29)                   Impression:      No acute abdominal or pelvic pathology CT of the abdomen and pelvis with contrast.    Mild intrahepatic and extrahepatic biliary ductal dilatation.  Consider nonemergent MRCP or ERCP.    4 mm nonobstructing left renal calculus.  Probable tiny right renal cyst.    Right common iliac stent.    Colonic diverticulosis      Electronically signed by: Kelly Crowell  Date:    03/01/2023  Time:    21:00               Narrative:    EXAMINATION:  CT OF ABDOMEN PELVIS  WITH    CLINICAL HISTORY:  LLQ abdominal pain;    TECHNIQUE:  5 mm enhanced axial images were obtained from the lung bases through the greater trochanters.  One hundred mL of Omnipaque 350 was injected.    COMPARISON:  04/10/2020    FINDINGS:  Mild intrahepatic and extrahepatic biliary ductal dilatation is present.  Spleen, pancreas, and adrenal glands are unremarkable. The gallbladder is surgically absent.    There is a 4 mm nonobstructing left renal calculus.  There is an additional punctate calcification in the left kidney, which may represent a vascular calcification (series 2 axial image 30).  A too small to characterize low attenuation lesions seen in the right renal lower pole cortex (coronal image 34).    There is no definite evidence for abdominal adenopathy or ascites.    There is a right common iliac stent    There are no pelvic masses or adenopathy.  There is colonic diverticulosis without definite evidence of acute diverticulitis.    There is no free fluid in the pelvis.    There is mild bibasilar atelectasis.    There is anterior fusion at L5/S1.  There is superior endplate concavity with height loss at L5 and inferior endplate concavity at L3, which are unchanged.  Degenerative changes are seen of the spine and hip joints.                                       Medications   sodium chloride 0.9% bolus 1,000 mL 1,000 mL (0 mLs Intravenous Stopped 3/1/23 1957)   morphine injection 4 mg (4 mg Intravenous Given 3/1/23 1904)   ondansetron injection 4 mg (4 mg Intravenous Given 3/1/23 1904)   iohexoL (OMNIPAQUE 350) injection 100 mL (100 mLs Intravenous Given 3/1/23 1957)   diphenhydrAMINE injection 25 mg (25 mg Intravenous Given 3/1/23 2103)   morphine injection 4 mg (4 mg Intravenous Given 3/1/23 2226)     Medical Decision Making:   History:   Old Medical Records: I decided to obtain old medical records.  Initial Assessment:   62 year old female with Rheumatoid Arthritis, NSTEMI, DVT, Hyperlipidemia, and  Fibromyalgia presents to the ER for sharp, constant LLQ abdominal pain that radiates to the groin region, appetite changes, nausea, and constipation for 2 hours. Denies relief with pain medications. Denies any other symptoms. CT ABD PELVIS ordered. POCT CBC and POCT CMP ordered.  On physical exam she was acutely tender in the left lower quadrant, the remainder the abdomen was nontender.  No CVA tenderness was noted.  Differential Diagnosis:   Includes but is not limited to: Diverticulitis, Kidney stones, ovarian torsion, tubo-ovarian abscess, UTI, STI  Clinical Tests:   Lab Tests: Ordered and Reviewed  Radiological Study: Ordered and Reviewed  Medical Tests: Ordered and Reviewed      Scribe attestation: Scribe attestation: I, Rito Andujar, DNP ACNP-BC FNP-C ENP-C,  personally performed the services described in this documentation. All medical record entries made by the scribe were at my direction and in my presence.  I have reviewed the chart and agree that the record reflects my personal performance and is accurate and complete.      Scribe Attestation:   Scribe #1: I performed the above scribed service and the documentation accurately describes the services I performed. I attest to the accuracy of the note.      ED Course as of 03/02/23 0008   Wed Mar 01, 2023   1820 BP(!): 157/100 [VC]   1820 Temp: 98.1 °F (36.7 °C) [VC]   1820 Temp Source: Oral [VC]   1820 Resp: 18 [VC]   1820 Pulse: 71 [VC]   1820 SpO2: 100 % [VC]   1820 POCT URINALYSIS W/O SCOPE(!)  Neg for uti. [VC]   1921 Normal CBC. [VC]   1921 Resp: 16 [VC]   1937 POCT CMP  Normal cmp. [VC]   2107 CT Abdomen Pelvis With Contrast  No acute abdominal or pelvic pathology CT of the abdomen and pelvis with contrast.     Mild intrahepatic and extrahepatic biliary ductal dilatation.  Consider nonemergent MRCP or ERCP.     4 mm nonobstructing left renal calculus.  Probable tiny right renal cyst.     Right common iliac stent.     Colonic diverticulosis [VC]  "  2155 BP(!): 175/84 [VC]   2156 Pulse: 75 [VC]   2156 SpO2: 98 % [VC]   2229 Resp: 16 [VC]   2257 BP: 129/79 [VC]   2257 Temp: 98.3 °F (36.8 °C) [VC]   2257 Pulse: 64 [VC]   2257 SpO2: 95 % [VC]   2330 US Pelvis Comp with Transvag NON-OB (xpd)  Thin endometrial stripe, which may indicate endometrial atrophy.     No uterine masses.  Nonvisualization of the ovaries.     Echogenic foci in the cervix and uterus, which may be benign calcifications.  Consider interval follow-up if persistent symptoms. [VC]      ED Course User Index  [VC] Rito Andujar, CARLINE Mueller has examined the patient and feels that her pain may be due to constipation.  I have prescribed lactulose at Dr. Mccollum's request and also recommended a MiraLax cleansed of lactulose is ineffective in the short term.  Patient can use oxycodone already in her possession for pain control and follow up as prescribed.         Clinical Impression:   Final diagnoses:  [R10.32] Left lower quadrant abdominal pain (Primary)  [K59.00] Constipation, unspecified constipation type     Vital signs at the time of disposition were:  /79   Pulse 64   Temp 98.3 °F (36.8 °C)   Resp 16   Ht 5' 1" (1.549 m)   Wt 89.4 kg (197 lb)   SpO2 95%   BMI 37.22 kg/m²       See AVS for additional recommendations. Medications listed herein were prescribed after reviewing the patient's allergies, medication list, history, most recent laboratories as available.  Referrals below were provided after reviewing the patient's previous medical providers. She understands she  should return for any worsening or changes in condition.  Prior to discharge the patient was asked if she  had any additional concerns or complaints and she declined. The patient was given an opportunity to ask questions and all were answered to her satisfaction.    ED Disposition Condition    Discharge Stable          ED Prescriptions       Medication Sig Dispense Start Date End Date Auth. Provider    " lactulose (CHRONULAC) 20 gram/30 mL Soln Take 30 mLs (20 g total) by mouth once daily. for 3 days 90 mL 3/2/2023 3/5/2023 Rito Andujar DNP          Follow-up Information       Follow up With Specialties Details Why Contact Info    Tani Chan MD Family Medicine Schedule an appointment as soon as possible for a visit   [address] [place] [address] [place] [address]  [phone]               Rito Andujar DNP  03/02/23 0008

## 2023-03-03 ENCOUNTER — TELEPHONE (OUTPATIENT)
Dept: SLEEP MEDICINE | Facility: HOSPITAL | Age: 63
End: 2023-03-03
Payer: MEDICARE

## 2023-03-06 RX ORDER — PREDNISONE 5 MG/1
5 TABLET ORAL DAILY
OUTPATIENT
Start: 2023-03-06

## 2023-03-09 ENCOUNTER — INFUSION (OUTPATIENT)
Dept: INFUSION THERAPY | Facility: HOSPITAL | Age: 63
End: 2023-03-09
Attending: HOSPITALIST
Payer: MEDICARE

## 2023-03-09 ENCOUNTER — TELEPHONE (OUTPATIENT)
Dept: INFUSION THERAPY | Facility: HOSPITAL | Age: 63
End: 2023-03-09

## 2023-03-09 ENCOUNTER — LAB VISIT (OUTPATIENT)
Dept: LAB | Facility: HOSPITAL | Age: 63
End: 2023-03-09
Attending: INTERNAL MEDICINE
Payer: MEDICARE

## 2023-03-09 VITALS
TEMPERATURE: 98 F | HEART RATE: 59 BPM | OXYGEN SATURATION: 94 % | DIASTOLIC BLOOD PRESSURE: 79 MMHG | RESPIRATION RATE: 18 BRPM | SYSTOLIC BLOOD PRESSURE: 140 MMHG

## 2023-03-09 DIAGNOSIS — M05.79 RHEUMATOID ARTHRITIS INVOLVING MULTIPLE SITES WITH POSITIVE RHEUMATOID FACTOR: ICD-10-CM

## 2023-03-09 DIAGNOSIS — M81.0 AGE-RELATED OSTEOPOROSIS WITHOUT CURRENT PATHOLOGICAL FRACTURE: Primary | ICD-10-CM

## 2023-03-09 DIAGNOSIS — Z79.899 HIGH RISK MEDICATION USE: ICD-10-CM

## 2023-03-09 LAB
ALBUMIN SERPL BCP-MCNC: 3.6 G/DL (ref 3.5–5.2)
ALP SERPL-CCNC: 69 U/L (ref 55–135)
ALT SERPL W/O P-5'-P-CCNC: 12 U/L (ref 10–44)
ANION GAP SERPL CALC-SCNC: 10 MMOL/L (ref 8–16)
AST SERPL-CCNC: 15 U/L (ref 10–40)
BASOPHILS # BLD AUTO: 0.03 K/UL (ref 0–0.2)
BASOPHILS NFR BLD: 0.5 % (ref 0–1.9)
BILIRUB SERPL-MCNC: 0.9 MG/DL (ref 0.1–1)
BUN SERPL-MCNC: 18 MG/DL (ref 8–23)
CALCIUM SERPL-MCNC: 9.6 MG/DL (ref 8.7–10.5)
CHLORIDE SERPL-SCNC: 103 MMOL/L (ref 95–110)
CO2 SERPL-SCNC: 31 MMOL/L (ref 23–29)
CREAT SERPL-MCNC: 0.8 MG/DL (ref 0.5–1.4)
CRP SERPL-MCNC: 4.8 MG/L (ref 0–8.2)
DIFFERENTIAL METHOD: ABNORMAL
EOSINOPHIL # BLD AUTO: 0.2 K/UL (ref 0–0.5)
EOSINOPHIL NFR BLD: 2.8 % (ref 0–8)
ERYTHROCYTE [DISTWIDTH] IN BLOOD BY AUTOMATED COUNT: 12.8 % (ref 11.5–14.5)
ERYTHROCYTE [SEDIMENTATION RATE] IN BLOOD BY WESTERGREN METHOD: 18 MM/HR (ref 0–20)
EST. GFR  (NO RACE VARIABLE): >60 ML/MIN/1.73 M^2
GLUCOSE SERPL-MCNC: 79 MG/DL (ref 70–110)
HCT VFR BLD AUTO: 45.7 % (ref 37–48.5)
HGB BLD-MCNC: 14.4 G/DL (ref 12–16)
IMM GRANULOCYTES # BLD AUTO: 0.02 K/UL (ref 0–0.04)
IMM GRANULOCYTES NFR BLD AUTO: 0.3 % (ref 0–0.5)
LYMPHOCYTES # BLD AUTO: 1.4 K/UL (ref 1–4.8)
LYMPHOCYTES NFR BLD: 21 % (ref 18–48)
MCH RBC QN AUTO: 30.3 PG (ref 27–31)
MCHC RBC AUTO-ENTMCNC: 31.5 G/DL (ref 32–36)
MCV RBC AUTO: 96 FL (ref 82–98)
MONOCYTES # BLD AUTO: 0.5 K/UL (ref 0.3–1)
MONOCYTES NFR BLD: 7.5 % (ref 4–15)
NEUTROPHILS # BLD AUTO: 4.4 K/UL (ref 1.8–7.7)
NEUTROPHILS NFR BLD: 67.9 % (ref 38–73)
NRBC BLD-RTO: 0 /100 WBC
PLATELET # BLD AUTO: 145 K/UL (ref 150–450)
PMV BLD AUTO: 10.1 FL (ref 9.2–12.9)
POTASSIUM SERPL-SCNC: 4.6 MMOL/L (ref 3.5–5.1)
PROT SERPL-MCNC: 6.8 G/DL (ref 6–8.4)
RBC # BLD AUTO: 4.76 M/UL (ref 4–5.4)
SODIUM SERPL-SCNC: 144 MMOL/L (ref 136–145)
WBC # BLD AUTO: 6.43 K/UL (ref 3.9–12.7)

## 2023-03-09 PROCEDURE — 80053 COMPREHEN METABOLIC PANEL: CPT | Performed by: INTERNAL MEDICINE

## 2023-03-09 PROCEDURE — 85025 COMPLETE CBC W/AUTO DIFF WBC: CPT | Performed by: INTERNAL MEDICINE

## 2023-03-09 PROCEDURE — 86140 C-REACTIVE PROTEIN: CPT | Performed by: INTERNAL MEDICINE

## 2023-03-09 PROCEDURE — 85652 RBC SED RATE AUTOMATED: CPT | Performed by: INTERNAL MEDICINE

## 2023-03-09 PROCEDURE — 36415 COLL VENOUS BLD VENIPUNCTURE: CPT | Performed by: INTERNAL MEDICINE

## 2023-03-09 NOTE — PLAN OF CARE
Pt continues Calcium and Vitamin D supplements, denies any falls. Pt reports jaw pain with jaw surgery scheduled soon at [facility]. Pt does not meet parameters for Prolia injection at this time. Pt to be evaluated at [facility] and will call infusion unit and Dr. Bryson's office with update. Pt left off unit in NAD. Message sent to Dr. Vidal with details of pt report.

## 2023-03-15 NOTE — PROGRESS NOTES
"  Subjective:   Patient ID: Robert Smith is a 62 y.o. female    Chief Complaint:   Chief Complaint   Patient presents with    Apnea       Referred by Dr. Tani Bishop*    HPI  Robert Smith is a 62 y.o. female presents for evaluation of poor sleep. Other PMH includes    has a past medical history of Acid reflux, Allergy, Anxiety, Degenerative disc disease, Depression, DVT of leg (deep venous thrombosis) (2009), Fibromyalgia, Fibromyalgia, Long-term current use of steroids (11/12/2012), NSTEMI (non-ST elevated myocardial infarction) (5/18/2019), Osteoporosis, postmenopausal, and RA (rheumatoid arthritis).      She has a prior history of sleep apnea. She was seeing [place] [facility] Intolerant of CPAP previously with minimal use and she has stopped using it. She no longer has the prior CPAP either.    She has poor sleep initiation and maintenance.  Reports difficulty with falling asleep. Frequently ruminates about family and illnesses.  Has RA and Fibromyalgia and suffers from pain regularly. Take Tizanidine, which helps to relax her muscles. Has cramps in the legs when she lays down. Has restlessness. Tried Gabapentin but made her memory worse.  Takes percocet twice a day.  Nocturia of 3 x a night.  Wakes up tired and exhausted with headaches.    She presents with complaint being tired and has daytime fatigue.   Report diurnal fatigue. Reports lack of energy.  Reports that her  does not see her snoring nor witnessed apneas.  Lost several lbs since sleep study.      BT: 10 pm  SOL: takes "hours" and usescell phone  Awakenings: 3x urinate  WASO:  RT: 5 am - 6 am    Stimulus control?: Uses cellophane  Caffeine:  1 cup in the morning. De-caf  Alcohol:  Not daily  Depression:  Not diagnosed  Anxiety:  Not diagnosed      PSG performed 9/5/2019:  BMI 40.5 AHI of 37.1. REM AHI of 51.4 Split study, titration portion determined patient needing 16 cmH2O.    Objective:   Vitals reviewed   Physical Exam  Constitutional:  "      Appearance: She is obese.   HENT:      Head: Normocephalic.      Mouth/Throat:      Comments: Mallampati ii  Cardiovascular:      Rate and Rhythm: Normal rate.   Pulmonary:      Effort: Pulmonary effort is normal. No respiratory distress.      Breath sounds: No wheezing or rales.   Skin:     General: Skin is warm.      Capillary Refill: Capillary refill takes less than 2 seconds.   Neurological:      General: No focal deficit present.      Mental Status: She is alert.   Psychiatric:         Behavior: Behavior normal.       Assessment:     Problem List Items Addressed This Visit          Orthopedic    Fibromyalgia    Overview     Sept 2017 Cymbalta; made her more nauseated  Hx Gabapentin           Relevant Orders    Ambulatory referral/consult to Psychiatry     Other Visit Diagnoses       Chronic insomnia    -  Primary    Relevant Orders    Ambulatory referral/consult to Psychiatry              Plan:       1. Chronic insomnia  Multifactorial in setting of fibromyalgia, restless legs and poor sleep hygiene. Also has underlying untreated sleep apnea contributing to maintenance insomnia, but this is not the reason for her inability to initiate sleep.   - get into bed later  - try rubber bottle with hot water or extra blankets for the legs  - did not tolerate gabapentin due to memory problems in the past  - recommend seeing psychiatrist to evaluate to see if there is depression    General good measures discussed:  Set bed time no earlier than 11 PM 7 days per week.    Set rise time no later than 6 AM 7 days per week.     Upon awakening engage in exposure to bright light.   Clock-watching should be avoided while in bed     Avoid using computer after 8 PM. No electronic devices 2 hours prior to desired bedtime.    Do not read or watch television in bed.    Exercise regularly but not in the hours before bedtime.    Avoid alcohol within 4 hours of bedtime.   Avoid opioids, sedatives or other substances that decrease  alertness.   Avoid caffeine, nicotine, or other stimulants within 4 hours of bedtime.   Avoid the dangers of driving or other activities that require mental alertness when drowsy.   Do not take naps during the day.   Quiet sleep environment.              Control the nighttime environment with comfortable temperature, noise, and light levels.    Eat a balanced diet with regular mealtimes. Food can be disruptive right before sleep; stay away from large meals close to bedtime.              Maintain body mass index below 26 kg/m2.   If unable to fall asleep (or back to sleep) within 15 minutes, remove yourself from bed and engage in relaxing activity until drowsy, then return to bed      Bed restriction and relaxation techniques discussed with patient. Avoidance of lying in bed for prolonged period of time not asleep. Upon awakening engage in exposure to bright light. Do not read or watch television.  If unable to fall asleep (or back to sleep) within 15 minutes, remove yourself from bed and engage in relaxing activity until drowsy, then return to bed. Do not take naps during the day.      Discuss Sleep Restriction  Reviewed Stimulus Control and Sleep Hygiene  Reviewed Relaxation Training  Reviewed Cognitive Restructuring  Discussed Relapse Prevention      2. LONNIE (obstructive sleep apnea)  - diagnosed in the past  - first work on lifestyle modifications as above  prior to pursuing evaluate of sleep apnea and treatment as doing all simultaneously may be too much for her and she wanted to wait on talking about sleep apnea    3. Fibromyalgia  - Ambulatory referral/consult to Psychiatry; Future      Follow up 4 months

## 2023-03-16 ENCOUNTER — OFFICE VISIT (OUTPATIENT)
Dept: PULMONOLOGY | Facility: CLINIC | Age: 63
End: 2023-03-16
Payer: MEDICARE

## 2023-03-16 VITALS
SYSTOLIC BLOOD PRESSURE: 138 MMHG | OXYGEN SATURATION: 99 % | DIASTOLIC BLOOD PRESSURE: 66 MMHG | BODY MASS INDEX: 37.5 KG/M2 | WEIGHT: 198.63 LBS | HEART RATE: 78 BPM | HEIGHT: 61 IN

## 2023-03-16 DIAGNOSIS — M79.7 FIBROMYALGIA: ICD-10-CM

## 2023-03-16 DIAGNOSIS — F51.04 CHRONIC INSOMNIA: Primary | ICD-10-CM

## 2023-03-16 DIAGNOSIS — G47.33 OSA (OBSTRUCTIVE SLEEP APNEA): ICD-10-CM

## 2023-03-16 PROCEDURE — 3078F DIAST BP <80 MM HG: CPT | Mod: CPTII,S$GLB,, | Performed by: INTERNAL MEDICINE

## 2023-03-16 PROCEDURE — 3075F PR MOST RECENT SYSTOLIC BLOOD PRESS GE 130-139MM HG: ICD-10-PCS | Mod: CPTII,S$GLB,, | Performed by: INTERNAL MEDICINE

## 2023-03-16 PROCEDURE — 1159F MED LIST DOCD IN RCRD: CPT | Mod: CPTII,S$GLB,, | Performed by: INTERNAL MEDICINE

## 2023-03-16 PROCEDURE — 3008F BODY MASS INDEX DOCD: CPT | Mod: CPTII,S$GLB,, | Performed by: INTERNAL MEDICINE

## 2023-03-16 PROCEDURE — 3078F PR MOST RECENT DIASTOLIC BLOOD PRESSURE < 80 MM HG: ICD-10-PCS | Mod: CPTII,S$GLB,, | Performed by: INTERNAL MEDICINE

## 2023-03-16 PROCEDURE — 1160F RVW MEDS BY RX/DR IN RCRD: CPT | Mod: CPTII,S$GLB,, | Performed by: INTERNAL MEDICINE

## 2023-03-16 PROCEDURE — 1159F PR MEDICATION LIST DOCUMENTED IN MEDICAL RECORD: ICD-10-PCS | Mod: CPTII,S$GLB,, | Performed by: INTERNAL MEDICINE

## 2023-03-16 PROCEDURE — 3008F PR BODY MASS INDEX (BMI) DOCUMENTED: ICD-10-PCS | Mod: CPTII,S$GLB,, | Performed by: INTERNAL MEDICINE

## 2023-03-16 PROCEDURE — 99203 OFFICE O/P NEW LOW 30 MIN: CPT | Mod: S$GLB,,, | Performed by: INTERNAL MEDICINE

## 2023-03-16 PROCEDURE — 99203 PR OFFICE/OUTPT VISIT, NEW, LEVL III, 30-44 MIN: ICD-10-PCS | Mod: S$GLB,,, | Performed by: INTERNAL MEDICINE

## 2023-03-16 PROCEDURE — 1160F PR REVIEW ALL MEDS BY PRESCRIBER/CLIN PHARMACIST DOCUMENTED: ICD-10-PCS | Mod: CPTII,S$GLB,, | Performed by: INTERNAL MEDICINE

## 2023-03-16 PROCEDURE — 99999 PR PBB SHADOW E&M-EST. PATIENT-LVL V: ICD-10-PCS | Mod: PBBFAC,,, | Performed by: INTERNAL MEDICINE

## 2023-03-16 PROCEDURE — 3075F SYST BP GE 130 - 139MM HG: CPT | Mod: CPTII,S$GLB,, | Performed by: INTERNAL MEDICINE

## 2023-03-16 PROCEDURE — 99999 PR PBB SHADOW E&M-EST. PATIENT-LVL V: CPT | Mod: PBBFAC,,, | Performed by: INTERNAL MEDICINE

## 2023-03-16 NOTE — PATIENT INSTRUCTIONS
Insomnia:  I believe it is related to fibromyalgia and leg cramps including frequent urination. I encourage you to talk with a psychiatrist to see if you may have depression.     Set bed time no earlier than 11 PM 7 days per week.    Set rise time no later than 6 AM 7 days per week.    Upon awakening engage in exposure to bright light.   Clock-watching should be avoided while in bed     Avoid using computer after 8 PM. No electronic devices 2 hours prior to desired bedtime.    Do not read or watch television in bed.    Exercise regularly but not in the hours before bedtime.    Avoid alcohol within 4 hours of bedtime.   Avoid opioids, sedatives or other substances that decrease alertness.   Avoid caffeine, nicotine, or other stimulants within 4 hours of bedtime.   Avoid the dangers of driving or other activities that require mental alertness when drowsy.   Do not take naps during the day.   Quiet sleep environment.              Control the nighttime environment with comfortable temperature, noise, and light levels.    Eat a balanced diet with regular mealtimes. Food can be disruptive right before sleep; stay away from large meals close to bedtime.              Maintain body mass index below 26 kg/m2.   If unable to fall asleep (or back to sleep) within 15 minutes, remove yourself from bed and engage in relaxing activity until drowsy, then return to bed

## 2023-03-21 ENCOUNTER — TELEPHONE (OUTPATIENT)
Dept: PSYCHIATRY | Facility: CLINIC | Age: 63
End: 2023-03-21
Payer: MEDICARE

## 2023-03-21 NOTE — TELEPHONE ENCOUNTER
Pt called to make Np appt, ref from Dr. Cao. Appt made w/ pt, appt reminder letter mailed as well. No concerns voiced.

## 2023-03-23 ENCOUNTER — HOSPITAL ENCOUNTER (EMERGENCY)
Facility: HOSPITAL | Age: 63
Discharge: HOME | End: 2023-03-23
Attending: EMERGENCY MEDICINE
Payer: MEDICARE

## 2023-03-23 VITALS
BODY MASS INDEX: 36.63 KG/M2 | HEIGHT: 61 IN | SYSTOLIC BLOOD PRESSURE: 137 MMHG | TEMPERATURE: 98 F | RESPIRATION RATE: 18 BRPM | OXYGEN SATURATION: 97 % | HEART RATE: 64 BPM | WEIGHT: 194 LBS | DIASTOLIC BLOOD PRESSURE: 70 MMHG

## 2023-03-23 DIAGNOSIS — N39.0 ACUTE UTI: Primary | ICD-10-CM

## 2023-03-23 DIAGNOSIS — R53.1 WEAKNESS: ICD-10-CM

## 2023-03-23 DIAGNOSIS — R53.1 WEAK: ICD-10-CM

## 2023-03-23 DIAGNOSIS — R07.9 CHEST PAIN: ICD-10-CM

## 2023-03-23 LAB
ALBUMIN SERPL-MCNC: 3.8 G/DL (ref 3.3–5.5)
ALBUMIN SERPL-MCNC: 3.9 G/DL (ref 3.3–5.5)
ALP SERPL-CCNC: 58 U/L (ref 42–141)
ALP SERPL-CCNC: 71 U/L (ref 42–141)
BILIRUB SERPL-MCNC: 1.2 MG/DL (ref 0.2–1.6)
BILIRUB SERPL-MCNC: 1.2 MG/DL (ref 0.2–1.6)
BILIRUBIN, POC UA: NEGATIVE
BLOOD, POC UA: NEGATIVE
BUN SERPL-MCNC: 14 MG/DL (ref 7–22)
CALCIUM SERPL-MCNC: 9.3 MG/DL (ref 8–10.3)
CHLORIDE SERPL-SCNC: 105 MMOL/L (ref 98–108)
CLARITY, POC UA: CLEAR
COLOR, POC UA: YELLOW
CREAT SERPL-MCNC: 0.7 MG/DL (ref 0.6–1.2)
GLUCOSE SERPL-MCNC: 92 MG/DL (ref 73–118)
GLUCOSE, POC UA: NEGATIVE
KETONES, POC UA: NEGATIVE
LEUKOCYTE EST, POC UA: ABNORMAL
NITRITE, POC UA: NEGATIVE
PH UR STRIP: 7 [PH]
POC ALT (SGPT): 17 U/L (ref 10–47)
POC ALT (SGPT): 19 U/L (ref 10–47)
POC AMYLASE: 23 U/L (ref 14–97)
POC AST (SGOT): 28 U/L (ref 11–38)
POC AST (SGOT): 30 U/L (ref 11–38)
POC B-TYPE NATRIURETIC PEPTIDE: 57.2 PG/ML (ref 0–100)
POC CARDIAC TROPONIN I: 0 NG/ML (ref 0–0.08)
POC GGT: 36 U/L (ref 5–65)
POC PTINR: 1.2 (ref 0.9–1.2)
POC PTWBT: 13.8 SEC (ref 9.7–14.3)
POC TCO2: 31 MMOL/L (ref 18–33)
POCT GLUCOSE: 91 MG/DL (ref 70–110)
POTASSIUM BLD-SCNC: 4.4 MMOL/L (ref 3.6–5.1)
PROTEIN, POC UA: NEGATIVE
PROTEIN, POC: 6.6 G/DL (ref 6.4–8.1)
PROTEIN, POC: 7.1 G/DL (ref 6.4–8.1)
SAMPLE: NORMAL
SAMPLE: NORMAL
SODIUM BLD-SCNC: 146 MMOL/L (ref 128–145)
SPECIFIC GRAVITY, POC UA: 1.02
UROBILINOGEN, POC UA: 1 E.U./DL

## 2023-03-23 PROCEDURE — 82962 GLUCOSE BLOOD TEST: CPT | Mod: ER

## 2023-03-23 PROCEDURE — 63600175 PHARM REV CODE 636 W HCPCS: Mod: ER | Performed by: NURSE PRACTITIONER

## 2023-03-23 PROCEDURE — 96361 HYDRATE IV INFUSION ADD-ON: CPT | Mod: ER

## 2023-03-23 PROCEDURE — 82150 ASSAY OF AMYLASE: CPT | Mod: ER

## 2023-03-23 PROCEDURE — 93005 ELECTROCARDIOGRAM TRACING: CPT | Mod: ER

## 2023-03-23 PROCEDURE — 63600175 PHARM REV CODE 636 W HCPCS: Mod: ER | Performed by: EMERGENCY MEDICINE

## 2023-03-23 PROCEDURE — 85025 COMPLETE CBC W/AUTO DIFF WBC: CPT | Mod: ER

## 2023-03-23 PROCEDURE — 96365 THER/PROPH/DIAG IV INF INIT: CPT | Mod: ER

## 2023-03-23 PROCEDURE — 93010 ELECTROCARDIOGRAM REPORT: CPT | Mod: ,,, | Performed by: INTERNAL MEDICINE

## 2023-03-23 PROCEDURE — 96375 TX/PRO/DX INJ NEW DRUG ADDON: CPT | Mod: ER

## 2023-03-23 PROCEDURE — 85610 PROTHROMBIN TIME: CPT | Mod: ER

## 2023-03-23 PROCEDURE — 87077 CULTURE AEROBIC IDENTIFY: CPT | Performed by: EMERGENCY MEDICINE

## 2023-03-23 PROCEDURE — 81003 URINALYSIS AUTO W/O SCOPE: CPT | Mod: ER

## 2023-03-23 PROCEDURE — 87088 URINE BACTERIA CULTURE: CPT | Performed by: EMERGENCY MEDICINE

## 2023-03-23 PROCEDURE — 25000003 PHARM REV CODE 250: Mod: ER | Performed by: EMERGENCY MEDICINE

## 2023-03-23 PROCEDURE — 83880 ASSAY OF NATRIURETIC PEPTIDE: CPT | Mod: ER

## 2023-03-23 PROCEDURE — 87086 URINE CULTURE/COLONY COUNT: CPT | Performed by: EMERGENCY MEDICINE

## 2023-03-23 PROCEDURE — 80053 COMPREHEN METABOLIC PANEL: CPT | Mod: ER

## 2023-03-23 PROCEDURE — 87186 SC STD MICRODIL/AGAR DIL: CPT | Performed by: EMERGENCY MEDICINE

## 2023-03-23 PROCEDURE — 93010 EKG 12-LEAD: ICD-10-PCS | Mod: ,,, | Performed by: INTERNAL MEDICINE

## 2023-03-23 PROCEDURE — 99285 EMERGENCY DEPT VISIT HI MDM: CPT | Mod: 25,ER

## 2023-03-23 PROCEDURE — 84484 ASSAY OF TROPONIN QUANT: CPT | Mod: ER

## 2023-03-23 RX ORDER — ACETAMINOPHEN 500 MG
500 TABLET ORAL EVERY 6 HOURS PRN
Qty: 30 TABLET | Refills: 0 | Status: SHIPPED | OUTPATIENT
Start: 2023-03-23

## 2023-03-23 RX ORDER — MECLIZINE HYDROCHLORIDE 25 MG/1
25 TABLET ORAL
Status: COMPLETED | OUTPATIENT
Start: 2023-03-23 | End: 2023-03-23

## 2023-03-23 RX ORDER — DIPHENHYDRAMINE HYDROCHLORIDE 50 MG/ML
25 INJECTION INTRAMUSCULAR; INTRAVENOUS
Status: COMPLETED | OUTPATIENT
Start: 2023-03-23 | End: 2023-03-23

## 2023-03-23 RX ORDER — AMOXICILLIN AND CLAVULANATE POTASSIUM 875; 125 MG/1; MG/1
1 TABLET, FILM COATED ORAL 2 TIMES DAILY
Qty: 20 TABLET | Refills: 0 | Status: SHIPPED | OUTPATIENT
Start: 2023-03-23 | End: 2023-04-02

## 2023-03-23 RX ORDER — ONDANSETRON 8 MG/1
8 TABLET, ORALLY DISINTEGRATING ORAL EVERY 6 HOURS PRN
Qty: 30 TABLET | Refills: 0 | Status: SHIPPED | OUTPATIENT
Start: 2023-03-23 | End: 2023-03-25

## 2023-03-23 RX ORDER — ONDANSETRON 2 MG/ML
8 INJECTION INTRAMUSCULAR; INTRAVENOUS
Status: COMPLETED | OUTPATIENT
Start: 2023-03-23 | End: 2023-03-23

## 2023-03-23 RX ORDER — FAMOTIDINE 10 MG/ML
40 INJECTION INTRAVENOUS
Status: COMPLETED | OUTPATIENT
Start: 2023-03-23 | End: 2023-03-23

## 2023-03-23 RX ORDER — DIPHENHYDRAMINE HCL 25 MG
25 CAPSULE ORAL EVERY 6 HOURS PRN
Qty: 20 CAPSULE | Refills: 0 | Status: SHIPPED | OUTPATIENT
Start: 2023-03-23

## 2023-03-23 RX ORDER — HYDROCODONE BITARTRATE AND ACETAMINOPHEN 10; 325 MG/1; MG/1
1 TABLET ORAL EVERY 6 HOURS PRN
Qty: 8 TABLET | Refills: 0 | Status: SHIPPED | OUTPATIENT
Start: 2023-03-23

## 2023-03-23 RX ORDER — HYDROCODONE BITARTRATE AND ACETAMINOPHEN 5; 325 MG/1; MG/1
1 TABLET ORAL
Status: COMPLETED | OUTPATIENT
Start: 2023-03-23 | End: 2023-03-23

## 2023-03-23 RX ORDER — MORPHINE SULFATE 4 MG/ML
4 INJECTION, SOLUTION INTRAMUSCULAR; INTRAVENOUS
Status: COMPLETED | OUTPATIENT
Start: 2023-03-23 | End: 2023-03-23

## 2023-03-23 RX ADMIN — FAMOTIDINE 40 MG: 10 INJECTION INTRAVENOUS at 10:03

## 2023-03-23 RX ADMIN — CEFTRIAXONE 1 G: 1 INJECTION, SOLUTION INTRAVENOUS at 01:03

## 2023-03-23 RX ADMIN — HYDROCODONE BITARTRATE AND ACETAMINOPHEN 1 TABLET: 5; 325 TABLET ORAL at 01:03

## 2023-03-23 RX ADMIN — MECLIZINE HYDROCHLORIDE 25 MG: 25 TABLET ORAL at 01:03

## 2023-03-23 RX ADMIN — MORPHINE SULFATE 4 MG: 4 INJECTION INTRAVENOUS at 10:03

## 2023-03-23 RX ADMIN — DIPHENHYDRAMINE HYDROCHLORIDE 25 MG: 50 INJECTION, SOLUTION INTRAMUSCULAR; INTRAVENOUS at 10:03

## 2023-03-23 RX ADMIN — SODIUM CHLORIDE 1000 ML: 9 INJECTION, SOLUTION INTRAVENOUS at 10:03

## 2023-03-23 RX ADMIN — ONDANSETRON 8 MG: 2 INJECTION INTRAMUSCULAR; INTRAVENOUS at 10:03

## 2023-03-23 NOTE — Clinical Note
"Robert "Robert" Luis was seen and treated in our emergency department on 3/23/2023.  She may return to work on 03/25/2023.       If you have any questions or concerns, please don't hesitate to call.      Carol Gr, DO"

## 2023-03-23 NOTE — ED PROVIDER NOTES
Encounter Date: 3/23/2023    SCRIBE #1 NOTE: I, Jolene Randall, am scribing for, and in the presence of,  Carol Gr DO. I have scribed the following portions of the note - Other sections scribed: HPI; ROS; PE.     History     Chief Complaint   Patient presents with    Extremity Weakness     Pt states that she has generalized weakness x1 week N&V present pt states tightness in both arms denies abd pain denies chest pain      Robert Smith is a 62 y.o. female with Hx of Fibromyalgia, Rheumatoid Arthritis, HLD, LONNIE, and vertigo who presents to the ED for chief complaint of increased nausea onset 3 days ago.  Patient also reports generalized body aches.  Patient reports she was switched from 10 mg of narco to 7.5 mg of percocet.  Patient reports feeling very nauseated since switching to Percocet.  Patient reports her nausea is getting worse and worse.    I discussed with patient that her chart reported she had a Percocet allergy back in 2012 and that Percocet makes her vomit.  Patient states she forgot this.  Patient also reports dizziness.  Patient states she is been having intermittent episodes of dizziness worse now that she is been nauseated.  Due to nausea patient has not been able to take her Percocet in the last 2 days.  Patient states she is feeling horrible.  Patient reports she is been taking Percocet since 2003 for her RA.   Patient reports she feels a tightness in the arms and legs. She attempted treatment of the nausea with Zofran at 8 AM today with no immediate relief. Patient notes she has a history of vertigo and has taken meclizine for the past 2 days with no relief. She states she usually gets about 3 hours of sleep, but has not had any sleep for 3 days and takes amitriptyline.  Patient reports she has a history of sleep apnea but she can not tolerate rate her machine.  No further complaints at this time. Patient notes she stopped drinking coffee 1 week ago.  Patient denies suicidal and homicidal  ideations.   Patient has an appointment with her PCP, Dr. Chan, tomorrow morning at 10:00 a.m..      The history is provided by the patient. No  was used.   Review of patient's allergies indicates:   Allergen Reactions    Sulfasalazine      HEPATITIS    Sulfa (sulfonamide antibiotics) Nausea And Vomiting    Clindamycin Diarrhea    Daypro [oxaprozin] Nausea Only    Orencia  [abatacept]      Other reaction(s): Muscle pain    Percocet  [oxycodone-acetaminophen]      Other reaction(s): Vomiting    Pregabalin     Simponi  [golimumab]      Other reaction(s): Unknown    Cimzia [certolizumab pegol] Rash    Ciprofloxacin Rash    Sulfamethoxazole-trimethoprim Rash     Past Medical History:   Diagnosis Date    Acid reflux     Allergy     Anxiety     Degenerative disc disease     Depression     DVT of leg (deep venous thrombosis) 2009    rt leg    Fibromyalgia     Fibromyalgia     Long-term current use of steroids 11/12/2012    NSTEMI (non-ST elevated myocardial infarction) 5/18/2019    Osteoporosis, postmenopausal     chronic steroid use    RA (rheumatoid arthritis)      Past Surgical History:   Procedure Laterality Date    ANKLE FRACTURE SURGERY      BACK SURGERY      CARPAL TUNNEL RELEASE      CHOLECYSTECTOMY      HARDWARE REMOVAL      JOINT REPLACEMENT Right     right knee    KNEE SURGERY      Bilateral    KNEE SURGERY Left     revision x 2    PARATHYROIDECTOMY Left 9/20/2022    Procedure: PARATHYROIDECTOMY, left possible bilateral, recurrent laryngeal nerve monitoring and pth lab draws and frozen section;  Surgeon: Carly Lorenzo MD;  Location: [facility];  Service: ENT;  Laterality: Left;  NEUROMONITORING SACHA GUEVARA [phone]  RN PREOP 9/12/2022   POSITIVE COVID ON 7/27/2022----HAS CARDS CLEARANCE    SPINE SURGERY      TONSILLECTOMY      TUBAL LIGATION       Family History   Problem Relation Age of Onset    COPD Mother     Heart attack Father     Emphysema Maternal Grandfather     Breast  cancer Neg Hx     Colon cancer Neg Hx     Ovarian cancer Neg Hx      Social History     Tobacco Use    Smoking status: Never    Smokeless tobacco: Never   Substance Use Topics    Alcohol use: No     Alcohol/week: 0.0 standard drinks    Drug use: No     Review of Systems   Constitutional:  Negative for fever.   HENT:  Negative for congestion, sore throat and trouble swallowing.    Respiratory:  Negative for cough and shortness of breath.    Cardiovascular:  Negative for chest pain.   Gastrointestinal:  Positive for nausea. Negative for abdominal pain, constipation, diarrhea and vomiting.   Genitourinary:  Negative for dysuria, flank pain, frequency and urgency.   Musculoskeletal:  Negative for back pain and myalgias.   Skin:  Negative for rash.   Neurological:  Positive for dizziness. Negative for headaches.   Psychiatric/Behavioral:  Positive for sleep disturbance. Negative for self-injury and suicidal ideas.    All other systems reviewed and are negative.    Physical Exam     Initial Vitals [03/23/23 0942]   BP Pulse Resp Temp SpO2   (!) 156/97 70 20 98 °F (36.7 °C) 97 %      MAP       --         Patient gave consent to have physical exam performed.    Physical Exam    Nursing note and vitals reviewed.  Constitutional: She appears well-developed and well-nourished.   HENT:   Head: Normocephalic and atraumatic.   Right Ear: External ear normal.   Left Ear: External ear normal.   Eyes: Conjunctivae and EOM are normal. Pupils are equal, round, and reactive to light. Right eye exhibits no discharge. Left eye exhibits no discharge.   Neck: Neck supple.   Normal range of motion.  Cardiovascular:  Normal rate, regular rhythm, normal heart sounds and intact distal pulses.     Exam reveals no gallop and no friction rub.       No murmur heard.  Pulmonary/Chest: Breath sounds normal. No respiratory distress. She has no wheezes. She has no rhonchi. She has no rales. She exhibits no tenderness.   Abdominal: Abdomen is soft.  Bowel sounds are normal. She exhibits no distension and no mass. There is no abdominal tenderness.   No CVA tenderness There is no rebound and no guarding.   Musculoskeletal:         General: No tenderness or edema. Normal range of motion.      Cervical back: Normal range of motion and neck supple.      Comments: Extremities normal.      Neurological: She is alert and oriented to person, place, and time. She has normal strength.   Skin: Skin is warm and dry.   Psychiatric: Her mood appears anxious. She expresses no homicidal and no suicidal ideation.       ED Course   Procedures  Labs Reviewed   POCT URINALYSIS W/O SCOPE - Abnormal; Notable for the following components:       Result Value    Leukocytes, UA 1+ (*)     All other components within normal limits   POCT CMP - Abnormal; Notable for the following components:    POC Sodium 146 (*)     All other components within normal limits   CULTURE, URINE   TROPONIN ISTAT   POCT URINALYSIS W/O SCOPE   POCT CBC   POCT URINALYSIS W/O SCOPE   POCT CMP   POCT GLUCOSE   POCT CMP   POCT PROTIME-INR   POCT TROPONIN   POCT B-TYPE NATRIURETIC PEPTIDE (BNP)   POCT LIVER PANEL   ISTAT PROCEDURE   POCT B-TYPE NATRIURETIC PEPTIDE (BNP)   POCT LIVER PANEL       EKG Readings: (Independently Interpreted)   No STEMI. Rate of 63. Normal Sinus Rhythm. Left Axis. Abnormal EKG. Right Bundle Branch Block. QTc normal at 462. When compared to prior EKG dated 12/04/20 rate decreased by 8 bpm.      Imaging Results              X-Ray Chest PA And Lateral (Final result)  Result time 03/23/23 11:18:02      Final result by Héctor Denise MD (03/23/23 11:18:02)                   Impression:      Questionable developing infiltrate within the right lower lobe.  Recommend clinical correlation      Electronically signed by: Héctor Denise MD  Date:    03/23/2023  Time:    11:18               Narrative:    EXAMINATION:  XR CHEST PA AND LATERAL    CLINICAL HISTORY:  Chest Pain;    TECHNIQUE:  PA and lateral  views of the chest were performed.    COMPARISON:  Chest x-ray dated July 27, 2022    FINDINGS:  The trachea and cardiomediastinal silhouette are within normal limits.  There is no evidence of pleural effusions, pneumothoraces.  There is subtle, patchy opacity in the right lower lobe which could represent developing infiltrate.  Lungs are otherwise clear.  Osseous structures demonstrate no evidence for acute fractures or dislocations.                                       CT Head Without Contrast (Final result)  Result time 03/23/23 11:36:11      Final result by Vladimir Johnson MD (03/23/23 11:36:11)                   Impression:      No acute intracranial findings.      Electronically signed by: Vladimir Johnson  Date:    03/23/2023  Time:    11:36               Narrative:    EXAMINATION:  CT HEAD WITHOUT CONTRAST    CLINICAL HISTORY:  Memory loss;Dizziness, persistent/recurrent, cardiac or vascular cause suspected;    TECHNIQUE:  Low dose axial images were obtained through the head.  Coronal and sagittal reformations were also performed. Contrast was not administered.    COMPARISON:  None.    FINDINGS:  Blood: No acute intracranial hemorrhage.    Parenchyma: No definite loss of gray-white differentiation to suggest acute or subacute transcortical infarct.    Ventricles/Extra-axial spaces: No abnormal extra-axial fluid collection. Basal cisterns are patent.    Vessels: Mild atherosclerotic calcification.    Orbits: Suspect prior right lens replacement.    Scalp: Unremarkable.    Skull: There are no depressed skull fractures or destructive bone lesions.    Sinuses and mastoids: Essentially clear.    Other findings: Partially empty sella configuration.  Bilateral TMJ DJD.                                       Medications   ondansetron injection 8 mg (8 mg Intravenous Given 3/23/23 1043)   famotidine (PF) injection 40 mg (40 mg Intravenous Given 3/23/23 1042)   morphine injection 4 mg (4 mg Intravenous Given 3/23/23  1050)   sodium chloride 0.9% bolus 1,000 mL 1,000 mL (0 mLs Intravenous Stopped 3/23/23 1244)   diphenhydrAMINE injection 25 mg (25 mg Intravenous Given 3/23/23 1058)   cefTRIAXone (ROCEPHIN) 1 g/50 mL D5W IVPB (0 g Intravenous Stopped 3/23/23 1349)   meclizine tablet 25 mg (25 mg Oral Given 3/23/23 1314)   HYDROcodone-acetaminophen 5-325 mg per tablet 1 tablet (1 tablet Oral Given 3/23/23 1351)     Medical Decision Making:   History:   Old Medical Records: I decided to obtain old medical records.  Independently Interpreted Test(s):   I have ordered and independently interpreted X-rays - see prior notes.  I have ordered and independently interpreted EKG Reading(s) - see prior notes  Clinical Tests:   Lab Tests: Ordered and Reviewed  Radiological Study: Ordered and Reviewed  Medical Tests: Ordered and Reviewed     This is an evaluation of a 62 y.o. female that presents to the Emergency Department for   Chief Complaint   Patient presents with    Extremity Weakness     Pt states that she has generalized weakness x1 week N&V present pt states tightness in both arms denies abd pain denies chest pain        The patient is a non-toxic and well appearing patient. On physical exam, patient appears well hydrated with moist mucus membranes. Breath sounds are clear and equal bilaterally with no adventitious breath sounds, tachypnea or respiratory distress. Regular rate and rhythm. No murmurs. Abdomen soft and non tender. Patient is tolerating PO without difficulty.    Differential diagnosis: Viral Illness, Influenza A/B, Pneumonia, UTI, Opiate Withdrawal, Allergic reaction to percocet.     Vital Signs Are Reassuring.     ED Course:Treatment in the ED included Physical Exam and   Medications   ondansetron injection 8 mg (8 mg Intravenous Given 3/23/23 1043)   famotidine (PF) injection 40 mg (40 mg Intravenous Given 3/23/23 1042)   morphine injection 4 mg (4 mg Intravenous Given 3/23/23 1050)   sodium chloride 0.9% bolus 1,000 mL  1,000 mL (0 mLs Intravenous Stopped 3/23/23 1244)   diphenhydrAMINE injection 25 mg (25 mg Intravenous Given 3/23/23 1058)   cefTRIAXone (ROCEPHIN) 1 g/50 mL D5W IVPB (0 g Intravenous Stopped 3/23/23 1349)   meclizine tablet 25 mg (25 mg Oral Given 3/23/23 1314)   HYDROcodone-acetaminophen 5-325 mg per tablet 1 tablet (1 tablet Oral Given 3/23/23 1351)     Cardiac monitor placed for dizziness. Monitor shows normal sinus rhythm. Interpreted by Dr. Carol Gr DO.    Patient reports feeling better after treatment in the ER.   In shared decision making with the patient, we discussed treatment, prescriptions, labs, and imaging results.  Patient is requesting a prescription for Norco.  As patient has been on opiates for more than 20 years and I am concerned about opiate withdrawal I will give her a [place] prescription.  I did discuss with the patient that if she fills this prescription she can be can not fired by her pain management doctor.  Patient states she will call her physician in the morning.  Patient also reports she will keep her follow-up appointment with her primary care at 10:00 a.m..  I had a lengthy discussion with patient on how to maintain healthy sleep habits including but not limited to avoiding blue light from TV screens, phone screens and computers 2 hours before bed.  I also discussed with patient the necessity of treat her sleep apnea.     Discharge home with   ED Prescriptions       Medication Sig Dispense Start Date End Date Auth. Provider    amoxicillin-clavulanate 875-125mg (AUGMENTIN) 875-125 mg per tablet Take 1 tablet by mouth 2 (two) times daily. for 10 days 20 tablet 3/23/2023 4/2/2023 Carol Gr DO    acetaminophen (TYLENOL) 500 MG tablet Take 1 tablet (500 mg total) by mouth every 6 (six) hours as needed for Pain (As needed for mild-to-moderate pain). 30 tablet 3/23/2023 -- Carol Gr DO    HYDROcodone-acetaminophen (NORCO)  mg per tablet Take 1 tablet by mouth every 6 (six)  hours as needed for Pain (As needed for severe 10/10 pain). 8 tablet 3/23/2023 -- Carol Gr DO    ondansetron (ZOFRAN-ODT) 8 MG TbDL Take 1 tablet (8 mg total) by mouth every 6 (six) hours as needed. 30 tablet 3/23/2023 3/25/2023 Carol Gr DO    diphenhydrAMINE (BENADRYL) 25 mg capsule Take 1 capsule (25 mg total) by mouth every 6 (six) hours as needed for Itching, Allergies or Insomnia. 20 capsule 3/23/2023 -- Carol Gr DO          Fill and take prescriptions as directed.  Return to the ED if symptoms worsen or do not resolve.   Answered questions and discussed discharge plan.    Patient feels better and is ready for discharge.  Follow up with PCP/specialist in 1 day       Scribe Attestation:   Scribe #1: I performed the above scribed service and the documentation accurately describes the services I performed. I attest to the accuracy of the note.      ED Course as of 03/23/23 1708   Thu Mar 23, 2023   0950 EKG interpreted by Dr. Gr.  No STEMI.  Normal sinus rhythm ventricular rate of 63.  Left axis.  Abnormal EKG, .  When compared to prior EKG done on 12/04/2020 rate has decreased by 8 beats per minute [RF]      ED Course User Index  [RF] Carol Gr DO           I, Dr. Carol Gr, personally performed the services described in this documentation. This document was produced by a scribe under my direction and in my presence. All medical record entries made by the scribe were at my direction and in my presence.  I have reviewed the chart and agree that the record reflects my personal performance and is accurate and complete. Carol Gr DO.     03/23/2023 5:15 PM         Clinical Impression:   Final diagnoses:  [R53.1] Weak  [R53.1] Weakness  [R07.9] Chest pain  [N39.0] Acute UTI (Primary)        ED Disposition Condition    Discharge Stable          ED Prescriptions       Medication Sig Dispense Start Date End Date Auth. Provider    amoxicillin-clavulanate 875-125mg (AUGMENTIN)  875-125 mg per tablet Take 1 tablet by mouth 2 (two) times daily. for 10 days 20 tablet 3/23/2023 4/2/2023 Carol Gr DO    acetaminophen (TYLENOL) 500 MG tablet Take 1 tablet (500 mg total) by mouth every 6 (six) hours as needed for Pain (As needed for mild-to-moderate pain). 30 tablet 3/23/2023 -- Carol Gr DO    HYDROcodone-acetaminophen (NORCO)  mg per tablet Take 1 tablet by mouth every 6 (six) hours as needed for Pain (As needed for severe 10/10 pain). 8 tablet 3/23/2023 -- Carol Gr DO    ondansetron (ZOFRAN-ODT) 8 MG TbDL Take 1 tablet (8 mg total) by mouth every 6 (six) hours as needed. 30 tablet 3/23/2023 3/25/2023 Carol Gr DO    diphenhydrAMINE (BENADRYL) 25 mg capsule Take 1 capsule (25 mg total) by mouth every 6 (six) hours as needed for Itching, Allergies or Insomnia. 20 capsule 3/23/2023 -- Carol Gr DO          Follow-up Information       Follow up With Specialties Details Why Contact Info Additional Information    Tani Chan MD Family Medicine Schedule an appointment as soon as possible for a visit in 1 day  [address]  [place] [address]  [phone]       [facility] - Pain Medicine Pain Medicine Schedule an appointment as soon as possible for a visit in 1 day  [facility], [address] [id number] [phone] Please park in surface lot and use [facility] entrance. Check in at main registration.     [facility] PAIN MANAGEMENT Pain Medicine Schedule an appointment as soon as possible for a visit in 1 day  [address]  [facility] [address]  [phone]              Carol Gr DO  03/23/23 3727

## 2023-03-25 LAB — BACTERIA UR CULT: ABNORMAL

## 2023-04-05 ENCOUNTER — OFFICE VISIT (OUTPATIENT)
Dept: PSYCHIATRY | Facility: CLINIC | Age: 63
End: 2023-04-05
Payer: MEDICARE

## 2023-04-05 VITALS
WEIGHT: 198.44 LBS | SYSTOLIC BLOOD PRESSURE: 138 MMHG | DIASTOLIC BLOOD PRESSURE: 83 MMHG | BODY MASS INDEX: 37.47 KG/M2 | OXYGEN SATURATION: 97 % | HEART RATE: 77 BPM | HEIGHT: 61 IN

## 2023-04-05 DIAGNOSIS — M79.7 FIBROMYALGIA: ICD-10-CM

## 2023-04-05 DIAGNOSIS — F33.1 MDD (MAJOR DEPRESSIVE DISORDER), RECURRENT EPISODE, MODERATE: ICD-10-CM

## 2023-04-05 DIAGNOSIS — F41.1 GAD (GENERALIZED ANXIETY DISORDER): Primary | ICD-10-CM

## 2023-04-05 DIAGNOSIS — F51.04 CHRONIC INSOMNIA: ICD-10-CM

## 2023-04-05 PROCEDURE — 99999 PR PBB SHADOW E&M-EST. PATIENT-LVL III: CPT | Mod: PBBFAC,,,

## 2023-04-05 PROCEDURE — 90833 PR PSYCHOTHERAPY W/PATIENT W/E&M, 30 MIN (ADD ON): ICD-10-PCS | Mod: S$GLB,,,

## 2023-04-05 PROCEDURE — 3079F PR MOST RECENT DIASTOLIC BLOOD PRESSURE 80-89 MM HG: ICD-10-PCS | Mod: CPTII,S$GLB,,

## 2023-04-05 PROCEDURE — 3079F DIAST BP 80-89 MM HG: CPT | Mod: CPTII,S$GLB,,

## 2023-04-05 PROCEDURE — 3075F PR MOST RECENT SYSTOLIC BLOOD PRESS GE 130-139MM HG: ICD-10-PCS | Mod: CPTII,S$GLB,,

## 2023-04-05 PROCEDURE — 1160F PR REVIEW ALL MEDS BY PRESCRIBER/CLIN PHARMACIST DOCUMENTED: ICD-10-PCS | Mod: CPTII,S$GLB,,

## 2023-04-05 PROCEDURE — 3008F BODY MASS INDEX DOCD: CPT | Mod: CPTII,S$GLB,,

## 2023-04-05 PROCEDURE — 1159F PR MEDICATION LIST DOCUMENTED IN MEDICAL RECORD: ICD-10-PCS | Mod: CPTII,S$GLB,,

## 2023-04-05 PROCEDURE — 1160F RVW MEDS BY RX/DR IN RCRD: CPT | Mod: CPTII,S$GLB,,

## 2023-04-05 PROCEDURE — 1159F MED LIST DOCD IN RCRD: CPT | Mod: CPTII,S$GLB,,

## 2023-04-05 PROCEDURE — 99999 PR PBB SHADOW E&M-EST. PATIENT-LVL III: ICD-10-PCS | Mod: PBBFAC,,,

## 2023-04-05 PROCEDURE — 90833 PSYTX W PT W E/M 30 MIN: CPT | Mod: S$GLB,,,

## 2023-04-05 PROCEDURE — 99205 PR OFFICE/OUTPT VISIT, NEW, LEVL V, 60-74 MIN: ICD-10-PCS | Mod: S$GLB,,,

## 2023-04-05 PROCEDURE — 3075F SYST BP GE 130 - 139MM HG: CPT | Mod: CPTII,S$GLB,,

## 2023-04-05 PROCEDURE — 3008F PR BODY MASS INDEX (BMI) DOCUMENTED: ICD-10-PCS | Mod: CPTII,S$GLB,,

## 2023-04-05 PROCEDURE — 99205 OFFICE O/P NEW HI 60 MIN: CPT | Mod: S$GLB,,,

## 2023-04-05 RX ORDER — DULOXETIN HYDROCHLORIDE 30 MG/1
30 CAPSULE, DELAYED RELEASE ORAL DAILY
Qty: 30 CAPSULE | Refills: 0 | Status: SHIPPED | OUTPATIENT
Start: 2023-04-05 | End: 2023-04-27

## 2023-04-05 RX ORDER — TRAZODONE HYDROCHLORIDE 50 MG/1
100 TABLET ORAL NIGHTLY
Qty: 60 TABLET | Refills: 0 | Status: SHIPPED | OUTPATIENT
Start: 2023-04-05 | End: 2023-04-27

## 2023-04-05 NOTE — PROGRESS NOTES
"Outpatient Psychiatry Initial Visit   4/5/2023    Robert Smith, a 62 y.o. female, presenting for initial evaluation visit. Met with patient.    Reason for Encounter: Referral from Dr. Cao . Patient complains of insomnia        History of Present Illness:    SUBJECTIVE:   Psych Interview 04/05/2023:   Robert Smith is a 62 y.o. female with past psychiatric history of insomnia  presented to for initial evaluation and treatment for insomnia.    Pt is A+Ox 4.  Patients mood is "fine", affect appears blunted, guarded, sad, anxious. Pts thought process is normal and logical.  Pts speech is normal tone, normal rate, normal pitch, normal volume   Linear and logical, friendly and cooperative, poor eye contact, no psychomotor retardation.  Pt is calmly seated in chair during interview.  Pt is casually dressed and well groomed.  Pt is tearful during interview.      Pt has a PMH of LONNIE - not on CPAP(currently in the process of receiving one) and RA    Patient states that she has been experiencing acute on chronic insomnia, anxiety, and depression.  Symptoms began decades ago, symptoms began worsening three years ago after the passing of her mother and brother. Patient states that she has experienced the loss of multiple family members since then. Patient states that her biggest stressor currently is lack of sleep. Patient states that she sleeps 2 to 3 hours a night. States that she is unable to take a nap. Patient states that during the night her mind races, states that she is worrying about her medical condition along with her 's health. Patient states that her  is health has deteriorated recently, currently he is on dialysis.    Patient states that she has three children but is not close with them.  Patient states that her coping skills Jerilyn of attending the [facility], states that she has not been in over a month.  Pt has not seen a talk therapist, at this time patient states that she is not " "interested. Will reassess during next appointment.     Denies prior hx of psychiatric hospitalizations. denies hx of suicide attempts. Pt denies hx self harm. Pt denies hx hallucinations.  Pt denies hx of eating disorders.   Pt denies hx trauma. denies physical/sexual abuse. Pt denies symptoms including nightmares, hypervigilance, flashbacks, avoidance behaviors, and disassociation.    Reports depression today as 5/10, and anxiety as 7/10.  Reports sleeping 2 hrs per night, and ok appetite.   Denies SI/HI/AVH. Denies side effects of medications.  Pt states that there support consists of oldest daughter - lives in [place]    Denies recreational drug use. Pt reports 0 drinks per week, denies tobacco use, denies Vaping, denies Caffeine.        Current Medication:  None    Past meds  Trazodone   Cymbalta - states that she stopped after 1 week due to "not feeling anything"    DX:  The patient complained of depressed mood with lethargy, decreased appetite , insomnia, psycho-motor retardation, anhedonia, apathy, worsening self-esteem, guilt, decreased concentration and ability to make decisions    Pt denies hx symptoms/episodes of mona.    Admits to symptoms of anxiety including excessive anxiety/worry/fear, more days than not, about numerous issues, difficulty controlling the worry, over thinking, rumination, restlessness, poor concentration, fatigue, and increased irritability. Denies panic attacks at this time.       Review Of Systems:     Review of Systems   Constitutional:  Negative for weight loss.   HENT:  Negative for tinnitus.    Eyes:  Negative for blurred vision.   Respiratory:  Negative for cough and shortness of breath.    Cardiovascular:  Negative for chest pain.   Gastrointestinal:  Negative for abdominal pain.   Genitourinary:  Negative for dysuria.   Musculoskeletal:  Negative for back pain and neck pain.   Skin:  Negative for rash.   Neurological:  Negative for dizziness, seizures and weakness. "   Psychiatric/Behavioral:  Positive for depression. Negative for hallucinations, memory loss, substance abuse and suicidal ideas. The patient is nervous/anxious and has insomnia.      Psychiatric Review Of Systems - Is patient experiencing or having changes in:  sleep: yes  appetite: yes  weight: yes  energy/anergy: yes  interest/pleasure/anhedonia: yes  somatic symptoms: no  libido: no  anxiety/panic: yes  guilty/hopelessness: no  concentration: yes  S.I.B.s/risky behavior: no  Irritability: yes  Racing thoughts: yes  Impulsive behaviors: no  Paranoia: no  AVH: no    Risk Parameters:  Patient reports no suicidal ideation  Patient reports no homicidal ideation  Patient reports no self-injurious behavior  Patient reports no violent behavior    OBJECTIVE     Past Psychiatric History:   Previous Psychiatric Hospitalizations: NO  Previous Medication Trials: NO  History of psychotherapy:  NO  Previous Suicide Attempts: NO  History of Violence:  NO  History of physical/sexual abuse: NO  Outpatient psychiatric provider(past): NO    Substance Abuse History:   Tobacco: NO  Alcohol: NO  Illicit Substances: NO  Detox/Rehab: NO    Neurological History:   Seizures: NO  Head trauma: NO    Family Psychiatric History: Yes   Mother- anxiety     Social History:  Developmental/Childhood:Achieved all developmental milestones timely  *Education: 10 grade  Employment Status/Finances:Disabled   Relationship Status/Sexual Orientation: : Relationship intact  Children: 3  Housing Status: Home    history:  NO  Access to gun: NO  Buddhist: Hoahaoism  Recreational activities: senior citizens  Person patient is closest to/confides in: daughter in [place]    Legal History:   Past Charges/Incarcerations:  No      Past Medical/Surgical History:   Past Medical History:   Diagnosis Date    Acid reflux     Allergy     Anxiety     Degenerative disc disease     Depression     DVT of leg (deep venous thrombosis) 2009    rt leg     Fibromyalgia     Fibromyalgia     Long-term current use of steroids 11/12/2012    NSTEMI (non-ST elevated myocardial infarction) 5/18/2019    Osteoporosis, postmenopausal     chronic steroid use    RA (rheumatoid arthritis)      Past Surgical History:   Procedure Laterality Date    ANKLE FRACTURE SURGERY      BACK SURGERY      CARPAL TUNNEL RELEASE      CHOLECYSTECTOMY      HARDWARE REMOVAL      JOINT REPLACEMENT Right     right knee    KNEE SURGERY      Bilateral    KNEE SURGERY Left     revision x 2    PARATHYROIDECTOMY Left 9/20/2022    Procedure: PARATHYROIDECTOMY, left possible bilateral, recurrent laryngeal nerve monitoring and pth lab draws and frozen section;  Surgeon: Carly Lorenzo MD;  Location: [facility];  Service: ENT;  Laterality: Left;  NEUROMONITORING SACHA GUEVARA [phone]  RN PREOP 9/12/2022   POSITIVE COVID ON 7/27/2022----HAS CARDS CLEARANCE    SPINE SURGERY      TONSILLECTOMY      TUBAL LIGATION           Current Medications:   Medication List with Changes/Refills   New Medications    DULOXETINE (CYMBALTA) 30 MG CAPSULE    Take 1 capsule (30 mg total) by mouth once daily.    TRAZODONE (DESYREL) 50 MG TABLET    Take 2 tablets (100 mg total) by mouth every evening.   Current Medications    ACETAMINOPHEN (TYLENOL) 500 MG TABLET    Take 1 tablet (500 mg total) by mouth every 6 (six) hours as needed for Pain (As needed for mild-to-moderate pain).    ALBUTEROL SULFATE 90 MCG/ACTUATION AEPB    Inhale 1 puff into the lungs every 4 (four) hours as needed. Rescue    AMITRIPTYLINE (ELAVIL) 50 MG TABLET    Take 1 tablet (50 mg total) by mouth every evening.    AZELASTINE (ASTELIN) 137 MCG (0.1 %) NASAL SPRAY    1 spray (137 mcg total) by Nasal route 2 (two) times daily.    CYANOCOBALAMIN 1,000 MCG/ML INJECTION    INJECT 1000 MCG (1ML) AS DIRECTED EVERY 28 DAYS    DICYCLOMINE (BENTYL) 10 MG CAPSULE    Take 20 mg by mouth 2 (two) times daily as needed.    DIPHENHYDRAMINE (BENADRYL) 25 MG CAPSULE     Take 1 capsule (25 mg total) by mouth every 6 (six) hours as needed for Itching, Allergies or Insomnia.    ELIQUIS 5 MG TAB    Take 5 mg by mouth 2 (two) times daily.    FAMOTIDINE (PEPCID) 40 MG TABLET    Take 40 mg by mouth.    FLUTICASONE PROPIONATE (FLONASE) 50 MCG/ACTUATION NASAL SPRAY    2 sprays (100 mcg total) by Each Nostril route 2 (two) times daily.    HYDROCODONE-ACETAMINOPHEN (NORCO)  MG PER TABLET    Take 1 tablet by mouth every 6 (six) hours as needed for Pain (As needed for severe 10/10 pain).    MECLIZINE (ANTIVERT) 25 MG TABLET    TAKE 1 TABLET BY MOUTH 3 (THREE) TIMES DAILY AS NEEDED FOR DIZZINESS    METHOCARBAMOL (ROBAXIN) 500 MG TAB    Take 500 mg by mouth 2 (two) times a day.    METOPROLOL SUCCINATE (TOPROL-XL) 25 MG 24 HR TABLET    Take 12.5 mg by mouth once daily.    MONTELUKAST (SINGULAIR) 10 MG TABLET    Take 10 mg by mouth every evening.    MULTIVITAMIN CAPSULE    Take 1 capsule by mouth once daily.    ONDANSETRON (ZOFRAN) 8 MG TABLET    Take 8 mg by mouth every 8 (eight) hours as needed for Nausea.    PANTOPRAZOLE (PROTONIX) 40 MG TABLET    Take 40 mg by mouth once daily.    POLYETHYLENE GLYCOL (GLYCOLAX) 17 GRAM/DOSE POWDER    Take 17 g by mouth daily as needed.    RITUXIMAB (RITUXAN) 10 MG/ML INJECTION    Inject into the vein.    SODIUM CHLORIDE FOR INHALATION (SODIUM CHLORIDE 0.9%) 0.9 % NEBULIZER SOLUTION        TIZANIDINE (ZANAFLEX) 2 MG TABLET    Take 2 mg by mouth nightly as needed.    UNABLE TO FIND    medication name: Wellcana 300mg THC   300mg CBD  drops   nightly       Allergies:   Review of patient's allergies indicates:   Allergen Reactions    Sulfasalazine      HEPATITIS    Sulfa (sulfonamide antibiotics) Nausea And Vomiting    Clindamycin Diarrhea    Daypro [oxaprozin] Nausea Only    Orencia  [abatacept]      Other reaction(s): Muscle pain    Percocet  [oxycodone-acetaminophen]      Other reaction(s): Vomiting    Pregabalin     Simponi  [golimumab]      Other  "reaction(s): Unknown    Cimzia [certolizumab pegol] Rash    Ciprofloxacin Rash    Sulfamethoxazole-trimethoprim Rash         Vitals   Vitals:    04/05/23 0848   BP: 138/83   Pulse: 77        Labs/Imaging/Studies:   No results found for this or any previous visit (from the past 48 hour(s)).   No results found for: PHENYTOIN, PHENOBARB, VALPROATE, CBMZ      Nutritional Screening: Considering the patient's height and weight, medications, medical history and preferences, should a referral be made to the dietitian? no    Constitutional  Vitals:  Most recent vital signs, dated less than 90 days prior to this appointment, were reviewed.    Vitals:    04/05/23 0848   BP: 138/83   Pulse: 77   SpO2: 97%   Weight: 90 kg (198 lb 6.6 oz)   Height: 5' 1" (1.549 m)        General:  unremarkable, age appropriate, casually dressed, neatly groomed     Musculoskeletal  Muscle Strength/Tone:  no spasicity, no rigidity, no cogwheeling, no flaccidity, no paratonia, no dyskinesia, no dystonia, no tremor, no tic, no choreoathetosis, no atrophy   Gait & Station:  non-ataxic       Psychiatric Mental Status Exam:  Arousal: alert  Sensorium/Orientation: oriented to grossly intact, person, place, situation, time/date  Behavior/Cooperation: cooperative, eye contact normal   Speech: normal tone, normal rate, normal pitch, normal volume  Language: grossly intact, able to name, able to repeat  Mood: anxious, depressed, sad  Affect: blunted, guarded, sad, anxious  Thought Process: normal and logical  Thought Content: concerned with sleep  Auditory hallucinations: NO  Visual hallucinations: NO  Paranoia: NO  Delusions:  NO  Suicidal ideation: NO  Homicidal ideation: NO  Attention/Concentration:  spelled "WORLD" forwards and backwards  Memory:    Recent: [facility] Recent Memory: WNL , 3 out of 3 in 3 minutes  Remote: [facility] Remote Memory: WNL , past events, as relates history  Fund of Knowledge: Aware of current events, Intact, and Vocabulary appropriate  "   Intelligence: [facility] Intelligence: Average, based on history, based on vocabulary, syntax, grammar and content  Insight: {[facility] insight: Fair, understanding severity of illness/history of present illness  Judgment: [facility] Judgement: Fair, per patient's behavior/history of present illness      Relevant Elements of Neurological Exam: normal gait        Laboratory Data  Admission on 03/23/2023, Discharged on 03/23/2023   Component Date Value Ref Range Status    POCT Glucose 03/23/2023 91  70 - 110 mg/dL Final    Albumin, POC 03/23/2023 3.8  3.3 - 5.5 g/dL Final    Alkaline Phosphatase, POC 03/23/2023 71  42 - 141 U/L Final    ALT (SGPT), POC 03/23/2023 19  10 - 47 U/L Final    AST (SGOT), POC 03/23/2023 28  11 - 38 U/L Final    POC BUN 03/23/2023 14  7 - 22 mg/dL Final    Calcium, POC 03/23/2023 9.3  8.0 - 10.3 mg/dL Final    POC Chloride 03/23/2023 105  98 - 108 mmol/L Final    POC Creatinine 03/23/2023 0.7  0.6 - 1.2 mg/dL Final    POC Glucose 03/23/2023 92  73 - 118 mg/dL Final    POC Potassium 03/23/2023 4.4  3.6 - 5.1 mmol/L Final    POC Sodium 03/23/2023 146 (H)  128 - 145 mmol/L Final    Bilirubin, POC 03/23/2023 1.2  0.2 - 1.6 mg/dL Final    POC TCO2 03/23/2023 31  18 - 33 mmol/L Final    Protein, POC 03/23/2023 6.6  6.4 - 8.1 g/dL Final    POC PTWBT 03/23/2023 13.8  9.7 - 14.3 sec Final    POC PTINR 03/23/2023 1.2  0.9 - 1.2 Final    Sample 03/23/2023 unknown   Final    POC B-Type Natriuretic Peptide 03/23/2023 57.2  0.0 - 100.0 pg/mL Final    Albumin, POC 03/23/2023 3.9  3.3 - 5.5 g/dL Final    Alkaline Phosphatase, POC 03/23/2023 58  42 - 141 U/L Final    ALT (SGPT), POC 03/23/2023 17  10 - 47 U/L Final    Amylase, POC 03/23/2023 23  14 - 97 U/L Final    AST (SGOT), POC 03/23/2023 30  11 - 38 U/L Final    POC GGT 03/23/2023 36  5 - 65 U/L Final    Bilirubin, POC 03/23/2023 1.2  0.2 - 1.6 mg/dL Final    Protein, POC 03/23/2023 7.1  6.4 - 8.1 g/dL Final    POC Cardiac Troponin I 03/23/2023 0.00  0.00 - 0.08 ng/mL  Final    Sample 03/23/2023 unknown   Final    Glucose, UA 03/23/2023 Negative   Final    Bilirubin, UA 03/23/2023 Negative   Final    Ketones, UA 03/23/2023 Negative   Final    Spec Grav UA 03/23/2023 1.020   Final    Blood, UA 03/23/2023 Negative   Final    PH, UA 03/23/2023 7.0   Final    Protein, UA 03/23/2023 Negative   Final    Urobilinogen, UA 03/23/2023 1.0  E.U./dL Final    Nitrite, UA 03/23/2023 Negative   Final    Leukocytes, UA 03/23/2023 1+ (A)   Final    Color, UA 03/23/2023 Yellow   Final    Clarity, UA 03/23/2023 Clear   Final    Urine Culture, Routine 03/23/2023  (A)   Final                    Value:ESCHERICHIA COLI  10,000 - 49,999 cfu/ml     Lab Visit on 03/09/2023   Component Date Value Ref Range Status    Sed Rate 03/09/2023 18  0 - 20 mm/Hr Final    CRP 03/09/2023 4.8  0.0 - 8.2 mg/L Final    Sodium 03/09/2023 144  136 - 145 mmol/L Final    Potassium 03/09/2023 4.6  3.5 - 5.1 mmol/L Final    Chloride 03/09/2023 103  95 - 110 mmol/L Final    CO2 03/09/2023 31 (H)  23 - 29 mmol/L Final    Glucose 03/09/2023 79  70 - 110 mg/dL Final    BUN 03/09/2023 18  8 - 23 mg/dL Final    Creatinine 03/09/2023 0.8  0.5 - 1.4 mg/dL Final    Calcium 03/09/2023 9.6  8.7 - 10.5 mg/dL Final    Total Protein 03/09/2023 6.8  6.0 - 8.4 g/dL Final    Albumin 03/09/2023 3.6  3.5 - 5.2 g/dL Final    Total Bilirubin 03/09/2023 0.9  0.1 - 1.0 mg/dL Final    Alkaline Phosphatase 03/09/2023 69  55 - 135 U/L Final    AST 03/09/2023 15  10 - 40 U/L Final    ALT 03/09/2023 12  10 - 44 U/L Final    Anion Gap 03/09/2023 10  8 - 16 mmol/L Final    eGFR 03/09/2023 >60  >60 mL/min/1.73 m^2 Final    WBC 03/09/2023 6.43  3.90 - 12.70 K/uL Final    RBC 03/09/2023 4.76  4.00 - 5.40 M/uL Final    Hemoglobin 03/09/2023 14.4  12.0 - 16.0 g/dL Final    Hematocrit 03/09/2023 45.7  37.0 - 48.5 % Final    MCV 03/09/2023 96  82 - 98 fL Final    MCH 03/09/2023 30.3  27.0 - 31.0 pg Final    MCHC 03/09/2023 31.5 (L)  32.0 - 36.0 g/dL Final    RDW  2023 12.8  11.5 - 14.5 % Final    Platelets 2023 145 (L)  150 - 450 K/uL Final    MPV 2023 10.1  9.2 - 12.9 fL Final    Immature Granulocytes 2023 0.3  0.0 - 0.5 % Final    Gran # (ANC) 2023 4.4  1.8 - 7.7 K/uL Final    Immature Grans (Abs) 2023 0.02  0.00 - 0.04 K/uL Final    Lymph # 2023 1.4  1.0 - 4.8 K/uL Final    Mono # 2023 0.5  0.3 - 1.0 K/uL Final    Eos # 2023 0.2  0.0 - 0.5 K/uL Final    Baso # 2023 0.03  0.00 - 0.20 K/uL Final    nRBC 2023 0  0 /100 WBC Final    Gran % 2023 67.9  38.0 - 73.0 % Final    Lymph % 2023 21.0  18.0 - 48.0 % Final    Mono % 2023 7.5  4.0 - 15.0 % Final    Eosinophil % 2023 2.8  0.0 - 8.0 % Final    Basophil % 2023 0.5  0.0 - 1.9 % Final    Differential Method 2023 Automated   Final         Medications  Outpatient Encounter Medications as of 2023   Medication Sig Dispense Refill    acetaminophen (TYLENOL) 500 MG tablet Take 1 tablet (500 mg total) by mouth every 6 (six) hours as needed for Pain (As needed for mild-to-moderate pain). 30 tablet 0    albuterol sulfate 90 mcg/actuation AePB Inhale 1 puff into the lungs every 4 (four) hours as needed. Rescue 1 each 1    amitriptyline (ELAVIL) 50 MG tablet Take 1 tablet (50 mg total) by mouth every evening. 30 tablet 11    [] amoxicillin-clavulanate 875-125mg (AUGMENTIN) 875-125 mg per tablet Take 1 tablet by mouth 2 (two) times daily. for 10 days 20 tablet 0    azelastine (ASTELIN) 137 mcg (0.1 %) nasal spray 1 spray (137 mcg total) by Nasal route 2 (two) times daily. 30 mL 3    cyanocobalamin 1,000 mcg/mL injection INJECT 1000 MCG (1ML) AS DIRECTED EVERY 28 DAYS      dicyclomine (BENTYL) 10 MG capsule Take 20 mg by mouth 2 (two) times daily as needed.      diphenhydrAMINE (BENADRYL) 25 mg capsule Take 1 capsule (25 mg total) by mouth every 6 (six) hours as needed for Itching, Allergies or Insomnia. 20 capsule 0     DULoxetine (CYMBALTA) 30 MG capsule Take 1 capsule (30 mg total) by mouth once daily. 30 capsule 0    ELIQUIS 5 mg Tab Take 5 mg by mouth 2 (two) times daily.      famotidine (PEPCID) 40 MG tablet Take 40 mg by mouth.      fluticasone propionate (FLONASE) 50 mcg/actuation nasal spray 2 sprays (100 mcg total) by Each Nostril route 2 (two) times daily. 18.2 mL 3    HYDROcodone-acetaminophen (NORCO)  mg per tablet Take 1 tablet by mouth every 6 (six) hours as needed for Pain (As needed for severe 10/10 pain). 8 tablet 0    meclizine (ANTIVERT) 25 mg tablet TAKE 1 TABLET BY MOUTH 3 (THREE) TIMES DAILY AS NEEDED FOR DIZZINESS      methocarbamoL (ROBAXIN) 500 MG Tab Take 500 mg by mouth 2 (two) times a day.      metoprolol succinate (TOPROL-XL) 25 MG 24 hr tablet Take 12.5 mg by mouth once daily.      montelukast (SINGULAIR) 10 mg tablet Take 10 mg by mouth every evening.      multivitamin capsule Take 1 capsule by mouth once daily.      ondansetron (ZOFRAN) 8 MG tablet Take 8 mg by mouth every 8 (eight) hours as needed for Nausea.      [] ondansetron (ZOFRAN-ODT) 8 MG TbDL Take 1 tablet (8 mg total) by mouth every 6 (six) hours as needed. 30 tablet 0    pantoprazole (PROTONIX) 40 MG tablet Take 40 mg by mouth once daily.      polyethylene glycol (GLYCOLAX) 17 gram/dose powder Take 17 g by mouth daily as needed.      riTUXimab (RITUXAN) 10 mg/mL injection Inject into the vein.      SODIUM CHLORIDE FOR INHALATION (SODIUM CHLORIDE 0.9%) 0.9 % nebulizer solution       tiZANidine (ZANAFLEX) 2 MG tablet Take 2 mg by mouth nightly as needed.      traZODone (DESYREL) 50 MG tablet Take 2 tablets (100 mg total) by mouth every evening. 60 tablet 0    UNABLE TO FIND medication name: Wellcana 300mg THC   300mg CBD  drops   nightly      [DISCONTINUED] ADVAIR DISKUS 250-50 mcg/dose diskus inhaler Inhale 1 puff into the lungs daily as needed.       [DISCONTINUED] lisinopril (PRINIVIL,ZESTRIL) 2.5 MG tablet        [DISCONTINUED] ondansetron (ZOFRAN-ODT) 4 MG TbDL Dissolve 1 tablet (4 mg total) by mouth every 6 (six) hours as needed (nausea). 10 tablet 0    [DISCONTINUED] oxyCODONE-acetaminophen (PERCOCET) 7.5-325 mg per tablet Take 1 tablet by mouth 2 (two) times daily as needed for Pain.      [DISCONTINUED] warfarin (COUMADIN) 1 MG tablet        No facility-administered encounter medications on file as of 4/5/2023.           Assessment / Plan:     Diagnosis:      ICD-10-CM ICD-9-CM   1. JULIA (generalized anxiety disorder)  F41.1 300.02   2. Chronic insomnia  F51.04 780.52   3. Fibromyalgia  M79.7 729.1   4. MDD (major depressive disorder), recurrent episode, moderate  F33.1 296.32       Strengths and Liabilities: Strength: Patient accepts guidance/feedback, Strength: Patient is expressive/articulate., Strength: Patient is motivated for change., Liability: Patient lacks coping skills.    Treatment Goals:  Specify outcomes written in observable, behavioral terms:   Anxiety: acquiring relapse prevention skills, reducing negative automatic thoughts, reducing physical symptoms of anxiety, and reducing time spent worrying (<30 minutes/day)  Depression: increasing interest in usual activities, increasing self-reward for positive behaviors (one/day), increasing self-reward for positive thoughts (one/day), reducing excessive guilt, and reducing fatigue    Treatment Plan/Recommendations:     Mood   Start Cymbalta 30mg QAM - targeting anxiety and depression   Start Trazodone QHS   Week 1-2 Start Trazodone 50mg QHS   Week 3-4 If having difficulty sleeping can increase to Trazodone 100mg QHS    Pt has a history of taking Cymbalta for a brief period of time.  Will retrial due to Pts RA Hx.  Will consider Zoloft in future    At this time Pt is not interested in talk therapy. Will reassess next appt.        Discussed diagnosis, risks and benefits of proposed treatment above vs alternative treatments vs no treatment, and potential side  effects of these treatments, and the inherent unpredictability of individual response to treatment.  The patient expresses understanding and gives informed consent to pursue treatment.  The potential benefits outweigh the potential risks. Patient has no other questions. Risks/adverse effects discussed at this time including but not limited to: GI side effects, sexual dysfunction, activation vs sedation, triggering of suicidal thoughts, and serotonin syndrome.    Serotonin syndrome   Mental status changes can include anxiety, restlessness, disorientation, and agitated delirium.    Autonomic manifestations can include diaphoresis, tachycardia, hyperthermia, hypertension, vomiting, and diarrhea   Neuromuscular hyperactivity can manifest as tremor, myoclonus, hyperreflexia, rigidity, hyperthermia, seizure, and bilateral Babinski sign.   Pt was informed that if they experience any of these symptoms to go the ED.       Difficulty Sleeping Behavioral Modification:  Implement stimulus control: [place] bedroom for sleep only. Leave bedroom when frustrated from not sleeping. Engage in relaxation before returning. Engage in activities during the day. AVOID >7-8 h time in bed  Avoid clock watching  Avoid thinking/worrying about sleep when trying to fall asleep  Limit caffeinated consumption  Make sure the bedroom is dark, quiet and cool    Safety Plan   Patient voices understanding and agreement with this plan  Provided crisis numbers  Encouraged patient to keep future appointments.  Instructed patient to call or message with questions or concerns  In the event of an emergency, including suicidal ideation, patient was advised to go to the emergency room and/or call 911    Return to Clinic: 1 month    Psychotherapy:  Target symptoms: depression, anxiety   Why chosen therapy is appropriate versus another modality: relevant to diagnosis, evidence based practice  Outcome monitoring methods: self-report, observation  Therapeutic  intervention type: insight oriented psychotherapy, supportive psychotherapy, interactive psychotherapy  Topics discussed/themes: relationships difficulties, difficulty managing affect in interpersonal relationships, building skills sets for symptom management, symptom recognition  The patient's response to the intervention is guarded. The patient's progress toward treatment goals is fair.   Duration of intervention: 18 minutes.    Total face to face time: 60 min  Total time (chart review, patient contact, documentation): 78 min    A diagnostic psychiatric evaluation was performed and responsiveness to treatment was assessed.  The patient demonstrates adequate ability/capacity to respond to treatment.    Robert Machado PA-C      *This note has been prepared using a combination of a dictation device and typing.  It has been checked for errors but some errors may still exist within the note as a result of speech recognition errors and/or typographical errors.

## 2023-04-11 ENCOUNTER — TELEPHONE (OUTPATIENT)
Dept: RHEUMATOLOGY | Facility: CLINIC | Age: 63
End: 2023-04-11
Payer: MEDICARE

## 2023-04-11 NOTE — TELEPHONE ENCOUNTER
----- Message from Macrina Mata RN sent at 4/11/2023  2:35 PM CDT -----  You need to go into the therapy plan and change location-[facility] chemotherapy to [facility] so the auth department can get approval for her rituxan.

## 2023-04-27 RX ORDER — DULOXETIN HYDROCHLORIDE 30 MG/1
CAPSULE, DELAYED RELEASE ORAL
Qty: 30 CAPSULE | Refills: 0 | Status: SHIPPED | OUTPATIENT
Start: 2023-04-27 | End: 2023-05-17

## 2023-04-27 RX ORDER — TRAZODONE HYDROCHLORIDE 50 MG/1
100 TABLET ORAL NIGHTLY
Qty: 60 TABLET | Refills: 0 | Status: SHIPPED | OUTPATIENT
Start: 2023-04-27 | End: 2023-05-17

## 2023-04-28 ENCOUNTER — TELEPHONE (OUTPATIENT)
Dept: PAIN MEDICINE | Facility: CLINIC | Age: 63
End: 2023-04-28
Payer: MEDICARE

## 2023-04-28 NOTE — TELEPHONE ENCOUNTER
Per Dr. Kearns he does not treat Fibromyalgia or Rheumatoid Arthritis.  Contacted pt to inform her of this prior to her appointment scheduled today.  Ms. Jones was previously seen by Dr. Romeo in 10/2022 for same issues.  Scheduled pt for a f/u appt with Dr. Romeo today at 3:00pm- address provided to pt.

## 2023-05-04 ENCOUNTER — TELEPHONE (OUTPATIENT)
Dept: PSYCHIATRY | Facility: CLINIC | Age: 63
End: 2023-05-04
Payer: MEDICARE

## 2023-05-04 NOTE — TELEPHONE ENCOUNTER
Pt called, lvm stating she needs top speak to billing. Attempted to return pts call, no answer, lvm and provided # to billing dept. Encouraged pt to call back in case of any other concerns.

## 2023-05-05 ENCOUNTER — TELEPHONE (OUTPATIENT)
Dept: PSYCHIATRY | Facility: CLINIC | Age: 63
End: 2023-05-05
Payer: MEDICARE

## 2023-05-05 NOTE — TELEPHONE ENCOUNTER
Pt called to r/s appt today w/ [facility] as she has been throwing up. Appt r/s to 5/12 w/ pt. No further concerns voiced.

## 2023-05-08 ENCOUNTER — INFUSION (OUTPATIENT)
Dept: INFUSION THERAPY | Facility: HOSPITAL | Age: 63
End: 2023-05-08
Attending: HOSPITALIST
Payer: MEDICARE

## 2023-05-08 VITALS
WEIGHT: 197.06 LBS | HEIGHT: 61 IN | TEMPERATURE: 98 F | SYSTOLIC BLOOD PRESSURE: 106 MMHG | DIASTOLIC BLOOD PRESSURE: 58 MMHG | RESPIRATION RATE: 18 BRPM | BODY MASS INDEX: 37.2 KG/M2 | HEART RATE: 63 BPM

## 2023-05-08 DIAGNOSIS — M05.79 RHEUMATOID ARTHRITIS INVOLVING MULTIPLE SITES WITH POSITIVE RHEUMATOID FACTOR: Primary | ICD-10-CM

## 2023-05-08 PROCEDURE — 63600175 PHARM REV CODE 636 W HCPCS: Performed by: INTERNAL MEDICINE

## 2023-05-08 PROCEDURE — 96413 CHEMO IV INFUSION 1 HR: CPT

## 2023-05-08 PROCEDURE — 25000003 PHARM REV CODE 250: Performed by: INTERNAL MEDICINE

## 2023-05-08 PROCEDURE — 96367 TX/PROPH/DG ADDL SEQ IV INF: CPT

## 2023-05-08 PROCEDURE — 96415 CHEMO IV INFUSION ADDL HR: CPT

## 2023-05-08 PROCEDURE — 96375 TX/PRO/DX INJ NEW DRUG ADDON: CPT

## 2023-05-08 RX ORDER — FAMOTIDINE 10 MG/ML
20 INJECTION INTRAVENOUS
Status: COMPLETED | OUTPATIENT
Start: 2023-05-08 | End: 2023-05-08

## 2023-05-08 RX ORDER — ACETAMINOPHEN 325 MG/1
650 TABLET ORAL
Status: COMPLETED | OUTPATIENT
Start: 2023-05-08 | End: 2023-05-08

## 2023-05-08 RX ORDER — SODIUM CHLORIDE 0.9 % (FLUSH) 0.9 %
10 SYRINGE (ML) INJECTION
Status: CANCELLED | OUTPATIENT
Start: 2023-05-18

## 2023-05-08 RX ORDER — SODIUM CHLORIDE 0.9 % (FLUSH) 0.9 %
10 SYRINGE (ML) INJECTION
Status: DISCONTINUED | OUTPATIENT
Start: 2023-05-08 | End: 2023-05-08 | Stop reason: HOSPADM

## 2023-05-08 RX ORDER — ONDANSETRON 2 MG/ML
8 INJECTION INTRAMUSCULAR; INTRAVENOUS ONCE
Status: COMPLETED | OUTPATIENT
Start: 2023-05-08 | End: 2023-05-08

## 2023-05-08 RX ORDER — FAMOTIDINE 10 MG/ML
20 INJECTION INTRAVENOUS
Status: CANCELLED | OUTPATIENT
Start: 2023-05-18

## 2023-05-08 RX ORDER — ACETAMINOPHEN 325 MG/1
650 TABLET ORAL
Status: CANCELLED | OUTPATIENT
Start: 2023-05-18

## 2023-05-08 RX ADMIN — SODIUM CHLORIDE: 9 INJECTION, SOLUTION INTRAVENOUS at 08:05

## 2023-05-08 RX ADMIN — FAMOTIDINE 20 MG: 10 INJECTION INTRAVENOUS at 08:05

## 2023-05-08 RX ADMIN — DEXTROSE 100 MG: 50 INJECTION, SOLUTION INTRAVENOUS at 09:05

## 2023-05-08 RX ADMIN — RITUXIMAB 500 MG: 10 INJECTION, SOLUTION INTRAVENOUS at 10:05

## 2023-05-08 RX ADMIN — DIPHENHYDRAMINE HYDROCHLORIDE 25 MG: 50 INJECTION, SOLUTION INTRAMUSCULAR; INTRAVENOUS at 09:05

## 2023-05-08 RX ADMIN — ONDANSETRON 8 MG: 2 INJECTION INTRAMUSCULAR; INTRAVENOUS at 09:05

## 2023-05-08 RX ADMIN — ACETAMINOPHEN 650 MG: 325 TABLET ORAL at 08:05

## 2023-05-08 NOTE — PLAN OF CARE
0844-Labs , hx, and medications reviewed, patient is here for rituxan infusion. Assessment completed. Discussed plan of care with patient. Patient in agreement. Chair reclined and warm blanket and snack offered.

## 2023-05-08 NOTE — PLAN OF CARE
1318-Rituxan started at rate of 50 cc/hr and increased by 50 cc/hr every 30 minutes until it completed at the rate of 300ml/hr. Patient tolerated treatment well. PIV removed and site dressed. Discharged without complaints or S/S of adverse event. AVS given.  Instructed to call provider for any questions or concerns.

## 2023-05-11 ENCOUNTER — TELEPHONE (OUTPATIENT)
Dept: ENDOCRINOLOGY | Facility: CLINIC | Age: 63
End: 2023-05-11
Payer: MEDICARE

## 2023-05-11 NOTE — TELEPHONE ENCOUNTER
Pt says shes taking a multiviatinthat has multi vitamins included . Asked when she rt home to call back with many mgs it contains .

## 2023-05-11 NOTE — TELEPHONE ENCOUNTER
----- Message from Tessa Castle sent at 5/11/2023 10:44 AM CDT -----  Regarding: Self/  [phone]  Type: Patient Call Back    Who called:  Patient    What is the request in detail:  Patient would like a call from staff regarding the milligrams of her Vitamin D. Thank you    Would the patient rather a call back or a response via [facility]?  Call back    Best call back number:  [phone]          Thank you

## 2023-05-12 ENCOUNTER — HOSPITAL ENCOUNTER (EMERGENCY)
Facility: HOSPITAL | Age: 63
Discharge: HOME | End: 2023-05-12
Attending: INTERNAL MEDICINE
Payer: MEDICARE

## 2023-05-12 ENCOUNTER — APPOINTMENT (OUTPATIENT)
Dept: RADIOLOGY | Facility: HOSPITAL | Age: 63
End: 2023-05-12
Attending: INTERNAL MEDICINE
Payer: MEDICARE

## 2023-05-12 ENCOUNTER — TELEPHONE (OUTPATIENT)
Dept: PSYCHIATRY | Facility: CLINIC | Age: 63
End: 2023-05-12
Payer: MEDICARE

## 2023-05-12 VITALS
BODY MASS INDEX: 37.19 KG/M2 | SYSTOLIC BLOOD PRESSURE: 126 MMHG | DIASTOLIC BLOOD PRESSURE: 67 MMHG | TEMPERATURE: 98 F | HEART RATE: 57 BPM | RESPIRATION RATE: 18 BRPM | WEIGHT: 197 LBS | HEIGHT: 61 IN | OXYGEN SATURATION: 96 %

## 2023-05-12 DIAGNOSIS — K59.00 CONSTIPATION: ICD-10-CM

## 2023-05-12 DIAGNOSIS — M54.32 SCIATICA OF LEFT SIDE: Primary | ICD-10-CM

## 2023-05-12 LAB
BILIRUBIN, POC UA: NEGATIVE
BLOOD, POC UA: NEGATIVE
CLARITY, POC UA: CLEAR
COLOR, POC UA: YELLOW
GLUCOSE, POC UA: NEGATIVE
KETONES, POC UA: NEGATIVE
LEUKOCYTE EST, POC UA: ABNORMAL
NITRITE, POC UA: NEGATIVE
PH UR STRIP: 7 [PH]
PROTEIN, POC UA: NEGATIVE
SPECIFIC GRAVITY, POC UA: 1.02
UROBILINOGEN, POC UA: 0.2 E.U./DL

## 2023-05-12 PROCEDURE — 96372 THER/PROPH/DIAG INJ SC/IM: CPT | Performed by: INTERNAL MEDICINE

## 2023-05-12 PROCEDURE — 81003 URINALYSIS AUTO W/O SCOPE: CPT | Mod: ER

## 2023-05-12 PROCEDURE — 74019 RADEX ABDOMEN 2 VIEWS: CPT | Mod: 26,,, | Performed by: RADIOLOGY

## 2023-05-12 PROCEDURE — 63600175 PHARM REV CODE 636 W HCPCS: Mod: ER | Performed by: INTERNAL MEDICINE

## 2023-05-12 PROCEDURE — 99284 EMERGENCY DEPT VISIT MOD MDM: CPT | Mod: ER

## 2023-05-12 PROCEDURE — 74019 XR ABDOMEN FLAT AND ERECT: ICD-10-PCS | Mod: 26,,, | Performed by: RADIOLOGY

## 2023-05-12 PROCEDURE — 74019 RADEX ABDOMEN 2 VIEWS: CPT | Mod: TC,FY

## 2023-05-12 RX ORDER — DEXAMETHASONE SODIUM PHOSPHATE 4 MG/ML
8 INJECTION, SOLUTION INTRA-ARTICULAR; INTRALESIONAL; INTRAMUSCULAR; INTRAVENOUS; SOFT TISSUE
Status: COMPLETED | OUTPATIENT
Start: 2023-05-12 | End: 2023-05-12

## 2023-05-12 RX ORDER — SYRING-NEEDL,DISP,INSUL,0.3 ML 29 G X1/2"
296 SYRINGE, EMPTY DISPOSABLE MISCELLANEOUS ONCE
Qty: 1 EACH | Refills: 0 | Status: SHIPPED | OUTPATIENT
Start: 2023-05-12 | End: 2023-05-12

## 2023-05-12 RX ORDER — KETOROLAC TROMETHAMINE 30 MG/ML
60 INJECTION, SOLUTION INTRAMUSCULAR; INTRAVENOUS
Status: COMPLETED | OUTPATIENT
Start: 2023-05-12 | End: 2023-05-12

## 2023-05-12 RX ADMIN — DEXAMETHASONE SODIUM PHOSPHATE 8 MG: 4 INJECTION INTRA-ARTICULAR; INTRALESIONAL; INTRAMUSCULAR; INTRAVENOUS; SOFT TISSUE at 10:05

## 2023-05-12 RX ADMIN — KETOROLAC TROMETHAMINE 60 MG: 30 INJECTION, SOLUTION INTRAMUSCULAR; INTRAVENOUS at 10:05

## 2023-05-13 NOTE — ED PROVIDER NOTES
Encounter Date: 5/12/2023       History     Chief Complaint   Patient presents with    Hip Pain     L hip pain radiating down L leg; also c/o R upper thigh bruising/discoloration; pt concerned about blood clots    Abdominal Pain     L sided abd pain/L flank pain; states last bm was 3 days ago;      62-year-old female presents to the emergency department complaining of left hip pain radiating down her left leg for the past several days.  She denies trauma and states she has a past medical history significant for rheumatoid arthritis, fibromyalgia, anxiety disorder and GERD.  She states she is also concerned about a right upper thigh bruise and endorses taking blood thinning medication daily.  She denies chest pain/shortness of breath/fever/chills/nausea/vomiting.    The history is provided by the patient. No  was used.   Review of patient's allergies indicates:   Allergen Reactions    Sulfasalazine      HEPATITIS    Sulfa (sulfonamide antibiotics) Nausea And Vomiting    Clindamycin Diarrhea    Daypro [oxaprozin] Nausea Only    Orencia  [abatacept]      Other reaction(s): Muscle pain    Percocet  [oxycodone-acetaminophen]      Other reaction(s): Vomiting    Pregabalin     Simponi  [golimumab]      Other reaction(s): Unknown    Cimzia [certolizumab pegol] Rash    Ciprofloxacin Rash    Sulfamethoxazole-trimethoprim Rash     Past Medical History:   Diagnosis Date    Acid reflux     Allergy     Anxiety     Degenerative disc disease     Depression     DVT of leg (deep venous thrombosis) 2009    rt leg    Fibromyalgia     Fibromyalgia     Long-term current use of steroids 11/12/2012    NSTEMI (non-ST elevated myocardial infarction) 5/18/2019    Osteoporosis, postmenopausal     chronic steroid use    RA (rheumatoid arthritis)      Past Surgical History:   Procedure Laterality Date    ANKLE FRACTURE SURGERY      BACK SURGERY      CARPAL TUNNEL RELEASE      CHOLECYSTECTOMY      HARDWARE REMOVAL      JOINT  REPLACEMENT Right     right knee    KNEE SURGERY      Bilateral    KNEE SURGERY Left     revision x 2    PARATHYROIDECTOMY Left 9/20/2022    Procedure: PARATHYROIDECTOMY, left possible bilateral, recurrent laryngeal nerve monitoring and pth lab draws and frozen section;  Surgeon: Carly Lorenzo MD;  Location: [facility];  Service: ENT;  Laterality: Left;  NEUROMONITORING SACHA GUEVARA [phone]  RN PREOP 9/12/2022   POSITIVE COVID ON 7/27/2022----HAS CARDS CLEARANCE    SPINE SURGERY      TONSILLECTOMY      TUBAL LIGATION       Family History   Problem Relation Age of Onset    COPD Mother     Heart attack Father     Emphysema Maternal Grandfather     Breast cancer Neg Hx     Colon cancer Neg Hx     Ovarian cancer Neg Hx      Social History     Tobacco Use    Smoking status: Never    Smokeless tobacco: Never   Substance Use Topics    Alcohol use: No     Alcohol/week: 0.0 standard drinks    Drug use: No     Review of Systems   Constitutional:  Negative for chills and fever.   Respiratory:  Negative for shortness of breath.    Cardiovascular:  Negative for chest pain.   Gastrointestinal:  Negative for nausea and vomiting.   Genitourinary:  Negative for decreased urine volume, difficulty urinating, dysuria, enuresis, frequency, hematuria, pelvic pain, urgency, vaginal bleeding, vaginal discharge and vaginal pain.   Musculoskeletal:  Positive for back pain and myalgias. Negative for neck pain.   Skin:  Positive for color change.   Neurological:  Negative for dizziness and weakness.   All other systems reviewed and are negative.    Physical Exam     Initial Vitals [05/12/23 2014]   BP Pulse Resp Temp SpO2   (!) 168/114 64 20 98.2 °F (36.8 °C) 96 %      MAP       --         Physical Exam    Nursing note and vitals reviewed.  Constitutional: She is not diaphoretic. No distress.   HENT:   Head: Normocephalic and atraumatic.   Right Ear: External ear normal.   Left Ear: External ear normal.   Eyes: Conjunctivae are  normal.   Neck: Neck supple.   Normal range of motion.  Cardiovascular:  Normal rate and regular rhythm.           Pulmonary/Chest: Breath sounds normal. No respiratory distress.   Abdominal: Abdomen is soft. Bowel sounds are normal. There is no abdominal tenderness.   Musculoskeletal:         General: Normal range of motion.      Cervical back: Normal range of motion and neck supple.      Comments: Left lumbar pain upon movement with radiation to left buttock/lower extremity.  Bilateral lower extremities are neurovascularly intact.  Chronic arthritic changes of bilateral hands noted.     Neurological: She has normal strength.   Skin: Skin is warm and dry. Capillary refill takes less than 2 seconds.   Ecchymosis of anterior proximal right thigh without tenderness to palpation.  Bilateral lower extremities are not erythematous or indurated, and do not have increased warmth or tenderness to palpation.   Psychiatric: She has a normal mood and affect.       ED Course   Procedures  Labs Reviewed   POCT URINALYSIS W/O SCOPE - Abnormal; Notable for the following components:       Result Value    Leukocytes, UA 1+ (*)     All other components within normal limits   POCT URINALYSIS W/O SCOPE          Imaging Results              X-Ray Abdomen Flat And Erect (Final result)  Result time 05/12/23 21:51:35      Final result by Andrae Meyer MD (05/12/23 21:51:35)                   Impression:      Moderate stool throughout the colon and rectum.  Correlate for constipation.      Electronically signed by: Andrae Meyer  Date:    05/12/2023  Time:    21:51               Narrative:    EXAMINATION:  XR ABDOMEN FLAT AND ERECT    CLINICAL HISTORY:  Constipation, unspecified    TECHNIQUE:  Flat and erect AP views of the abdomen were performed.    COMPARISON:  None    FINDINGS:  Cholecystectomy clips.Moderate stool throughout the colon and rectum.  No small bowel obstruction.  Left nephrolithiasis.  Right iliac stent.   Postsurgical changes of the lumbosacral spine.                                       Medications   ketorolac injection 60 mg (has no administration in time range)   dexAMETHasone injection 8 mg (has no administration in time range)     Medical Decision Making:   Initial Assessment:   62-year-old female presents to the emergency department complaining of left hip pain radiating down her left leg for the past several days.  She denies trauma and states she has a past medical history significant for rheumatoid arthritis, fibromyalgia, anxiety disorder and GERD.  She states she is also concerned about a right upper thigh bruise and endorses taking blood thinning medication daily.  She denies chest pain/shortness of breath/fever/chills/nausea/vomiting.  ED Management:  These are abdomen showed no acute abnormalities.  Urinalysis shows 1+ leukocytes, but patient denies any symptoms of urinary tract infection.  Instructions for sciatica were given and patient was advised to follow-up with her primary care physician within the next week for re-evaluation/return to the emergency department if condition worsens.                        Clinical Impression:   Final diagnoses:  [K59.00] Constipation  [M54.32] Sciatica of left side (Primary)        ED Disposition Condition    Discharge Stable          ED Prescriptions       Medication Sig Dispense Start Date End Date Auth. Provider    magnesium citrate solution (Expires today) Take 296 mLs by mouth once. for 1 dose 1 each 5/12/2023 5/12/2023 Aly Henry MD          Follow-up Information       Follow up With Specialties Details Why Contact Info    Tani Chan MD Family Medicine Schedule an appointment as soon as possible for a visit in 3 days For reevaluation [address] [place] [address]  [phone]               Aly Henry MD  05/12/23 [id number]

## 2023-05-13 NOTE — ED NOTES
Pt aaox4 c/o pain under left breast that started on today. Pt states that the pain is a sharp sensation. Pt rates pain 5/10. Pt denies sob. Pt also reports left hip pain radiating  down her leg. Pt denies any injury/trauma. Pt has no other complaints at this time. Nadn. Pt has hx of blood clots. Pt states that she has been taking her eliquis as prescribed

## 2023-05-15 ENCOUNTER — TELEPHONE (OUTPATIENT)
Dept: PSYCHIATRY | Facility: CLINIC | Age: 63
End: 2023-05-15
Payer: MEDICARE

## 2023-05-15 NOTE — TELEPHONE ENCOUNTER
Pt called, lvm regarding her missed appt. Returned call, pt was in the ED day of appt. Appt r/s for 5/17 @ 10am w/ pt. No concerns voiced.

## 2023-05-17 ENCOUNTER — OFFICE VISIT (OUTPATIENT)
Dept: PSYCHIATRY | Facility: CLINIC | Age: 63
End: 2023-05-17
Payer: MEDICARE

## 2023-05-17 VITALS
SYSTOLIC BLOOD PRESSURE: 146 MMHG | OXYGEN SATURATION: 95 % | HEIGHT: 61 IN | DIASTOLIC BLOOD PRESSURE: 87 MMHG | HEART RATE: 68 BPM | BODY MASS INDEX: 36.71 KG/M2 | WEIGHT: 194.44 LBS

## 2023-05-17 DIAGNOSIS — F33.2 SEVERE EPISODE OF RECURRENT MAJOR DEPRESSIVE DISORDER, WITHOUT PSYCHOTIC FEATURES: Primary | ICD-10-CM

## 2023-05-17 DIAGNOSIS — F41.1 GAD (GENERALIZED ANXIETY DISORDER): ICD-10-CM

## 2023-05-17 DIAGNOSIS — F51.04 CHRONIC INSOMNIA: ICD-10-CM

## 2023-05-17 DIAGNOSIS — M79.7 FIBROMYALGIA: ICD-10-CM

## 2023-05-17 PROCEDURE — 99215 PR OFFICE/OUTPT VISIT, EST, LEVL V, 40-54 MIN: ICD-10-PCS | Mod: ,,,

## 2023-05-17 PROCEDURE — 90833 PR PSYCHOTHERAPY W/PATIENT W/E&M, 30 MIN (ADD ON): ICD-10-PCS | Mod: ,,,

## 2023-05-17 PROCEDURE — 1160F RVW MEDS BY RX/DR IN RCRD: CPT | Mod: CPTII,,,

## 2023-05-17 PROCEDURE — 1159F MED LIST DOCD IN RCRD: CPT | Mod: CPTII,,,

## 2023-05-17 PROCEDURE — 3008F BODY MASS INDEX DOCD: CPT | Mod: CPTII,,,

## 2023-05-17 PROCEDURE — 3079F DIAST BP 80-89 MM HG: CPT | Mod: CPTII,,,

## 2023-05-17 PROCEDURE — 99999 PR PBB SHADOW E&M-EST. PATIENT-LVL II: CPT | Mod: PBBFAC,,,

## 2023-05-17 PROCEDURE — 3079F PR MOST RECENT DIASTOLIC BLOOD PRESSURE 80-89 MM HG: ICD-10-PCS | Mod: CPTII,,,

## 2023-05-17 PROCEDURE — 1159F PR MEDICATION LIST DOCUMENTED IN MEDICAL RECORD: ICD-10-PCS | Mod: CPTII,,,

## 2023-05-17 PROCEDURE — 3008F PR BODY MASS INDEX (BMI) DOCUMENTED: ICD-10-PCS | Mod: CPTII,,,

## 2023-05-17 PROCEDURE — 3077F PR MOST RECENT SYSTOLIC BLOOD PRESSURE >= 140 MM HG: ICD-10-PCS | Mod: CPTII,,,

## 2023-05-17 PROCEDURE — 3077F SYST BP >= 140 MM HG: CPT | Mod: CPTII,,,

## 2023-05-17 PROCEDURE — 99999 PR PBB SHADOW E&M-EST. PATIENT-LVL II: ICD-10-PCS | Mod: PBBFAC,,,

## 2023-05-17 PROCEDURE — 90833 PSYTX W PT W E/M 30 MIN: CPT | Mod: ,,,

## 2023-05-17 PROCEDURE — 99215 OFFICE O/P EST HI 40 MIN: CPT | Mod: ,,,

## 2023-05-17 PROCEDURE — 1160F PR REVIEW ALL MEDS BY PRESCRIBER/CLIN PHARMACIST DOCUMENTED: ICD-10-PCS | Mod: CPTII,,,

## 2023-05-17 RX ORDER — TRAZODONE HYDROCHLORIDE 100 MG/1
200 TABLET ORAL NIGHTLY
Qty: 60 TABLET | Refills: 1 | Status: SHIPPED | OUTPATIENT
Start: 2023-05-17 | End: 2023-07-16

## 2023-05-17 RX ORDER — DULOXETIN HYDROCHLORIDE 60 MG/1
60 CAPSULE, DELAYED RELEASE ORAL DAILY
Qty: 30 CAPSULE | Refills: 1 | Status: SHIPPED | OUTPATIENT
Start: 2023-05-17 | End: 2023-07-17

## 2023-05-17 NOTE — PROGRESS NOTES
"Outpatient Psychiatry Follow-Up Visit   5/17/2023    Clinical Status of Patient:  Outpatient (Ambulatory)    Chief Complaint:  Robert Smith is a 62 y.o. female who presents today for follow-up of depression and anxiety.  Met with patient.      Interval History and Content of Current Session 05/17/2023:    Pt is A+Ox 4.  Patients mood is "not good", affect appears blunted, guarded, sad, anxious. Pts thought process is normal and logical.  Pts speech is normal tone, normal rate, normal pitch, normal volume   Logical, cooperative, poor eye contact, psychomotor retardation.  Pt is tearful during interview. Pt is casually dressed and well groomed.      The patient is tearful throughout appointment. Patient states that she has not been doing well on her current medication regiment. Patient endorses 4 hours of sleep per night on Trazodone 100MG QHS. Patient has been on other medications for sleep in the past but has stopped taking them due to I don't want to be on medication.  Patient states that at this time she is willing to increase Trazodone before considering other options.    Patient endorses increased anxiety and depression related to current health conditions and  's health. Patient states that her weeks are filled with doctors appointments. Patient is currently endorsing memory issues. Patient states that she has been more involved in her [facility] Patient states that she enjoys the center and will continue to go.    Reports depression today as 10/10, and anxiety as 10/10.    Pt reports taking medications as prescribed and behaving appropriately during interview today.  Denies SI/HI/AVH. Denies side effects of medications.  Pt reports sleeping poor and normal appetite.     Denies recreational drug use. Pt reports 0 drinks per week, denies tobacco use, denies Vaping, denies Caffeine.      Past Med   Elavil    Prior visit :  Psych Interview 04/05/2023:   Robert Smith is a 62 " "y.o. female with past psychiatric history of insomnia  presented to for initial evaluation and treatment for insomnia.     Pt is A+Ox 4.  Patients mood is "fine", affect appears blunted, guarded, sad, anxious. Pts thought process is normal and logical.  Pts speech is normal tone, normal rate, normal pitch, normal volume   Linear and logical, friendly and cooperative, poor eye contact, no psychomotor retardation.  Pt is calmly seated in chair during interview.  Pt is casually dressed and well groomed.  Pt is tearful during interview.       Pt has a PMH of LONNIE - not on CPAP(currently in the process of receiving one) and RA     Patient states that she has been experiencing acute on chronic insomnia, anxiety, and depression.  Symptoms began decades ago, symptoms began worsening three years ago after the passing of her mother and brother. Patient states that she has experienced the loss of multiple family members since then. Patient states that her biggest stressor currently is lack of sleep. Patient states that she sleeps 2 to 3 hours a night. States that she is unable to take a nap. Patient states that during the night her mind races, states that she is worrying about her medical condition along with her 's health. Patient states that her  is health has deteriorated recently, currently he is on dialysis.     Patient states that she has three children but is not close with them.  Patient states that her coping skills Jerilyn of attending the [employer], states that she has not been in over a month.  Pt has not seen a talk therapist, at this time patient states that she is not interested. Will reassess during next appointment.      Denies prior hx of psychiatric hospitalizations. denies hx of suicide attempts. Pt denies hx self harm. Pt denies hx hallucinations.  Pt denies hx of eating disorders.   Pt denies hx trauma. denies physical/sexual abuse. Pt denies symptoms including nightmares, " "hypervigilance, flashbacks, avoidance behaviors, and disassociation.     Reports depression today as 5/10, and anxiety as 7/10.  Reports sleeping 2 hrs per night, and ok appetite.   Denies SI/HI/AVH. Denies side effects of medications.  Pt states that there support consists of oldest daughter - lives in [place]     Denies recreational drug use. Pt reports 0 drinks per week, denies tobacco use, denies Vaping, denies Caffeine.       Current Medication:  None     Past meds  Trazodone   Cymbalta - states that she stopped after 1 week due to "not feeling anything"     DX:  The patient complained of depressed mood with lethargy, decreased appetite , insomnia, psycho-motor retardation, anhedonia, apathy, worsening self-esteem, guilt, decreased concentration and ability to make decisions     Pt denies hx symptoms/episodes of mona.     Admits to symptoms of anxiety including excessive anxiety/worry/fear, more days than not, about numerous issues, difficulty controlling the worry, over thinking, rumination, restlessness, poor concentration, fatigue, and increased irritability. Denies panic attacks at this time.     Past Psychiatric History:   Previous Psychiatric Hospitalizations: NO  Previous Medication Trials: NO  History of psychotherapy:  NO  Previous Suicide Attempts: NO  History of Violence:  NO  History of physical/sexual abuse: NO  Outpatient psychiatric provider(past): NO     Substance Abuse History:   Tobacco: NO  Alcohol: NO  Illicit Substances: NO  Detox/Rehab: NO     Neurological History:   Seizures: NO  Head trauma: NO     Family Psychiatric History: Yes   Mother- anxiety      Social History:  Developmental/Childhood:Achieved all developmental milestones timely  *Education: 10 grade  Employment Status/Finances:Disabled   Relationship Status/Sexual Orientation: : Relationship intact  Children: 3  Housing Status: Home    history:  NO  Access to gun: NO  Confucianism: Mandaen  Recreational activities: " [employer]  Person patient is closest to/confides in: daughter in [place]     Legal History:   Past Charges/Incarcerations:  No      Review of Systems     Review of Systems   Constitutional:  Negative for weight loss.   HENT:  Negative for tinnitus.    Eyes:  Negative for blurred vision.   Respiratory:  Negative for cough and shortness of breath.    Cardiovascular:  Negative for chest pain.   Gastrointestinal:  Negative for abdominal pain.   Genitourinary:  Negative for dysuria.   Musculoskeletal:  Negative for back pain and neck pain.   Skin:  Negative for rash.   Neurological:  Negative for dizziness, seizures and weakness.   Psychiatric/Behavioral:  Positive for depression and memory loss. Negative for hallucinations, substance abuse and suicidal ideas. The patient is nervous/anxious and has insomnia.      Psychiatric Review Of Systems - Is patient experiencing or having changes in:  sleep: yes  appetite: no  weight: no  energy/anergy: no  interest/pleasure/anhedonia: yes  somatic symptoms: no  libido: no  anxiety/panic: yes  guilty/hopelessness: no  concentration: yes  S.I.B.s/risky behavior: no  Irritability: no  Racing thoughts: no  Impulsive behaviors: no  Paranoia: no  AVH: no      Past Medical, Family and Social History: The patient's past medical, family and social history have been reviewed and updated as appropriate within the electronic medical record - see encounter notes.      Current Medications:   Medication List with Changes/Refills   New Medications    DULOXETINE (CYMBALTA) 60 MG CAPSULE    Take 1 capsule (60 mg total) by mouth once daily.    TRAZODONE (DESYREL) 100 MG TABLET    Take 2 tablets (200 mg total) by mouth every evening.   Current Medications    ACETAMINOPHEN (TYLENOL) 500 MG TABLET    Take 1 tablet (500 mg total) by mouth every 6 (six) hours as needed for Pain (As needed for mild-to-moderate pain).    ALBUTEROL SULFATE 90 MCG/ACTUATION AEPB    Inhale 1 puff into the lungs every 4  (four) hours as needed. Rescue    AMITRIPTYLINE (ELAVIL) 50 MG TABLET    Take 1 tablet (50 mg total) by mouth every evening.    AZELASTINE (ASTELIN) 137 MCG (0.1 %) NASAL SPRAY    1 spray (137 mcg total) by Nasal route 2 (two) times daily.    CYANOCOBALAMIN 1,000 MCG/ML INJECTION    INJECT 1000 MCG (1ML) AS DIRECTED EVERY 28 DAYS    DICYCLOMINE (BENTYL) 10 MG CAPSULE    Take 20 mg by mouth 2 (two) times daily as needed.    DIPHENHYDRAMINE (BENADRYL) 25 MG CAPSULE    Take 1 capsule (25 mg total) by mouth every 6 (six) hours as needed for Itching, Allergies or Insomnia.    ELIQUIS 5 MG TAB    Take 5 mg by mouth 2 (two) times daily.    FAMOTIDINE (PEPCID) 40 MG TABLET    Take 40 mg by mouth.    FLUTICASONE PROPIONATE (FLONASE) 50 MCG/ACTUATION NASAL SPRAY    2 sprays (100 mcg total) by Each Nostril route 2 (two) times daily.    HYDROCODONE-ACETAMINOPHEN (NORCO)  MG PER TABLET    Take 1 tablet by mouth every 6 (six) hours as needed for Pain (As needed for severe 10/10 pain).    MECLIZINE (ANTIVERT) 25 MG TABLET    TAKE 1 TABLET BY MOUTH 3 (THREE) TIMES DAILY AS NEEDED FOR DIZZINESS    METHOCARBAMOL (ROBAXIN) 500 MG TAB    Take 500 mg by mouth 2 (two) times a day.    METOPROLOL SUCCINATE (TOPROL-XL) 25 MG 24 HR TABLET    Take 12.5 mg by mouth once daily.    MONTELUKAST (SINGULAIR) 10 MG TABLET    Take 10 mg by mouth every evening.    MULTIVITAMIN CAPSULE    Take 1 capsule by mouth once daily.    ONDANSETRON (ZOFRAN) 8 MG TABLET    Take 8 mg by mouth every 8 (eight) hours as needed for Nausea.    PANTOPRAZOLE (PROTONIX) 40 MG TABLET    Take 40 mg by mouth once daily.    POLYETHYLENE GLYCOL (GLYCOLAX) 17 GRAM/DOSE POWDER    Take 17 g by mouth daily as needed.    RITUXIMAB (RITUXAN) 10 MG/ML INJECTION    Inject into the vein.    SODIUM CHLORIDE FOR INHALATION (SODIUM CHLORIDE 0.9%) 0.9 % NEBULIZER SOLUTION        TIZANIDINE (ZANAFLEX) 2 MG TABLET    Take 2 mg by mouth nightly as needed.    UNABLE TO FIND    medication  "name: Wellcana 300mg THC   300mg CBD  drops   nightly   Discontinued Medications    DULOXETINE (CYMBALTA) 30 MG CAPSULE    TAKE 1 CAPSULE BY MOUTH EVERY DAY    TRAZODONE (DESYREL) 50 MG TABLET    TAKE 2 TABLETS (100 MG TOTAL) BY MOUTH EVERY EVENING         Allergies:   Review of patient's allergies indicates:   Allergen Reactions    Sulfasalazine      HEPATITIS    Sulfa (sulfonamide antibiotics) Nausea And Vomiting    Clindamycin Diarrhea    Daypro [oxaprozin] Nausea Only    Orencia  [abatacept]      Other reaction(s): Muscle pain    Percocet  [oxycodone-acetaminophen]      Other reaction(s): Vomiting    Pregabalin     Simponi  [golimumab]      Other reaction(s): Unknown    Cimzia [certolizumab pegol] Rash    Ciprofloxacin Rash    Sulfamethoxazole-trimethoprim Rash         Vitals   Vitals:    05/17/23 0946   BP: (!) 146/87   Pulse: 68          Labs/Imaging/Studies:   No results found for this or any previous visit (from the past 48 hour(s)).   No results found for: PHENYTOIN, PHENOBARB, VALPROATE, CBMZ    Compliance: yes    Side effects: None    Risk Parameters:  Patient reports no suicidal ideation  Patient reports no homicidal ideation  Patient reports no self-injurious behavior  Patient reports no violent behavior    Exam (detailed: at least 9 elements; comprehensive: all 15 elements)   Constitutional  Vitals:  Most recent vital signs, dated less than 90 days prior to this appointment, were reviewed.   Vitals:    05/17/23 0946   BP: (!) 146/87   Pulse: 68   SpO2: 95%   Weight: 88.2 kg (194 lb 7.1 oz)   Height: 5' 1" (1.549 m)        General:  unremarkable, age appropriate     Musculoskeletal  Muscle Strength/Tone:  no spasicity, no rigidity, no cogwheeling, no flaccidity, no paratonia, no dyskinesia, no dystonia, no tremor, no tic, no choreoathetosis, no atrophy   Gait & Station:  non-ataxic     Psychiatric  Speech:  no latency; no press   Mood & Affect:  anxious, sad  blunted, guarded, sad, anxious   Thought " Process:  normal and logical   Associations:  intact   Thought Content:  normal, no suicidality, no homicidality, delusions, or paranoia   Insight:  intact, has awareness of illness   Judgement: behavior is adequate to circumstances, age appropriate   Orientation:  grossly intact, person, place, situation, time/date   Memory: intact for content of interview, grossly intact, memory >3 objects at five mins, able to remember recent events- Yes, able to remember remote events- Yes   Language: grossly intact, able to name, able to repeat   Attention Span & Concentration:  able to focus, completed tasks   Fund of Knowledge:  intact and appropriate to age and level of education, familiar with aspects of current personal life     Assessment and Diagnosis   Status/Progress: Based on the examination today, the patient's problem(s) is/are inadequately controlled.  New problems have not been presented today.       General Impression:      ICD-10-CM ICD-9-CM   1. Severe episode of recurrent major depressive disorder, without psychotic features  F33.2 296.33   2. JULIA (generalized anxiety disorder)  F41.1 300.02   3. Fibromyalgia  M79.7 729.1   4. Chronic insomnia  F51.04 780.52       Intervention/Counseling/Treatment Plan   Mood   Increase Cymbalta 60mg QAM - targeting anxiety and depression   Increase Trazodone QHS              Week 1 Increase Trazodone 150mg QHS              Week 2 If having difficulty sleeping can increase to Trazodone 200mg QHS     Pt has a history of taking Cymbalta for a brief period of time.  Will retrial due to Pts RA Hx.  Will consider Zoloft in future     At this time Pt is not interested in talk therapy. Will reassess next appt.     MOCA performed in clinic   5/17/2023- 20/30 - Pt memory could be due to anxiety/depression/insomnia.  Will continually address as appropriate.      Discussed diagnosis, risks and benefits of proposed treatment above vs alternative treatments vs no treatment, and potential  side effects of these treatments, and the inherent unpredictability of individual response to treatment.  The patient expresses understanding and gives informed consent to pursue treatment.  The potential benefits outweigh the potential risks. Patient has no other questions. Risks/adverse effects discussed at this time including but not limited to: GI side effects, sexual dysfunction, activation vs sedation, triggering of suicidal thoughts, and serotonin syndrome.    Serotonin syndrome   Mental status changes can include anxiety, restlessness, disorientation, and agitated delirium.    Autonomic manifestations can include diaphoresis, tachycardia, hyperthermia, hypertension, vomiting, and diarrhea   Neuromuscular hyperactivity can manifest as tremor, myoclonus, hyperreflexia, rigidity, hyperthermia, seizure, and bilateral Babinski sign.   Pt was informed that if they experience any of these symptoms to go the ED.     Difficulty Sleeping Behavioral Modification:  Implement stimulus control: [place] bedroom for sleep only. Leave bedroom when frustrated from not sleeping. Engage in relaxation before returning. Engage in activities during the day. AVOID >7-8 h time in bed  Avoid clock watching  Avoid thinking/worrying about sleep when trying to fall asleep  Limit caffeinated consumption  Make sure the bedroom is dark, quiet and cool    Safety Plan   Patient voices understanding and agreement with this plan  Provided crisis numbers  Encouraged patient to keep future appointments.  Instructed patient to call or message with questions or concerns  In the event of an emergency, including suicidal ideation, patient was advised to go to the emergency room and/or call 911    Return to Clinic: 1 month    Psychotherapy:  Target symptoms: depression, anxiety   Why chosen therapy is appropriate versus another modality: relevant to diagnosis, evidence based practice  Outcome monitoring methods: self-report, observation  Therapeutic  intervention type: insight oriented psychotherapy, supportive psychotherapy, interactive psychotherapy  Topics discussed/themes: relationships difficulties, difficulty managing affect in interpersonal relationships, building skills sets for symptom management, symptom recognition  The patient's response to the intervention is guarded. The patient's progress toward treatment goals is fair.   Duration of intervention: 17 minutes.    Total face to face time: 50 min  Total time (chart review, patient contact, documentation): 67 min    A diagnostic psychiatric evaluation was performed and responsiveness to treatment was assessed.  The patient demonstrates adequate ability/capacity to respond to treatment.    Robert Machado PA-C    *This note has been prepared using a combination of a dictation device and typing.  It has been checked for errors but some errors may still exist within the note as a result of speech recognition errors and/or typographical errors.

## 2023-05-29 ENCOUNTER — OFFICE VISIT (OUTPATIENT)
Dept: RHEUMATOLOGY | Facility: CLINIC | Age: 63
End: 2023-05-29
Payer: MEDICARE

## 2023-05-29 VITALS
WEIGHT: 202.81 LBS | SYSTOLIC BLOOD PRESSURE: 135 MMHG | DIASTOLIC BLOOD PRESSURE: 79 MMHG | HEART RATE: 59 BPM | BODY MASS INDEX: 38.29 KG/M2 | HEIGHT: 61 IN

## 2023-05-29 DIAGNOSIS — Z79.899 OTHER LONG TERM (CURRENT) DRUG THERAPY: Primary | ICD-10-CM

## 2023-05-29 DIAGNOSIS — M20.031 SWAN-NECK DEFORMITY OF FINGER OF BOTH HANDS: ICD-10-CM

## 2023-05-29 DIAGNOSIS — M05.79 RHEUMATOID ARTHRITIS INVOLVING MULTIPLE SITES WITH POSITIVE RHEUMATOID FACTOR: ICD-10-CM

## 2023-05-29 DIAGNOSIS — M79.7 FIBROMYALGIA: ICD-10-CM

## 2023-05-29 DIAGNOSIS — M54.32 SCIATICA OF LEFT SIDE: ICD-10-CM

## 2023-05-29 DIAGNOSIS — Z79.899 HIGH RISK MEDICATION USE: ICD-10-CM

## 2023-05-29 DIAGNOSIS — M81.0 AGE-RELATED OSTEOPOROSIS WITHOUT CURRENT PATHOLOGICAL FRACTURE: ICD-10-CM

## 2023-05-29 DIAGNOSIS — M20.032 SWAN-NECK DEFORMITY OF FINGER OF BOTH HANDS: ICD-10-CM

## 2023-05-29 PROCEDURE — 3075F SYST BP GE 130 - 139MM HG: CPT | Mod: CPTII,S$GLB,, | Performed by: INTERNAL MEDICINE

## 2023-05-29 PROCEDURE — 1160F RVW MEDS BY RX/DR IN RCRD: CPT | Mod: CPTII,S$GLB,, | Performed by: INTERNAL MEDICINE

## 2023-05-29 PROCEDURE — 3008F PR BODY MASS INDEX (BMI) DOCUMENTED: ICD-10-PCS | Mod: CPTII,S$GLB,, | Performed by: INTERNAL MEDICINE

## 2023-05-29 PROCEDURE — 3078F DIAST BP <80 MM HG: CPT | Mod: CPTII,S$GLB,, | Performed by: INTERNAL MEDICINE

## 2023-05-29 PROCEDURE — 3075F PR MOST RECENT SYSTOLIC BLOOD PRESS GE 130-139MM HG: ICD-10-PCS | Mod: CPTII,S$GLB,, | Performed by: INTERNAL MEDICINE

## 2023-05-29 PROCEDURE — 1160F PR REVIEW ALL MEDS BY PRESCRIBER/CLIN PHARMACIST DOCUMENTED: ICD-10-PCS | Mod: CPTII,S$GLB,, | Performed by: INTERNAL MEDICINE

## 2023-05-29 PROCEDURE — 1159F MED LIST DOCD IN RCRD: CPT | Mod: CPTII,S$GLB,, | Performed by: INTERNAL MEDICINE

## 2023-05-29 PROCEDURE — 3078F PR MOST RECENT DIASTOLIC BLOOD PRESSURE < 80 MM HG: ICD-10-PCS | Mod: CPTII,S$GLB,, | Performed by: INTERNAL MEDICINE

## 2023-05-29 PROCEDURE — 3008F BODY MASS INDEX DOCD: CPT | Mod: CPTII,S$GLB,, | Performed by: INTERNAL MEDICINE

## 2023-05-29 PROCEDURE — 99214 PR OFFICE/OUTPT VISIT, EST, LEVL IV, 30-39 MIN: ICD-10-PCS | Mod: S$GLB,,, | Performed by: INTERNAL MEDICINE

## 2023-05-29 PROCEDURE — 99214 OFFICE O/P EST MOD 30 MIN: CPT | Mod: S$GLB,,, | Performed by: INTERNAL MEDICINE

## 2023-05-29 PROCEDURE — 99999 PR PBB SHADOW E&M-EST. PATIENT-LVL IV: ICD-10-PCS | Mod: PBBFAC,,, | Performed by: INTERNAL MEDICINE

## 2023-05-29 PROCEDURE — 99999 PR PBB SHADOW E&M-EST. PATIENT-LVL IV: CPT | Mod: PBBFAC,,, | Performed by: INTERNAL MEDICINE

## 2023-05-29 PROCEDURE — 1159F PR MEDICATION LIST DOCUMENTED IN MEDICAL RECORD: ICD-10-PCS | Mod: CPTII,S$GLB,, | Performed by: INTERNAL MEDICINE

## 2023-05-29 RX ORDER — LACTULOSE 10 G/15ML
SOLUTION ORAL; RECTAL
COMMUNITY
Start: 2023-03-02

## 2023-05-29 ASSESSMENT — ROUTINE ASSESSMENT OF PATIENT INDEX DATA (RAPID3)
AM STIFFNESS SCORE: 0, NO
PATIENT GLOBAL ASSESSMENT SCORE: 8
FATIGUE SCORE: 7.5
MDHAQ FUNCTION SCORE: 1
PSYCHOLOGICAL DISTRESS SCORE: 7.7
TOTAL RAPID3 SCORE: 6.61
PAIN SCORE: 8.5

## 2023-05-29 NOTE — ASSESSMENT & PLAN NOTE
Continues with fatigue, sleep disorder, diffuse pain and hyperalgesia (nociplastic pain)    She is now on duloxetine and trazodone, so far with little improvement    I tried to re-explain concept of central sensitization

## 2023-05-29 NOTE — ASSESSMENT & PLAN NOTE
This is active and my be due to degenerative lumbar disease but would also consider compression fx

## 2023-05-29 NOTE — PROGRESS NOTES
Subjective:       Patient ID: Robert Smith is a 62 y.o. female.    Chief Complaint: Disease Management    HPI      RA and FMS that presents for follow-up.     Hx RA dx 2003; dx at [facility] ; started with hands and all over body  On prednisone 5 mg/d since 2003  Rx here 2007 Dr Max and Chung Rx MTX and Humira  MTX nausea with po and SQ  LFA tried 2007 by Dr Terry and she did not tolerate with nausea and hospitalization  2008 Dr Mann retried MTX; did not tolerate  2010-12 Dr Rosenbaum treated with Plaquenil(HCQ)  Humira quit working 2010 2010 Simponi tried - caused weak and shaky reaction  2011 Orencia tried - caused muscle aches, shakes and jaw pain  2011 Enbrel tried - did not work  Dr Paris [id number];  Had Rituxin 2016  Earlier 2017 took Xeljanz for a few months; had a lot of nausea and she felt arms and hands getting numb  SSZ July 2017 caused nausea and hepatitis   ENBREL retried  Aug 2017 - stopped Dec due to lack of effectiveness  Cimzia Jan 2018 caused urticarial rash  Actemra March 2018 - Oct 2019 with partial improvement  Nov 2019 started upadacitinib/Rinvoq; stopped April 2020 due to side effects  RTX  July/Aug 2020; Jan/Feb 2021 ; Oct 14, 2021 , Oct 20, 2022; 5/8/23     Also Prolia 9/1/22 per Dr Vidal;  3/9/23 Prolia held due to jaw surgery   TKP 2009 and 2012    C/o Low back and LLE pain; has appointment with ortho [facility]     Says was walking and feeling better before rituximab     Has had pain shoulders, elbows, knuckles hurt and feet  Joint swelling in L foot  Stopped walking due to LLE; burning pain    Psychiatrist prescribed trazodone for sleep  Also c/o difficulty memory    March lab CMP ok, CBC notable for platelets 140, ESR 18, CRP 4.8    Review of Systems   Constitutional:  Positive for fatigue. Negative for fever and unexpected weight change.   HENT:  Negative for mouth sores and trouble swallowing.         Dry mouth   Eyes:  Positive for redness.        Dry eyes   Respiratory:  Negative for cough and  "shortness of breath.    Cardiovascular:  Negative for chest pain.   Gastrointestinal:  Positive for constipation. Negative for abdominal pain and diarrhea.   Genitourinary:  Negative for dysuria and genital sores.   Skin:  Negative for color change and rash.   Neurological:  Negative for headaches.   Hematological:  Negative for adenopathy. Bruises/bleeds easily.   Psychiatric/Behavioral:  Positive for sleep disturbance.        Objective:   /79   Pulse (!) 59   Ht 5' 1" (1.549 m)   Wt 92 kg (202 lb 13.2 oz)   BMI 38.32 kg/m²      Physical Exam      1/28/2022 5/27/2022 2/6/2023 5/29/2023   Tender (HERNANDEZ-28) 1 / 28 4 / 28 2 / 28 6 / 28    Swollen (HERNANDEZ-28) 0 / 28 1 / 28 1 / 28 4 / 28    Provider Global 20 mm 20 mm 10 mm 20 mm   Patient Global 40 mm 70 mm 80 mm 80 mm   ESR 4 mm/hr 22 mm/hr 22 mm/hr 18 mm/hr   CRP 2.7 mg/L 4.7 mg/L 4.7 mg/L 4.8 mg/L   HERNANDEZ-28 (ESR) 2.09 (Remission) 4.54 (Moderate disease activity) 4.36 (Moderate disease activity) 5.07 (Moderate disease activity)   HERNANDEZ-28 (CRP) 2.55 (Remission) 3.97 (Moderate disease activity) 3.78 (Moderate disease activity) 4.64 (Moderate disease activity)   CDAI Score 7  14  12  20      Lab Results   Component Value Date    SEDRATE 18 03/09/2023          Assessment:       1. Other long term (current) drug therapy    2. Rheumatoid arthritis involving multiple sites with positive rheumatoid factor    3. High risk medication use    4. Fibromyalgia    5. [place]-neck deformity of finger of both hands    6. Sciatica of left side    7. Age-related osteoporosis without current pathological fracture            Plan:       Problem List Items Addressed This Visit          Active Problems    Rheumatoid arthritis with positive rheumatoid factor     She has ongoing pain more likely attributed to fibromyalgia and lumbar degenerative spondylosis and facet OA; has ortho appointment for which imaging will be important to r/o compression fractures as well as she is 2 mo " overdue for Prolia injection    The minimal synovitis suggests that rituximab is effective so will plan to repeat lab in mid-rituximab cycle (August and anticipate continuing 500 mg IV q6m maintenance    RTC me Sept  Anticipate next rituximab Nov          High risk medication use     No interval infections     Will repeat labs in Aug (3 mo after rituximab)    Will recommend immunosuppressive immunization schedule         Fibromyalgia     Continues with fatigue, sleep disorder, diffuse pain and hyperalgesia (nociplastic pain)    She is now on duloxetine and trazodone, so far with little improvement    I tried to re-explain concept of central sensitization           [place]-neck deformity of finger of both hands     Hand deformities due to RA with little active synovitis now         Osteoporosis     She is overdue for Prolia which was delayed when she considered jaw surgery but now she reports that she is not proceeding with this so I advised her to reschedule Prolia injection as rapid bone loss is likely with interruption of q6m schedule          Sciatica of left side     This is active and my be due to degenerative lumbar disease but would also consider compression fx          Other Visit Diagnoses       Other long term (current) drug therapy    -  Primary    Relevant Orders    C-Reactive Protein    Sedimentation rate

## 2023-05-29 NOTE — ASSESSMENT & PLAN NOTE
No interval infections     Will repeat labs in Aug (3 mo after rituximab)    Will recommend immunosuppressive immunization schedule

## 2023-05-29 NOTE — ASSESSMENT & PLAN NOTE
She has ongoing pain more likely attributed to fibromyalgia and lumbar degenerative spondylosis and facet OA; has ortho appointment for which imaging will be important to r/o compression fractures as well as she is 2 mo overdue for Prolia injection    The minimal synovitis suggests that rituximab is effective so will plan to repeat lab in mid-rituximab cycle (August and anticipate continuing 500 mg IV q6m maintenance    RTC me Sept  Anticipate next rituximab Nov

## 2023-05-29 NOTE — ASSESSMENT & PLAN NOTE
She is overdue for Prolia which was delayed when she considered jaw surgery but now she reports that she is not proceeding with this so I advised her to reschedule Prolia injection as rapid bone loss is likely with interruption of q6m schedule

## 2023-06-21 ENCOUNTER — TELEPHONE (OUTPATIENT)
Dept: PSYCHIATRY | Facility: CLINIC | Age: 63
End: 2023-06-21
Payer: MEDICARE

## 2023-06-21 NOTE — TELEPHONE ENCOUNTER
Pt called yesterday after hours for appt f/u & refill for Cymbalta. Returned call, appt made w/ angel for 6/28 @ 9am. Spoke w/ Mayi at [facility], they had 1 refill still on file for Cymbalta, requested to satrt fill.    Called and informed pt. No concerns voiced.

## 2023-06-29 ENCOUNTER — TELEPHONE (OUTPATIENT)
Dept: PSYCHIATRY | Facility: CLINIC | Age: 63
End: 2023-06-29
Payer: MEDICARE

## 2023-06-29 NOTE — TELEPHONE ENCOUNTER
Pt called, lvm after hours. Returned call, no answer, Lvm requesting call back to further assist.

## 2023-07-05 ENCOUNTER — TELEPHONE (OUTPATIENT)
Dept: PSYCHIATRY | Facility: CLINIC | Age: 63
End: 2023-07-05
Payer: MEDICARE

## 2023-07-05 NOTE — TELEPHONE ENCOUNTER
Pt called, cx'd appt tomorrow. Stated she will be moving to [place] and will no longer be w/ [facility]. No concerns voiced.

## 2023-07-17 RX ORDER — DULOXETIN HYDROCHLORIDE 60 MG/1
CAPSULE, DELAYED RELEASE ORAL
Qty: 30 CAPSULE | Refills: 0 | Status: SHIPPED | OUTPATIENT
Start: 2023-07-17

## 2023-07-20 ENCOUNTER — TELEPHONE (OUTPATIENT)
Dept: GASTROENTEROLOGY | Facility: CLINIC | Age: 63
End: 2023-07-20
Payer: MEDICARE

## 2023-07-20 DIAGNOSIS — Z12.11 COLON CANCER SCREENING: Primary | ICD-10-CM

## 2023-07-21 ENCOUNTER — TELEPHONE (OUTPATIENT)
Dept: ENDOSCOPY | Facility: HOSPITAL | Age: 63
End: 2023-07-21
Payer: MEDICARE

## 2023-07-21 NOTE — TELEPHONE ENCOUNTER
Called patient, rec'd referral from [facility] for Colon Screen, patient is moving to [place] in Aug and needs appointment before she leaves. Will call patient back to schedule if there are any cancellations.

## 2023-09-15 ENCOUNTER — CLINICAL SUPPORT (OUTPATIENT)
Dept: ENDOSCOPY | Facility: HOSPITAL | Age: 63
End: 2023-09-15
Attending: INTERNAL MEDICINE
Payer: MEDICARE

## 2023-09-15 ENCOUNTER — TELEPHONE (OUTPATIENT)
Dept: ENDOSCOPY | Facility: HOSPITAL | Age: 63
End: 2023-09-15

## 2023-09-15 DIAGNOSIS — Z12.11 COLON CANCER SCREENING: ICD-10-CM

## 2023-09-15 NOTE — TELEPHONE ENCOUNTER
Called patient for PAT appointment to schedule colonoscopy. Patient is moving to [place] and will have colonoscopy done there.

## 2023-09-28 ENCOUNTER — TELEPHONE (OUTPATIENT)
Dept: RHEUMATOLOGY | Facility: CLINIC | Age: 63
End: 2023-09-28
Payer: MEDICARE

## 2023-09-28 NOTE — TELEPHONE ENCOUNTER
----- Message from Erin White MA sent at 9/27/2023 10:25 AM CDT -----  Regarding: FW: Pt Advice  Contact: Pt [phone]    ----- Message -----  From: Callie Gambino  Sent: 9/27/2023   9:58 AM CDT  To: Michael RODRIGUEZ Staff  Subject: Pt Advice                                        Pt calling states she hasn't had a RITUXIMAB (RITUXAN) infusion since June and would like to get a referral to start infusions. Pt states she is in pain. Please call [phone]

## 2023-09-29 ENCOUNTER — TELEPHONE (OUTPATIENT)
Dept: RHEUMATOLOGY | Facility: HOSPITAL | Age: 63
End: 2023-09-29
Payer: MEDICARE

## 2023-09-29 DIAGNOSIS — Z79.899 HIGH RISK MEDICATION USE: ICD-10-CM

## 2023-09-29 DIAGNOSIS — M05.79 RHEUMATOID ARTHRITIS INVOLVING MULTIPLE SITES WITH POSITIVE RHEUMATOID FACTOR: Primary | ICD-10-CM

## 2023-09-29 NOTE — TELEPHONE ENCOUNTER
----- Message from Erin White MA sent at 9/29/2023  9:12 AM CDT -----  Regarding: FW: pt adivce  Contact: [phone]  I am confused to the message   ----- Message -----  From: Kymberly Oneal  Sent: 9/29/2023   8:44 AM CDT  To: Michael RODRIGUEZ Staff  Subject: pt adivce                                        Pt  calling in regards to leaving a provider number for order to be sent over for pt to have her infusion. Contact for orders to be faxed listed below Pls yobani Ward  [phone]

## 2023-09-29 NOTE — TELEPHONE ENCOUNTER
I called the office at [phone]   I spoke to Corie  She looked up patient and could not find her. She gave me the number for referral, [phone], for Dr Ward or Waqas, the rheumatologists. WD

## 2024-01-26 NOTE — TELEPHONE ENCOUNTER
----- Message from Rosalba Coronel sent at 12/30/2021  9:50 AM CST -----  Regarding: MEDICATION ADVISE  Contact: Self  Pt stated she is in a lot pain right now ask if she can take the whole prednisone Pt ask for a call      Contact info  [phone] (home)        Two to four times a month

## 2024-09-04 ENCOUNTER — TELEPHONE (OUTPATIENT)
Dept: RHEUMATOLOGY | Facility: CLINIC | Age: 64
End: 2024-09-04
Payer: MEDICARE

## 2024-09-04 NOTE — TELEPHONE ENCOUNTER
----- Message from Su Granado MA sent at 9/4/2024  3:47 PM CDT -----  Regarding: FW: Rx Advise / Records  Contact: [phone]  PT MRN [id number]    PT stated that she is currently going to a new Dr. Pt wants to know If we ever prescribed here the following medications as her new Dr. Is asking    Efrain House  Xeyjanz  Rinvoq (Tablet)  ----- Message -----  From: Yulia Burden  Sent: 9/4/2024   3:34 PM CDT  To: Michael RODRIGUEZ Staff  Subject: Rx Advise / Records                              Robert Smith calling regarding Patient Advice (message) for # pt is calling to have all her previous medications that she tried she moved and has another Dr and Dr needs all medical records and also list of medications pls advise [phone]

## 2024-09-17 NOTE — TELEPHONE ENCOUNTER
NORMA to patient.  States she is trying to get all of her medical records transferred to [place].  Patient was provided the phone number to [facility]'s medical records department.

## (undated) DEVICE — SUT 4-0 CV RB-1 UND CR

## (undated) DEVICE — SEE MEDLINE ITEM 157110

## (undated) DEVICE — SYR 10CC LUER LOCK

## (undated) DEVICE — PACK HEAD & NECK

## (undated) DEVICE — ELECTRODE REM PLYHSV RETURN 9

## (undated) DEVICE — DRAPE STERI INSTRUMENT 1018

## (undated) DEVICE — SPONGE KITTNER 1/4X 5/8 L STRL

## (undated) DEVICE — SUT VICRYL PLUS 3-0 SH 18IN

## (undated) DEVICE — POSITIONER HEAD DONUT 9IN FOAM

## (undated) DEVICE — COVER OVERHEAD SURG LT BLUE

## (undated) DEVICE — NDL HYPO REG 25G X 1 1/2

## (undated) DEVICE — TOWEL OR DISP STRL BLUE 4/PK

## (undated) DEVICE — FORCEP SPTZLR-MALIS 1.5MM 8IN

## (undated) DEVICE — BLANKET LOWER BODY 55.9X40.2IN

## (undated) DEVICE — Device

## (undated) DEVICE — PROBE PRASS SLIM

## (undated) DEVICE — SEE MEDLINE ITEM 157194

## (undated) DEVICE — CONTAINER SPECIMEN STRL 4OZ

## (undated) DEVICE — SEE MEDLINE ITEM 154981

## (undated) DEVICE — DRAIN FLAT HUBLESS FULL 10MM

## (undated) DEVICE — SEE L#120831

## (undated) DEVICE — SHEARS HARMONIC CRVD 9 CM

## (undated) DEVICE — ADHESIVE DERMABOND MINI HV

## (undated) DEVICE — SUPPORT ULNA NERVE PROTECTOR

## (undated) DEVICE — SUT 3-0 12-18IN SILK

## (undated) DEVICE — GLOVE SURGICAL LATEX SZ 6.5

## (undated) DEVICE — CANISTER SUCTION 2 LTR